# Patient Record
Sex: MALE | Race: WHITE | Employment: FULL TIME | URBAN - NONMETROPOLITAN AREA
[De-identification: names, ages, dates, MRNs, and addresses within clinical notes are randomized per-mention and may not be internally consistent; named-entity substitution may affect disease eponyms.]

---

## 2017-01-04 ENCOUNTER — OFFICE VISIT (OUTPATIENT)
Dept: PRIMARY CARE CLINIC | Facility: CLINIC | Age: 62
End: 2017-01-04

## 2017-01-04 VITALS
HEART RATE: 91 BPM | BODY MASS INDEX: 32.58 KG/M2 | RESPIRATION RATE: 14 BRPM | SYSTOLIC BLOOD PRESSURE: 143 MMHG | WEIGHT: 215 LBS | DIASTOLIC BLOOD PRESSURE: 91 MMHG | HEIGHT: 68 IN | OXYGEN SATURATION: 97 % | TEMPERATURE: 98.4 F

## 2017-01-04 DIAGNOSIS — J01.00 ACUTE NON-RECURRENT MAXILLARY SINUSITIS: Primary | ICD-10-CM

## 2017-01-04 PROCEDURE — 99213 OFFICE O/P EST LOW 20 MIN: CPT | Performed by: NURSE PRACTITIONER

## 2017-01-04 RX ORDER — AMOXICILLIN AND CLAVULANATE POTASSIUM 875; 125 MG/1; MG/1
1 TABLET, FILM COATED ORAL 2 TIMES DAILY
Qty: 14 TABLET | Refills: 0 | Status: SHIPPED | OUTPATIENT
Start: 2017-01-04 | End: 2017-01-11

## 2017-01-04 ASSESSMENT — ENCOUNTER SYMPTOMS
TROUBLE SWALLOWING: 0
COUGH: 1
SORE THROAT: 0
HEMOPTYSIS: 0
CHEST TIGHTNESS: 0
SINUS PRESSURE: 1
WHEEZING: 0
SHORTNESS OF BREATH: 0

## 2017-01-12 ENCOUNTER — OFFICE VISIT (OUTPATIENT)
Dept: UROLOGY | Facility: CLINIC | Age: 62
End: 2017-01-12

## 2017-01-12 VITALS
BODY MASS INDEX: 35.03 KG/M2 | SYSTOLIC BLOOD PRESSURE: 142 MMHG | WEIGHT: 218 LBS | DIASTOLIC BLOOD PRESSURE: 80 MMHG | HEIGHT: 66 IN

## 2017-01-12 DIAGNOSIS — N52.9 ERECTILE DYSFUNCTION, UNSPECIFIED ERECTILE DYSFUNCTION TYPE: Primary | ICD-10-CM

## 2017-01-12 PROCEDURE — 99213 OFFICE O/P EST LOW 20 MIN: CPT | Performed by: PHYSICIAN ASSISTANT

## 2017-01-25 ENCOUNTER — OFFICE VISIT (OUTPATIENT)
Dept: FAMILY MEDICINE CLINIC | Facility: CLINIC | Age: 62
End: 2017-01-25

## 2017-01-25 VITALS
WEIGHT: 222 LBS | HEIGHT: 66 IN | DIASTOLIC BLOOD PRESSURE: 86 MMHG | BODY MASS INDEX: 35.68 KG/M2 | SYSTOLIC BLOOD PRESSURE: 128 MMHG

## 2017-01-25 DIAGNOSIS — T46.4X5A ACE-INHIBITOR COUGH: ICD-10-CM

## 2017-01-25 DIAGNOSIS — F33.1 MODERATE EPISODE OF RECURRENT MAJOR DEPRESSIVE DISORDER (HCC): ICD-10-CM

## 2017-01-25 DIAGNOSIS — I10 ESSENTIAL HYPERTENSION: Primary | ICD-10-CM

## 2017-01-25 DIAGNOSIS — R05.8 ACE-INHIBITOR COUGH: ICD-10-CM

## 2017-01-25 DIAGNOSIS — E78.2 MIXED HYPERLIPIDEMIA: ICD-10-CM

## 2017-01-25 PROBLEM — F32.9 MAJOR DEPRESSION: Status: ACTIVE | Noted: 2017-01-25

## 2017-01-25 PROCEDURE — 99214 OFFICE O/P EST MOD 30 MIN: CPT | Performed by: FAMILY MEDICINE

## 2017-01-25 RX ORDER — AMLODIPINE BESYLATE 5 MG/1
5 TABLET ORAL DAILY
Qty: 30 TABLET | Refills: 5 | Status: SHIPPED | OUTPATIENT
Start: 2017-01-25 | End: 2017-08-02 | Stop reason: SDUPTHER

## 2017-01-25 RX ORDER — VENLAFAXINE HYDROCHLORIDE 150 MG/1
CAPSULE, EXTENDED RELEASE ORAL
Qty: 30 CAPSULE | Refills: 5 | Status: SHIPPED | OUTPATIENT
Start: 2017-01-25 | End: 2017-08-02 | Stop reason: SDUPTHER

## 2017-01-25 RX ORDER — LISINOPRIL 10 MG/1
10 TABLET ORAL DAILY
Qty: 30 TABLET | Refills: 5 | Status: SHIPPED | OUTPATIENT
Start: 2017-01-25 | End: 2017-01-25

## 2017-01-25 RX ORDER — LISINOPRIL 10 MG/1
TABLET ORAL
COMMUNITY
Start: 2017-01-02 | End: 2017-01-25 | Stop reason: SDUPTHER

## 2017-01-25 RX ORDER — SIMVASTATIN 20 MG
TABLET ORAL
Qty: 30 TABLET | Refills: 5 | Status: SHIPPED | OUTPATIENT
Start: 2017-01-25 | End: 2017-07-14 | Stop reason: ALTCHOICE

## 2017-01-25 ASSESSMENT — ENCOUNTER SYMPTOMS
SHORTNESS OF BREATH: 0
BLURRED VISION: 0
ORTHOPNEA: 0

## 2017-03-17 ENCOUNTER — OFFICE VISIT (OUTPATIENT)
Dept: FAMILY MEDICINE CLINIC | Age: 62
End: 2017-03-17
Payer: COMMERCIAL

## 2017-03-17 ENCOUNTER — HOSPITAL ENCOUNTER (OUTPATIENT)
Age: 62
Setting detail: SPECIMEN
Discharge: HOME | End: 2017-03-17
Payer: COMMERCIAL

## 2017-03-17 VITALS
BODY MASS INDEX: 35.36 KG/M2 | WEIGHT: 220 LBS | SYSTOLIC BLOOD PRESSURE: 136 MMHG | DIASTOLIC BLOOD PRESSURE: 82 MMHG | HEIGHT: 66 IN

## 2017-03-17 DIAGNOSIS — N41.0 ACUTE PROSTATITIS: Primary | ICD-10-CM

## 2017-03-17 DIAGNOSIS — M79.675 PAIN OF TOE OF LEFT FOOT: ICD-10-CM

## 2017-03-17 PROCEDURE — 87086 URINE CULTURE/COLONY COUNT: CPT

## 2017-03-17 PROCEDURE — 99214 OFFICE O/P EST MOD 30 MIN: CPT | Performed by: FAMILY MEDICINE

## 2017-03-17 PROCEDURE — 81002 URINALYSIS NONAUTO W/O SCOPE: CPT | Performed by: FAMILY MEDICINE

## 2017-03-17 RX ORDER — SULFAMETHOXAZOLE AND TRIMETHOPRIM 800; 160 MG/1; MG/1
1 TABLET ORAL 2 TIMES DAILY
Qty: 28 TABLET | Refills: 0 | Status: SHIPPED | OUTPATIENT
Start: 2017-03-17 | End: 2017-03-31

## 2017-03-17 ASSESSMENT — ENCOUNTER SYMPTOMS: RESPIRATORY NEGATIVE: 1

## 2017-03-18 LAB
CULTURE: NO GROWTH
CULTURE: NORMAL
Lab: NORMAL
SPECIMEN DESCRIPTION: NORMAL
SPECIMEN DESCRIPTION: NORMAL
STATUS: NORMAL

## 2017-03-20 LAB
BILIRUBIN, POC: ABNORMAL
BLOOD URINE, POC: ABNORMAL
CLARITY, POC: ABNORMAL
COLOR, POC: ABNORMAL
GLUCOSE URINE, POC: ABNORMAL
KETONES, POC: ABNORMAL
LEUKOCYTE EST, POC: ABNORMAL
NITRITE, POC: ABNORMAL
PH, POC: 5.5
PROTEIN, POC: ABNORMAL
SPECIFIC GRAVITY, POC: 1.02
UROBILINOGEN, POC: 0.2

## 2017-03-31 ENCOUNTER — OFFICE VISIT (OUTPATIENT)
Dept: FAMILY MEDICINE CLINIC | Age: 62
End: 2017-03-31
Payer: COMMERCIAL

## 2017-03-31 VITALS
BODY MASS INDEX: 35.36 KG/M2 | HEIGHT: 66 IN | TEMPERATURE: 98 F | SYSTOLIC BLOOD PRESSURE: 138 MMHG | WEIGHT: 220 LBS | OXYGEN SATURATION: 96 % | HEART RATE: 90 BPM | DIASTOLIC BLOOD PRESSURE: 82 MMHG

## 2017-03-31 DIAGNOSIS — J06.9 VIRAL URI WITH COUGH: Primary | ICD-10-CM

## 2017-03-31 PROCEDURE — 99213 OFFICE O/P EST LOW 20 MIN: CPT | Performed by: NURSE PRACTITIONER

## 2017-03-31 ASSESSMENT — ENCOUNTER SYMPTOMS
RHINORRHEA: 0
SORE THROAT: 1
DIARRHEA: 0
SINUS PAIN: 0
NAUSEA: 1
WHEEZING: 1
COUGH: 1
VOMITING: 0

## 2017-04-05 ENCOUNTER — OFFICE VISIT (OUTPATIENT)
Dept: PRIMARY CARE CLINIC | Age: 62
End: 2017-04-05
Payer: COMMERCIAL

## 2017-04-05 VITALS
TEMPERATURE: 98.1 F | DIASTOLIC BLOOD PRESSURE: 95 MMHG | WEIGHT: 219.1 LBS | OXYGEN SATURATION: 97 % | SYSTOLIC BLOOD PRESSURE: 156 MMHG | HEART RATE: 92 BPM | RESPIRATION RATE: 16 BRPM | BODY MASS INDEX: 35.36 KG/M2

## 2017-04-05 DIAGNOSIS — J01.40 ACUTE NON-RECURRENT PANSINUSITIS: Primary | ICD-10-CM

## 2017-04-05 PROCEDURE — 99213 OFFICE O/P EST LOW 20 MIN: CPT | Performed by: NURSE PRACTITIONER

## 2017-04-05 RX ORDER — AMOXICILLIN AND CLAVULANATE POTASSIUM 875; 125 MG/1; MG/1
1 TABLET, FILM COATED ORAL 2 TIMES DAILY
Qty: 20 TABLET | Refills: 0 | Status: SHIPPED | OUTPATIENT
Start: 2017-04-05 | End: 2017-04-15

## 2017-04-05 ASSESSMENT — ENCOUNTER SYMPTOMS
SORE THROAT: 1
WHEEZING: 1
NAUSEA: 1
DIARRHEA: 0
SHORTNESS OF BREATH: 0
RHINORRHEA: 1
SINUS PRESSURE: 1
COUGH: 1
VOMITING: 0
HOARSE VOICE: 0
SWOLLEN GLANDS: 0

## 2017-05-03 ENCOUNTER — OFFICE VISIT (OUTPATIENT)
Dept: FAMILY MEDICINE CLINIC | Age: 62
End: 2017-05-03
Payer: COMMERCIAL

## 2017-05-03 ENCOUNTER — HOSPITAL ENCOUNTER (OUTPATIENT)
Age: 62
Discharge: HOME | End: 2017-05-03
Payer: COMMERCIAL

## 2017-05-03 VITALS
WEIGHT: 221 LBS | SYSTOLIC BLOOD PRESSURE: 128 MMHG | BODY MASS INDEX: 35.52 KG/M2 | DIASTOLIC BLOOD PRESSURE: 86 MMHG | HEIGHT: 66 IN

## 2017-05-03 DIAGNOSIS — K62.5 RECTAL BLEEDING: ICD-10-CM

## 2017-05-03 DIAGNOSIS — R10.32 LLQ PAIN: ICD-10-CM

## 2017-05-03 DIAGNOSIS — E78.2 MIXED HYPERLIPIDEMIA: ICD-10-CM

## 2017-05-03 DIAGNOSIS — R10.32 LLQ PAIN: Primary | ICD-10-CM

## 2017-05-03 DIAGNOSIS — E78.2 MIXED HYPERLIPIDEMIA: Primary | ICD-10-CM

## 2017-05-03 LAB
ABSOLUTE EOS #: 0 K/UL (ref 0–0.4)
ABSOLUTE LYMPH #: 1 K/UL (ref 0.9–2.5)
ABSOLUTE MONO #: 0.6 K/UL (ref 0–1)
ALBUMIN SERPL-MCNC: 4.4 G/DL (ref 3.5–5.2)
ALBUMIN/GLOBULIN RATIO: ABNORMAL (ref 1–2.5)
ALP BLD-CCNC: 100 U/L (ref 40–129)
ALT SERPL-CCNC: 37 U/L (ref 5–41)
ANION GAP SERPL CALCULATED.3IONS-SCNC: 13 MMOL/L (ref 9–17)
AST SERPL-CCNC: 26 U/L
BASOPHILS # BLD: 0 %
BASOPHILS ABSOLUTE: 0 K/UL (ref 0–0.2)
BILIRUB SERPL-MCNC: 0.56 MG/DL (ref 0.3–1.2)
BUN BLDV-MCNC: 12 MG/DL (ref 8–23)
BUN/CREAT BLD: 15 (ref 9–20)
CALCIUM SERPL-MCNC: 9.2 MG/DL (ref 8.6–10.4)
CHLORIDE BLD-SCNC: 102 MMOL/L (ref 98–107)
CHOLESTEROL/HDL RATIO: 6.2
CHOLESTEROL: 218 MG/DL
CO2: 23 MMOL/L (ref 20–31)
CREAT SERPL-MCNC: 0.8 MG/DL (ref 0.7–1.2)
DIFFERENTIAL TYPE: YES
EOSINOPHILS RELATIVE PERCENT: 0 %
GFR AFRICAN AMERICAN: >60 ML/MIN
GFR NON-AFRICAN AMERICAN: >60 ML/MIN
GFR SERPL CREATININE-BSD FRML MDRD: ABNORMAL ML/MIN/{1.73_M2}
GFR SERPL CREATININE-BSD FRML MDRD: ABNORMAL ML/MIN/{1.73_M2}
GLUCOSE BLD-MCNC: 108 MG/DL (ref 70–99)
HCT VFR BLD CALC: 40.6 % (ref 41–53)
HDLC SERPL-MCNC: 35 MG/DL
HEMOGLOBIN: 13.7 G/DL (ref 13.5–17.5)
LDL CHOLESTEROL: 134 MG/DL (ref 0–130)
LYMPHOCYTES # BLD: 16 %
MCH RBC QN AUTO: 29.7 PG (ref 26–34)
MCHC RBC AUTO-ENTMCNC: 33.8 G/DL (ref 31–37)
MCV RBC AUTO: 87.9 FL (ref 80–100)
MONOCYTES # BLD: 9 %
PATIENT FASTING?: YES
PDW BLD-RTO: 14.1 % (ref 12.1–15.2)
PLATELET # BLD: 283 K/UL (ref 140–450)
PLATELET ESTIMATE: ABNORMAL
PMV BLD AUTO: ABNORMAL FL (ref 6–12)
POTASSIUM SERPL-SCNC: 4.5 MMOL/L (ref 3.7–5.3)
RBC # BLD: 4.61 M/UL (ref 4.5–5.9)
RBC # BLD: ABNORMAL 10*6/UL
SEG NEUTROPHILS: 75 %
SEGMENTED NEUTROPHILS ABSOLUTE COUNT: 4.6 K/UL (ref 2.1–6.5)
SODIUM BLD-SCNC: 138 MMOL/L (ref 135–144)
TOTAL PROTEIN: 7.1 G/DL (ref 6.4–8.3)
TRIGL SERPL-MCNC: 243 MG/DL
VLDLC SERPL CALC-MCNC: ABNORMAL MG/DL (ref 1–30)
WBC # BLD: 6.2 K/UL (ref 3.5–11)
WBC # BLD: ABNORMAL 10*3/UL

## 2017-05-03 PROCEDURE — 99214 OFFICE O/P EST MOD 30 MIN: CPT | Performed by: FAMILY MEDICINE

## 2017-05-03 PROCEDURE — 36415 COLL VENOUS BLD VENIPUNCTURE: CPT

## 2017-05-03 PROCEDURE — 85025 COMPLETE CBC W/AUTO DIFF WBC: CPT

## 2017-05-03 PROCEDURE — 80061 LIPID PANEL: CPT

## 2017-05-03 PROCEDURE — 80053 COMPREHEN METABOLIC PANEL: CPT

## 2017-05-03 RX ORDER — ATORVASTATIN CALCIUM 40 MG/1
40 TABLET, FILM COATED ORAL DAILY
Qty: 90 TABLET | Refills: 1 | Status: SHIPPED | OUTPATIENT
Start: 2017-05-03 | End: 2017-07-02 | Stop reason: ALTCHOICE

## 2017-05-03 ASSESSMENT — PATIENT HEALTH QUESTIONNAIRE - PHQ9
SUM OF ALL RESPONSES TO PHQ QUESTIONS 1-9: 0
SUM OF ALL RESPONSES TO PHQ9 QUESTIONS 1 & 2: 0
1. LITTLE INTEREST OR PLEASURE IN DOING THINGS: 0
2. FEELING DOWN, DEPRESSED OR HOPELESS: 0

## 2017-05-03 ASSESSMENT — ENCOUNTER SYMPTOMS: RESPIRATORY NEGATIVE: 1

## 2017-06-22 ENCOUNTER — OFFICE VISIT (OUTPATIENT)
Dept: FAMILY MEDICINE CLINIC | Age: 62
End: 2017-06-22
Payer: COMMERCIAL

## 2017-06-22 VITALS
DIASTOLIC BLOOD PRESSURE: 80 MMHG | SYSTOLIC BLOOD PRESSURE: 120 MMHG | HEIGHT: 66 IN | BODY MASS INDEX: 35.84 KG/M2 | WEIGHT: 223 LBS

## 2017-06-22 DIAGNOSIS — R51.9 MORNING HEADACHE: ICD-10-CM

## 2017-06-22 DIAGNOSIS — G47.30 OBSERVED SLEEP APNEA: ICD-10-CM

## 2017-06-22 DIAGNOSIS — R06.83 SNORING: Primary | ICD-10-CM

## 2017-06-22 PROCEDURE — 99213 OFFICE O/P EST LOW 20 MIN: CPT | Performed by: FAMILY MEDICINE

## 2017-06-22 ASSESSMENT — PATIENT HEALTH QUESTIONNAIRE - PHQ9
SUM OF ALL RESPONSES TO PHQ QUESTIONS 1-9: 0
1. LITTLE INTEREST OR PLEASURE IN DOING THINGS: 0
2. FEELING DOWN, DEPRESSED OR HOPELESS: 0
SUM OF ALL RESPONSES TO PHQ9 QUESTIONS 1 & 2: 0

## 2017-06-26 ASSESSMENT — ENCOUNTER SYMPTOMS: GASTROINTESTINAL NEGATIVE: 1

## 2017-07-02 ENCOUNTER — APPOINTMENT (OUTPATIENT)
Dept: GENERAL RADIOLOGY | Age: 62
End: 2017-07-02
Payer: COMMERCIAL

## 2017-07-02 ENCOUNTER — HOSPITAL ENCOUNTER (EMERGENCY)
Age: 62
Discharge: HOME | End: 2017-07-02
Attending: EMERGENCY MEDICINE
Payer: COMMERCIAL

## 2017-07-02 VITALS
DIASTOLIC BLOOD PRESSURE: 87 MMHG | HEART RATE: 87 BPM | RESPIRATION RATE: 20 BRPM | TEMPERATURE: 98.1 F | SYSTOLIC BLOOD PRESSURE: 138 MMHG | OXYGEN SATURATION: 98 %

## 2017-07-02 DIAGNOSIS — S62.639A CLOSED FRACTURE OF TUFT OF DISTAL PHALANX OF FINGER, INITIAL ENCOUNTER: Primary | ICD-10-CM

## 2017-07-02 PROCEDURE — 99283 EMERGENCY DEPT VISIT LOW MDM: CPT

## 2017-07-02 PROCEDURE — 2500000003 HC RX 250 WO HCPCS: Performed by: EMERGENCY MEDICINE

## 2017-07-02 PROCEDURE — 6370000000 HC RX 637 (ALT 250 FOR IP): Performed by: EMERGENCY MEDICINE

## 2017-07-02 PROCEDURE — 73130 X-RAY EXAM OF HAND: CPT

## 2017-07-02 PROCEDURE — 12001 RPR S/N/AX/GEN/TRNK 2.5CM/<: CPT

## 2017-07-02 RX ORDER — CEPHALEXIN 500 MG/1
500 CAPSULE ORAL 3 TIMES DAILY
Qty: 21 CAPSULE | Refills: 0 | Status: SHIPPED | OUTPATIENT
Start: 2017-07-02 | End: 2017-07-09

## 2017-07-02 RX ORDER — IBUPROFEN 200 MG
400 TABLET ORAL ONCE
Status: COMPLETED | OUTPATIENT
Start: 2017-07-02 | End: 2017-07-02

## 2017-07-02 RX ORDER — CEPHALEXIN 250 MG/1
250 CAPSULE ORAL ONCE
Status: COMPLETED | OUTPATIENT
Start: 2017-07-02 | End: 2017-07-02

## 2017-07-02 RX ORDER — DIAPER,BRIEF,INFANT-TODD,DISP
EACH MISCELLANEOUS ONCE
Status: COMPLETED | OUTPATIENT
Start: 2017-07-02 | End: 2017-07-02

## 2017-07-02 RX ORDER — LIDOCAINE HYDROCHLORIDE 10 MG/ML
5 INJECTION, SOLUTION EPIDURAL; INFILTRATION; INTRACAUDAL; PERINEURAL ONCE
Status: COMPLETED | OUTPATIENT
Start: 2017-07-02 | End: 2017-07-02

## 2017-07-02 RX ADMIN — LIDOCAINE HYDROCHLORIDE 5 ML: 10 INJECTION, SOLUTION EPIDURAL; INFILTRATION; INTRACAUDAL; PERINEURAL at 17:47

## 2017-07-02 RX ADMIN — CEPHALEXIN 250 MG: 250 CAPSULE ORAL at 17:52

## 2017-07-02 RX ADMIN — BACITRACIN ZINC 1 G: 500 OINTMENT TOPICAL at 18:24

## 2017-07-02 RX ADMIN — IBUPROFEN 400 MG: 200 TABLET, FILM COATED ORAL at 18:24

## 2017-07-02 ASSESSMENT — ENCOUNTER SYMPTOMS
RESPIRATORY NEGATIVE: 1
GASTROINTESTINAL NEGATIVE: 1
ROS SKIN COMMENTS: LEFT RING FINGER
EYES NEGATIVE: 1
ALLERGIC/IMMUNOLOGIC NEGATIVE: 1

## 2017-07-02 ASSESSMENT — PAIN DESCRIPTION - DESCRIPTORS: DESCRIPTORS: THROBBING

## 2017-07-02 ASSESSMENT — PAIN SCALES - GENERAL
PAINLEVEL_OUTOF10: 3
PAINLEVEL_OUTOF10: 3

## 2017-07-02 ASSESSMENT — PAIN DESCRIPTION - PAIN TYPE: TYPE: ACUTE PAIN

## 2017-07-02 ASSESSMENT — PAIN DESCRIPTION - ORIENTATION: ORIENTATION: LEFT

## 2017-07-02 ASSESSMENT — PAIN DESCRIPTION - LOCATION: LOCATION: FINGER (COMMENT WHICH ONE)

## 2017-07-02 ASSESSMENT — PAIN DESCRIPTION - PROGRESSION: CLINICAL_PROGRESSION: NOT CHANGED

## 2017-07-02 ASSESSMENT — PAIN DESCRIPTION - FREQUENCY: FREQUENCY: CONTINUOUS

## 2017-07-14 ENCOUNTER — OFFICE VISIT (OUTPATIENT)
Dept: FAMILY MEDICINE CLINIC | Age: 62
End: 2017-07-14
Payer: COMMERCIAL

## 2017-07-14 VITALS
WEIGHT: 226 LBS | BODY MASS INDEX: 36.32 KG/M2 | HEIGHT: 66 IN | DIASTOLIC BLOOD PRESSURE: 80 MMHG | SYSTOLIC BLOOD PRESSURE: 120 MMHG

## 2017-07-14 DIAGNOSIS — S62.639D CLOSED FRACTURE OF TUFT OF DISTAL PHALANX OF FINGER, WITH ROUTINE HEALING, SUBSEQUENT ENCOUNTER: Primary | ICD-10-CM

## 2017-07-14 DIAGNOSIS — Z48.02 VISIT FOR SUTURE REMOVAL: ICD-10-CM

## 2017-07-14 DIAGNOSIS — S61.219D FINGER LACERATION, SUBSEQUENT ENCOUNTER: ICD-10-CM

## 2017-07-14 PROCEDURE — 99213 OFFICE O/P EST LOW 20 MIN: CPT | Performed by: FAMILY MEDICINE

## 2017-07-14 RX ORDER — ATORVASTATIN CALCIUM 40 MG/1
TABLET, FILM COATED ORAL
Refills: 0 | COMMUNITY
Start: 2017-07-09 | End: 2017-08-02 | Stop reason: SDUPTHER

## 2017-07-17 ASSESSMENT — ENCOUNTER SYMPTOMS: GASTROINTESTINAL NEGATIVE: 1

## 2017-07-31 ENCOUNTER — HOSPITAL ENCOUNTER (OUTPATIENT)
Age: 62
Discharge: HOME | End: 2017-07-31
Payer: COMMERCIAL

## 2017-07-31 ENCOUNTER — HOSPITAL ENCOUNTER (OUTPATIENT)
Dept: GENERAL RADIOLOGY | Age: 62
Discharge: HOME | End: 2017-07-31
Payer: COMMERCIAL

## 2017-07-31 DIAGNOSIS — S62.639D CLOSED FRACTURE OF TUFT OF DISTAL PHALANX OF FINGER, WITH ROUTINE HEALING, SUBSEQUENT ENCOUNTER: ICD-10-CM

## 2017-07-31 PROCEDURE — 73140 X-RAY EXAM OF FINGER(S): CPT

## 2017-08-02 ENCOUNTER — OFFICE VISIT (OUTPATIENT)
Dept: FAMILY MEDICINE CLINIC | Age: 62
End: 2017-08-02
Payer: COMMERCIAL

## 2017-08-02 VITALS
DIASTOLIC BLOOD PRESSURE: 80 MMHG | WEIGHT: 227 LBS | HEIGHT: 66 IN | SYSTOLIC BLOOD PRESSURE: 130 MMHG | BODY MASS INDEX: 36.48 KG/M2

## 2017-08-02 DIAGNOSIS — B35.6 JOCK ITCH: ICD-10-CM

## 2017-08-02 DIAGNOSIS — F32.A ANXIETY AND DEPRESSION: ICD-10-CM

## 2017-08-02 DIAGNOSIS — Z12.5 PROSTATE CANCER SCREENING: ICD-10-CM

## 2017-08-02 DIAGNOSIS — E78.2 MIXED HYPERLIPIDEMIA: ICD-10-CM

## 2017-08-02 DIAGNOSIS — S62.639G: Primary | ICD-10-CM

## 2017-08-02 DIAGNOSIS — F41.9 ANXIETY AND DEPRESSION: ICD-10-CM

## 2017-08-02 DIAGNOSIS — M85.80 OSTEOPENIA DETERMINED BY X-RAY: ICD-10-CM

## 2017-08-02 DIAGNOSIS — I10 ESSENTIAL HYPERTENSION: ICD-10-CM

## 2017-08-02 PROCEDURE — 99214 OFFICE O/P EST MOD 30 MIN: CPT | Performed by: FAMILY MEDICINE

## 2017-08-02 RX ORDER — VENLAFAXINE HYDROCHLORIDE 150 MG/1
CAPSULE, EXTENDED RELEASE ORAL
Qty: 30 CAPSULE | Refills: 5 | Status: SHIPPED | OUTPATIENT
Start: 2017-08-02 | End: 2017-11-01 | Stop reason: SDUPTHER

## 2017-08-02 RX ORDER — ATORVASTATIN CALCIUM 40 MG/1
40 TABLET, FILM COATED ORAL DAILY
Qty: 30 TABLET | Refills: 5 | Status: SHIPPED | OUTPATIENT
Start: 2017-08-02 | End: 2018-03-09 | Stop reason: SDUPTHER

## 2017-08-02 RX ORDER — AMLODIPINE BESYLATE 5 MG/1
5 TABLET ORAL DAILY
Qty: 30 TABLET | Refills: 5 | Status: SHIPPED | OUTPATIENT
Start: 2017-08-02 | End: 2018-03-09 | Stop reason: SDUPTHER

## 2017-08-02 ASSESSMENT — ENCOUNTER SYMPTOMS
RESPIRATORY NEGATIVE: 1
GASTROINTESTINAL NEGATIVE: 1

## 2017-08-14 RX ORDER — AMLODIPINE BESYLATE 5 MG/1
TABLET ORAL
Qty: 30 TABLET | Refills: 5 | OUTPATIENT
Start: 2017-08-14

## 2017-08-18 ENCOUNTER — HOSPITAL ENCOUNTER (OUTPATIENT)
Dept: BONE DENSITY | Age: 62
Discharge: HOME | End: 2017-08-18
Payer: COMMERCIAL

## 2017-08-18 DIAGNOSIS — M85.80 OSTEOPENIA DETERMINED BY X-RAY: ICD-10-CM

## 2017-08-18 PROCEDURE — 77080 DXA BONE DENSITY AXIAL: CPT

## 2017-10-23 ENCOUNTER — OFFICE VISIT (OUTPATIENT)
Dept: FAMILY MEDICINE CLINIC | Age: 62
End: 2017-10-23
Payer: COMMERCIAL

## 2017-10-23 VITALS — HEIGHT: 66 IN | BODY MASS INDEX: 36 KG/M2 | WEIGHT: 224 LBS

## 2017-10-23 DIAGNOSIS — L03.311 CELLULITIS OF ABDOMINAL WALL: Primary | ICD-10-CM

## 2017-10-23 DIAGNOSIS — L98.492 ULCER OF ABDOMEN WALL WITH FAT LAYER EXPOSED (HCC): ICD-10-CM

## 2017-10-23 PROCEDURE — 10060 I&D ABSCESS SIMPLE/SINGLE: CPT | Performed by: FAMILY MEDICINE

## 2017-10-23 PROCEDURE — 99213 OFFICE O/P EST LOW 20 MIN: CPT | Performed by: FAMILY MEDICINE

## 2017-10-23 RX ORDER — SULFAMETHOXAZOLE AND TRIMETHOPRIM 800; 160 MG/1; MG/1
1 TABLET ORAL 2 TIMES DAILY
Qty: 20 TABLET | Refills: 0 | Status: SHIPPED | OUTPATIENT
Start: 2017-10-23 | End: 2017-11-02

## 2017-10-23 NOTE — PROGRESS NOTES
current medications, diet and exercise. Discussed use, benefit, and side effects of prescribed medications. Barriers to medication compliance addressed. Patient given educational materials - see patient instructions. All patient questions answered. Patient voiced understanding.          Electronically signed by Odean Schilder, DO on 10/23/2017 at 5:43 PM

## 2017-11-01 ENCOUNTER — HOSPITAL ENCOUNTER (OUTPATIENT)
Age: 62
Discharge: HOME | End: 2017-11-01
Payer: COMMERCIAL

## 2017-11-01 ENCOUNTER — HOSPITAL ENCOUNTER (OUTPATIENT)
Dept: GENERAL RADIOLOGY | Age: 62
Discharge: HOME | End: 2017-11-01
Payer: COMMERCIAL

## 2017-11-01 ENCOUNTER — OFFICE VISIT (OUTPATIENT)
Dept: FAMILY MEDICINE CLINIC | Age: 62
End: 2017-11-01
Payer: COMMERCIAL

## 2017-11-01 VITALS
DIASTOLIC BLOOD PRESSURE: 82 MMHG | SYSTOLIC BLOOD PRESSURE: 122 MMHG | HEIGHT: 66 IN | BODY MASS INDEX: 36 KG/M2 | WEIGHT: 224 LBS

## 2017-11-01 DIAGNOSIS — R51.9 OCCIPITAL HEADACHE: Primary | ICD-10-CM

## 2017-11-01 DIAGNOSIS — R51.9 OCCIPITAL HEADACHE: ICD-10-CM

## 2017-11-01 DIAGNOSIS — M54.2 CHRONIC NECK PAIN: ICD-10-CM

## 2017-11-01 DIAGNOSIS — G89.29 CHRONIC NECK PAIN: ICD-10-CM

## 2017-11-01 PROCEDURE — 99214 OFFICE O/P EST MOD 30 MIN: CPT | Performed by: FAMILY MEDICINE

## 2017-11-01 PROCEDURE — 72050 X-RAY EXAM NECK SPINE 4/5VWS: CPT

## 2017-11-01 RX ORDER — CYCLOBENZAPRINE HCL 5 MG
5-10 TABLET ORAL 3 TIMES DAILY PRN
Qty: 30 TABLET | Refills: 1 | Status: SHIPPED | OUTPATIENT
Start: 2017-11-01 | End: 2018-05-16 | Stop reason: ALTCHOICE

## 2017-11-01 RX ORDER — VENLAFAXINE HYDROCHLORIDE 150 MG/1
CAPSULE, EXTENDED RELEASE ORAL
Qty: 30 CAPSULE | Refills: 5 | Status: SHIPPED | OUTPATIENT
Start: 2017-11-01 | End: 2018-10-03 | Stop reason: SDUPTHER

## 2017-11-01 NOTE — PATIENT INSTRUCTIONS
SURVEY:    You may be receiving a survey from [email or web address] regarding your visit today. Please complete the survey to enable us to provide the highest quality of care to you and your family. If you cannot score us as very good on any question, please call the office to discuss how we could have made your experience exceptional.     Thank you.

## 2017-11-01 NOTE — PROGRESS NOTES
Name: Onel Cruz  : 1955         Chief Complaint:     Chief Complaint   Patient presents with    Headache     patient woke up with a migraine, took excedrin, mostly gone but his wife insisted that he see the doctor. History of Present Illness:      Onel Cruz is a 65 y.o.  male who presents with Headache (patient woke up with a migraine, took excedrin, mostly gone but his wife insisted that he see the doctor. )      HPI    Pt waking up with posterior headache almost every day. Not there on going to sleep. If pain very bad sometimes gets nausea but hasn't vomited. Makes it difficult to do work and other activities. Not usually pounding. Sometimes feels like pain is r/t a stiff neck. Used to have My Pillow but hasn't found it since moving and thinks sleeping on different pillow (has tried a few) may be causing symptoms. Bothered by bright light during headaches. Helped by excedrine or aspirin but not by tylenol or advil. Pt and wife have been trying to cut out caffeine. Drinks fair amt of iced tea - since weather has gotten cooler drinks 1 bottle of pure leaf iced tea, estimates 16 oz. Ran out of effexor about 2 days ago. Has run out before and didn't get headaches. Headaches are different than they had been a few mos ago - at that point had been frontal and now they're biparietal. No paresthesias in upper extremities. Pt relates very long history of headaches throughout adulthood. Missed work d/t headache today and had to go home for a couple hrs during work yesterday. Lesion on L abdomen much better, just a little pain if pushed right in the middle. Medical History:     Patient Active Problem List   Diagnosis    Major depression    Hypertension    Mixed hyperlipidemia       Medications:       Prior to Admission medications    Medication Sig Start Date End Date Taking?  Authorizing Provider   cyclobenzaprine (FLEXERIL) 5 MG tablet Take 1-2 tablets by mouth 3 times daily as needed for Muscle spasms 11/1/17  Yes Aura Chew, DO   venlafaxine (EFFEXOR XR) 150 MG extended release capsule take 1 capsule by mouth daily 11/1/17  Yes Aura Chew, DO   atorvastatin (LIPITOR) 40 MG tablet Take 1 tablet by mouth daily 8/2/17   Aura Chew, DO   amLODIPine (NORVASC) 5 MG tablet Take 1 tablet by mouth daily 8/2/17   Aura Chew, DO   sildenafil (VIAGRA) 100 MG tablet Take 1 tablet by mouth as needed for Erectile Dysfunction 8/11/16   Angelina Garcia MD   [facility] [place] Natural Products (GLUCOSAMINE CHOND COMPLEX/MSM PO) Take 2 tablets by mouth daily    Historical Provider, MD   aspirin 81 MG tablet Take 81 mg by mouth daily    Historical Provider, MD   Multiple Vitamins-Minerals (THERAPEUTIC MULTIVITAMIN-MINERALS) tablet Take 1 tablet by mouth daily    Historical Provider, MD        Allergies:       Codeine    Review of Systems:     Positive and Negative as described in HPI    Review of Systems   Constitutional: Negative. Respiratory: Negative. Gastrointestinal: Negative. Physical Exam:     Vitals:  /82   Ht 5' 6\" (1.676 m)   Wt 224 lb (101.6 kg)   BMI 36.15 kg/m²   Physical Exam   Constitutional: He is oriented to person, place, and time. He appears well-developed and well-nourished. No distress. Eyes: Conjunctivae and EOM are normal. Pupils are equal, round, and reactive to light. Neck: Neck supple. Cardiovascular: Normal rate, regular rhythm, normal heart sounds and intact distal pulses. No peripheral edema. Pulmonary/Chest: Effort normal and breath sounds normal.   Musculoskeletal: He exhibits no edema. TTP upper cervical paraspinals   Lymphadenopathy:     He has no cervical adenopathy. Neurological: He is alert and oriented to person, place, and time. Skin: Skin is warm and dry. Psychiatric: He has a normal mood and affect. Judgment normal.   Nursing note and vitals reviewed.       Data:     Lab Results   Component Value Date     05/03/2017

## 2017-11-03 ASSESSMENT — ENCOUNTER SYMPTOMS
RESPIRATORY NEGATIVE: 1
GASTROINTESTINAL NEGATIVE: 1

## 2018-03-12 RX ORDER — ATORVASTATIN CALCIUM 40 MG/1
TABLET, FILM COATED ORAL
Qty: 30 TABLET | Refills: 5 | Status: SHIPPED | OUTPATIENT
Start: 2018-03-12 | End: 2018-10-03 | Stop reason: SDUPTHER

## 2018-03-12 RX ORDER — AMLODIPINE BESYLATE 5 MG/1
TABLET ORAL
Qty: 30 TABLET | Refills: 5 | Status: SHIPPED | OUTPATIENT
Start: 2018-03-12 | End: 2018-10-03 | Stop reason: SDUPTHER

## 2018-05-16 ENCOUNTER — OFFICE VISIT (OUTPATIENT)
Dept: FAMILY MEDICINE CLINIC | Age: 63
End: 2018-05-16
Payer: COMMERCIAL

## 2018-05-16 VITALS
SYSTOLIC BLOOD PRESSURE: 118 MMHG | DIASTOLIC BLOOD PRESSURE: 78 MMHG | HEIGHT: 66 IN | WEIGHT: 223 LBS | BODY MASS INDEX: 35.84 KG/M2

## 2018-05-16 DIAGNOSIS — Z01.812 PRE-PROCEDURE LAB EXAM: ICD-10-CM

## 2018-05-16 DIAGNOSIS — G47.30 OBSERVED SLEEP APNEA: ICD-10-CM

## 2018-05-16 DIAGNOSIS — R10.32 LEFT GROIN PAIN: Primary | ICD-10-CM

## 2018-05-16 DIAGNOSIS — R51.9 MORNING HEADACHE: ICD-10-CM

## 2018-05-16 DIAGNOSIS — E29.1 HYPOGONADISM MALE: ICD-10-CM

## 2018-05-16 PROCEDURE — 99214 OFFICE O/P EST MOD 30 MIN: CPT | Performed by: FAMILY MEDICINE

## 2018-05-16 ASSESSMENT — PATIENT HEALTH QUESTIONNAIRE - PHQ9
SUM OF ALL RESPONSES TO PHQ QUESTIONS 1-9: 3
2. FEELING DOWN, DEPRESSED OR HOPELESS: 1
1. LITTLE INTEREST OR PLEASURE IN DOING THINGS: 2
SUM OF ALL RESPONSES TO PHQ9 QUESTIONS 1 & 2: 3

## 2018-05-19 ASSESSMENT — ENCOUNTER SYMPTOMS: RESPIRATORY NEGATIVE: 1

## 2018-05-23 ENCOUNTER — HOSPITAL ENCOUNTER (OUTPATIENT)
Dept: CT IMAGING | Age: 63
Discharge: HOME | End: 2018-05-25
Payer: COMMERCIAL

## 2018-05-23 ENCOUNTER — TELEPHONE (OUTPATIENT)
Dept: FAMILY MEDICINE CLINIC | Age: 63
End: 2018-05-23

## 2018-05-23 ENCOUNTER — HOSPITAL ENCOUNTER (OUTPATIENT)
Age: 63
Discharge: HOME | End: 2018-05-23
Payer: COMMERCIAL

## 2018-05-23 DIAGNOSIS — R93.5 ABNORMAL CT OF THE ABDOMEN: Primary | ICD-10-CM

## 2018-05-23 DIAGNOSIS — R10.32 LLQ PAIN: ICD-10-CM

## 2018-05-23 DIAGNOSIS — R10.32 LEFT GROIN PAIN: ICD-10-CM

## 2018-05-23 DIAGNOSIS — Z01.812 PRE-PROCEDURE LAB EXAM: ICD-10-CM

## 2018-05-23 DIAGNOSIS — E29.1 HYPOGONADISM MALE: ICD-10-CM

## 2018-05-23 DIAGNOSIS — E29.1 HYPOGONADISM IN MALE: ICD-10-CM

## 2018-05-23 LAB
ANION GAP SERPL CALCULATED.3IONS-SCNC: 11 MMOL/L (ref 9–17)
BUN BLDV-MCNC: 15 MG/DL (ref 8–23)
BUN/CREAT BLD: 19 (ref 9–20)
CALCIUM SERPL-MCNC: 8.8 MG/DL (ref 8.6–10.4)
CHLORIDE BLD-SCNC: 104 MMOL/L (ref 98–107)
CO2: 24 MMOL/L (ref 20–31)
CREAT SERPL-MCNC: 0.8 MG/DL (ref 0.7–1.2)
GFR AFRICAN AMERICAN: >60 ML/MIN
GFR NON-AFRICAN AMERICAN: >60 ML/MIN
GFR SERPL CREATININE-BSD FRML MDRD: ABNORMAL ML/MIN/{1.73_M2}
GFR SERPL CREATININE-BSD FRML MDRD: ABNORMAL ML/MIN/{1.73_M2}
GLUCOSE BLD-MCNC: 115 MG/DL (ref 70–99)
POTASSIUM SERPL-SCNC: 3.8 MMOL/L (ref 3.7–5.3)
SODIUM BLD-SCNC: 139 MMOL/L (ref 135–144)

## 2018-05-23 PROCEDURE — 6360000004 HC RX CONTRAST MEDICATION: Performed by: FAMILY MEDICINE

## 2018-05-23 PROCEDURE — 80048 BASIC METABOLIC PNL TOTAL CA: CPT

## 2018-05-23 PROCEDURE — 74177 CT ABD & PELVIS W/CONTRAST: CPT

## 2018-05-23 PROCEDURE — 84403 ASSAY OF TOTAL TESTOSTERONE: CPT

## 2018-05-23 PROCEDURE — 36415 COLL VENOUS BLD VENIPUNCTURE: CPT

## 2018-05-23 RX ADMIN — IOHEXOL 25 ML: 240 INJECTION, SOLUTION INTRATHECAL; INTRAVASCULAR; INTRAVENOUS; ORAL at 09:40

## 2018-05-23 RX ADMIN — IOPAMIDOL 75 ML: 755 INJECTION, SOLUTION INTRAVENOUS at 09:40

## 2018-05-24 LAB — TESTOSTERONE TOTAL: 79 NG/DL (ref 220–1000)

## 2018-05-31 ENCOUNTER — HOSPITAL ENCOUNTER (OUTPATIENT)
Age: 63
Setting detail: SPECIMEN
Discharge: HOME | End: 2018-05-31
Payer: COMMERCIAL

## 2018-05-31 ENCOUNTER — OFFICE VISIT (OUTPATIENT)
Dept: UROLOGY | Age: 63
End: 2018-05-31
Payer: COMMERCIAL

## 2018-05-31 VITALS
BODY MASS INDEX: 33.49 KG/M2 | WEIGHT: 221 LBS | HEIGHT: 68 IN | SYSTOLIC BLOOD PRESSURE: 140 MMHG | DIASTOLIC BLOOD PRESSURE: 90 MMHG

## 2018-05-31 DIAGNOSIS — R35.0 FREQUENCY OF URINATION: ICD-10-CM

## 2018-05-31 DIAGNOSIS — N28.89 DILATION OF RENAL PELVIS: Primary | ICD-10-CM

## 2018-05-31 DIAGNOSIS — R10.32 LEFT GROIN PAIN: ICD-10-CM

## 2018-05-31 DIAGNOSIS — Z12.5 PROSTATE CANCER SCREENING: ICD-10-CM

## 2018-05-31 LAB
-: ABNORMAL
AMORPHOUS: ABNORMAL
BACTERIA: ABNORMAL
BILIRUBIN URINE: NEGATIVE
CASTS UA: ABNORMAL /LPF
COLOR: YELLOW
COMMENT UA: ABNORMAL
CRYSTALS, UA: ABNORMAL /HPF
EPITHELIAL CELLS UA: ABNORMAL /HPF
GLUCOSE URINE: NEGATIVE
KETONES, URINE: NEGATIVE
LEUKOCYTE ESTERASE, URINE: NEGATIVE
MUCUS: ABNORMAL
NITRITE, URINE: NEGATIVE
OTHER OBSERVATIONS UA: ABNORMAL
PH UA: 6 (ref 5–8)
PROTEIN UA: NEGATIVE
RBC UA: ABNORMAL /HPF (ref 0–2)
RENAL EPITHELIAL, UA: ABNORMAL /HPF
SPECIFIC GRAVITY UA: 1.01 (ref 1–1.03)
TRICHOMONAS: ABNORMAL
TURBIDITY: CLEAR
URINE HGB: ABNORMAL
UROBILINOGEN, URINE: NORMAL
WBC UA: ABNORMAL /HPF
YEAST: ABNORMAL

## 2018-05-31 PROCEDURE — 87086 URINE CULTURE/COLONY COUNT: CPT

## 2018-05-31 PROCEDURE — 81001 URINALYSIS AUTO W/SCOPE: CPT

## 2018-05-31 PROCEDURE — 99214 OFFICE O/P EST MOD 30 MIN: CPT | Performed by: NURSE PRACTITIONER

## 2018-06-04 ASSESSMENT — ENCOUNTER SYMPTOMS
NAUSEA: 0
EYE REDNESS: 0
EYE PAIN: 0
CONSTIPATION: 0
ABDOMINAL PAIN: 0
SHORTNESS OF BREATH: 0
BACK PAIN: 0
WHEEZING: 0
COUGH: 0
COLOR CHANGE: 0
VOMITING: 0

## 2018-06-08 ENCOUNTER — HOSPITAL ENCOUNTER (OUTPATIENT)
Dept: NUCLEAR MEDICINE | Age: 63
Discharge: HOME | End: 2018-06-10

## 2018-06-08 VITALS — BODY MASS INDEX: 33.34 KG/M2 | HEIGHT: 68 IN | WEIGHT: 220 LBS

## 2018-06-08 DIAGNOSIS — N28.89 DILATION OF RENAL PELVIS: ICD-10-CM

## 2018-06-08 PROCEDURE — 3430000000 HC RX DIAGNOSTIC RADIOPHARMACEUTICAL: Performed by: NURSE PRACTITIONER

## 2018-06-08 PROCEDURE — 78708 K FLOW/FUNCT IMAGE W/DRUG: CPT

## 2018-06-08 PROCEDURE — A9562 TC99M MERTIATIDE: HCPCS | Performed by: NURSE PRACTITIONER

## 2018-06-08 PROCEDURE — 6360000002 HC RX W HCPCS: Performed by: RADIOLOGY

## 2018-06-08 RX ORDER — FUROSEMIDE 10 MG/ML
40 INJECTION INTRAMUSCULAR; INTRAVENOUS ONCE
Status: COMPLETED | OUTPATIENT
Start: 2018-06-08 | End: 2018-06-08

## 2018-06-08 RX ADMIN — FUROSEMIDE 40 MG: 10 INJECTION, SOLUTION INTRAMUSCULAR; INTRAVENOUS at 14:14

## 2018-06-08 RX ADMIN — Medication 5 MILLICURIE: at 14:05

## 2018-06-14 DIAGNOSIS — N40.0 BPH WITHOUT URINARY OBSTRUCTION: ICD-10-CM

## 2018-06-14 DIAGNOSIS — R31.29 HEMATURIA, MICROSCOPIC: ICD-10-CM

## 2018-06-14 PROBLEM — R10.30 GROIN PAIN: Status: ACTIVE | Noted: 2018-06-07

## 2018-06-14 PROBLEM — N52.9 ERECTILE DYSFUNCTION: Status: ACTIVE | Noted: 2018-06-07

## 2018-06-15 ENCOUNTER — HOSPITAL ENCOUNTER (OUTPATIENT)
Age: 63
Discharge: HOME | End: 2018-06-15

## 2018-06-15 DIAGNOSIS — Z12.5 PROSTATE CANCER SCREENING: ICD-10-CM

## 2018-06-15 LAB — PROSTATE SPECIFIC ANTIGEN: 1.31 UG/L

## 2018-06-15 PROCEDURE — G0103 PSA SCREENING: HCPCS

## 2018-06-15 PROCEDURE — 36415 COLL VENOUS BLD VENIPUNCTURE: CPT

## 2018-06-21 ENCOUNTER — OFFICE VISIT (OUTPATIENT)
Dept: UROLOGY | Age: 63
End: 2018-06-21
Payer: COMMERCIAL

## 2018-06-21 VITALS
SYSTOLIC BLOOD PRESSURE: 136 MMHG | DIASTOLIC BLOOD PRESSURE: 90 MMHG | BODY MASS INDEX: 34.25 KG/M2 | HEIGHT: 68 IN | WEIGHT: 226 LBS

## 2018-06-21 DIAGNOSIS — N40.0 BPH WITHOUT URINARY OBSTRUCTION: ICD-10-CM

## 2018-06-21 DIAGNOSIS — N28.89 DILATION OF RENAL PELVIS: Primary | ICD-10-CM

## 2018-06-21 DIAGNOSIS — Z12.5 SCREENING PSA (PROSTATE SPECIFIC ANTIGEN): ICD-10-CM

## 2018-06-21 DIAGNOSIS — R10.32 LEFT GROIN PAIN: ICD-10-CM

## 2018-06-21 PROCEDURE — 99214 OFFICE O/P EST MOD 30 MIN: CPT | Performed by: NURSE PRACTITIONER

## 2018-06-21 ASSESSMENT — ENCOUNTER SYMPTOMS
EYE PAIN: 0
NAUSEA: 0
BACK PAIN: 0
CONSTIPATION: 0
EYE REDNESS: 0
WHEEZING: 0
VOMITING: 0
ABDOMINAL PAIN: 0
COUGH: 0
COLOR CHANGE: 0
SHORTNESS OF BREATH: 0

## 2018-06-30 PROBLEM — Z12.5 PROSTATE CANCER SCREENING: Status: RESOLVED | Noted: 2018-05-31 | Resolved: 2018-06-30

## 2018-09-10 ENCOUNTER — OFFICE VISIT (OUTPATIENT)
Dept: PRIMARY CARE CLINIC | Age: 63
End: 2018-09-10
Payer: COMMERCIAL

## 2018-09-10 VITALS
OXYGEN SATURATION: 97 % | BODY MASS INDEX: 34.21 KG/M2 | WEIGHT: 225 LBS | TEMPERATURE: 97.6 F | DIASTOLIC BLOOD PRESSURE: 88 MMHG | HEART RATE: 84 BPM | SYSTOLIC BLOOD PRESSURE: 130 MMHG

## 2018-09-10 DIAGNOSIS — J06.9 VIRAL URI WITH COUGH: Primary | ICD-10-CM

## 2018-09-10 PROCEDURE — 3017F COLORECTAL CA SCREEN DOC REV: CPT | Performed by: NURSE PRACTITIONER

## 2018-09-10 PROCEDURE — 99213 OFFICE O/P EST LOW 20 MIN: CPT | Performed by: NURSE PRACTITIONER

## 2018-09-10 PROCEDURE — G8417 CALC BMI ABV UP PARAM F/U: HCPCS | Performed by: NURSE PRACTITIONER

## 2018-09-10 PROCEDURE — 1036F TOBACCO NON-USER: CPT | Performed by: NURSE PRACTITIONER

## 2018-09-10 PROCEDURE — G8427 DOCREV CUR MEDS BY ELIG CLIN: HCPCS | Performed by: NURSE PRACTITIONER

## 2018-09-10 RX ORDER — BENZONATATE 100 MG/1
100 CAPSULE ORAL 3 TIMES DAILY PRN
Qty: 14 CAPSULE | Refills: 0 | Status: SHIPPED | OUTPATIENT
Start: 2018-09-10 | End: 2018-09-17

## 2018-09-10 RX ORDER — FLUTICASONE PROPIONATE 50 MCG
1 SPRAY, SUSPENSION (ML) NASAL DAILY
Qty: 1 BOTTLE | Refills: 3 | Status: SHIPPED | OUTPATIENT
Start: 2018-09-10 | End: 2018-10-03

## 2018-09-10 ASSESSMENT — ENCOUNTER SYMPTOMS
SINUS PAIN: 1
WHEEZING: 0
SORE THROAT: 0
SINUS PRESSURE: 1
RHINORRHEA: 1
TROUBLE SWALLOWING: 0
SHORTNESS OF BREATH: 0
COUGH: 1

## 2018-09-10 NOTE — PROGRESS NOTES
[facility]  [place] [id number]  Dept: [phone]  Dept Fax: [phone]    Vaishali Severino is a 59 y.o. male who presents to the [address] [place] [address] in Care today for his medical conditions/complaints as noted below. Vaishali Severino is c/o of Cough (Patient presents today for a non productive cough since yesterday with slight nasal congestion, denies fever )      HPI:   Cough   This is a new problem. The current episode started yesterday. The problem has been unchanged. The cough is non-productive. Associated symptoms include nasal congestion, postnasal drip and rhinorrhea. Pertinent negatives include no chills, ear congestion, ear pain, fever, sore throat, shortness of breath or wheezing. Nothing aggravates the symptoms. Treatments tried: Coricidin. The treatment provided no relief. Past Medical History:   Diagnosis Date    Depression     Erectile dysfunction     History of shingles     Hypertension 1/25/2017    Mixed hyperlipidemia 1/25/2017        Current Outpatient Prescriptions   Medication Sig Dispense Refill    fluticasone (FLONASE) 50 MCG/ACT nasal spray 1 spray by Nasal route daily 1 Bottle 3    benzonatate (TESSALON PERLES) 100 MG capsule Take 1 capsule by mouth 3 times daily as needed for Cough 14 capsule 0    atorvastatin (LIPITOR) 40 MG tablet take 1 tablet by mouth once daily 30 tablet 5    amLODIPine (NORVASC) 5 MG tablet take 1 tablet by mouth once daily 30 tablet 5    venlafaxine (EFFEXOR XR) 150 MG extended release capsule take 1 capsule by mouth daily 30 capsule 5    Misc Natural Products (GLUCOSAMINE CHOND COMPLEX/MSM PO) Take 2 tablets by mouth daily      aspirin 81 MG tablet Take 81 mg by mouth daily      Multiple Vitamins-Minerals (THERAPEUTIC MULTIVITAMIN-MINERALS) tablet Take 1 tablet by mouth daily       No current facility-administered medications for this visit.       Allergies   Allergen Reactions    Codeine Nausea Only       Subjective:      Review of Systems   Constitutional: Negative for chills, fatigue and fever. HENT: Positive for congestion, postnasal drip, rhinorrhea, sinus pain and sinus pressure. Negative for ear discharge, ear pain, sore throat and trouble swallowing. Respiratory: Positive for cough. Negative for shortness of breath and wheezing. Cardiovascular: Negative. Skin: Negative. Hematological: Negative. Objective:     Physical Exam   Constitutional: He does not appear ill. Appears to be of stated age with warm, dry skin; normal coloration without rash of the exposed skin. Patient is well-appearing, well-hydrated, nontoxic, comfortable, alert and oriented, pleasant and talkative without apparent distress. They are oriented for age with age appropriate behavior. HENT:   Head: Normocephalic. Right Ear: Tympanic membrane normal.   Left Ear: Tympanic membrane normal.   Nose: Nose normal. Right sinus exhibits no maxillary sinus tenderness and no frontal sinus tenderness. Left sinus exhibits no maxillary sinus tenderness and no frontal sinus tenderness. Mouth/Throat: Uvula is midline, oropharynx is clear and moist and mucous membranes are normal.   Posterior oropharyngeal cobblestoning   Neck: Neck supple. Cardiovascular: Normal rate and regular rhythm. Pulmonary/Chest: Effort normal and breath sounds normal. He has no wheezes. He has no rhonchi. He has no rales. Lymphadenopathy:     He has no cervical adenopathy. Nursing note and vitals reviewed. /88   Pulse 84   Temp 97.6 °F (36.4 °C)   Wt 225 lb (102.1 kg)   SpO2 97%   BMI 34.21 kg/m²     Assessment:      Diagnosis Orders   1. Viral URI with cough  fluticasone (FLONASE) 50 MCG/ACT nasal spray    benzonatate (TESSALON PERLES) 100 MG capsule       Plan:   Luis Daniel Ovalle was seen today for cough.     Diagnoses and all orders for this visit:    Viral URI with cough  -     fluticasone (FLONASE) 50 MCG/ACT nasal

## 2018-09-10 NOTE — PATIENT INSTRUCTIONS
SURVEY:    You may be receiving a survey from [employer] regarding your visit today. Please complete the survey to enable us to provide the highest quality of care to you and your family. If you cannot score us as very good on any question, please call the office to discuss how we could have made your experience exceptional.     Thank you. Patient Education        Viral Respiratory Infection: Care Instructions  Your Care Instructions    Viruses are very small organisms. They grow in number after they enter your body. There are many types that cause different illnesses, such as colds and the mumps. The symptoms of a viral respiratory infection often start quickly. They include a fever, sore throat, and runny nose. You may also just not feel well. Or you may not want to eat much. Most viral respiratory infections are not serious. They usually get better with time and self-care. Antibiotics are not used to treat a viral infection. That's because antibiotics will not help cure a viral illness. In some cases, antiviral medicine can help your body fight a serious viral infection. Follow-up care is a key part of your treatment and safety. Be sure to make and go to all appointments, and call your doctor if you are having problems. It's also a good idea to know your test results and keep a list of the medicines you take. How can you care for yourself at home? · Rest as much as possible until you feel better. · Be safe with medicines. Take your medicine exactly as prescribed. Call your doctor if you think you are having a problem with your medicine. You will get more details on the specific medicine your doctor prescribes. · Take an over-the-counter pain medicine, such as acetaminophen (Tylenol), ibuprofen (Advil, Motrin), or naproxen (Aleve), as needed for pain and fever. Read and follow all instructions on the label. Do not give aspirin to anyone younger than 20.  It has been linked to Reye syndrome, a serious illness. · Drink plenty of fluids, enough so that your urine is light yellow or clear like water. Hot fluids, such as tea or soup, may help relieve congestion in your nose and throat. If you have kidney, heart, or liver disease and have to limit fluids, talk with your doctor before you increase the amount of fluids you drink. · Try to clear mucus from your lungs by breathing deeply and coughing. · Gargle with warm salt water once an hour. This can help reduce swelling and throat pain. Use 1 teaspoon of salt mixed in 1 cup of warm water. · Do not smoke or allow others to smoke around you. If you need help quitting, talk to your doctor about stop-smoking programs and medicines. These can increase your chances of quitting for good. To avoid spreading the virus  · Cough or sneeze into a tissue. Then throw the tissue away. · If you don't have a tissue, use your hand to cover your cough or sneeze. Then clean your hand. You can also cough into your sleeve. · Wash your hands often. Use soap and warm water. Wash for 15 to 20 seconds each time. · If you don't have soap and water near you, you can clean your hands with alcohol wipes or gel. When should you call for help? Call your doctor now or seek immediate medical care if:    · You have a new or higher fever.     · Your fever lasts more than 48 hours.     · You have trouble breathing.     · You have a fever with a stiff neck or a severe headache.     · You are sensitive to light.     · You feel very sleepy or confused.    Watch closely for changes in your health, and be sure to contact your doctor if:    · You do not get better as expected. Where can you learn more? Go to https://[employer]. org and sign in to your [employer] account. Enter O979 in the gripNote box to learn more about \"Viral Respiratory Infection: Care Instructions. \"     If you do not have an account, please click on the \"Sign Up Now\" link.   Current as of: pregnant while using this medication. It is not known whether benzonatate passes into breast milk or if it could harm a nursing baby. Do not use this medication without telling your doctor if you are breast-feeding a baby. Do not give this medication to a child younger than 8years old without medical advice. An overdose of benzonatate can be fatal, especially to a child. How should I take benzonatate? Take exactly as prescribed by your doctor. Do not take in larger or smaller amounts or for longer than recommended. Follow the directions on your prescription label. Always ask a doctor before giving a cough medicine to a child. Death can occur from the misuse of cough and cold medicines in very young children. Take each dose with a full glass of water. Never suck or chew on a benzonatate capsule. Swallow the pill whole. Sucking or chewing the capsule may cause your mouth and throat to feel numb or cause other serious side effects. Store at room temperature away from moisture, heat, and light. What happens if I miss a dose? Take the missed dose as soon as you remember. Skip the missed dose if it is almost time for your next scheduled dose. Do not  take extra medicine to make up the missed dose. What happens if I overdose? Seek emergency medical attention or call the Poison Help line at 1-896.454.9457. An overdose of benzonatate can be fatal, especially to a child. Accidental death has occurred in children under 3years old who took only 1 or 2 capsules. Overdose symptoms may include numbness in the mouth or throat, feeling restless or very sleepy, tremors or shaking, seizure (convulsions), slow heart rate, weak pulse, fainting, and slow breathing (breathing may stop). What should I avoid while taking benzonatate? Follow your doctor's instructions about any restrictions on food, beverages, or activity while you are using benzonatate  What are the possible side effects of benzonatate?   Get emergency medical help if you have any of these signs of an allergic reaction:  hives; difficulty breathing; swelling of your face, lips, tongue, or throat. Stop taking benzonatate and call your doctor at once if you have a serious side effect such as:  · a choking feeling;  · chest pain or numbness;  · feeling like you might pass out;  · confusion; or  · hallucinations. Some of these side effects may result from chewing or sucking on a benzonatate capsule. Less serious side effects may include:  · headache;  · dizziness;  · drowsiness;  · nausea, vomiting, constipation; or  · mild itching or skin rash. This is not a complete list of side effects and others may occur. Call your doctor for medical advice about side effects. You may report side effects to FDA at 1-615-FDA-0841. What other drugs will affect benzonatate? Before taking benzonatate, tell your doctor if you regularly use other medicines that make you sleepy (such as cold or allergy medicine, sedatives, narcotic pain medicine, sleeping pills, muscle relaxers, and medicine for seizures, depression, or anxiety). They can add to drowsiness and other side effects of benzonatate. There may be other drugs that can interact with benzonatate. Tell your doctor about all medications you use. This includes prescription, over-the-counter, vitamin, and herbal products. Do not start a new medication without telling your doctor. Where can I get more information? Your pharmacist can provide more information about benzonatate. Remember, keep this and all other medicines out of the reach of children, never share your medicines with others, and use this medication only for the indication prescribed. Every effort has been made to ensure that the information provided by [address] is accurate, up-to-date, and complete, but no guarantee is made to that effect. Drug information contained herein may be time sensitive.  [employer] information has been compiled for use by healthcare practitioners and consumers in the [place] and therefore [employer] does not warrant that uses outside of the [place] are appropriate, unless specifically indicated otherwise. [facility] drug information does not endorse drugs, diagnose patients or recommend therapy. [facility] drug information is an informational resource designed to assist licensed healthcare practitioners in caring for their patients and/or to serve consumers viewing this service as a supplement to, and not a substitute for, the expertise, skill, knowledge and judgment of healthcare practitioners. The absence of a warning for a given drug or drug combination in no way should be construed to indicate that the drug or drug combination is safe, effective or appropriate for any given patient. [facility] does not assume any responsibility for any aspect of healthcare administered with the aid of information [facility] provides. The information contained herein is not intended to cover all possible uses, directions, precautions, warnings, drug interactions, allergic reactions, or adverse effects. If you have questions about the drugs you are taking, check with your doctor, nurse or pharmacist.  Copyright 1745-7438 [address]. Version: 8.01. Revision date: 3/9/2011. Care instructions adapted under license by [facility] ([facility]). If you have questions about a medical condition or this instruction, always ask your healthcare professional. [place] [address] any warranty or liability for your use of this information. Patient Education          fluticasone nasal  Pronunciation:  jorge a thomas  Brand:  Flonase, Michelle Mehta  What is the most important information I should know about fluticasone nasal?  Follow all directions on your medicine label and package. Tell each of your healthcare providers about all your medical conditions, allergies, and all medicines you use.   What is fluticasone nasal?  Fluticasone is a steroid. It prevents the release of substances in the body that cause inflammation. Fluticasone nasal (for the nose) is used to treat nasal congestion, sneezing, runny nose, and itchy or watery eyes caused by seasonal or year-round allergies. The Janna brand of this medicine is for use only in adults. Veramyst may be used in children as young as 3years old. Flonase is for use in adults and children who are at least 3years old. Fluticasone nasal may also be used for purposes not listed in this medication guide. What should I discuss with my healthcare provider before using fluticasone nasal?  You should not use fluticasone nasal if you are allergic to it. Fluticasone can weaken your immune system,  making it easier for you to get an infection or worsening an infection you already have or recently had. Tell your doctor about any illness or infection you have had within the past several weeks. To make sure fluticasone nasal is safe for you, tell your doctor if you have ever had:  · sores or ulcers inside your nose;  · injury of or surgery on your nose;  · tuberculosis or any other infection or illness;  · glaucoma or cataracts;  · liver disease;  · osteoporosis; or  · herpes simplex virus of your eyes. If you use fluticasone nasal without a prescription and you have any medical conditions, ask a doctor or pharmacist if this medicine is safe for you. Also tell your doctor if you have diabetes. Steroid medicines may increase the glucose (sugar) levels in your blood or urine. You may also need to adjust the dose of your diabetes medications. It is not known whether fluticasone nasal will harm an unborn baby. Ask a doctor before using this medicine if you are pregnant. It is not known whether fluticasone nasal passes into breast milk or if it could affect a nursing baby. Ask a doctor before using this medicine if you are breast-feeding.   How should I use fluticasone nasal?  Use exactly as directed on the label, or as prescribed by your doctor. Do not use in larger or smaller amounts or for longer than recommended. Do not share this medicine with another person, even if they have the same symptoms you have. The usual dose of fluticasone nasal is 1 to 2 sprays into each nostril once or twice per day. Your dose will depend on the fluticasone brand or strength you use, and your dose may change once your symptoms improve. Follow all dosing instructions very carefully. Danny Horner is not approved for use by anyone younger than 25years old. Do not use Flonase in a child younger than 3years old. Do not use Veramyst in a child younger than 3years old. Any child using fluticasone nasal should be supervised by an adult while using the nasal spray. This medicine comes with patient instructions for safe and effective use, and directions for priming the nasal spray device. Follow these directions carefully. Ask your doctor or pharmacist if you have any questions. Shake the nasal spray just before each use. If you switched to fluticasone from another steroid medicine, do not stop using the other steroid suddenly or you may have unpleasant withdrawal symptoms. Talk with your doctor about tapering your steroid dose before stopping completely. To be sure fluticasone nasal is not causing harmful effects on your nose or sinuses, your doctor may need to check your progress on a regular basis. It may take up to several days before your symptoms improve. Keep using the medication as directed and tell your doctor if your symptoms do not improve after a week of treatment. Store fluticasone nasal in an upright position at room temperature, away from moisture and heat. Throw the spray bottle away after you have used 120 sprays, even if there is still medicine left in the bottle. What happens if I miss a dose? Use the missed dose as soon as you remember. Skip the missed dose if it is almost time for your next scheduled dose.  Do not

## 2018-09-17 ENCOUNTER — OFFICE VISIT (OUTPATIENT)
Dept: FAMILY MEDICINE CLINIC | Age: 63
End: 2018-09-17
Payer: COMMERCIAL

## 2018-09-17 VITALS
HEART RATE: 102 BPM | HEIGHT: 68 IN | DIASTOLIC BLOOD PRESSURE: 90 MMHG | OXYGEN SATURATION: 97 % | SYSTOLIC BLOOD PRESSURE: 130 MMHG | WEIGHT: 225.3 LBS | BODY MASS INDEX: 34.14 KG/M2 | TEMPERATURE: 97.5 F

## 2018-09-17 DIAGNOSIS — M70.22 OLECRANON BURSITIS OF LEFT ELBOW: ICD-10-CM

## 2018-09-17 DIAGNOSIS — J01.00 ACUTE NON-RECURRENT MAXILLARY SINUSITIS: Primary | ICD-10-CM

## 2018-09-17 PROCEDURE — G8417 CALC BMI ABV UP PARAM F/U: HCPCS | Performed by: NURSE PRACTITIONER

## 2018-09-17 PROCEDURE — G8427 DOCREV CUR MEDS BY ELIG CLIN: HCPCS | Performed by: NURSE PRACTITIONER

## 2018-09-17 PROCEDURE — 99213 OFFICE O/P EST LOW 20 MIN: CPT | Performed by: NURSE PRACTITIONER

## 2018-09-17 PROCEDURE — 1036F TOBACCO NON-USER: CPT | Performed by: NURSE PRACTITIONER

## 2018-09-17 PROCEDURE — 3017F COLORECTAL CA SCREEN DOC REV: CPT | Performed by: NURSE PRACTITIONER

## 2018-09-17 RX ORDER — AMOXICILLIN AND CLAVULANATE POTASSIUM 875; 125 MG/1; MG/1
1 TABLET, FILM COATED ORAL EVERY 12 HOURS
Qty: 20 TABLET | Refills: 0 | Status: SHIPPED | OUTPATIENT
Start: 2018-09-17 | End: 2018-09-27

## 2018-09-17 ASSESSMENT — ENCOUNTER SYMPTOMS
SORE THROAT: 1
SHORTNESS OF BREATH: 0
COUGH: 1
WHEEZING: 0

## 2018-09-17 NOTE — PATIENT INSTRUCTIONS
up and palm down while keeping your forearm on your thigh. 3. Repeat 8 to 12 times. Follow-up care is a key part of your treatment and safety. Be sure to make and go to all appointments, and call your doctor if you are having problems. It's also a good idea to know your test results and keep a list of the medicines you take. Where can you learn more? Go to https://IntegrienpeBenchling.OnSwipe. org and sign in to your OpenBuildings account. Enter J919 in the Trustev box to learn more about \"Elbow Bursitis: Exercises. \"     If you do not have an account, please click on the \"Sign Up Now\" link. Current as of: November 29, 2017  Content Version: 11.7  © 3370-7926 [employer], [employer]. Care instructions adapted under license by [facility] ([facility]). If you have questions about a medical condition or this instruction, always ask your healthcare professional. [place] [address] any warranty or liability for your use of this information. Patient Education        Saline Nasal Washes: Care Instructions  Your Care Instructions  Saline nasal washes help keep the nasal passages open by washing out thick or dried mucus. This simple remedy can help relieve symptoms of allergies, sinusitis, and colds. It also can make the nose feel more comfortable by keeping the mucous membranes moist. You may notice a little burning sensation in your nose the first few times you use the solution, but this usually gets better in a few days. Follow-up care is a key part of your treatment and safety. Be sure to make and go to all appointments, and call your doctor if you are having problems. It's also a good idea to know your test results and keep a list of the medicines you take. How can you care for yourself at home? · You can buy premixed saline solution in a squeeze bottle or other sinus rinse products at a drugstore. Read and follow the instructions on the label.   · You also can make your own saline solution by adding 1 teaspoon of salt and 1 teaspoon of baking soda to 2 cups of distilled water. · If you use a homemade solution, pour a small amount into a clean bowl. Using a rubber bulb syringe, squeeze the syringe and place the tip in the salt water. Pull a small amount of the salt water into the syringe by relaxing your hand. · Sit down with your head tilted slightly back. Do not lie down. Put the tip of the bulb syringe or the squeeze bottle a little way into one of your nostrils. Gently drip or squirt a few drops into the nostril. Repeat with the other nostril. Some sneezing and gagging are normal at first.  · Gently blow your nose. · Wipe the syringe or bottle tip clean after each use. · Repeat this 2 or 3 times a day. · Use nasal washes gently if you have nosebleeds often. When should you call for help? Watch closely for changes in your health, and be sure to contact your doctor if:    · You often get nosebleeds.     · You have problems doing the nasal washes. Where can you learn more? Go to https://MostLikelypepiceweb.[email or web address] Energy Corporation. org and sign in to your Applied BioCode account. Enter [phone] [id number] [phone] in the Equipois box to learn more about \"Saline Nasal Washes: Care Instructions. \"     If you do not have an account, please click on the \"Sign Up Now\" link. Current as of: May 12, 2017  Content Version: 11.7  © 0729-5133 [employer], Incorporated. Care instructions adapted under license by [facility] ([facility]). If you have questions about a medical condition or this instruction, always ask your healthcare professional. [place] [address] any warranty or liability for your use of this information.

## 2018-09-17 NOTE — PROGRESS NOTES
atraumatic. Right Ear: External ear normal.   Left Ear: External ear normal.   Mouth/Throat: Oropharynx is clear and moist. No oropharyngeal exudate. Maxillary sinus fullness, pharynx erythematous post nasal yellow thick mucous no petechiae or exudate. Eyes: Pupils are equal, round, and reactive to light. EOM are normal.   Neck: Normal range of motion. Neck supple. No thyromegaly present. Cardiovascular: Normal rate, regular rhythm, normal heart sounds and normal pulses. No murmur heard. Pulmonary/Chest: Effort normal and breath sounds normal. No respiratory distress. Abdominal: Soft. He exhibits no mass. There is no tenderness. Musculoskeletal: Normal range of motion. He exhibits edema (generalized non-pitting) and tenderness (left elbow olecranon process tender no redness. applied 4x4's pad and 3 inch ace wrap MSP intact after wrapping instructed to remove at bedtime and use next 2-3 days). Epicondylar medial and lateral without bold tenderness or irritation on exam on left. Lymphadenopathy:     He has no cervical adenopathy. Neurological: He is alert and oriented to person, place, and time. Skin: Skin is warm and dry. No rash noted. Psychiatric: He has a normal mood and affect. His behavior is normal. Judgment and thought content normal.   Nursing note and vitals reviewed.     BP (!) 130/90 (Site: Right Upper Arm, Position: Sitting, Cuff Size: Medium Adult)   Pulse 102   Temp 97.5 °F (36.4 °C) (Oral)   Ht 5' 8\" (1.727 m)   Wt 225 lb 4.8 oz (102.2 kg)   SpO2 97%   BMI 34.26 kg/m²     Data:     Lab Results   Component Value Date     05/23/2018    K 3.8 05/23/2018     05/23/2018    CO2 24 05/23/2018    BUN 15 05/23/2018    CREATININE 0.80 05/23/2018    GLUCOSE 115 05/23/2018    PROT 7.1 05/03/2017    LABALBU 4.4 05/03/2017    BILITOT 0.56 05/03/2017    ALKPHOS 100 05/03/2017    AST 26 05/03/2017    ALT 37 05/03/2017     Lab Results   Component Value Date    WBC 6.2 05/03/2017    RBC 4.61 05/03/2017    HGB 13.7 05/03/2017    HCT 40.6 05/03/2017    MCV 87.9 05/03/2017    MCH 29.7 05/03/2017    MCHC 33.8 05/03/2017    RDW 14.1 05/03/2017     05/03/2017    MPV NOT REPORTED 05/03/2017     Lab Results   Component Value Date    TSH 1.913 06/11/2012     Lab Results   Component Value Date    CHOL 218 05/03/2017    HDL 35 05/03/2017    PSA 1.31 06/15/2018          Assessment & Plan       1. Acute non-recurrent maxillary sinusitis  Good hydration  OTC nasal saline  Finish antibiotic  - amoxicillin-clavulanate (AUGMENTIN) 875-125 MG per tablet; Take 1 tablet by mouth every 12 hours for 10 days Maxillary sinusitis  Dispense: 20 tablet; Refill: 0    2. Olecranon bursitis of left elbow  Likely early with no specific injury recalled. Will offer support and should resolve. On augmentin for sinusitis. Call if worsening or effusion develops. Gentle stretching. Remove all support wraps at night. Rash to RLL not present today with encourage lotion/hydration likely stasis dermatitis. Elevate legs when able. Will think about support hose. Completed Refills   Requested Prescriptions     Signed Prescriptions Disp Refills    amoxicillin-clavulanate (AUGMENTIN) 875-125 MG per tablet 20 tablet 0     Sig: Take 1 tablet by mouth every 12 hours for 10 days Maxillary sinusitis     Return if symptoms worsen or fail to improve. Orders Placed This Encounter   Medications    amoxicillin-clavulanate (AUGMENTIN) 875-125 MG per tablet     Sig: Take 1 tablet by mouth every 12 hours for 10 days Maxillary sinusitis     Dispense:  20 tablet     Refill:  0     No orders of the defined types were placed in this encounter. Jarrett Ruff received counseling on the following healthy behaviors: nutrition, exercise and medication adherence  Reviewed prior labs and health maintenance  Continue current medications, diet and exercise.   Discussed use, benefit, and side effects of prescribed medications. Barriers to medication compliance addressed. Patient given educational materials - see patient instructions  Was a self-tracking handout given in paper form or via OSSIANIXt? No:     Requested Prescriptions     Signed Prescriptions Disp Refills    amoxicillin-clavulanate (AUGMENTIN) 875-125 MG per tablet 20 tablet 0     Sig: Take 1 tablet by mouth every 12 hours for 10 days Maxillary sinusitis       All patient questions answered. Patient voiced understanding. Quality Measures    Body mass index is 34.26 kg/m². Elevated. Weight control planned discussed Healthy diet and regular exercise. BP: (!) 130/90 Blood pressure is high. Treatment plan consists of no added salt increase exercise regimen.     Lab Results   Component Value Date    LDLCALC 136 (H) 06/11/2012    LDLCHOLESTEROL 134 (H) 05/03/2017    (goal LDL reduction with dx if diabetes is 50% LDL reduction)      PHQ Scores 5/16/2018 6/22/2017 5/3/2017 7/18/2016   PHQ2 Score 3 0 0 0   PHQ9 Score 3 0 0 0     Interpretation of Total Score Depression Severity: 1-4 = Minimal depression, 5-9 = Mild depression, 10-14 = Moderate depression, 15-19 = Moderately severe depression, 20-27 = Severe depression         Electronically signed by ALEKSANDAR Wren CNP on 9/20/2018 at 12:18 AM

## 2018-10-03 ENCOUNTER — OFFICE VISIT (OUTPATIENT)
Dept: FAMILY MEDICINE CLINIC | Age: 63
End: 2018-10-03
Payer: COMMERCIAL

## 2018-10-03 VITALS
WEIGHT: 230 LBS | BODY MASS INDEX: 34.86 KG/M2 | DIASTOLIC BLOOD PRESSURE: 84 MMHG | HEIGHT: 68 IN | SYSTOLIC BLOOD PRESSURE: 120 MMHG

## 2018-10-03 DIAGNOSIS — I10 ESSENTIAL HYPERTENSION: ICD-10-CM

## 2018-10-03 DIAGNOSIS — F41.9 ANXIETY AND DEPRESSION: ICD-10-CM

## 2018-10-03 DIAGNOSIS — R40.0 DAYTIME SLEEPINESS: ICD-10-CM

## 2018-10-03 DIAGNOSIS — G25.0 ESSENTIAL TREMOR: ICD-10-CM

## 2018-10-03 DIAGNOSIS — F32.A ANXIETY AND DEPRESSION: ICD-10-CM

## 2018-10-03 DIAGNOSIS — R51.9 MORNING HEADACHE: Primary | ICD-10-CM

## 2018-10-03 PROCEDURE — 3017F COLORECTAL CA SCREEN DOC REV: CPT | Performed by: FAMILY MEDICINE

## 2018-10-03 PROCEDURE — G8427 DOCREV CUR MEDS BY ELIG CLIN: HCPCS | Performed by: FAMILY MEDICINE

## 2018-10-03 PROCEDURE — 1036F TOBACCO NON-USER: CPT | Performed by: FAMILY MEDICINE

## 2018-10-03 PROCEDURE — 99214 OFFICE O/P EST MOD 30 MIN: CPT | Performed by: FAMILY MEDICINE

## 2018-10-03 PROCEDURE — G8484 FLU IMMUNIZE NO ADMIN: HCPCS | Performed by: FAMILY MEDICINE

## 2018-10-03 PROCEDURE — G8417 CALC BMI ABV UP PARAM F/U: HCPCS | Performed by: FAMILY MEDICINE

## 2018-10-03 RX ORDER — VENLAFAXINE HYDROCHLORIDE 150 MG/1
CAPSULE, EXTENDED RELEASE ORAL
Qty: 90 CAPSULE | Refills: 1 | Status: SHIPPED | OUTPATIENT
Start: 2018-10-03 | End: 2019-06-09 | Stop reason: SDUPTHER

## 2018-10-03 RX ORDER — ATORVASTATIN CALCIUM 40 MG/1
TABLET, FILM COATED ORAL
Qty: 90 TABLET | Refills: 1 | Status: SHIPPED | OUTPATIENT
Start: 2018-10-03 | End: 2019-05-04 | Stop reason: SDUPTHER

## 2018-10-03 RX ORDER — CEPHALEXIN 500 MG/1
CAPSULE ORAL
Refills: 0 | COMMUNITY
Start: 2018-09-27 | End: 2018-12-27 | Stop reason: ALTCHOICE

## 2018-10-03 RX ORDER — AMLODIPINE BESYLATE 5 MG/1
TABLET ORAL
Qty: 90 TABLET | Refills: 1 | Status: SHIPPED | OUTPATIENT
Start: 2018-10-03 | End: 2019-05-04 | Stop reason: SDUPTHER

## 2018-10-03 ASSESSMENT — ENCOUNTER SYMPTOMS: RESPIRATORY NEGATIVE: 1

## 2018-10-03 NOTE — PROGRESS NOTES
Prescriptions     Signed Prescriptions Disp Refills    venlafaxine (EFFEXOR XR) 150 MG extended release capsule 90 capsule 1     Sig: take 1 capsule by mouth daily    amLODIPine (NORVASC) 5 MG tablet 90 tablet 1     Sig: take 1 tablet by mouth once daily    atorvastatin (LIPITOR) 40 MG tablet 90 tablet 1     Sig: take 1 tablet by mouth once daily       There are no Patient Instructions on file for this visit. Afsaneh Devries received counseling on the following healthy behaviors: medication adherence  Reviewed prior labs and health maintenance. Continue current medications, diet and exercise. Discussed use, benefit, and side effects of prescribed medications. Barriers to medication compliance addressed. Patient given educational materials - see patient instructions. All patient questions answered. Patient voiced understanding.      Electronically signed by Nury Mendez DO on 10/3/2018 at 11:15 PM   [address] [address]  Dept: [phone]

## 2018-10-10 ENCOUNTER — OFFICE VISIT (OUTPATIENT)
Dept: FAMILY MEDICINE CLINIC | Age: 63
End: 2018-10-10
Payer: COMMERCIAL

## 2018-10-10 VITALS
HEART RATE: 80 BPM | DIASTOLIC BLOOD PRESSURE: 88 MMHG | BODY MASS INDEX: 34.71 KG/M2 | WEIGHT: 229 LBS | SYSTOLIC BLOOD PRESSURE: 132 MMHG | OXYGEN SATURATION: 95 % | HEIGHT: 68 IN

## 2018-10-10 DIAGNOSIS — R07.81 RIB PAIN ON RIGHT SIDE: Primary | ICD-10-CM

## 2018-10-10 PROCEDURE — G8417 CALC BMI ABV UP PARAM F/U: HCPCS | Performed by: FAMILY MEDICINE

## 2018-10-10 PROCEDURE — 1036F TOBACCO NON-USER: CPT | Performed by: FAMILY MEDICINE

## 2018-10-10 PROCEDURE — G8427 DOCREV CUR MEDS BY ELIG CLIN: HCPCS | Performed by: FAMILY MEDICINE

## 2018-10-10 PROCEDURE — 3017F COLORECTAL CA SCREEN DOC REV: CPT | Performed by: FAMILY MEDICINE

## 2018-10-10 PROCEDURE — G8484 FLU IMMUNIZE NO ADMIN: HCPCS | Performed by: FAMILY MEDICINE

## 2018-10-10 PROCEDURE — 99213 OFFICE O/P EST LOW 20 MIN: CPT | Performed by: FAMILY MEDICINE

## 2018-10-10 RX ORDER — NAPROXEN 500 MG/1
500 TABLET ORAL 2 TIMES DAILY WITH MEALS
Qty: 30 TABLET | Refills: 1 | Status: SHIPPED | OUTPATIENT
Start: 2018-10-10 | End: 2019-03-13 | Stop reason: ALTCHOICE

## 2018-10-12 ASSESSMENT — ENCOUNTER SYMPTOMS
CONSTIPATION: 0
SHORTNESS OF BREATH: 0
EYE DISCHARGE: 0
COUGH: 0
NAUSEA: 0
EYE REDNESS: 0
DIARRHEA: 0
VOMITING: 0
ABDOMINAL PAIN: 0
BLOOD IN STOOL: 0

## 2018-12-27 ENCOUNTER — OFFICE VISIT (OUTPATIENT)
Dept: PRIMARY CARE CLINIC | Age: 63
End: 2018-12-27
Payer: COMMERCIAL

## 2018-12-27 VITALS
HEART RATE: 91 BPM | WEIGHT: 223 LBS | DIASTOLIC BLOOD PRESSURE: 86 MMHG | SYSTOLIC BLOOD PRESSURE: 127 MMHG | BODY MASS INDEX: 33.91 KG/M2 | OXYGEN SATURATION: 98 % | TEMPERATURE: 98 F

## 2018-12-27 DIAGNOSIS — K52.9 GASTROENTERITIS, ACUTE: Primary | ICD-10-CM

## 2018-12-27 PROCEDURE — G8417 CALC BMI ABV UP PARAM F/U: HCPCS | Performed by: NURSE PRACTITIONER

## 2018-12-27 PROCEDURE — 3017F COLORECTAL CA SCREEN DOC REV: CPT | Performed by: NURSE PRACTITIONER

## 2018-12-27 PROCEDURE — G8427 DOCREV CUR MEDS BY ELIG CLIN: HCPCS | Performed by: NURSE PRACTITIONER

## 2018-12-27 PROCEDURE — G8484 FLU IMMUNIZE NO ADMIN: HCPCS | Performed by: NURSE PRACTITIONER

## 2018-12-27 PROCEDURE — 1036F TOBACCO NON-USER: CPT | Performed by: NURSE PRACTITIONER

## 2018-12-27 PROCEDURE — 99213 OFFICE O/P EST LOW 20 MIN: CPT | Performed by: NURSE PRACTITIONER

## 2018-12-27 RX ORDER — POLYETHYLENE GLYCOL 3350, SODIUM CHLORIDE, SODIUM BICARBONATE, POTASSIUM CHLORIDE 420; 11.2; 5.72; 1.48 G/4L; G/4L; G/4L; G/4L
POWDER, FOR SOLUTION ORAL
Refills: 0 | COMMUNITY
Start: 2018-10-09 | End: 2019-03-13 | Stop reason: ALTCHOICE

## 2018-12-27 ASSESSMENT — ENCOUNTER SYMPTOMS
ANAL BLEEDING: 0
VOMITING: 1
DIARRHEA: 1
NAUSEA: 1
BLOOD IN STOOL: 0
ABDOMINAL DISTENTION: 0
COUGH: 0
ABDOMINAL PAIN: 0

## 2018-12-27 NOTE — PATIENT INSTRUCTIONS
gastroenteritis  · Keep hot foods hot and cold foods cold. · Do not eat meats, dressings, salads, or other foods that have been kept at room temperature for more than 2 hours. · Use a thermometer to check your refrigerator. It should be between 34°F and 40°F.  · Defrost meats in the refrigerator or microwave, not on the kitchen counter. · Keep your hands and your kitchen clean. Wash your hands, cutting boards, and countertops with hot soapy water frequently. · Cook meat until it is well done. · Do not eat raw eggs or uncooked sauces made with raw eggs. · Do not take chances. If food looks or tastes spoiled, throw it out. When should you call for help? Call 911 anytime you think you may need emergency care. For example, call if:    · You vomit blood or what looks like coffee grounds.     · You passed out (lost consciousness).     · You pass maroon or very bloody stools.    Call your doctor now or seek immediate medical care if:    · You have severe belly pain.     · You have signs of needing more fluids. You have sunken eyes, a dry mouth, and pass only a little dark urine.     · You feel like you are going to faint.     · You have increased belly pain that does not go away in 1 to 2 days.     · You have new or increased nausea, or you are vomiting.     · You have a new or higher fever.     · Your stools are black and tarlike or have streaks of blood.    Watch closely for changes in your health, and be sure to contact your doctor if:    · You are dizzy or lightheaded.     · You urinate less than usual, or your urine is dark yellow or brown.     · You do not feel better with each day that goes by. Where can you learn more? Go to https://Teleport.Shopular. org and sign in to your C-Note account. Enter N142 in the WO Funding box to learn more about \"Gastroenteritis: Care Instructions. \"     If you do not have an account, please click on the \"Sign Up Now\" link.   Current as of: November

## 2018-12-27 NOTE — LETTER
[address]  [place] [address]  Phone: [phone]  Fax: [phone]    ALEKSANDAR Rangel CNP        December 27, 2018     Patient: Benito Tucker   YOB: 1955   Date of Visit: 12/27/2018       To Whom it May Concern:    Benito Tucker was seen in my clinic on 12/27/2018. He may return to work on 12/28/2018. If you have any questions or concerns, please don't hesitate to call.     Sincerely,           ALEKSANDAR Rangel CNP

## 2019-02-08 ENCOUNTER — OFFICE VISIT (OUTPATIENT)
Dept: PRIMARY CARE CLINIC | Age: 64
End: 2019-02-08
Payer: COMMERCIAL

## 2019-02-08 VITALS
BODY MASS INDEX: 34.36 KG/M2 | SYSTOLIC BLOOD PRESSURE: 130 MMHG | HEART RATE: 86 BPM | OXYGEN SATURATION: 97 % | WEIGHT: 226 LBS | DIASTOLIC BLOOD PRESSURE: 84 MMHG | TEMPERATURE: 97.7 F

## 2019-02-08 DIAGNOSIS — B35.6 TINEA CRURIS: Primary | ICD-10-CM

## 2019-02-08 PROCEDURE — G8427 DOCREV CUR MEDS BY ELIG CLIN: HCPCS | Performed by: NURSE PRACTITIONER

## 2019-02-08 PROCEDURE — 3017F COLORECTAL CA SCREEN DOC REV: CPT | Performed by: NURSE PRACTITIONER

## 2019-02-08 PROCEDURE — G8484 FLU IMMUNIZE NO ADMIN: HCPCS | Performed by: NURSE PRACTITIONER

## 2019-02-08 PROCEDURE — 99213 OFFICE O/P EST LOW 20 MIN: CPT | Performed by: NURSE PRACTITIONER

## 2019-02-08 PROCEDURE — G8417 CALC BMI ABV UP PARAM F/U: HCPCS | Performed by: NURSE PRACTITIONER

## 2019-02-08 PROCEDURE — 1036F TOBACCO NON-USER: CPT | Performed by: NURSE PRACTITIONER

## 2019-02-08 RX ORDER — PRENATAL VIT 91/IRON/FOLIC/DHA 28-975-200
COMBINATION PACKAGE (EA) ORAL
Qty: 24 G | Refills: 0 | Status: SHIPPED | OUTPATIENT
Start: 2019-02-08 | End: 2019-02-08 | Stop reason: ALTCHOICE

## 2019-02-08 RX ORDER — PRENATAL VIT 91/IRON/FOLIC/DHA 28-975-200
COMBINATION PACKAGE (EA) ORAL
Qty: 24 G | Refills: 0
Start: 2019-02-08 | End: 2019-03-13 | Stop reason: ALTCHOICE

## 2019-02-08 ASSESSMENT — ENCOUNTER SYMPTOMS
SHORTNESS OF BREATH: 0
COUGH: 0
SORE THROAT: 0

## 2019-03-13 ENCOUNTER — OFFICE VISIT (OUTPATIENT)
Dept: FAMILY MEDICINE CLINIC | Age: 64
End: 2019-03-13
Payer: COMMERCIAL

## 2019-03-13 VITALS
SYSTOLIC BLOOD PRESSURE: 130 MMHG | TEMPERATURE: 97.4 F | HEART RATE: 97 BPM | DIASTOLIC BLOOD PRESSURE: 90 MMHG | WEIGHT: 220 LBS | BODY MASS INDEX: 33.45 KG/M2 | OXYGEN SATURATION: 98 %

## 2019-03-13 DIAGNOSIS — R51.9 CHRONIC NONINTRACTABLE HEADACHE, UNSPECIFIED HEADACHE TYPE: Primary | ICD-10-CM

## 2019-03-13 DIAGNOSIS — G89.29 CHRONIC NONINTRACTABLE HEADACHE, UNSPECIFIED HEADACHE TYPE: Primary | ICD-10-CM

## 2019-03-13 PROCEDURE — 99213 OFFICE O/P EST LOW 20 MIN: CPT | Performed by: NURSE PRACTITIONER

## 2019-03-13 RX ORDER — TIZANIDINE 4 MG/1
4 TABLET ORAL NIGHTLY PRN
Qty: 30 TABLET | Refills: 2 | Status: SHIPPED | OUTPATIENT
Start: 2019-03-13 | End: 2019-05-16 | Stop reason: ALTCHOICE

## 2019-03-13 ASSESSMENT — ENCOUNTER SYMPTOMS
PHOTOPHOBIA: 1
COUGH: 0
VOMITING: 0
DIARRHEA: 0
BLURRED VISION: 0
SHORTNESS OF BREATH: 0
NAUSEA: 1

## 2019-05-06 RX ORDER — AMLODIPINE BESYLATE 5 MG/1
TABLET ORAL
Qty: 90 TABLET | Refills: 1 | Status: SHIPPED | OUTPATIENT
Start: 2019-05-06 | End: 2019-10-30 | Stop reason: SDUPTHER

## 2019-05-06 RX ORDER — ATORVASTATIN CALCIUM 40 MG/1
TABLET, FILM COATED ORAL
Qty: 90 TABLET | Refills: 1 | Status: SHIPPED | OUTPATIENT
Start: 2019-05-06 | End: 2019-10-30 | Stop reason: SDUPTHER

## 2019-05-06 NOTE — TELEPHONE ENCOUNTER
Last OV: 3/13/2019  ---last appointment with you was 10-10-18  Last RX: 10/3/18  Next scheduled apt: Visit date not found  Medication pending

## 2019-05-16 ENCOUNTER — OFFICE VISIT (OUTPATIENT)
Dept: FAMILY MEDICINE CLINIC | Age: 64
End: 2019-05-16
Payer: COMMERCIAL

## 2019-05-16 VITALS
BODY MASS INDEX: 33.95 KG/M2 | DIASTOLIC BLOOD PRESSURE: 80 MMHG | WEIGHT: 224 LBS | SYSTOLIC BLOOD PRESSURE: 130 MMHG | HEIGHT: 68 IN

## 2019-05-16 DIAGNOSIS — B35.6 TINEA CRURIS: ICD-10-CM

## 2019-05-16 DIAGNOSIS — G89.29 CHRONIC LEFT SHOULDER PAIN: Primary | ICD-10-CM

## 2019-05-16 DIAGNOSIS — M25.512 CHRONIC LEFT SHOULDER PAIN: Primary | ICD-10-CM

## 2019-05-16 PROBLEM — R10.30 GROIN PAIN: Status: RESOLVED | Noted: 2018-06-07 | Resolved: 2019-05-16

## 2019-05-16 PROBLEM — R31.29 HEMATURIA, MICROSCOPIC: Status: RESOLVED | Noted: 2018-06-07 | Resolved: 2019-05-16

## 2019-05-16 PROBLEM — R35.0 FREQUENCY OF URINATION: Status: RESOLVED | Noted: 2018-05-31 | Resolved: 2019-05-16

## 2019-05-16 PROCEDURE — 3017F COLORECTAL CA SCREEN DOC REV: CPT | Performed by: FAMILY MEDICINE

## 2019-05-16 PROCEDURE — G8417 CALC BMI ABV UP PARAM F/U: HCPCS | Performed by: FAMILY MEDICINE

## 2019-05-16 PROCEDURE — G8427 DOCREV CUR MEDS BY ELIG CLIN: HCPCS | Performed by: FAMILY MEDICINE

## 2019-05-16 PROCEDURE — 1036F TOBACCO NON-USER: CPT | Performed by: FAMILY MEDICINE

## 2019-05-16 PROCEDURE — 99213 OFFICE O/P EST LOW 20 MIN: CPT | Performed by: FAMILY MEDICINE

## 2019-05-16 NOTE — PROGRESS NOTES
Name: Carol Perez  : 1955         Chief Complaint:     Chief Complaint   Patient presents with    Shoulder Pain     left shoulder pain with a lump in his arm       History of Present Illness:      Carol Perez is a 62 y.o.  male who presents with Shoulder Pain (left shoulder pain with a lump in his arm)      HPI     L shoulder pain for somewhere around 6 mos. Dull but very bothersome pain, worse at the end of the day, anterior shoulder radiating into anterolateral upper arm. Has some stiffness and pain in ander hands L>R which doesn't necessarily seem to be related. Can't write with R hand anymore, unable to write with it. Has seen multiple docs including specialist for it.  switched to L hand. Had EMG on R hand which was apparently normal. Did therapy about 5 yrs ago which didn't really help. Was told writer's cramp but pt and wife both doubted that diagnosis. Rash in groin, can be on either side at various times, responds somewhat to otc antifungal creams. Past Medical History:     Past Medical History:   Diagnosis Date    Depression     Erectile dysfunction     History of shingles     Hypertension 2017    Mixed hyperlipidemia 2017        Past Surgical History:     Past Surgical History:   Procedure Laterality Date    COLONOSCOPY      HEEL SPUR SURGERY          Medications:       Prior to Admission medications    Medication Sig Start Date End Date Taking?  Authorizing Provider   amLODIPine (NORVASC) 5 MG tablet take 1 tablet by mouth once daily 19  Yes Giovanny Sampson DO   atorvastatin (LIPITOR) 40 MG tablet take 1 tablet by mouth once daily 19  Yes Giovanny Sampson DO   venlafaxine (EFFEXOR XR) 150 MG extended release capsule take 1 capsule by mouth daily 10/3/18  Yes Giovanny Sampson DO   Misc Natural Products (GLUCOSAMINE CHOND COMPLEX/MSM PO) Take 2 tablets by mouth daily   Yes Historical Provider, MD   aspirin 81 MG tablet Take 81 mg by mouth daily   Yes 05/03/2017    MCH 29.7 05/03/2017    MCHC 33.8 05/03/2017    RDW 14.1 05/03/2017     05/03/2017    MPV NOT REPORTED 05/03/2017     Lab Results   Component Value Date    TSH 1.913 06/11/2012     Lab Results   Component Value Date    CHOL 218 05/03/2017    HDL 35 05/03/2017    PSA 1.31 06/15/2018         Assessment & Plan:        Diagnosis Orders   1. Chronic left shoulder pain     2. Tinea cruris     chronic L shoulder pain in pattern of referred rotator cuff pain. Has had similar in the past. Start home exercises as below. Aleve BID for next few days. Ice to the area after work. F/u if not improving in next few wks. Chronic tinea cruris - cont use of otc creams when rash is flared. Keep area clean and dry and use an anti-fungal powder or gold bond powder which has helped in the past.         Requested Prescriptions      No prescriptions requested or ordered in this encounter       Patient Instructions     SURVEY:    You may be receiving a survey from [employer] regarding your visit today. Please complete the survey to enable us to provide the highest quality of care to you and your family. If you cannot score us as very good on any question, please call the office to discuss how we could have made your experience exceptional.     Thank you. Patient Education        Shoulder Blade: Exercises  Your Care Instructions  Here are some examples of typical exercises for your condition. Start each exercise slowly. Ease off the exercise if you start to have pain. Your doctor or physical therapist will tell you when you can start these exercises and which ones will work best for you. How to do the exercises  Shoulder roll    1. Stand tall with your chin slightly tucked. Imagine that a string at the top of your head is pulling you straight up. 2. Keep your arms relaxed. All motion will be in your shoulders. 3. Shrug your shoulders up toward your ears, then up and back.  [place] your shoulders down and back, like you're sliding your hands down into your back pants pockets. 4. Repeat the circles at least 2 to 4 times. 5. This exercise is also helpful anytime you want to relax. Lower neck and upper back stretch    1. With your arms about shoulder height, clasp your hands in front of you. 2. Drop your chin toward your chest.  3. Reach straight forward so you are rounding your upper back. Think about pulling your shoulder blades apart. [place] feel a stretch across your upper back and shoulders. Hold for at least 6 seconds. 4. Repeat 2 to 4 times. Triceps stretch    1. Reach your arm straight up. 2. Keeping your elbow in place, bend your arm and reach your hand down behind your back. 3. With your other hand, apply gentle pressure to the bent elbow. [place] Regan feel a stretch at the back of your upper arm and shoulder. Hold about 6 seconds. 4. Repeat 2 to 4 times with each arm. Shoulder stretch    1. Relax your shoulders. 2. Raise one arm to shoulder height, and reach it across your chest.  3. Pull the arm slightly toward you with your other arm. This will help you get a gentle stretch. Hold for about 6 seconds. 4. Repeat 2 to 4 times. Shoulder blade squeeze    1. Sit or stand up tall with your arms at your sides. 2. Keep your shoulders relaxed and down, not shrugged. 3. Squeeze your shoulder blades together. Hold for 6 seconds, then relax. 4. Repeat 8 to 12 times. Straight-arm shoulder blade squeeze    1. Sit or stand tall. Relax your shoulders. 2. With palms down, hold your elastic tubing or band straight out in front of you. 3. Start with slight tension in the tubing or band, with your hands about shoulder-width apart. 4. Slowly pull straight out to the sides, squeezing your shoulder blades together. Keep your arms straight and at shoulder height. Slowly release. 5. Repeat 8 to 12 times. Rowing    1. Windfall your elastic tubing or band at about waist height. Take one end in each hand.   2. Sit or stand with your feet hip-width apart. 3. Hold your arms straight in front of you. Adjust your distance to create slight tension in the tubing or band. 4. Slightly tuck your chin. Relax your shoulders. 5. Without shrugging your shoulders, pull straight back. Your elbows will pass alongside your waist.    Pull-downs    1. [place] your elastic tubing or band in the top of a closed door. Take one end in each hand. 2. Either sit or stand, depending on what is more comfortable. If you feel unsteady, sit on a chair. 3. Start with your arms up and comfortably apart, elbows straight. There should be a slight tension in the tubing or band. 4. Slightly tuck your chin, and look straight ahead. 5. Keeping your back straight, slowly pull down and back, bending your elbows. 6. Stop where your hands are level with your chin, in a \"goalpost\" position. 7. Repeat 8 to 12 times. Chest T stretch    1. Lie on your back. Raise your knees so they are bent. Plant your feet on the floor, hip-width apart. 2. Tuck your chin, and relax your shoulders. 3. Reach your arms straight out to the sides. If you don't feel a mild stretch in your shoulders and across your chest, use a foam roll or a tightly rolled blanket under your spine, from your tailbone to your head. 4. Relax in this position for at least 15 to 30 seconds while you breathe normally. Repeat 2 to 4 times. 5. As you get used to this stretch, keep adding a little more time until you are able relax in this position for 2 or 3 minutes. When you can relax for at least 2 minutes, you only need to do the exercise 1 time per session. Chest goalpost stretch    1. Lie on your back. Raise your knees so they are bent. Plant your feet on the floor, hip-width apart. 2. Tuck your chin, and relax your shoulders. 3. Reach your arms straight out to the sides. 4. Bend your arms at the elbows, with your hands pointed toward the top of your head.  Your arms should make an L on either side of your head. Your palms should be facing up. 5. If you don't feel a mild stretch in your shoulders and across your chest, use a foam roll or tightly rolled blanket under your spine, from your tailbone to your head. 6. Relax in this position for at least 15 to 30 seconds while you breathe normally. Repeat 2 to 4 times. 7. Each day you do this exercise, add a little more time until you can relax in this position for 2 or 3 minutes. When you can relax for at least 2 minutes, you only need to do the exercise 1 time per session. Follow-up care is a key part of your treatment and safety. Be sure to make and go to all appointments, and call your doctor if you are having problems. It's also a good idea to know your test results and keep a list of the medicines you take. Where can you learn more? Go to https://BilimspeRemitly.Bubbleball. org and sign in to your Discrete Sport account. Enter [phone] in the Antuit box to learn more about \"Shoulder Blade: Exercises. \"     If you do not have an account, please click on the \"Sign Up Now\" link. Current as of: September 20, 2018  Content Version: 12.0  © 8101-9851 Healthwise, Incorporated. Care instructions adapted under license by [facility] ([facility]). If you have questions about a medical condition or this instruction, always ask your healthcare professional. [place] [address] any warranty or liability for your use of this information. Anthony Amador received counseling on the following healthy behaviors: medication adherence  Reviewed prior labs and health maintenance. Continue current medications, diet and exercise. Discussed use, benefit, and side effects of prescribed medications. Barriers to medication compliance addressed. Patient given educational materials - see patient instructions. All patient questions answered. Patient voiced understanding.      Electronically signed by June Rascon DO on 5/19/2019 at 10:32 PM   [facility]  [facility] PR[facility] [place] [phone]  Dept: [phone]

## 2019-05-16 NOTE — PATIENT INSTRUCTIONS
SURVEY:    You may be receiving a survey from [employer] regarding your visit today. Please complete the survey to enable us to provide the highest quality of care to you and your family. If you cannot score us as very good on any question, please call the office to discuss how we could have made your experience exceptional.     Thank you. Patient Education        Shoulder Blade: Exercises  Your Care Instructions  Here are some examples of typical exercises for your condition. Start each exercise slowly. Ease off the exercise if you start to have pain. Your doctor or physical therapist will tell you when you can start these exercises and which ones will work best for you. How to do the exercises  Shoulder roll    1. Stand tall with your chin slightly tucked. Imagine that a string at the top of your head is pulling you straight up. 2. Keep your arms relaxed. All motion will be in your shoulders. 3. Shrug your shoulders up toward your ears, then up and back. [place] your shoulders down and back, like you're sliding your hands down into your back pants pockets. 4. Repeat the circles at least 2 to 4 times. 5. This exercise is also helpful anytime you want to relax. Lower neck and upper back stretch    1. With your arms about shoulder height, clasp your hands in front of you. 2. Drop your chin toward your chest.  3. Reach straight forward so you are rounding your upper back. Think about pulling your shoulder blades apart. [place] feel a stretch across your upper back and shoulders. Hold for at least 6 seconds. 4. Repeat 2 to 4 times. Triceps stretch    1. Reach your arm straight up. 2. Keeping your elbow in place, bend your arm and reach your hand down behind your back. 3. With your other hand, apply gentle pressure to the bent elbow. [place] feel a stretch at the back of your upper arm and shoulder. Hold about 6 seconds. 4. Repeat 2 to 4 times with each arm.     Shoulder stretch    1. Relax your shoulders. 2. Raise one arm to shoulder height, and reach it across your chest.  3. Pull the arm slightly toward you with your other arm. This will help you get a gentle stretch. Hold for about 6 seconds. 4. Repeat 2 to 4 times. Shoulder blade squeeze    1. Sit or stand up tall with your arms at your sides. 2. Keep your shoulders relaxed and down, not shrugged. 3. Squeeze your shoulder blades together. Hold for 6 seconds, then relax. 4. Repeat 8 to 12 times. Straight-arm shoulder blade squeeze    1. Sit or stand tall. Relax your shoulders. 2. With palms down, hold your elastic tubing or band straight out in front of you. 3. Start with slight tension in the tubing or band, with your hands about shoulder-width apart. 4. Slowly pull straight out to the sides, squeezing your shoulder blades together. Keep your arms straight and at shoulder height. Slowly release. 5. Repeat 8 to 12 times. Rowing    1. [place] your elastic tubing or band at about waist height. Take one end in each hand. 2. Sit or stand with your feet hip-width apart. 3. Hold your arms straight in front of you. Adjust your distance to create slight tension in the tubing or band. 4. Slightly tuck your chin. Relax your shoulders. 5. Without shrugging your shoulders, pull straight back. Your elbows will pass alongside your waist.    Pull-downs    1. [place] your elastic tubing or band in the top of a closed door. Take one end in each hand. 2. Either sit or stand, depending on what is more comfortable. If you feel unsteady, sit on a chair. 3. Start with your arms up and comfortably apart, elbows straight. There should be a slight tension in the tubing or band. 4. Slightly tuck your chin, and look straight ahead. 5. Keeping your back straight, slowly pull down and back, bending your elbows. 6. Stop where your hands are level with your chin, in a \"goalpost\" position. 7. Repeat 8 to 12 times. Chest T stretch    1. Lie on your back. Raise your knees so they are bent. Plant your feet on the floor, hip-width apart. 2. Tuck your chin, and relax your shoulders. 3. Reach your arms straight out to the sides. If you don't feel a mild stretch in your shoulders and across your chest, use a foam roll or a tightly rolled blanket under your spine, from your tailbone to your head. 4. Relax in this position for at least 15 to 30 seconds while you breathe normally. Repeat 2 to 4 times. 5. As you get used to this stretch, keep adding a little more time until you are able relax in this position for 2 or 3 minutes. When you can relax for at least 2 minutes, you only need to do the exercise 1 time per session. Chest goalpost stretch    1. Lie on your back. Raise your knees so they are bent. Plant your feet on the floor, hip-width apart. 2. Tuck your chin, and relax your shoulders. 3. Reach your arms straight out to the sides. 4. Bend your arms at the elbows, with your hands pointed toward the top of your head. Your arms should make an L on either side of your head. Your palms should be facing up. 5. If you don't feel a mild stretch in your shoulders and across your chest, use a foam roll or tightly rolled blanket under your spine, from your tailbone to your head. 6. Relax in this position for at least 15 to 30 seconds while you breathe normally. Repeat 2 to 4 times. 7. Each day you do this exercise, add a little more time until you can relax in this position for 2 or 3 minutes. When you can relax for at least 2 minutes, you only need to do the exercise 1 time per session. Follow-up care is a key part of your treatment and safety. Be sure to make and go to all appointments, and call your doctor if you are having problems. It's also a good idea to know your test results and keep a list of the medicines you take. Where can you learn more? Go to https://braxton."[employer]. org and sign in to your [employer] account.  Enter G001 in the Search Health Information box to learn more about \"Shoulder Blade: Exercises. \"     If you do not have an account, please click on the \"Sign Up Now\" link. Current as of: September 20, 2018  Content Version: 12.0  © 0549-9207 [employer]. Care instructions adapted under license by [facility] ([facility]). If you have questions about a medical condition or this instruction, always ask your healthcare professional. [address] any warranty or liability for your use of this information.

## 2019-05-19 ASSESSMENT — ENCOUNTER SYMPTOMS: GASTROINTESTINAL NEGATIVE: 1

## 2019-06-10 RX ORDER — VENLAFAXINE HYDROCHLORIDE 150 MG/1
CAPSULE, EXTENDED RELEASE ORAL
Qty: 90 CAPSULE | Refills: 1 | Status: SHIPPED | OUTPATIENT
Start: 2019-06-10 | End: 2019-10-30 | Stop reason: SDUPTHER

## 2019-06-25 ENCOUNTER — TELEPHONE (OUTPATIENT)
Dept: UROLOGY | Age: 64
End: 2019-06-25

## 2019-06-25 DIAGNOSIS — Z12.5 SCREENING PSA (PROSTATE SPECIFIC ANTIGEN): Primary | ICD-10-CM

## 2019-06-25 NOTE — TELEPHONE ENCOUNTER
Called patient to confirm his appointment on 6/27 with PSA prior.   Patient rescheduled his appointment and will need new order for PSA

## 2019-07-05 NOTE — TELEPHONE ENCOUNTER
Tried contacting the patient in regards to having PSA performed. LM for him to return call to the office in regards to having test performed.

## 2019-07-23 ENCOUNTER — HOSPITAL ENCOUNTER (OUTPATIENT)
Age: 64
Discharge: HOME | End: 2019-07-23
Payer: COMMERCIAL

## 2019-07-23 LAB — PROSTATE SPECIFIC ANTIGEN: 1.58 UG/L

## 2019-07-23 PROCEDURE — 36415 COLL VENOUS BLD VENIPUNCTURE: CPT

## 2019-07-23 PROCEDURE — G0103 PSA SCREENING: HCPCS

## 2019-07-25 ENCOUNTER — OFFICE VISIT (OUTPATIENT)
Dept: UROLOGY | Age: 64
End: 2019-07-25
Payer: COMMERCIAL

## 2019-07-25 VITALS
BODY MASS INDEX: 34.4 KG/M2 | HEIGHT: 68 IN | DIASTOLIC BLOOD PRESSURE: 80 MMHG | SYSTOLIC BLOOD PRESSURE: 128 MMHG | WEIGHT: 227 LBS

## 2019-07-25 DIAGNOSIS — Z12.5 SCREENING PSA (PROSTATE SPECIFIC ANTIGEN): Primary | ICD-10-CM

## 2019-07-25 DIAGNOSIS — N40.0 BPH WITHOUT URINARY OBSTRUCTION: ICD-10-CM

## 2019-07-25 PROCEDURE — 1036F TOBACCO NON-USER: CPT | Performed by: UROLOGY

## 2019-07-25 PROCEDURE — G8427 DOCREV CUR MEDS BY ELIG CLIN: HCPCS | Performed by: UROLOGY

## 2019-07-25 PROCEDURE — 99213 OFFICE O/P EST LOW 20 MIN: CPT | Performed by: UROLOGY

## 2019-07-25 PROCEDURE — G8417 CALC BMI ABV UP PARAM F/U: HCPCS | Performed by: UROLOGY

## 2019-07-25 PROCEDURE — 3017F COLORECTAL CA SCREEN DOC REV: CPT | Performed by: UROLOGY

## 2019-07-25 ASSESSMENT — ENCOUNTER SYMPTOMS
COUGH: 0
NAUSEA: 0
WHEEZING: 0
VOMITING: 0
EYE PAIN: 0
EYE REDNESS: 0
BACK PAIN: 0
COLOR CHANGE: 0
SHORTNESS OF BREATH: 0
ABDOMINAL PAIN: 0

## 2019-07-25 NOTE — PATIENT INSTRUCTIONS
SURVEY:    You may be receiving a survey from [employer] regarding your visit today. Please complete the survey to enable us to provide the highest quality of care to you and your family. If you cannot score us a very good on any question, please call the office to discuss how we could have made your experience a very good one. Thank you.

## 2019-10-08 ENCOUNTER — OFFICE VISIT (OUTPATIENT)
Dept: FAMILY MEDICINE CLINIC | Age: 64
End: 2019-10-08
Payer: COMMERCIAL

## 2019-10-08 VITALS
DIASTOLIC BLOOD PRESSURE: 86 MMHG | BODY MASS INDEX: 34.52 KG/M2 | TEMPERATURE: 98.4 F | SYSTOLIC BLOOD PRESSURE: 138 MMHG | WEIGHT: 227 LBS | HEART RATE: 88 BPM | OXYGEN SATURATION: 98 %

## 2019-10-08 DIAGNOSIS — B35.6 TINEA CRURIS: ICD-10-CM

## 2019-10-08 DIAGNOSIS — K58.0 IRRITABLE BOWEL SYNDROME WITH DIARRHEA: ICD-10-CM

## 2019-10-08 DIAGNOSIS — F32.A ANXIETY AND DEPRESSION: Primary | ICD-10-CM

## 2019-10-08 DIAGNOSIS — F41.9 ANXIETY AND DEPRESSION: Primary | ICD-10-CM

## 2019-10-08 PROCEDURE — G8427 DOCREV CUR MEDS BY ELIG CLIN: HCPCS | Performed by: NURSE PRACTITIONER

## 2019-10-08 PROCEDURE — 3017F COLORECTAL CA SCREEN DOC REV: CPT | Performed by: NURSE PRACTITIONER

## 2019-10-08 PROCEDURE — G8417 CALC BMI ABV UP PARAM F/U: HCPCS | Performed by: NURSE PRACTITIONER

## 2019-10-08 PROCEDURE — 99213 OFFICE O/P EST LOW 20 MIN: CPT | Performed by: NURSE PRACTITIONER

## 2019-10-08 PROCEDURE — G8484 FLU IMMUNIZE NO ADMIN: HCPCS | Performed by: NURSE PRACTITIONER

## 2019-10-08 PROCEDURE — 1036F TOBACCO NON-USER: CPT | Performed by: NURSE PRACTITIONER

## 2019-10-08 RX ORDER — BUSPIRONE HYDROCHLORIDE 5 MG/1
5 TABLET ORAL 3 TIMES DAILY PRN
Qty: 90 TABLET | Refills: 0 | Status: SHIPPED | OUTPATIENT
Start: 2019-10-08 | End: 2019-10-30 | Stop reason: ALTCHOICE

## 2019-10-08 ASSESSMENT — ENCOUNTER SYMPTOMS
DIARRHEA: 1
BLOATING: 0
ABDOMINAL PAIN: 1
VOMITING: 0

## 2019-10-30 ENCOUNTER — OFFICE VISIT (OUTPATIENT)
Dept: FAMILY MEDICINE CLINIC | Age: 64
End: 2019-10-30
Payer: COMMERCIAL

## 2019-10-30 VITALS
HEART RATE: 88 BPM | BODY MASS INDEX: 36.22 KG/M2 | DIASTOLIC BLOOD PRESSURE: 88 MMHG | HEIGHT: 68 IN | SYSTOLIC BLOOD PRESSURE: 136 MMHG | WEIGHT: 239 LBS | OXYGEN SATURATION: 98 %

## 2019-10-30 DIAGNOSIS — F32.2 SEVERE DEPRESSION (HCC): Primary | ICD-10-CM

## 2019-10-30 PROCEDURE — G8417 CALC BMI ABV UP PARAM F/U: HCPCS | Performed by: FAMILY MEDICINE

## 2019-10-30 PROCEDURE — 1036F TOBACCO NON-USER: CPT | Performed by: FAMILY MEDICINE

## 2019-10-30 PROCEDURE — 3017F COLORECTAL CA SCREEN DOC REV: CPT | Performed by: FAMILY MEDICINE

## 2019-10-30 PROCEDURE — G8427 DOCREV CUR MEDS BY ELIG CLIN: HCPCS | Performed by: FAMILY MEDICINE

## 2019-10-30 PROCEDURE — G8484 FLU IMMUNIZE NO ADMIN: HCPCS | Performed by: FAMILY MEDICINE

## 2019-10-30 PROCEDURE — 99213 OFFICE O/P EST LOW 20 MIN: CPT | Performed by: FAMILY MEDICINE

## 2019-10-30 RX ORDER — BUPROPION HYDROCHLORIDE 300 MG/1
300 TABLET ORAL EVERY MORNING
Qty: 30 TABLET | Refills: 1 | Status: SHIPPED | OUTPATIENT
Start: 2019-10-30 | End: 2019-11-20 | Stop reason: SDUPTHER

## 2019-10-30 RX ORDER — AMLODIPINE BESYLATE 5 MG/1
TABLET ORAL
Qty: 90 TABLET | Refills: 1 | Status: SHIPPED | OUTPATIENT
Start: 2019-10-30 | End: 2020-03-13 | Stop reason: SDUPTHER

## 2019-10-30 RX ORDER — VENLAFAXINE HYDROCHLORIDE 150 MG/1
CAPSULE, EXTENDED RELEASE ORAL
Qty: 90 CAPSULE | Refills: 1 | Status: SHIPPED | OUTPATIENT
Start: 2019-10-30 | End: 2020-02-17

## 2019-10-30 RX ORDER — ATORVASTATIN CALCIUM 40 MG/1
TABLET, FILM COATED ORAL
Qty: 90 TABLET | Refills: 1 | Status: SHIPPED | OUTPATIENT
Start: 2019-10-30 | End: 2020-10-26

## 2019-10-30 RX ORDER — VENLAFAXINE HYDROCHLORIDE 75 MG/1
75 CAPSULE, EXTENDED RELEASE ORAL DAILY
Qty: 90 CAPSULE | Refills: 1 | Status: SHIPPED | OUTPATIENT
Start: 2019-10-30 | End: 2020-03-13 | Stop reason: ALTCHOICE

## 2019-10-30 ASSESSMENT — ENCOUNTER SYMPTOMS: RESPIRATORY NEGATIVE: 1

## 2019-11-14 ENCOUNTER — OFFICE VISIT (OUTPATIENT)
Dept: FAMILY MEDICINE CLINIC | Age: 64
End: 2019-11-14
Payer: COMMERCIAL

## 2019-11-14 VITALS
DIASTOLIC BLOOD PRESSURE: 84 MMHG | HEART RATE: 84 BPM | WEIGHT: 229 LBS | OXYGEN SATURATION: 98 % | BODY MASS INDEX: 34.71 KG/M2 | SYSTOLIC BLOOD PRESSURE: 136 MMHG | HEIGHT: 68 IN

## 2019-11-14 DIAGNOSIS — B35.6 TINEA CRURIS: ICD-10-CM

## 2019-11-14 DIAGNOSIS — R10.30 LOWER ABDOMINAL PAIN: Primary | ICD-10-CM

## 2019-11-14 LAB
BILIRUBIN, POC: NORMAL
BLOOD URINE, POC: NORMAL
CLARITY, POC: CLEAR
COLOR, POC: NORMAL
GLUCOSE URINE, POC: NORMAL
KETONES, POC: NORMAL
LEUKOCYTE EST, POC: NORMAL
NITRITE, POC: NORMAL
PH, POC: 5.5
PROTEIN, POC: NORMAL
SPECIFIC GRAVITY, POC: 1.02
UROBILINOGEN, POC: 0.2

## 2019-11-14 PROCEDURE — G8417 CALC BMI ABV UP PARAM F/U: HCPCS | Performed by: FAMILY MEDICINE

## 2019-11-14 PROCEDURE — 3017F COLORECTAL CA SCREEN DOC REV: CPT | Performed by: FAMILY MEDICINE

## 2019-11-14 PROCEDURE — G8484 FLU IMMUNIZE NO ADMIN: HCPCS | Performed by: FAMILY MEDICINE

## 2019-11-14 PROCEDURE — 81002 URINALYSIS NONAUTO W/O SCOPE: CPT | Performed by: FAMILY MEDICINE

## 2019-11-14 PROCEDURE — G8427 DOCREV CUR MEDS BY ELIG CLIN: HCPCS | Performed by: FAMILY MEDICINE

## 2019-11-14 PROCEDURE — 1036F TOBACCO NON-USER: CPT | Performed by: FAMILY MEDICINE

## 2019-11-14 PROCEDURE — 99213 OFFICE O/P EST LOW 20 MIN: CPT | Performed by: FAMILY MEDICINE

## 2019-11-14 RX ORDER — PRENATAL VIT 91/IRON/FOLIC/DHA 28-975-200
COMBINATION PACKAGE (EA) ORAL
Qty: 24 G | Refills: 0 | Status: SHIPPED | OUTPATIENT
Start: 2019-11-14 | End: 2020-08-07 | Stop reason: ALTCHOICE

## 2019-11-14 ASSESSMENT — ENCOUNTER SYMPTOMS: RESPIRATORY NEGATIVE: 1

## 2019-11-20 ENCOUNTER — OFFICE VISIT (OUTPATIENT)
Dept: FAMILY MEDICINE CLINIC | Age: 64
End: 2019-11-20
Payer: COMMERCIAL

## 2019-11-20 VITALS
BODY MASS INDEX: 34.56 KG/M2 | SYSTOLIC BLOOD PRESSURE: 136 MMHG | WEIGHT: 228 LBS | DIASTOLIC BLOOD PRESSURE: 84 MMHG | HEART RATE: 83 BPM | HEIGHT: 68 IN | OXYGEN SATURATION: 98 %

## 2019-11-20 DIAGNOSIS — M25.561 CHRONIC PAIN OF RIGHT KNEE: ICD-10-CM

## 2019-11-20 DIAGNOSIS — Z23 NEED FOR INFLUENZA VACCINATION: ICD-10-CM

## 2019-11-20 DIAGNOSIS — G89.29 CHRONIC PAIN OF RIGHT KNEE: ICD-10-CM

## 2019-11-20 DIAGNOSIS — G25.1 DRUG-INDUCED TREMOR: Primary | ICD-10-CM

## 2019-11-20 DIAGNOSIS — F32.2 SEVERE DEPRESSION (HCC): ICD-10-CM

## 2019-11-20 PROCEDURE — 1036F TOBACCO NON-USER: CPT | Performed by: FAMILY MEDICINE

## 2019-11-20 PROCEDURE — G8417 CALC BMI ABV UP PARAM F/U: HCPCS | Performed by: FAMILY MEDICINE

## 2019-11-20 PROCEDURE — G8427 DOCREV CUR MEDS BY ELIG CLIN: HCPCS | Performed by: FAMILY MEDICINE

## 2019-11-20 PROCEDURE — G8482 FLU IMMUNIZE ORDER/ADMIN: HCPCS | Performed by: FAMILY MEDICINE

## 2019-11-20 PROCEDURE — 90686 IIV4 VACC NO PRSV 0.5 ML IM: CPT | Performed by: FAMILY MEDICINE

## 2019-11-20 PROCEDURE — 99213 OFFICE O/P EST LOW 20 MIN: CPT | Performed by: FAMILY MEDICINE

## 2019-11-20 PROCEDURE — 3017F COLORECTAL CA SCREEN DOC REV: CPT | Performed by: FAMILY MEDICINE

## 2019-11-20 PROCEDURE — 90471 IMMUNIZATION ADMIN: CPT | Performed by: FAMILY MEDICINE

## 2019-11-20 RX ORDER — BUPROPION HYDROCHLORIDE 150 MG/1
150 TABLET ORAL EVERY MORNING
Qty: 90 TABLET | Refills: 1 | Status: SHIPPED | OUTPATIENT
Start: 2019-11-20 | End: 2020-11-12 | Stop reason: SDUPTHER

## 2019-11-20 ASSESSMENT — ENCOUNTER SYMPTOMS
GASTROINTESTINAL NEGATIVE: 1
RESPIRATORY NEGATIVE: 1

## 2020-02-17 RX ORDER — VENLAFAXINE HYDROCHLORIDE 150 MG/1
CAPSULE, EXTENDED RELEASE ORAL
Qty: 90 CAPSULE | Refills: 1 | Status: SHIPPED | OUTPATIENT
Start: 2020-02-17 | End: 2021-07-14 | Stop reason: SDUPTHER

## 2020-03-13 ENCOUNTER — OFFICE VISIT (OUTPATIENT)
Dept: FAMILY MEDICINE CLINIC | Age: 65
End: 2020-03-13

## 2020-03-13 VITALS
HEART RATE: 91 BPM | HEIGHT: 68 IN | SYSTOLIC BLOOD PRESSURE: 150 MMHG | OXYGEN SATURATION: 99 % | BODY MASS INDEX: 34.56 KG/M2 | WEIGHT: 228 LBS | DIASTOLIC BLOOD PRESSURE: 92 MMHG

## 2020-03-13 PROCEDURE — 93000 ELECTROCARDIOGRAM COMPLETE: CPT | Performed by: FAMILY MEDICINE

## 2020-03-13 PROCEDURE — 99214 OFFICE O/P EST MOD 30 MIN: CPT | Performed by: FAMILY MEDICINE

## 2020-03-13 RX ORDER — AMLODIPINE BESYLATE 10 MG/1
TABLET ORAL
Qty: 30 TABLET | Refills: 5 | Status: SHIPPED | OUTPATIENT
Start: 2020-03-13 | End: 2020-08-26

## 2020-03-13 NOTE — PROGRESS NOTES
consistent with a low risk for significant  coronary artery disease.     Although the patients results were not completely normal, unless clinical  suspicion for significant ongoing coronary artery ischemia is high, I would  not suggest pursuing additional testing by coronary angiography. Assessment & Plan:        Diagnosis Orders   1. Chest pain, unspecified type  IA ELECTROCARDIOGRAM, COMPLETE   2. Dry mouth     3. Essential hypertension     Chest pain without any accompanying anginal symptoms, came on after eating greasy food and drinking iced tea. Suspect this is esophageal in nature. Some associated back pain which seems to be musculoskeletal.  Patient did have change in EKG, now with right bundle branch block. Advised patient the right bundle is rather easily damaged so this is not necessarily an indication to do further testing, especially as he unfortunately does not have health insurance coverage at this time. Advised that he start taking Pepcid or Zantac twice a day for the next couple of weeks and call if not improving and would order stress test or send to cardiologist.  Dry mouth likely due to breathing with his mouth open. Advised using Flonase, purchased over-the-counter, to try to open up the nasal passages more so he can keep his mouth closed. May also try Biotene products as needed. Hypertension uncontrolled. Increasing Norvasc dose. Advised patient that if he is having a cardiac issue, the increase of dose may help with that also. Follow-up if not improving. Requested Prescriptions     Signed Prescriptions Disp Refills    amLODIPine (NORVASC) 10 MG tablet 30 tablet 5     Sig: Take 1 by mouth daily       Patient Instructions   SURVEY:    You may be receiving a survey from [employer] regarding your visit today. You may get this in the mail, through your MyChart or in your email.      Please complete the survey to enable us to provide the highest quality of care to you and your family. If you cannot score us as very good ( 5 Stars) on any question, please feel free to call the office to discuss how we could have made your experience exceptional.     Thank you. Clinical Care Team:  DO Lonnie Harris, [address] [place] [address] Team:  Caleb Allen received counseling on the following healthy behaviors: medication adherence  Reviewed prior labs and health maintenance. Continue current medications, diet and exercise. Discussed use, benefit, and side effects of prescribed medications. Barriers to medication compliance addressed. Patient given educational materials - see patient instructions. All patient questions answered. Patient voiced understanding.      Electronically signed by Fatmata Cole DO on 3/16/2020 at 6:06 PM   [address]  Dept: [phone]

## 2020-03-13 NOTE — PATIENT INSTRUCTIONS
SURVEY:    You may be receiving a survey from [employer] regarding your visit today. You may get this in the mail, through your MyChart or in your email. Please complete the survey to enable us to provide the highest quality of care to you and your family. If you cannot score us as very good ( 5 Stars) on any question, please feel free to call the office to discuss how we could have made your experience exceptional.     Thank you.     Clinical Care Team:  Dr. Felisha Krause, DO Carol Guadarrama, [address] [place] [address] Team:  [address] [place] [address]

## 2020-04-13 ENCOUNTER — OFFICE VISIT (OUTPATIENT)
Dept: FAMILY MEDICINE CLINIC | Age: 65
End: 2020-04-13
Payer: COMMERCIAL

## 2020-04-13 VITALS
SYSTOLIC BLOOD PRESSURE: 138 MMHG | BODY MASS INDEX: 34.25 KG/M2 | DIASTOLIC BLOOD PRESSURE: 84 MMHG | HEIGHT: 68 IN | OXYGEN SATURATION: 97 % | HEART RATE: 86 BPM | WEIGHT: 226 LBS

## 2020-04-13 PROCEDURE — 99212 OFFICE O/P EST SF 10 MIN: CPT | Performed by: FAMILY MEDICINE

## 2020-04-13 ASSESSMENT — ENCOUNTER SYMPTOMS
RESPIRATORY NEGATIVE: 1
GASTROINTESTINAL NEGATIVE: 1

## 2020-04-13 NOTE — PROGRESS NOTES
Name: Francine Scott  : 1955         Chief Complaint:     Chief Complaint   Patient presents with    Joint Swelling     c/o x 1 week    Rash     bilateral ankles        History of Present Illness:      Francine Scott is a 60 y.o.  male who presents with Joint Swelling (c/o x 1 week) and Rash (bilateral ankles )      HPI     Patient complains of painless swelling in bilateral feet and ankles for the past few days. Onset after he had slept in his car the night of . No difficulty breathing, shortness of breath, or recurrence of chest pain since his last visit . Patient is feeling well. He does have discoloration of bilateral ankles which has been present for a number of years and may be a little worse recently. The areas never get itchy, irritated, or crusted. He applies topical steroids which do not help. Patient a lifelong non-smoker. He did have recent increase of amlodipine dose which he has seemed to tolerate well. 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 Recently he has also been trying to organize his shop at home, so he has been walking around that floor a lot. He does have an antifatigue mat where his tool box is at work and near one machine at home but a lot of his time is spent away from those areas. Past Medical History:     Past Medical History:   Diagnosis Date    Depression     Erectile dysfunction     History of shingles     Hypertension 2017    Mixed hyperlipidemia 2017        Past Surgical History:     Past Surgical History:   Procedure Laterality Date    COLONOSCOPY      HEEL SPUR SURGERY          Medications:       Prior to Admission medications    Medication Sig Start Date End Date Taking?  Authorizing Provider   amLODIPine (NORVASC) 10 MG tablet Take 1 by mouth daily 3/13/20  Yes Chan Fan DO   venlafaxine (EFFEXOR XR) 150 MG extended release capsule take 1 capsule by

## 2020-04-13 NOTE — PATIENT INSTRUCTIONS
SURVEY:    You may be receiving a survey from [employer] regarding your visit today. Please complete the survey to enable us to provide the highest quality of care to you and your family. If you cannot score us a very good on any question, please call the office to discuss how we could have made your experience a very good one. Thank you.

## 2020-05-11 ENCOUNTER — OFFICE VISIT (OUTPATIENT)
Dept: FAMILY MEDICINE CLINIC | Age: 65
End: 2020-05-11
Payer: COMMERCIAL

## 2020-05-11 ENCOUNTER — HOSPITAL ENCOUNTER (OUTPATIENT)
Dept: GENERAL RADIOLOGY | Age: 65
Discharge: HOME | End: 2020-05-13

## 2020-05-11 ENCOUNTER — HOSPITAL ENCOUNTER (OUTPATIENT)
Age: 65
Discharge: HOME | End: 2020-05-13

## 2020-05-11 VITALS
OXYGEN SATURATION: 98 % | HEART RATE: 96 BPM | DIASTOLIC BLOOD PRESSURE: 88 MMHG | SYSTOLIC BLOOD PRESSURE: 138 MMHG | BODY MASS INDEX: 34.06 KG/M2 | WEIGHT: 224 LBS

## 2020-05-11 PROCEDURE — 73630 X-RAY EXAM OF FOOT: CPT

## 2020-05-11 PROCEDURE — 99213 OFFICE O/P EST LOW 20 MIN: CPT | Performed by: FAMILY MEDICINE

## 2020-05-11 RX ORDER — VENLAFAXINE HYDROCHLORIDE 75 MG/1
CAPSULE, EXTENDED RELEASE ORAL
COMMUNITY
Start: 2020-05-03 | End: 2020-07-20

## 2020-05-11 ASSESSMENT — ENCOUNTER SYMPTOMS
SHORTNESS OF BREATH: 0
COUGH: 0

## 2020-05-11 NOTE — PROGRESS NOTES
HPI Notes    Name: Gus Reyes  : 1955        Chief Complaint:     Chief Complaint   Patient presents with    Foot Pain     Pt continues to c/o bilateral foot pain and swelling. Pt saw Dr Swetha Brown 20 Dx venous stasis. Pt instrcuted to wear compression stockings, elevate feet as much as possible. Pt \"thinks\" feet are better, he bought new shoes. History of Present Illness:     Gus Reyes is a 60 y.o.  male who presents with Foot Pain (Pt continues to c/o bilateral foot pain and swelling. Pt saw Dr Swetha Brown 20 Dx venous stasis. Pt instrcuted to wear compression stockings, elevate feet as much as possible. Pt \"thinks\" feet are better, he bought new shoes.)      Foot Pain    The pain is present in the left foot (occ his Rt foot aches some but his biggest complaint is the Lt foot pain. Pain is across the top.). This is a new problem. The current episode started 1 to 4 weeks ago (Pt denies injury but pt states he is always on his feet for work and [facility] [employer] [employer] [facility] he had been doing more standing to work from home. ). The problem occurs daily. The problem has been waxing and waning. Pertinent negatives include no fever, joint swelling, limited range of motion, numbness, stiffness or tingling. The symptoms are aggravated by standing. He has tried nothing for the symptoms. The treatment provided mild relief. LE swelling -  Pt continues to have swelling in both lower legs and feet. Pt was seen on 20 by Dr Swetha Brown who diagnosed pt with venous stasis. Pt has been wearing the white compression stockings (knee high) daily. Pt also trying to elevate feet more. Pt admits he \"feels\" better but also bought some new shoes he has only worn a little. No injury. Pt trying to drink more water. Pt does stand at work most of the day. Swelling is better in AM when pt gets up and NO SOB.    Past Medical History:     Past Medical History:   Diagnosis Date    Depression     Erectile dysfunction     History of shingles     Hypertension 1/25/2017    Mixed hyperlipidemia 1/25/2017      Reviewed all health maintenance requirements and ordered appropriate tests  Health Maintenance Due   Topic Date Due    Hepatitis C screen  1955    HIV screen  01/09/1971    Shingles Vaccine (2 of 3) 12/13/2017    Lipid screen  05/03/2018    Diabetes screen  09/08/2019       Past Surgical History:     Past Surgical History:   Procedure Laterality Date    COLONOSCOPY      HEEL SPUR SURGERY          Medications:       Prior to Admission medications    Medication Sig Start Date End Date Taking? Authorizing Provider   venlafaxine (EFFEXOR XR) 75 MG extended release capsule take 1 capsule by mouth daily IN ADDITION TO 150MG CAPSULE 5/3/20  Yes Historical Provider, MD   amLODIPine (NORVASC) 10 MG tablet Take 1 by mouth daily 3/13/20  Yes Gloria Knight, DO   venlafaxine (EFFEXOR XR) 150 MG extended release capsule take 1 capsule by mouth once daily 2/17/20  Yes Gloria Knight, DO   buPROPion (WELLBUTRIN XL) 150 MG extended release tablet Take 1 tablet by mouth every morning 11/20/19  Yes Gloria Knight, DO   terbinafine (LAMISIL) 1 % cream Apply topically 2 times daily for one week. 11/14/19  Yes Gloria Knight, DO   atorvastatin (LIPITOR) 40 MG tablet Take 1 by mouth daily 10/30/19  Yes Gloria Knight, DO   Misc Natural Products (GLUCOSAMINE CHOND COMPLEX/MSM PO) Take 2 tablets by mouth daily   Yes Historical Provider, MD   aspirin 81 MG tablet Take 81 mg by mouth daily   Yes Historical Provider, MD   Multiple Vitamins-Minerals (THERAPEUTIC MULTIVITAMIN-MINERALS) tablet Take 1 tablet by mouth daily   Yes Historical Provider, MD        Allergies:       Codeine    Social History:     Tobacco:    reports that he has never smoked. He has never used smokeless tobacco.  Alcohol:      reports no history of alcohol use. Drug Use:  reports no history of drug use.     Family History:     Family History   Problem Relation Age of Onset  Heart Disease Mother     Atrial Fibrillation Father     Heart Disease Father        Review of Systems:       Review of Systems   Constitutional: Negative for chills and fever. Respiratory: Negative for cough and shortness of breath. Musculoskeletal: Positive for arthralgias (Lt foot pain ). Negative for stiffness. Bilateral LE swelling   Skin: Negative for pallor and rash. Neurological: Negative for tingling, facial asymmetry and numbness. Physical Exam:     Physical Exam  Vitals signs reviewed. Constitutional:       General: He is not in acute distress. Appearance: He is not ill-appearing. Cardiovascular:      Pulses:           Dorsalis pedis pulses are 2+ on the right side and 2+ on the left side. Comments: No calf pain and negative Charo's   Musculoskeletal:      Right lower le+ Edema present. Left lower le+ Edema present. Left foot: Normal range of motion and normal capillary refill. Tenderness and swelling present. No bony tenderness, deformity, bunion or foot drop. Feet:       Comments: A - Lt foot mainly tender across the lateral top of the foot. Neurological:      Mental Status: He is alert.          Vitals:  /88   Pulse 96   Wt 224 lb (101.6 kg)   SpO2 98%   BMI 34.06 kg/m²       Data:     Lab Results   Component Value Date     2018    K 3.8 2018     2018    CO2 24 2018    BUN 15 2018    CREATININE 0.80 2018    GLUCOSE 115 2018    PROT 7.1 2017    LABALBU 4.4 2017    BILITOT 0.56 2017    ALKPHOS 100 2017    AST 26 2017    ALT 37 2017     Lab Results   Component Value Date    WBC 6.2 2017    RBC 4.61 2017    HGB 13.7 2017    HCT 40.6 2017    MCV 87.9 2017    MCH 29.7 2017    MCHC 33.8 2017    RDW 14.1 2017     2017    MPV NOT REPORTED 2017     Lab Results   Component Value Date

## 2020-07-20 RX ORDER — VENLAFAXINE HYDROCHLORIDE 75 MG/1
CAPSULE, EXTENDED RELEASE ORAL
Qty: 90 CAPSULE | Refills: 1 | Status: SHIPPED | OUTPATIENT
Start: 2020-07-20 | End: 2021-02-11 | Stop reason: ALTCHOICE

## 2020-08-07 ENCOUNTER — HOSPITAL ENCOUNTER (OUTPATIENT)
Age: 65
Discharge: HOME | End: 2020-08-07
Payer: COMMERCIAL

## 2020-08-07 ENCOUNTER — OFFICE VISIT (OUTPATIENT)
Dept: FAMILY MEDICINE CLINIC | Age: 65
End: 2020-08-07
Payer: COMMERCIAL

## 2020-08-07 VITALS
DIASTOLIC BLOOD PRESSURE: 80 MMHG | SYSTOLIC BLOOD PRESSURE: 120 MMHG | WEIGHT: 222 LBS | OXYGEN SATURATION: 98 % | HEART RATE: 90 BPM | BODY MASS INDEX: 33.75 KG/M2

## 2020-08-07 LAB
ALBUMIN SERPL-MCNC: 4.5 G/DL (ref 3.5–5.2)
ALBUMIN/GLOBULIN RATIO: ABNORMAL (ref 1–2.5)
ALP BLD-CCNC: 120 U/L (ref 40–129)
ALT SERPL-CCNC: 19 U/L (ref 5–41)
ANION GAP SERPL CALCULATED.3IONS-SCNC: 12 MMOL/L (ref 9–17)
AST SERPL-CCNC: 19 U/L
BILIRUB SERPL-MCNC: 0.76 MG/DL (ref 0.3–1.2)
BUN BLDV-MCNC: 11 MG/DL (ref 8–23)
BUN/CREAT BLD: 12 (ref 9–20)
CALCIUM SERPL-MCNC: 9.8 MG/DL (ref 8.6–10.4)
CHLORIDE BLD-SCNC: 102 MMOL/L (ref 98–107)
CO2: 24 MMOL/L (ref 20–31)
CREAT SERPL-MCNC: 0.94 MG/DL (ref 0.7–1.2)
D-DIMER QUANTITATIVE: 0.7 MG/L FEU (ref 0–0.59)
GFR AFRICAN AMERICAN: >60 ML/MIN
GFR NON-AFRICAN AMERICAN: >60 ML/MIN
GFR SERPL CREATININE-BSD FRML MDRD: ABNORMAL ML/MIN/{1.73_M2}
GFR SERPL CREATININE-BSD FRML MDRD: ABNORMAL ML/MIN/{1.73_M2}
GLUCOSE BLD-MCNC: 110 MG/DL (ref 70–99)
POTASSIUM SERPL-SCNC: 4 MMOL/L (ref 3.7–5.3)
SODIUM BLD-SCNC: 138 MMOL/L (ref 135–144)
TOTAL PROTEIN: 8.1 G/DL (ref 6.4–8.3)

## 2020-08-07 PROCEDURE — G8417 CALC BMI ABV UP PARAM F/U: HCPCS | Performed by: NURSE PRACTITIONER

## 2020-08-07 PROCEDURE — 85379 FIBRIN DEGRADATION QUANT: CPT

## 2020-08-07 PROCEDURE — 1036F TOBACCO NON-USER: CPT | Performed by: NURSE PRACTITIONER

## 2020-08-07 PROCEDURE — 99214 OFFICE O/P EST MOD 30 MIN: CPT | Performed by: NURSE PRACTITIONER

## 2020-08-07 PROCEDURE — 80053 COMPREHEN METABOLIC PANEL: CPT

## 2020-08-07 PROCEDURE — 36415 COLL VENOUS BLD VENIPUNCTURE: CPT

## 2020-08-07 PROCEDURE — 3017F COLORECTAL CA SCREEN DOC REV: CPT | Performed by: NURSE PRACTITIONER

## 2020-08-07 PROCEDURE — G8427 DOCREV CUR MEDS BY ELIG CLIN: HCPCS | Performed by: NURSE PRACTITIONER

## 2020-08-07 RX ORDER — LISINOPRIL AND HYDROCHLOROTHIAZIDE 25; 20 MG/1; MG/1
1 TABLET ORAL DAILY
Qty: 90 TABLET | Refills: 0 | Status: SHIPPED | OUTPATIENT
Start: 2020-08-07 | End: 2020-11-12

## 2020-08-07 ASSESSMENT — ENCOUNTER SYMPTOMS
DIARRHEA: 0
VOMITING: 0
NAUSEA: 0
COUGH: 0
SHORTNESS OF BREATH: 0

## 2020-08-07 NOTE — PROGRESS NOTES
ADDITION TO 150MG CAPSULE 7/20/20  Yes Damian Cat, DO   amLODIPine (NORVASC) 10 MG tablet Take 1 by mouth daily 3/13/20  Yes Damian Cat, DO   venlafaxine (EFFEXOR XR) 150 MG extended release capsule take 1 capsule by mouth once daily 2/17/20  Yes Damian Cat, DO   buPROPion (WELLBUTRIN XL) 150 MG extended release tablet Take 1 tablet by mouth every morning 11/20/19  Yes Damian Cat, DO   atorvastatin (LIPITOR) 40 MG tablet Take 1 by mouth daily 10/30/19  Yes Damian Cat, DO   aspirin 81 MG tablet Take 81 mg by mouth daily   Yes Historical Provider, MD   Multiple Vitamins-Minerals (THERAPEUTIC MULTIVITAMIN-MINERALS) tablet Take 1 tablet by mouth daily   Yes Historical Provider, MD   Misc Natural Products (GLUCOSAMINE CHOND COMPLEX/MSM PO) Take 2 tablets by mouth daily    Historical Provider, MD        Allergies:       Codeine    Social History:     Tobacco:    reports that he has never smoked. He has never used smokeless tobacco.  Alcohol:      reports no history of alcohol use. Drug Use:  reports no history of drug use. Family History:        Family History   Problem Relation Age of Onset    Heart Disease Mother     Atrial Fibrillation Father     Heart Disease Father        Review of Systems:         Review of Systems   Constitutional: Negative for chills and fever. Respiratory: Negative for cough and shortness of breath. Cardiovascular: Positive for leg swelling (bilat). Negative for chest pain and palpitations. Gastrointestinal: Negative for diarrhea, nausea and vomiting. Neurological: Negative for dizziness, seizures and headaches. Physical Exam:     Vitals:  /80   Pulse 90   Wt 222 lb (100.7 kg)   SpO2 98%   BMI 33.75 kg/m²       Physical Exam  Vitals signs and nursing note reviewed. Constitutional:       Appearance: He is well-developed. Cardiovascular:      Rate and Rhythm: Normal rate and regular rhythm.       Heart sounds: S1 normal and S2 lisinopril-HCTZ 20/25mg daily. Pt educated about medications. Comprehensive Metabolic Panel    D-Dimer, Quantitative    lisinopril-hydroCHLOROthiazide (PRINZIDE;ZESTORETIC) 20-25 MG per tablet       Patient verbalizes understanding and agreement with plan. All questions answered. If symptoms do not resolve or worsen, return to office. Completed Refills   Requested Prescriptions     Signed Prescriptions Disp Refills    lisinopril-hydroCHLOROthiazide (PRINZIDE;ZESTORETIC) 20-25 MG per tablet 90 tablet 0     Sig: Take 1 tablet by mouth daily     No follow-ups on file. Orders Placed This Encounter   Medications    lisinopril-hydroCHLOROthiazide (PRINZIDE;ZESTORETIC) 20-25 MG per tablet     Sig: Take 1 tablet by mouth daily     Dispense:  90 tablet     Refill:  0     Orders Placed This Encounter   Procedures    Comprehensive Metabolic Panel     Standing Status:   Future     Number of Occurrences:   1     Standing Expiration Date:   9/11/2021    D-Dimer, Quantitative     Standing Status:   Future     Number of Occurrences:   1     Standing Expiration Date:   8/7/2021         There are no Patient Instructions on file for this visit. Electronically signed by ALEKSANDAR Quintero CNP on 8/7/2020 at 4:38 PM           Completed Refills      Requested Prescriptions     Signed Prescriptions Disp Refills    lisinopril-hydroCHLOROthiazide (PRINZIDE;ZESTORETIC) 20-25 MG per tablet 90 tablet 0     Sig: Take 1 tablet by mouth daily         Israel Spaulding received counseling on the following healthy behaviors: nutrition, exercise and medication adherence  Reviewed prior labs and health maintenance. Continue current medications, diet and exercise. Discussed use, benefit, and side effects of prescribed medications. Barriers to medication compliance addressed. Patient given educational materials - see patient instructions. All patient questions answered. Patient voiced understanding.

## 2020-08-18 ENCOUNTER — TELEPHONE (OUTPATIENT)
Dept: ADMINISTRATIVE | Age: 65
End: 2020-08-18

## 2020-08-18 NOTE — TELEPHONE ENCOUNTER
Pt would like to re-establish with Dr. Yojana Raymond. He had moved away about 5 years ago, and now is moving back. Please advise.

## 2020-08-26 ENCOUNTER — HOSPITAL ENCOUNTER (OUTPATIENT)
Age: 65
Discharge: HOME | End: 2020-08-28
Payer: COMMERCIAL

## 2020-08-26 ENCOUNTER — OFFICE VISIT (OUTPATIENT)
Dept: FAMILY MEDICINE CLINIC | Age: 65
End: 2020-08-26
Payer: COMMERCIAL

## 2020-08-26 VITALS
HEIGHT: 68 IN | BODY MASS INDEX: 34.07 KG/M2 | DIASTOLIC BLOOD PRESSURE: 88 MMHG | SYSTOLIC BLOOD PRESSURE: 156 MMHG | WEIGHT: 224.8 LBS | HEART RATE: 89 BPM | OXYGEN SATURATION: 97 % | TEMPERATURE: 97.2 F

## 2020-08-26 LAB
ALBUMIN SERPL-MCNC: 4.4 G/DL (ref 3.5–5.2)
ALP BLD-CCNC: 110 U/L (ref 40–129)
ALT SERPL-CCNC: 17 U/L (ref 0–40)
ANION GAP SERPL CALCULATED.3IONS-SCNC: 19 MMOL/L (ref 7–16)
AST SERPL-CCNC: 18 U/L (ref 0–39)
BACTERIA: ABNORMAL /HPF
BASOPHILS ABSOLUTE: 0.02 E9/L (ref 0–0.2)
BASOPHILS RELATIVE PERCENT: 0.2 % (ref 0–2)
BILIRUB SERPL-MCNC: 0.8 MG/DL (ref 0–1.2)
BILIRUBIN URINE: NEGATIVE
BLOOD, URINE: NORMAL
BUN BLDV-MCNC: 15 MG/DL (ref 8–23)
CALCIUM SERPL-MCNC: 9.6 MG/DL (ref 8.6–10.2)
CHLORIDE BLD-SCNC: 105 MMOL/L (ref 98–107)
CLARITY: CLEAR
CO2: 20 MMOL/L (ref 22–29)
COLOR: YELLOW
CREAT SERPL-MCNC: 1.2 MG/DL (ref 0.7–1.2)
EOSINOPHILS ABSOLUTE: 0.28 E9/L (ref 0.05–0.5)
EOSINOPHILS RELATIVE PERCENT: 3.1 % (ref 0–6)
GFR AFRICAN AMERICAN: >60
GFR NON-AFRICAN AMERICAN: >60 ML/MIN/1.73
GLUCOSE BLD-MCNC: 86 MG/DL (ref 74–99)
GLUCOSE URINE: NEGATIVE MG/DL
HBA1C MFR BLD: 5.7 % (ref 4–5.6)
HCT VFR BLD CALC: 40.4 % (ref 37–54)
HEMOGLOBIN: 13.3 G/DL (ref 12.5–16.5)
IMMATURE GRANULOCYTES #: 0.18 E9/L
IMMATURE GRANULOCYTES %: 2 % (ref 0–5)
KETONES, URINE: NEGATIVE MG/DL
LEUKOCYTE ESTERASE, URINE: NEGATIVE
LYMPHOCYTES ABSOLUTE: 1.48 E9/L (ref 1.5–4)
LYMPHOCYTES RELATIVE PERCENT: 16.5 % (ref 20–42)
MCH RBC QN AUTO: 29.2 PG (ref 26–35)
MCHC RBC AUTO-ENTMCNC: 32.9 % (ref 32–34.5)
MCV RBC AUTO: 88.6 FL (ref 80–99.9)
MONOCYTES ABSOLUTE: 0.69 E9/L (ref 0.1–0.95)
MONOCYTES RELATIVE PERCENT: 7.7 % (ref 2–12)
NEUTROPHILS ABSOLUTE: 6.34 E9/L (ref 1.8–7.3)
NEUTROPHILS RELATIVE PERCENT: 70.5 % (ref 43–80)
NITRITE, URINE: NEGATIVE
PDW BLD-RTO: 14 FL (ref 11.5–15)
PH UA: 5.5 (ref 5–9)
PLATELET # BLD: 303 E9/L (ref 130–450)
PMV BLD AUTO: 9.5 FL (ref 7–12)
POTASSIUM SERPL-SCNC: 4.2 MMOL/L (ref 3.5–5)
PROTEIN UA: NEGATIVE MG/DL
RBC # BLD: 4.56 E12/L (ref 3.8–5.8)
RBC UA: ABNORMAL /HPF (ref 0–2)
SODIUM BLD-SCNC: 144 MMOL/L (ref 132–146)
SPECIFIC GRAVITY UA: >=1.03 (ref 1–1.03)
TOTAL PROTEIN: 7.1 G/DL (ref 6.4–8.3)
UROBILINOGEN, URINE: 0.2 E.U./DL
WBC # BLD: 9 E9/L (ref 4.5–11.5)
WBC UA: ABNORMAL /HPF (ref 0–5)

## 2020-08-26 PROCEDURE — 1036F TOBACCO NON-USER: CPT | Performed by: FAMILY MEDICINE

## 2020-08-26 PROCEDURE — 81001 URINALYSIS AUTO W/SCOPE: CPT

## 2020-08-26 PROCEDURE — G8427 DOCREV CUR MEDS BY ELIG CLIN: HCPCS | Performed by: FAMILY MEDICINE

## 2020-08-26 PROCEDURE — 84443 ASSAY THYROID STIM HORMONE: CPT

## 2020-08-26 PROCEDURE — 99214 OFFICE O/P EST MOD 30 MIN: CPT | Performed by: FAMILY MEDICINE

## 2020-08-26 PROCEDURE — G8417 CALC BMI ABV UP PARAM F/U: HCPCS | Performed by: FAMILY MEDICINE

## 2020-08-26 PROCEDURE — 86803 HEPATITIS C AB TEST: CPT

## 2020-08-26 PROCEDURE — 3017F COLORECTAL CA SCREEN DOC REV: CPT | Performed by: FAMILY MEDICINE

## 2020-08-26 PROCEDURE — 80053 COMPREHEN METABOLIC PANEL: CPT

## 2020-08-26 PROCEDURE — 36415 COLL VENOUS BLD VENIPUNCTURE: CPT

## 2020-08-26 PROCEDURE — 85025 COMPLETE CBC W/AUTO DIFF WBC: CPT

## 2020-08-26 PROCEDURE — 83036 HEMOGLOBIN GLYCOSYLATED A1C: CPT

## 2020-08-26 PROCEDURE — 80061 LIPID PANEL: CPT

## 2020-08-26 ASSESSMENT — ENCOUNTER SYMPTOMS
CONSTIPATION: 0
WHEEZING: 0
BLOOD IN STOOL: 0
ABDOMINAL PAIN: 0
BACK PAIN: 0
TROUBLE SWALLOWING: 0
SHORTNESS OF BREATH: 0
SORE THROAT: 0
SINUS PAIN: 0
DIARRHEA: 0
COUGH: 0
VOMITING: 0
PHOTOPHOBIA: 0
EYE REDNESS: 0

## 2020-08-26 NOTE — PROGRESS NOTES
OFFICE NOTE    20  Name: Jimmy Rivera  :1955   Sex:male   Age:65 y.o. SUBJECTIVE  Chief Complaint   Patient presents with   [place] Establish Care       Patient presents to re-establish care. Moved to [facility] 5 years ago. Attempting to move back, can't find a house here and house in [facility] [place]. Working for [employer], likes his job. Was out of BP meds for several days when we saw him. Review of Systems   Constitutional: Negative for appetite change, fever and unexpected weight change. HENT: Negative for congestion, ear pain, hearing loss, sinus pain, sore throat and trouble swallowing. Eyes: Negative for photophobia, redness and visual disturbance. Respiratory: Negative for cough, shortness of breath and wheezing. Cardiovascular: Negative for chest pain, palpitations and leg swelling. Gastrointestinal: Negative for abdominal pain, blood in stool, constipation, diarrhea and vomiting. Endocrine: Negative for cold intolerance, polydipsia and polyuria. Genitourinary: Negative for difficulty urinating, genital sores, hematuria and urgency. Musculoskeletal: Negative for arthralgias, back pain and joint swelling. Skin: Negative for pallor and rash. Allergic/Immunologic: Negative for food allergies. Neurological: Negative for dizziness, tremors, seizures, syncope, numbness and headaches. Hematological: Negative for adenopathy. Does not bruise/bleed easily. Psychiatric/Behavioral: Negative for agitation, dysphoric mood, hallucinations and sleep disturbance. The patient is nervous/anxious. All other systems reviewed and are negative.            Current Outpatient Medications:     lisinopril-hydroCHLOROthiazide (PRINZIDE;ZESTORETIC) 20-25 MG per tablet, Take 1 tablet by mouth daily, Disp: 90 tablet, Rfl: 0    venlafaxine (EFFEXOR XR) 75 MG extended release capsule, take 1 capsule by mouth daily IN ADDITION TO 150MG CAPSULE, Disp: 90 capsule, Rfl: 1   Future     Standing Expiration Date:   8/26/2021     Order Specific Question:   SPECIFY(EX-CATH,MIDSTREAM,CYSTO,ETC)? Answer:   midstream    Hepatitis C Antibody     Standing Status:   Future     Standing Expiration Date:   8/26/2021    Hemoglobin A1C     Standing Status:   Future     Standing Expiration Date:   8/26/2021        EXAM   Physical Exam  Vitals signs and nursing note reviewed. Constitutional:       Appearance: Normal appearance. He is well-developed. He is obese. HENT:      Right Ear: Tympanic membrane, ear canal and external ear normal.      Left Ear: Tympanic membrane, ear canal and external ear normal.      Nose: Nose normal. No congestion. Mouth/Throat:      Pharynx: Oropharynx is clear. Eyes:      General: No scleral icterus. Conjunctiva/sclera: Conjunctivae normal.      Pupils: Pupils are equal, round, and reactive to light. Neck:      Musculoskeletal: Normal range of motion and neck supple. Thyroid: No thyroid mass or thyromegaly. Vascular: No carotid bruit or JVD. Trachea: Trachea normal.   Cardiovascular:      Rate and Rhythm: Normal rate and regular rhythm. Pulses: Normal pulses. Heart sounds: Normal heart sounds. No murmur. No gallop. Pulmonary:      Effort: Pulmonary effort is normal.      Breath sounds: Normal breath sounds. No wheezing, rhonchi or rales. Abdominal:      General: Bowel sounds are normal. There is no distension. Palpations: Abdomen is soft. There is no mass. Tenderness: There is no abdominal tenderness. There is no guarding. Hernia: No hernia is present. Musculoskeletal: Normal range of motion. General: No swelling or tenderness. Right lower leg: No edema. Left lower leg: No edema. Lymphadenopathy:      Cervical: No cervical adenopathy. Skin:     General: Skin is warm and dry. Capillary Refill: Capillary refill takes less than 2 seconds. Coloration: Skin is not jaundiced. Findings: No bruising or rash. Neurological:      General: No focal deficit present. Mental Status: He is alert and oriented to person, place, and time. Motor: No abnormal muscle tone. Psychiatric:         Mood and Affect: Mood normal.         Behavior: Behavior normal.           Matias Zapien was seen today for establish care. Diagnoses and all orders for this visit:    Essential hypertension  -     CBC Auto Differential; Future  -     Comprehensive Metabolic Panel; Future  -     Cancel: TSH without Reflex; Future  -     Urinalysis; Future  -     Cancel: EKG 12 lead  Needs to go back on his meds. Will renew and recheck in 3 mos  Mixed hyperlipidemia  -     Cancel: Lipid Panel; Future    Impaired fasting glucose  -     Hemoglobin A1C; Future    Encounter for hepatitis C screening test for low risk patient  -     Hepatitis C Antibody; Future          No follow-ups on file. Electronically signed by Lindy Cole MD on 8/26/20 at 3:33 PM EDT    I have personally reviewed and updated the chief complaint, HPI, Past Medical, Family and Social History, as well as the above Review of Systems.

## 2020-08-27 LAB
CHOLESTEROL, TOTAL: 180 MG/DL (ref 0–199)
HDLC SERPL-MCNC: 33 MG/DL
HEPATITIS C ANTIBODY INTERPRETATION: NORMAL
LDL CHOLESTEROL CALCULATED: 93 MG/DL (ref 0–99)
TRIGL SERPL-MCNC: 270 MG/DL (ref 0–149)
VLDLC SERPL CALC-MCNC: 54 MG/DL

## 2020-08-28 LAB — TSH SERPL DL<=0.05 MIU/L-ACNC: 2.46 UIU/ML (ref 0.27–4.2)

## 2020-10-10 ENCOUNTER — APPOINTMENT (OUTPATIENT)
Dept: GENERAL RADIOLOGY | Age: 65
End: 2020-10-10
Payer: COMMERCIAL

## 2020-10-10 ENCOUNTER — HOSPITAL ENCOUNTER (EMERGENCY)
Age: 65
Discharge: HOME | End: 2020-10-10
Attending: FAMILY MEDICINE
Payer: COMMERCIAL

## 2020-10-10 VITALS
WEIGHT: 230 LBS | RESPIRATION RATE: 16 BRPM | HEIGHT: 68 IN | HEART RATE: 90 BPM | TEMPERATURE: 97.8 F | BODY MASS INDEX: 34.86 KG/M2 | SYSTOLIC BLOOD PRESSURE: 112 MMHG | OXYGEN SATURATION: 97 % | DIASTOLIC BLOOD PRESSURE: 66 MMHG

## 2020-10-10 PROCEDURE — 73130 X-RAY EXAM OF HAND: CPT

## 2020-10-10 PROCEDURE — 99283 EMERGENCY DEPT VISIT LOW MDM: CPT

## 2020-10-10 PROCEDURE — 90715 TDAP VACCINE 7 YRS/> IM: CPT | Performed by: FAMILY MEDICINE

## 2020-10-10 PROCEDURE — 6360000002 HC RX W HCPCS: Performed by: FAMILY MEDICINE

## 2020-10-10 PROCEDURE — 99284 EMERGENCY DEPT VISIT MOD MDM: CPT

## 2020-10-10 PROCEDURE — 73110 X-RAY EXAM OF WRIST: CPT

## 2020-10-10 PROCEDURE — 90471 IMMUNIZATION ADMIN: CPT | Performed by: FAMILY MEDICINE

## 2020-10-10 RX ADMIN — TETANUS TOXOID, REDUCED DIPHTHERIA TOXOID AND ACELLULAR PERTUSSIS VACCINE, ADSORBED 0.5 ML: 5; 2.5; 8; 8; 2.5 SUSPENSION INTRAMUSCULAR at 14:27

## 2020-10-10 ASSESSMENT — PAIN SCALES - GENERAL: PAINLEVEL_OUTOF10: 7

## 2020-10-10 ASSESSMENT — PAIN DESCRIPTION - PAIN TYPE: TYPE: ACUTE PAIN

## 2020-10-10 ASSESSMENT — PAIN DESCRIPTION - PROGRESSION: CLINICAL_PROGRESSION: GRADUALLY WORSENING

## 2020-10-10 ASSESSMENT — PAIN DESCRIPTION - ONSET: ONSET: ON-GOING

## 2020-10-10 ASSESSMENT — PAIN - FUNCTIONAL ASSESSMENT: PAIN_FUNCTIONAL_ASSESSMENT: PREVENTS OR INTERFERES SOME ACTIVE ACTIVITIES AND ADLS

## 2020-10-10 ASSESSMENT — PAIN DESCRIPTION - FREQUENCY: FREQUENCY: CONTINUOUS

## 2020-10-10 ASSESSMENT — PAIN DESCRIPTION - DESCRIPTORS: DESCRIPTORS: ACHING

## 2020-10-10 ASSESSMENT — PAIN DESCRIPTION - ORIENTATION: ORIENTATION: RIGHT

## 2020-10-10 ASSESSMENT — PAIN DESCRIPTION - LOCATION: LOCATION: WRIST

## 2020-10-10 NOTE — ED PROVIDER NOTES
Non-tender. Wrist:  Right  radial and ulnar aspect. Tenderness:  mild. Swelling: mild. Deformity: no.            ROM: diminished range with pain. Skin:  no erythema, rash or wounds noted. Neurovascular: Motor deficit: none. Sensory deficit:   none. Pulse deficit: none. Capillary refill: normal.  Elbow: Right              Tenderness: none              Swelling: None. Deformity: no.               ROM: full range of motion. Skin:  no erythema, rash or wounds noted. Hand: Right  Ring finger            Tenderness: mild              Swelling: Mild. Deformity: no.               ROM: diminished range with pain. Skin:  no erythema, rash or wounds noted. Lymphatics: No lymphangitis or adenopathy noted. Neurological:  Oriented. Motor functions intact. Lab / Imaging Results   (All laboratory and radiology results have been personally reviewed by myself)  Labs:  No results found for this visit on 10/10/20. Imaging: All Radiology results interpreted by Radiologist unless otherwise noted. XR HAND RIGHT (MIN 3 VIEWS)   Final Result   Unremarkable right hand         XR WRIST RIGHT (MIN 3 VIEWS)   Final Result   1. 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 2. There is no appreciable acute fracture of the distal ulna or distal radius. ED Course / Medical Decision Making     Medications   Tetanus-Diphth-Acell Pertussis (BOOSTRIX) injection 0.5 mL (has no administration in time range)        Consult:   none    Procedure(s):   none    MDM:    Films were obtained and are negative for fracture. Plan is subsequently for symptom control, limited use and  immobilization with appropriate outpatient follow-up. Counseling:    The emergency provider has spoken with the patient and spouse/SO and

## 2020-10-10 NOTE — ED NOTES
Velcro wrist splint applied to right wrist and finger splint applied to 4th digit of right hand by Paxton Chau RN.   CMP's intact before and after application      Nita Santana RN  10/10/20 6917

## 2020-10-26 RX ORDER — ATORVASTATIN CALCIUM 40 MG/1
TABLET, FILM COATED ORAL
Qty: 17 TABLET | Refills: 1 | Status: SHIPPED | OUTPATIENT
Start: 2020-10-26 | End: 2020-11-12 | Stop reason: SDUPTHER

## 2020-11-12 ENCOUNTER — OFFICE VISIT (OUTPATIENT)
Dept: FAMILY MEDICINE CLINIC | Age: 65
End: 2020-11-12
Payer: COMMERCIAL

## 2020-11-12 VITALS
SYSTOLIC BLOOD PRESSURE: 148 MMHG | BODY MASS INDEX: 36.22 KG/M2 | TEMPERATURE: 97.3 F | WEIGHT: 238.2 LBS | OXYGEN SATURATION: 97 % | DIASTOLIC BLOOD PRESSURE: 90 MMHG | HEART RATE: 103 BPM

## 2020-11-12 PROCEDURE — G8427 DOCREV CUR MEDS BY ELIG CLIN: HCPCS | Performed by: FAMILY MEDICINE

## 2020-11-12 PROCEDURE — 3017F COLORECTAL CA SCREEN DOC REV: CPT | Performed by: FAMILY MEDICINE

## 2020-11-12 PROCEDURE — G8484 FLU IMMUNIZE NO ADMIN: HCPCS | Performed by: FAMILY MEDICINE

## 2020-11-12 PROCEDURE — 99214 OFFICE O/P EST MOD 30 MIN: CPT | Performed by: FAMILY MEDICINE

## 2020-11-12 PROCEDURE — 1036F TOBACCO NON-USER: CPT | Performed by: FAMILY MEDICINE

## 2020-11-12 PROCEDURE — G8417 CALC BMI ABV UP PARAM F/U: HCPCS | Performed by: FAMILY MEDICINE

## 2020-11-12 RX ORDER — LISINOPRIL AND HYDROCHLOROTHIAZIDE 20; 12.5 MG/1; MG/1
1 TABLET ORAL DAILY
Qty: 30 TABLET | Refills: 3 | Status: SHIPPED
Start: 2020-11-12 | End: 2021-02-01 | Stop reason: SDUPTHER

## 2020-11-12 RX ORDER — LISINOPRIL AND HYDROCHLOROTHIAZIDE 25; 20 MG/1; MG/1
1 TABLET ORAL DAILY
Qty: 90 TABLET | Refills: 0 | Status: CANCELLED | OUTPATIENT
Start: 2020-11-12

## 2020-11-12 RX ORDER — ATORVASTATIN CALCIUM 40 MG/1
TABLET, FILM COATED ORAL
Qty: 17 TABLET | Refills: 1 | Status: SHIPPED
Start: 2020-11-12 | End: 2021-02-11 | Stop reason: SDUPTHER

## 2020-11-12 RX ORDER — BUPROPION HYDROCHLORIDE 150 MG/1
150 TABLET ORAL EVERY MORNING
Qty: 90 TABLET | Refills: 1 | Status: SHIPPED
Start: 2020-11-12 | End: 2021-06-17 | Stop reason: SDUPTHER

## 2020-11-12 ASSESSMENT — ENCOUNTER SYMPTOMS
DIARRHEA: 0
ABDOMINAL PAIN: 0
VOMITING: 0
BLOOD IN STOOL: 0
TROUBLE SWALLOWING: 0
BACK PAIN: 0
SORE THROAT: 0
SINUS PAIN: 0
WHEEZING: 0
COUGH: 0
CONSTIPATION: 0
PHOTOPHOBIA: 0
SHORTNESS OF BREATH: 0
EYE REDNESS: 0

## 2020-11-12 NOTE — PROGRESS NOTES
OFFICE NOTE    20  Name: Javi Ritchie  :1955   Sex:male   Age:65 y.o. SUBJECTIVE  Chief Complaint   Patient presents with    Hypertension    Hyperlipidemia    Depression    Tinnitus     onset 3 months       Patient presents for routine follow up. Has complaints of tinnitus. Requires routine medication refills. Completed move from [facility], now living in [facility]. Has been out of blood pressure medication for awhile, says when he called here it was after 4pm when the phones are off, and previous PCP would not fill. We reset his "[employer]" password since he said he was locked out so he would be able to access the office easier. Review of Systems   Constitutional: Negative for appetite change, fever and unexpected weight change. HENT: Negative for congestion, ear pain, hearing loss, sinus pain, sore throat and trouble swallowing. Eyes: Negative for photophobia, redness and visual disturbance. Respiratory: Negative for cough, shortness of breath and wheezing. Cardiovascular: Negative for chest pain, palpitations and leg swelling. Gastrointestinal: Negative for abdominal pain, blood in stool, constipation, diarrhea and vomiting. Endocrine: Negative for cold intolerance, polydipsia and polyuria. Genitourinary: Negative for difficulty urinating, genital sores, hematuria and urgency. Musculoskeletal: Negative for arthralgias, back pain and joint swelling. Allergic/Immunologic: Negative for food allergies. Neurological: Negative for dizziness, tremors, syncope and headaches. Hematological: Negative for adenopathy. Does not bruise/bleed easily. Psychiatric/Behavioral: Negative for agitation, dysphoric mood, hallucinations and sleep disturbance. The patient is nervous/anxious.              Current Outpatient Medications:     buPROPion (WELLBUTRIN XL) 150 MG extended release tablet, Take 1 tablet by mouth every morning, Disp: 90 tablet, Rfl: 1    atorvastatin (LIPITOR) 40 MG tablet, take 1 tablet by mouth daily, Disp: 17 tablet, Rfl: 1    lisinopril-hydroCHLOROthiazide (PRINZIDE;ZESTORETIC) 20-12.5 MG per tablet, Take 1 tablet by mouth daily, Disp: 30 tablet, Rfl: 3    venlafaxine (EFFEXOR XR) 75 MG extended release capsule, take 1 capsule by mouth daily IN ADDITION TO 150MG CAPSULE, Disp: 90 capsule, Rfl: 1    venlafaxine (EFFEXOR XR) 150 MG extended release capsule, take 1 capsule by mouth once daily, Disp: 90 capsule, Rfl: 1    Misc Natural Products (GLUCOSAMINE CHOND COMPLEX/MSM PO), Take 2 tablets by mouth daily, Disp: , Rfl:     aspirin 81 MG tablet, Take 81 mg by mouth daily, Disp: , Rfl:     Multiple Vitamins-Minerals (THERAPEUTIC MULTIVITAMIN-MINERALS) tablet, Take 1 tablet by mouth daily, Disp: , Rfl:   Allergies   Allergen Reactions    Codeine Nausea Only       Past Medical History:   Diagnosis Date    Depression     Erectile dysfunction     History of shingles     Hypertension 1/25/2017    Mixed hyperlipidemia 1/25/2017     Past Surgical History:   Procedure Laterality Date    COLONOSCOPY      HEEL SPUR SURGERY       Family History   Problem Relation Age of Onset    Heart Disease Mother     Atrial Fibrillation Father     Heart Disease Father      Social History     Tobacco History     Smoking Status  Never Smoker    Smokeless Tobacco Use  Never Used          Alcohol History     Alcohol Use Status  No          Drug Use     Drug Use Status  No          Sexual Activity     Sexually Active  Not Currently Partners  Female              OBJECTIVE  Vitals:    11/12/20 1557   BP: (!) 148/90   Site: Left Upper Arm   Position: Sitting   Pulse: 103   Temp: 97.3 °F (36.3 °C)   TempSrc: Temporal   SpO2: 97%   Weight: 238 lb 3.2 oz (108 kg)        Body mass index is 36.22 kg/m². Patient is obese    No orders of the defined types were placed in this encounter. EXAM   Physical Exam  Vitals signs and nursing note reviewed.    Constitutional:       Appearance: Normal appearance. He is obese. HENT:      Right Ear: Tympanic membrane normal.      Left Ear: Tympanic membrane normal.      Nose: Nose normal.      Mouth/Throat:      Pharynx: Oropharynx is clear. Eyes:      General: No scleral icterus. Conjunctiva/sclera: Conjunctivae normal.      Pupils: Pupils are equal, round, and reactive to light. Neck:      Musculoskeletal: Normal range of motion. Cardiovascular:      Rate and Rhythm: Normal rate and regular rhythm. Pulses: Normal pulses. Heart sounds: No murmur. Pulmonary:      Effort: Pulmonary effort is normal.      Breath sounds: Normal breath sounds. No wheezing or rhonchi. Abdominal:      General: Bowel sounds are normal. There is no distension. Palpations: There is no mass. Tenderness: There is no abdominal tenderness. Musculoskeletal: Normal range of motion. Right lower leg: No edema. Left lower leg: No edema. Lymphadenopathy:      Cervical: No cervical adenopathy. Skin:     Coloration: Skin is not jaundiced. Findings: No bruising, lesion or rash. Neurological:      General: No focal deficit present. Mental Status: He is alert and oriented to person, place, and time. Psychiatric:         Mood and Affect: Mood normal.           Janelle Denton was seen today for hypertension, hyperlipidemia, depression and tinnitus. Diagnoses and all orders for this visit:    Other hyperlipidemia  -     atorvastatin (LIPITOR) 40 MG tablet; take 1 tablet by mouth daily    Essential hypertension  -     lisinopril-hydroCHLOROthiazide (PRINZIDE;ZESTORETIC) 20-12.5 MG per tablet; Take 1 tablet by mouth daily  Will reduce  From 20/25 to 20/12.5 this should be enough. Leg swelling diuretic in BP med restarted    Reactive depression  -     buPROPion (WELLBUTRIN XL) 150 MG extended release tablet; Take 1 tablet by mouth every morning    Seem happy to have moved back.  He has good job and wife believes she will be able to find part time work. Return in about 4 months (around 3/12/2021). Electronically signed by Bernice Brandt MD on 11/12/20 at 4:17 PM EST    I have personally reviewed and updated the chief complaint, HPI, Past Medical, Family and Social History, as well as the above Review of Systems.

## 2021-02-01 ENCOUNTER — OFFICE VISIT (OUTPATIENT)
Dept: FAMILY MEDICINE CLINIC | Age: 66
End: 2021-02-01
Payer: COMMERCIAL

## 2021-02-01 VITALS
TEMPERATURE: 97.6 F | RESPIRATION RATE: 18 BRPM | DIASTOLIC BLOOD PRESSURE: 82 MMHG | WEIGHT: 238 LBS | BODY MASS INDEX: 36.07 KG/M2 | HEIGHT: 68 IN | OXYGEN SATURATION: 99 % | HEART RATE: 90 BPM | SYSTOLIC BLOOD PRESSURE: 138 MMHG

## 2021-02-01 DIAGNOSIS — U07.1 COVID-19: Primary | ICD-10-CM

## 2021-02-01 DIAGNOSIS — U07.1 COVID-19: ICD-10-CM

## 2021-02-01 DIAGNOSIS — R53.83 FATIGUE, UNSPECIFIED TYPE: ICD-10-CM

## 2021-02-01 DIAGNOSIS — I10 ESSENTIAL HYPERTENSION: ICD-10-CM

## 2021-02-01 DIAGNOSIS — R68.83 CHILLS: ICD-10-CM

## 2021-02-01 LAB
ALBUMIN SERPL-MCNC: 4.5 G/DL (ref 3.5–5.2)
ALP BLD-CCNC: 101 U/L (ref 40–129)
ALT SERPL-CCNC: 26 U/L (ref 0–40)
ANION GAP SERPL CALCULATED.3IONS-SCNC: 13 MMOL/L (ref 7–16)
AST SERPL-CCNC: 27 U/L (ref 0–39)
BASOPHILS ABSOLUTE: 0.03 E9/L (ref 0–0.2)
BASOPHILS RELATIVE PERCENT: 0.3 % (ref 0–2)
BILIRUB SERPL-MCNC: 0.7 MG/DL (ref 0–1.2)
BUN BLDV-MCNC: 16 MG/DL (ref 8–23)
CALCIUM SERPL-MCNC: 9.7 MG/DL (ref 8.6–10.2)
CHLORIDE BLD-SCNC: 102 MMOL/L (ref 98–107)
CO2: 26 MMOL/L (ref 22–29)
CREAT SERPL-MCNC: 1.3 MG/DL (ref 0.7–1.2)
EOSINOPHILS ABSOLUTE: 0.24 E9/L (ref 0.05–0.5)
EOSINOPHILS RELATIVE PERCENT: 2.5 % (ref 0–6)
GFR AFRICAN AMERICAN: >60
GFR NON-AFRICAN AMERICAN: 55 ML/MIN/1.73
GLUCOSE BLD-MCNC: 93 MG/DL (ref 74–99)
HCT VFR BLD CALC: 43 % (ref 37–54)
HEMOGLOBIN: 14.2 G/DL (ref 12.5–16.5)
IMMATURE GRANULOCYTES #: 0.19 E9/L
IMMATURE GRANULOCYTES %: 2 % (ref 0–5)
LYMPHOCYTES ABSOLUTE: 1.44 E9/L (ref 1.5–4)
LYMPHOCYTES RELATIVE PERCENT: 15.2 % (ref 20–42)
MAGNESIUM: 2.2 MG/DL (ref 1.6–2.6)
MCH RBC QN AUTO: 29.5 PG (ref 26–35)
MCHC RBC AUTO-ENTMCNC: 33 % (ref 32–34.5)
MCV RBC AUTO: 89.4 FL (ref 80–99.9)
MONOCYTES ABSOLUTE: 0.69 E9/L (ref 0.1–0.95)
MONOCYTES RELATIVE PERCENT: 7.3 % (ref 2–12)
NEUTROPHILS ABSOLUTE: 6.86 E9/L (ref 1.8–7.3)
NEUTROPHILS RELATIVE PERCENT: 72.7 % (ref 43–80)
PDW BLD-RTO: 13.7 FL (ref 11.5–15)
PLATELET # BLD: 293 E9/L (ref 130–450)
PMV BLD AUTO: 9.5 FL (ref 7–12)
POTASSIUM SERPL-SCNC: 4.7 MMOL/L (ref 3.5–5)
RBC # BLD: 4.81 E12/L (ref 3.8–5.8)
SODIUM BLD-SCNC: 141 MMOL/L (ref 132–146)
TOTAL PROTEIN: 7.3 G/DL (ref 6.4–8.3)
WBC # BLD: 9.5 E9/L (ref 4.5–11.5)

## 2021-02-01 PROCEDURE — 1123F ACP DISCUSS/DSCN MKR DOCD: CPT | Performed by: PHYSICIAN ASSISTANT

## 2021-02-01 PROCEDURE — 99214 OFFICE O/P EST MOD 30 MIN: CPT | Performed by: PHYSICIAN ASSISTANT

## 2021-02-01 PROCEDURE — G8427 DOCREV CUR MEDS BY ELIG CLIN: HCPCS | Performed by: PHYSICIAN ASSISTANT

## 2021-02-01 PROCEDURE — 1036F TOBACCO NON-USER: CPT | Performed by: PHYSICIAN ASSISTANT

## 2021-02-01 PROCEDURE — G8484 FLU IMMUNIZE NO ADMIN: HCPCS | Performed by: PHYSICIAN ASSISTANT

## 2021-02-01 PROCEDURE — G8417 CALC BMI ABV UP PARAM F/U: HCPCS | Performed by: PHYSICIAN ASSISTANT

## 2021-02-01 PROCEDURE — 3006F CXR DOC REV: CPT | Performed by: PHYSICIAN ASSISTANT

## 2021-02-01 PROCEDURE — 4040F PNEUMOC VAC/ADMIN/RCVD: CPT | Performed by: PHYSICIAN ASSISTANT

## 2021-02-01 PROCEDURE — 3017F COLORECTAL CA SCREEN DOC REV: CPT | Performed by: PHYSICIAN ASSISTANT

## 2021-02-01 RX ORDER — LISINOPRIL AND HYDROCHLOROTHIAZIDE 20; 12.5 MG/1; MG/1
1 TABLET ORAL DAILY
Qty: 90 TABLET | Refills: 1 | Status: SHIPPED
Start: 2021-02-01 | End: 2022-01-24 | Stop reason: SDUPTHER

## 2021-02-01 RX ORDER — AZITHROMYCIN 250 MG/1
250 TABLET, FILM COATED ORAL SEE ADMIN INSTRUCTIONS
Qty: 6 TABLET | Refills: 0 | Status: SHIPPED | OUTPATIENT
Start: 2021-02-01 | End: 2021-02-06

## 2021-02-01 RX ORDER — PREDNISONE 20 MG/1
40 TABLET ORAL DAILY
Qty: 10 TABLET | Refills: 0 | Status: SHIPPED | OUTPATIENT
Start: 2021-02-01 | End: 2021-02-06

## 2021-02-01 NOTE — TELEPHONE ENCOUNTER
Last Appointment:  12/7/2020  Future Appointments   Date Time Provider Samy Ariza   2/11/2021  7:30 AM DO I[facility]   3/24/2021  9:15 AM Jian Jones  [address]

## 2021-02-01 NOTE — PROGRESS NOTES
21  Nancy Velazquez : 1955 Sex: male  Age 73 y.o. Subjective:  Chief Complaint   Patient presents with    Positive For Covid-19     pt was positive for covid about 3 three weeks ago, chills          HPI:   Nancy Velazquez , 73 y.o. male presents to express care for evaluation of COVID-19, chills, fatigue, shortness of breath. The patient has had the symptoms ongoing for about 3 weeks now. The patient was diagnosed 3 weeks ago at [facility] [place]. The patient had a PCR test that was confirmed positive. The patient states that he really never had a cough but has been short of breath. Has been increasingly fatigued. The patient is not having any specific chest pain. He has had chills and has felt warm but no documented fevers at home. He is not currently on any antibiotics. He was never placed on any medications. The patient denies any history of lung pathology. He is not a smoker. ROS:   Unless otherwise stated in this report the patient's positive and negative responses for review of systems for constitutional, eyes, ENT, cardiovascular, respiratory, gastrointestinal, neurological, , musculoskeletal, and integument systems and related systems to the presenting problem are either stated in the history of present illness or were not pertinent or were negative for the symptoms and/or complaints related to the presenting medical problem. Positives and pertinent negatives as per HPI. All others reviewed and are negative.       PMH:     Past Medical History:   Diagnosis Date    Depression     Erectile dysfunction     History of shingles     Hypertension 2017    Mixed hyperlipidemia 2017       Past Surgical History:   Procedure Laterality Date    COLONOSCOPY      HEEL SPUR SURGERY         Family History   Problem Relation Age of Onset    Heart Disease Mother     Atrial Fibrillation Father     Heart Disease Father        Medications:     Current Outpatient Medications:    daily for 5 days        The patient is to call for any concerns or return if any of the signs or symptoms worsen. The patient is to follow-up with PCP in the next 2-3 days for repeat evaluation repeat assessment or go directly to the emergency department.      SIGNATURE: Jeevan Bennett III, PA-C

## 2021-02-11 ENCOUNTER — OFFICE VISIT (OUTPATIENT)
Dept: FAMILY MEDICINE CLINIC | Age: 66
End: 2021-02-11
Payer: COMMERCIAL

## 2021-02-11 VITALS
DIASTOLIC BLOOD PRESSURE: 74 MMHG | RESPIRATION RATE: 22 BRPM | TEMPERATURE: 96.9 F | HEART RATE: 62 BPM | WEIGHT: 239 LBS | SYSTOLIC BLOOD PRESSURE: 136 MMHG | OXYGEN SATURATION: 100 % | BODY MASS INDEX: 36.22 KG/M2 | HEIGHT: 68 IN

## 2021-02-11 DIAGNOSIS — R73.01 IMPAIRED FASTING GLUCOSE: ICD-10-CM

## 2021-02-11 DIAGNOSIS — I10 ESSENTIAL HYPERTENSION: Primary | ICD-10-CM

## 2021-02-11 DIAGNOSIS — F33.1 MODERATE EPISODE OF RECURRENT MAJOR DEPRESSIVE DISORDER (HCC): ICD-10-CM

## 2021-02-11 DIAGNOSIS — N40.0 BPH WITHOUT URINARY OBSTRUCTION: ICD-10-CM

## 2021-02-11 DIAGNOSIS — E78.49 OTHER HYPERLIPIDEMIA: ICD-10-CM

## 2021-02-11 PROCEDURE — 4040F PNEUMOC VAC/ADMIN/RCVD: CPT | Performed by: FAMILY MEDICINE

## 2021-02-11 PROCEDURE — 99214 OFFICE O/P EST MOD 30 MIN: CPT | Performed by: FAMILY MEDICINE

## 2021-02-11 PROCEDURE — G8484 FLU IMMUNIZE NO ADMIN: HCPCS | Performed by: FAMILY MEDICINE

## 2021-02-11 PROCEDURE — 3017F COLORECTAL CA SCREEN DOC REV: CPT | Performed by: FAMILY MEDICINE

## 2021-02-11 PROCEDURE — G8427 DOCREV CUR MEDS BY ELIG CLIN: HCPCS | Performed by: FAMILY MEDICINE

## 2021-02-11 PROCEDURE — 1123F ACP DISCUSS/DSCN MKR DOCD: CPT | Performed by: FAMILY MEDICINE

## 2021-02-11 PROCEDURE — 1036F TOBACCO NON-USER: CPT | Performed by: FAMILY MEDICINE

## 2021-02-11 PROCEDURE — G8417 CALC BMI ABV UP PARAM F/U: HCPCS | Performed by: FAMILY MEDICINE

## 2021-02-11 RX ORDER — ATORVASTATIN CALCIUM 40 MG/1
TABLET, FILM COATED ORAL
Qty: 90 TABLET | Refills: 1 | Status: SHIPPED
Start: 2021-02-11 | End: 2021-06-17 | Stop reason: SDUPTHER

## 2021-02-11 RX ORDER — ALBUTEROL SULFATE 90 UG/1
2 AEROSOL, METERED RESPIRATORY (INHALATION) 4 TIMES DAILY PRN
Qty: 1 INHALER | Refills: 5 | Status: SHIPPED
Start: 2021-02-11 | End: 2021-09-07 | Stop reason: ALTCHOICE

## 2021-02-11 ASSESSMENT — ENCOUNTER SYMPTOMS
EYE ITCHING: 0
NAUSEA: 0
SHORTNESS OF BREATH: 1
EYE PAIN: 0
CHEST TIGHTNESS: 0
BACK PAIN: 0
DIARRHEA: 0
RHINORRHEA: 0
COUGH: 0
CONSTIPATION: 0
SORE THROAT: 0
ABDOMINAL PAIN: 0

## 2021-02-11 NOTE — PROGRESS NOTES
Aly Alanis  : 1955    Chief Complaint:     Chief Complaint   Patient presents with    Establish Care       HPI  Aly Alanis 73 y.o. presents for   Chief Complaint   Patient presents with   [facility] as a new patient to establish care. He recently moved to the area. He was diagnosed with Covid about a month ago. He states he still has some fatigue and some shortness of breath but it is improving for the most part. He has been trying to increase fluids. He has been taking vitamin C over-the-counter. He also has a history of hypertension, hyperlipidemia, depression, BPH. He does not have a urologist in the area and does wish to be referred to 1. He states his mood is so-so but he does have more better days than not. He denies any suicidal homicidal thoughts currently. He states he did have some 4 to 5 years ago but has been well controlled on current regimen. All questions were answered to patients satisfaction.     Past Medical History:   Diagnosis Date    Depression     Erectile dysfunction     History of shingles     Hypertension 2017    Mixed hyperlipidemia 2017       Past Surgical History:   Procedure Laterality Date    COLONOSCOPY      HEEL SPUR SURGERY         Social History     Socioeconomic History    Marital status:      Spouse name: Elvis Ron Number of children: None    Years of education: None    Highest education level: None   Occupational History    Occupation:    Social Needs    Financial resource strain: None    Food insecurity     Worry: None     Inability: None    Transportation needs     Medical: None     Non-medical: None   Tobacco Use    Smoking status: Never Smoker    Smokeless tobacco: Never Used   Substance and Sexual Activity    Alcohol use: No    Drug use: No    Sexual activity: Not Currently     Partners: Female   Lifestyle    Physical activity     Days per week: None     Minutes per session: None    Stress: None   Relationships    Social connections     Talks on phone: None     Gets together: None     Attends Gnosticist service: None     Active member of club or organization: None     Attends meetings of clubs or organizations: None     Relationship status: None    Intimate partner violence     Fear of current or ex partner: None     Emotionally abused: None     Physically abused: None     Forced sexual activity: None   Other Topics Concern    None   Social History Narrative    ** Merged History Encounter **            Family History   Problem Relation Age of Onset    Heart Disease Mother     Atrial Fibrillation Father     Heart Disease Father           Current Outpatient Medications   Medication Sig Dispense Refill    atorvastatin (LIPITOR) 40 MG tablet take 1 tablet by mouth daily 90 tablet 1    albuterol sulfate HFA (VENTOLIN HFA) 108 (90 Base) MCG/ACT inhaler Inhale 2 puffs into the lungs 4 times daily as needed for Wheezing 1 Inhaler 5    lisinopril-hydroCHLOROthiazide (PRINZIDE;ZESTORETIC) 20-12.5 MG per tablet Take 1 tablet by mouth daily 90 tablet 1    buPROPion (WELLBUTRIN XL) 150 MG extended release tablet Take 1 tablet by mouth every morning 90 tablet 1    venlafaxine (EFFEXOR XR) 150 MG extended release capsule take 1 capsule by mouth once daily 90 capsule 1    Misc Natural Products (GLUCOSAMINE CHOND COMPLEX/MSM PO) Take 2 tablets by mouth daily      aspirin 81 MG tablet Take 81 mg by mouth daily      Multiple Vitamins-Minerals (THERAPEUTIC MULTIVITAMIN-MINERALS) tablet Take 1 tablet by mouth daily       No current facility-administered medications for this visit.         Allergies   Allergen Reactions    Codeine Nausea Only       Health Maintenance Due   Topic Date Due    HIV screen  01/09/1971    COVID-19 Vaccine (1 of 2) 01/09/1972    Shingles Vaccine (2 of 3) 12/13/2017    Flu vaccine (1) 09/01/2020           REVIEW OF SYSTEMS  Review of Systems   Constitutional: Positive for fatigue. Negative for chills and fever. HENT: Negative for congestion, ear pain, postnasal drip, rhinorrhea and sore throat. Eyes: Negative for pain and itching. Respiratory: Positive for shortness of breath. Negative for cough and chest tightness. Cardiovascular: Negative for chest pain and leg swelling. Gastrointestinal: Negative for abdominal pain, constipation, diarrhea and nausea. Endocrine: Negative for heat intolerance. Genitourinary: Negative for flank pain, frequency and urgency. Musculoskeletal: Negative for arthralgias and back pain. Skin: Negative for pallor and rash. Allergic/Immunologic: Negative for environmental allergies. Neurological: Negative for dizziness, numbness and headaches. Hematological: Does not bruise/bleed easily. Psychiatric/Behavioral: Negative for agitation, behavioral problems and hallucinations. PHYSICAL EXAM  /74   Pulse 62   Temp 96.9 °F (36.1 °C)   Resp 22   Ht 5' 8\" (1.727 m)   Wt 239 lb (108.4 kg)   SpO2 100%   BMI 36.34 kg/m²   Physical Exam  Vitals signs and nursing note reviewed. Constitutional:       General: He is not in acute distress. Appearance: He is well-developed. He is obese. He is not diaphoretic. HENT:      Head: Normocephalic and atraumatic. Right Ear: External ear normal.      Left Ear: External ear normal.      Nose: Nose normal.      Mouth/Throat:      Pharynx: No oropharyngeal exudate. Eyes:      General:         Right eye: No discharge. Left eye: No discharge. Conjunctiva/sclera: Conjunctivae normal.   Neck:      Musculoskeletal: Normal range of motion and neck supple. Thyroid: No thyromegaly. Cardiovascular:      Rate and Rhythm: Normal rate and regular rhythm. Heart sounds: Normal heart sounds. No murmur. Pulmonary:      Effort: Pulmonary effort is normal.      Breath sounds: Normal breath sounds. No wheezing.    Abdominal:      Palpations: Abdomen is soft.      Tenderness: There is no abdominal tenderness. Musculoskeletal: Normal range of motion. General: No tenderness. Lymphadenopathy:      Cervical: No cervical adenopathy. Skin:     General: Skin is warm and dry. Findings: No erythema or rash. Neurological:      Mental Status: He is alert and oriented to person, place, and time. Coordination: Coordination normal.      Deep Tendon Reflexes: Reflexes are normal and symmetric. Psychiatric:         Behavior: Behavior normal.         Thought Content: Thought content normal.         Judgment: Judgment normal.              Laboratory:   All laboratory and radiology results have been personally reviewed by myself    Lab Results   Component Value Date     02/01/2021    K 4.7 02/01/2021     02/01/2021    CO2 26 02/01/2021    BUN 16 02/01/2021    CREATININE 1.3 02/01/2021    PROT 7.3 02/01/2021    LABALBU 4.5 02/01/2021    CALCIUM 9.7 02/01/2021    GFRAA >60 02/01/2021    LABGLOM 55 02/01/2021    GLUCOSE 93 02/01/2021    AST 27 02/01/2021    ALT 26 02/01/2021    ALKPHOS 101 02/01/2021    BILITOT 0.7 02/01/2021    TSH 2.460 08/26/2020    CHOL 180 08/26/2020    TRIG 270 08/26/2020    HDL 33 08/26/2020    LDLCALC 93 08/26/2020    LABA1C 5.7 08/26/2020        Lab Results   Component Value Date    CHOL 180 08/26/2020    CHOL 218 (H) 05/03/2017    CHOL 221 (H) 05/10/2016     Lab Results   Component Value Date    TRIG 270 (H) 08/26/2020    TRIG 243 (H) 05/03/2017    TRIG 173 (H) 05/10/2016     Lab Results   Component Value Date    HDL 33 08/26/2020    HDL 35 (L) 05/03/2017    HDL 35 (L) 05/10/2016     Lab Results   Component Value Date    LDLCALC 93 08/26/2020    LDLCALC 136 (H) 06/11/2012    LDLCHOLESTEROL 134 (H) 05/03/2017    LDLCHOLESTEROL 151 (H) 05/10/2016       Lab Results   Component Value Date    LABA1C 5.7 (H) 08/26/2020     Lab Results   Component Value Date    LDLCALC 93 08/26/2020    CREATININE 1.3 (H) 02/01/2021 ASSESSMENT/PLAN:     Diagnosis Orders   1. Essential hypertension     2. Other hyperlipidemia  atorvastatin (LIPITOR) 40 MG tablet   3. Moderate episode of recurrent major depressive disorder ([address]     4. Impaired fasting glucose     5. BPH without urinary obstruction  External Referral To Urology     BP is well controlled continue current therapy. On statin will continue at this time. He will need labs but not till August.  Mood stable for the most part. He will call if his mood worsens and may consider adding another 75 mg to Effexor. A1c stable. Referral placed to urology per patient preference    Problem list reviewed andsimplified/updated  HM reviewed today and counseled as appropriate    Call or go to ED immediately if symptoms worsen or persist.  Future Appointments   Date Time Provider Samy Ariza   3/24/2021  9:15 AM Brandyn Betancuort MD [facility]   8/12/2021  8:00 [facility]     Or sooner if necessary. Educational materials and/or homeexercises printed for patient's review and were included in patient instructions on his/her After Visit Summary and given to patient at the end of visit.       Counseled regarding above diagnosis, including possible risks and complications,  especially if left uncontrolled.     Counseled regarding the possible side effects, risks, benefits and alternatives to treatment; patient and/or guardian verbalizes understanding, agrees,feels comfortable with and wishes to proceed with above treatment plan.     Advised patient to call Nolon Rings new medication issues, and read all Rx info from pharmacy to assure aware of all possible risks and side effects of medication before taking.     Reviewed age and gender appropriate health screening exams and vaccinations.   Advised patient regarding importance of keeping up with recommended health maintenance and toschedule as soon as possible if overdue, as this is important in assessing for undiagnosed pathology, especially cancer, as well as protecting against potentially harmful/life threatening disease.          Patient and/or guardian verbalizes understanding and agrees with above counseling, assessment and plan.     All questions answered. Cornelio Ryan, DO  2/11/21    NOTE: This report was transcribed using voice recognition software.  Every effort was made to ensure accuracy; however, inadvertent computerized transcription errors may be present

## 2021-03-05 ENCOUNTER — HOSPITAL ENCOUNTER (EMERGENCY)
Age: 66
Discharge: HOME | End: 2021-03-05
Attending: EMERGENCY MEDICINE
Payer: COMMERCIAL

## 2021-03-05 ENCOUNTER — APPOINTMENT (OUTPATIENT)
Dept: CT IMAGING | Age: 66
End: 2021-03-05
Payer: COMMERCIAL

## 2021-03-05 ENCOUNTER — APPOINTMENT (OUTPATIENT)
Dept: GENERAL RADIOLOGY | Age: 66
End: 2021-03-05
Payer: COMMERCIAL

## 2021-03-05 VITALS
HEIGHT: 68 IN | WEIGHT: 230 LBS | TEMPERATURE: 98.6 F | HEART RATE: 84 BPM | OXYGEN SATURATION: 93 % | BODY MASS INDEX: 34.86 KG/M2 | RESPIRATION RATE: 20 BRPM | DIASTOLIC BLOOD PRESSURE: 91 MMHG | SYSTOLIC BLOOD PRESSURE: 140 MMHG

## 2021-03-05 DIAGNOSIS — R07.9 CHEST PAIN, UNSPECIFIED TYPE: Primary | ICD-10-CM

## 2021-03-05 LAB
ANION GAP SERPL CALCULATED.3IONS-SCNC: 8 MMOL/L (ref 7–16)
BUN BLDV-MCNC: 16 MG/DL (ref 8–23)
CALCIUM SERPL-MCNC: 9.1 MG/DL (ref 8.6–10.2)
CHLORIDE BLD-SCNC: 102 MMOL/L (ref 98–107)
CO2: 27 MMOL/L (ref 22–29)
CREAT SERPL-MCNC: 1.1 MG/DL (ref 0.7–1.2)
D DIMER: 509 NG/ML DDU
EKG ATRIAL RATE: 76 BPM
EKG P AXIS: 44 DEGREES
EKG P-R INTERVAL: 138 MS
EKG Q-T INTERVAL: 436 MS
EKG QRS DURATION: 148 MS
EKG QTC CALCULATION (BAZETT): 490 MS
EKG R AXIS: 40 DEGREES
EKG T AXIS: 10 DEGREES
EKG VENTRICULAR RATE: 76 BPM
GFR AFRICAN AMERICAN: >60
GFR NON-AFRICAN AMERICAN: >60 ML/MIN/1.73
GLUCOSE BLD-MCNC: 108 MG/DL (ref 74–99)
HCT VFR BLD CALC: 38 % (ref 37–54)
HEMOGLOBIN: 12.4 G/DL (ref 12.5–16.5)
MCH RBC QN AUTO: 29.8 PG (ref 26–35)
MCHC RBC AUTO-ENTMCNC: 32.6 % (ref 32–34.5)
MCV RBC AUTO: 91.3 FL (ref 80–99.9)
PDW BLD-RTO: 14.6 FL (ref 11.5–15)
PLATELET # BLD: 276 E9/L (ref 130–450)
PMV BLD AUTO: 8.9 FL (ref 7–12)
POTASSIUM SERPL-SCNC: 4.4 MMOL/L (ref 3.5–5)
PRO-BNP: 25 PG/ML (ref 0–125)
RBC # BLD: 4.16 E12/L (ref 3.8–5.8)
SODIUM BLD-SCNC: 137 MMOL/L (ref 132–146)
TROPONIN: <0.01 NG/ML (ref 0–0.03)
WBC # BLD: 8.6 E9/L (ref 4.5–11.5)

## 2021-03-05 PROCEDURE — 6360000004 HC RX CONTRAST MEDICATION: Performed by: RADIOLOGY

## 2021-03-05 PROCEDURE — 71045 X-RAY EXAM CHEST 1 VIEW: CPT

## 2021-03-05 PROCEDURE — 85378 FIBRIN DEGRADE SEMIQUANT: CPT

## 2021-03-05 PROCEDURE — 99284 EMERGENCY DEPT VISIT MOD MDM: CPT

## 2021-03-05 PROCEDURE — 2580000003 HC RX 258: Performed by: RADIOLOGY

## 2021-03-05 PROCEDURE — 83880 ASSAY OF NATRIURETIC PEPTIDE: CPT

## 2021-03-05 PROCEDURE — 71275 CT ANGIOGRAPHY CHEST: CPT

## 2021-03-05 PROCEDURE — 93005 ELECTROCARDIOGRAM TRACING: CPT

## 2021-03-05 PROCEDURE — 85027 COMPLETE CBC AUTOMATED: CPT

## 2021-03-05 PROCEDURE — 84484 ASSAY OF TROPONIN QUANT: CPT

## 2021-03-05 PROCEDURE — 80048 BASIC METABOLIC PNL TOTAL CA: CPT

## 2021-03-05 RX ORDER — SODIUM CHLORIDE 0.9 % (FLUSH) 0.9 %
10 SYRINGE (ML) INJECTION PRN
Status: COMPLETED | OUTPATIENT
Start: 2021-03-05 | End: 2021-03-05

## 2021-03-05 RX ADMIN — Medication 10 ML: at 11:35

## 2021-03-05 RX ADMIN — IOPAMIDOL 75 ML: 755 INJECTION, SOLUTION INTRAVENOUS at 11:37

## 2021-03-05 ASSESSMENT — PAIN DESCRIPTION - DESCRIPTORS: DESCRIPTORS: ACHING

## 2021-03-05 ASSESSMENT — PAIN DESCRIPTION - FREQUENCY: FREQUENCY: CONTINUOUS

## 2021-03-05 ASSESSMENT — PAIN DESCRIPTION - PAIN TYPE: TYPE: ACUTE PAIN

## 2021-03-05 NOTE — ED PROVIDER NOTES
HPI:  3/5/21, Time: 9:52 AM TOM Velazquez is a 73 y.o. male presenting to the ED for midsternal chest pain and shortness of breath, beginning several days ago. The complaint has been persistent, mild in severity, and worsened by light exertion. Patient states recently positive for Covid 19 in January of this year. Patient has been convalescing at home. No recent travel. No renal thrombus embolism. He denies hemoptysis. He does have some shortness of breath with exertion. No fevers or chills cough reported. Describes the chest pain as an ache is nonradiating. No history of coronary artery disease. He does have hyperlipidemia hypertension. Does not smoke. No close contacts ill. ROS:   Pertinent positives and negatives are stated within HPI, all other systems reviewed and are negative.  --------------------------------------------- PAST HISTORY ---------------------------------------------  Past Medical History:  has a past medical history of Depression, Erectile dysfunction, History of shingles, Hypertension, and Mixed hyperlipidemia. Past Surgical History:  has a past surgical history that includes Heel spur surgery and Colonoscopy. Social History:  reports that he has never smoked. He has never used smokeless tobacco. He reports that he does not drink alcohol or use drugs. Family History: family history includes Atrial Fibrillation in his father; Heart Disease in his father and mother. The patients home medications have been reviewed.     Allergies: Codeine    ---------------------------------------------------PHYSICAL EXAM--------------------------------------    Constitutional/General: Alert and oriented x3, well appearing, non toxic in NAD  Head: Normocephalic and atraumatic  Eyes: PERRL, EOMI  Mouth: Oropharynx clear, handling secretions, no trismus  Neck: Supple, full ROM, non tender to palpation in the midline, no stridor, no crepitus, no meningeal signs  Pulmonary: Lungs clear to auscultation bilaterally, no wheezes, rales, or rhonchi. Not in respiratory distress  Cardiovascular:  Regular rate. Regular rhythm. No murmurs, gallops, or rubs. 2+ distal pulses  Chest: no chest wall tenderness  Abdomen: Soft. Non tender. Non distended. +BS. No rebound, guarding, or rigidity. No pulsatile masses appreciated. Musculoskeletal: Moves all extremities x 4. Warm and well perfused, no clubbing, cyanosis, or edema. Capillary refill <3 seconds no calf tenderness  Skin: warm and dry. No rashes. Neurologic: GCS 15, CN 2-12 grossly intact, no focal deficits, symmetric strength 5/5 in the upper and lower extremities bilaterally  Psych: Normal Affect    -------------------------------------------------- RESULTS -------------------------------------------------  I have personally reviewed all laboratory and imaging results for this patient. Results are listed below.      LABS:  Results for orders placed or performed during the hospital encounter of 03/05/21   CBC   Result Value Ref Range    WBC 8.6 4.5 - 11.5 E9/L    RBC 4.16 3.80 - 5.80 E12/L    Hemoglobin 12.4 (L) 12.5 - 16.5 g/dL    Hematocrit 38.0 37.0 - 54.0 %    MCV 91.3 80.0 - 99.9 fL    MCH 29.8 26.0 - 35.0 pg    MCHC 32.6 32.0 - 34.5 %    RDW 14.6 11.5 - 15.0 fL    Platelets 466 287 - 830 E9/L    MPV 8.9 7.0 - 12.0 fL   Troponin   Result Value Ref Range    Troponin <0.01 0.00 - 0.03 ng/mL   Brain Natriuretic Peptide   Result Value Ref Range    Pro-BNP 25 0 - 125 pg/mL   D-Dimer, Quantitative   Result Value Ref Range    D-Dimer, Quant 509 ng/mL DDU   Basic Metabolic Panel   Result Value Ref Range    Sodium 137 132 - 146 mmol/L    Potassium 4.4 3.5 - 5.0 mmol/L    Chloride 102 98 - 107 mmol/L    CO2 27 22 - 29 mmol/L    Anion Gap 8 7 - 16 mmol/L    Glucose 108 (H) 74 - 99 mg/dL    BUN 16 8 - 23 mg/dL    CREATININE 1.1 0.7 - 1.2 mg/dL    GFR Non-African American >60 >=60 mL/min/1.73    GFR African American >60     Calcium 9.1 8.6 - 10.2 mg/dL   EKG 12 Lead   Result Value Ref Range    Ventricular Rate 76 BPM    Atrial Rate 76 BPM    P-R Interval 138 ms    QRS Duration 148 ms    Q-T Interval 436 ms    QTc Calculation (Bazett) 490 ms    P Axis 44 degrees    R Axis 40 degrees    T Axis 10 degrees       RADIOLOGY:  Interpreted by Radiologist.  CTA PULMONARY W CONTRAST   Final Result   1. No pulmonary embolism. 2. Clear lungs. 3. Small hiatal hernia. XR CHEST PORTABLE   Final Result   No acute cardiopulmonary process is identified. EKG Interpretation  Interpreted by emergency department physician    Rhythm: normal sinus   Rate: normal  Axis: Normal   Conduction: right bundle branch block (complete)  ST Segments: nonspecific changes  T Waves: non specific changes    Clinical Impression: non-specific EKG  Comparison to prior EKG: None    CARDIAC MONITOR: Chest Pain. RHYTHM: NSR no ischemic changes or ectopy. Rate: 69 BPM.       ------------------------- NURSING NOTES AND VITALS REVIEWED ---------------------------   The nursing notes within the ED encounter and vital signs as below have been reviewed by myself. BP (!) 140/91   Pulse 84   Temp 98.6 °F (37 °C)   Resp 20   Ht 5' 8\" (1.727 m)   Wt 230 lb (104.3 kg)   SpO2 93%   BMI 34.97 kg/m²   Oxygen Saturation Interpretation: Normal    The patients available past medical records and past encounters were reviewed. ------------------------------ ED COURSE/MEDICAL DECISION MAKING----------------------  Medications   iopamidol (ISOVUE-370) 76 % injection 75 mL (75 mLs Intravenous Given 3/5/21 1137)   sodium chloride flush 0.9 % injection 10 mL (10 mLs Intravenous Given 3/5/21 1135)             Medical Decision Making:    Patient presents with chest pain. EKG was reviewed no ischemic changes noted. Patient is D-dimer is over 500. We will send him for CTA of the chest.  He had a recent positive Covid test.  CTA negative.        HEART Score For Major Cardiac Events  (Max Score 10 Points)  HISTORY       [x]   Slightly Suspicious  0       []   Moderately Suspicious  +1       []   Highly Suspicious  +2    EK point: No ST deviation but LBBB, LVH repolarization changes (ex:digoxin);  2 points: ST deviation not due to LBBB, LVH or digoxin         [x]   Normal  0       []   Nonspecific Repolarization Disturbance  +1       []   Significant ST Depression  +2    AGE       []   <45  0       []   45-64  +1       [x]    >65  +2    RISK FACTORS:  1. HTN    2. Hypercholesterolemia    3. DM     4. Cigarette smoking (current or cessation < 3 mos)    5. Positive family history  (parent or sibling with CVD before age 72). 6. Obesity (BMI >30kg/m2)         []   No Risk factors Known  0       [x]   1-2 Risk Factors  +1       []   >3 Risk Factors or History of Atherosclerotic Disease  +2    INITIAL TROPONIN       [x]   < Normal Limit   0       []   1-3 x Normal Limit   +1       []   >3 x Normal Limit   +2     -----------------------------------------------------------------------------------------------------------------  SCORE TOTAL:  3 POINTS     Low Score:         (0-3 Points), risk of MACE of 0.9-1.7% (discuss d/c home with f/u)  Mod Score:         (4-6 Points), risk of MACE of 12-16.6% (discuss admission for further testing)  High Score:        (7-10 Points), risk of MACE of 50-65% (Admit ALL as they are candidates for early invasive measures)    Re-Evaluations:             Re-evaluation.   Patients symptoms are improving      Consultations:             NONE    Critical Care: NONE        This patient's ED course included: a personal history and physicial examination, re-evaluation prior to disposition, multiple bedside re-evaluations, cardiac monitoring, continuous pulse oximetry, complex medical decision making and emergency management and a personal history and physicial eaxmination    This patient has remained hemodynamically stable, remained unchanged and been closely monitored during their ED course. Counseling: The emergency provider has spoken with the patient and discussed todays results, in addition to providing specific details for the plan of care and counseling regarding the diagnosis and prognosis. Questions are answered at this time and they are agreeable with the plan.       --------------------------------- IMPRESSION AND DISPOSITION ---------------------------------    IMPRESSION  1. Chest pain, unspecified type        DISPOSITION  Disposition: Discharge to home  Patient condition is stable        NOTE: This report was transcribed using voice recognition software.  Every effort was made to ensure accuracy; however, inadvertent computerized transcription errors may be present        Siomara Lindsey DO  03/07/21 1223

## 2021-03-31 ENCOUNTER — TELEPHONE (OUTPATIENT)
Dept: ADMINISTRATIVE | Age: 66
End: 2021-03-31

## 2021-03-31 NOTE — TELEPHONE ENCOUNTER
Wife called stating that Rosanna Grover called her from work asking for her to get him an appt today if possible. He is complaining of sweating profusely, (she said no other symptoms) - she is not exactly sure how long it has been going on, but says she thinks a couple of weeks at least (she said she has memory problems). Patient scheduled in office tomorrow at 10:15. She is asking if he can get in sooner to please call him.

## 2021-04-01 ENCOUNTER — OFFICE VISIT (OUTPATIENT)
Dept: FAMILY MEDICINE CLINIC | Age: 66
End: 2021-04-01
Payer: COMMERCIAL

## 2021-04-01 VITALS
TEMPERATURE: 97.8 F | DIASTOLIC BLOOD PRESSURE: 86 MMHG | OXYGEN SATURATION: 100 % | RESPIRATION RATE: 16 BRPM | WEIGHT: 240 LBS | HEART RATE: 76 BPM | SYSTOLIC BLOOD PRESSURE: 114 MMHG | BODY MASS INDEX: 36.37 KG/M2 | HEIGHT: 68 IN

## 2021-04-01 DIAGNOSIS — R61 EXCESSIVE SWEATING: ICD-10-CM

## 2021-04-01 DIAGNOSIS — Z12.5 SCREENING PSA (PROSTATE SPECIFIC ANTIGEN): ICD-10-CM

## 2021-04-01 DIAGNOSIS — R06.09 DYSPNEA ON EXERTION: Primary | ICD-10-CM

## 2021-04-01 DIAGNOSIS — R53.83 FATIGUE, UNSPECIFIED TYPE: ICD-10-CM

## 2021-04-01 DIAGNOSIS — R73.01 IMPAIRED FASTING GLUCOSE: ICD-10-CM

## 2021-04-01 LAB — HBA1C MFR BLD: 5.8 %

## 2021-04-01 PROCEDURE — 99214 OFFICE O/P EST MOD 30 MIN: CPT | Performed by: FAMILY MEDICINE

## 2021-04-01 PROCEDURE — G8427 DOCREV CUR MEDS BY ELIG CLIN: HCPCS | Performed by: FAMILY MEDICINE

## 2021-04-01 PROCEDURE — 4040F PNEUMOC VAC/ADMIN/RCVD: CPT | Performed by: FAMILY MEDICINE

## 2021-04-01 PROCEDURE — 83036 HEMOGLOBIN GLYCOSYLATED A1C: CPT | Performed by: FAMILY MEDICINE

## 2021-04-01 PROCEDURE — 3017F COLORECTAL CA SCREEN DOC REV: CPT | Performed by: FAMILY MEDICINE

## 2021-04-01 PROCEDURE — 1036F TOBACCO NON-USER: CPT | Performed by: FAMILY MEDICINE

## 2021-04-01 PROCEDURE — 1123F ACP DISCUSS/DSCN MKR DOCD: CPT | Performed by: FAMILY MEDICINE

## 2021-04-01 PROCEDURE — G8417 CALC BMI ABV UP PARAM F/U: HCPCS | Performed by: FAMILY MEDICINE

## 2021-04-01 ASSESSMENT — ENCOUNTER SYMPTOMS
RHINORRHEA: 0
ABDOMINAL PAIN: 0
DIARRHEA: 0
SHORTNESS OF BREATH: 0
SORE THROAT: 0
CHEST TIGHTNESS: 0
BACK PAIN: 0
CONSTIPATION: 0
COUGH: 0
NAUSEA: 0
EYE PAIN: 0
EYE ITCHING: 0

## 2021-04-01 NOTE — PROGRESS NOTES
Susana Apodaca  : 1955    Chief Complaint:     Chief Complaint   Patient presents with    Excessive Sweating       HPI  Susana Apodaca 73 y.o. presents for   Chief Complaint   Patient presents with    Excessive Sweating     Patient presents for follow-up today. He admits to excessive sweating especially when he is doing activities. He has some shortness of breath but not all the time. He states this has worsened since he had Covid a few months prior. He states that before Covid he was still sweating but not as much. He denies any chest pain. However he did go to the ER in March due to chest pain and shortness of breath. CTA as well as labs were within normal limits. He also has fatigue. He was admitted to the hospital many years prior and did have a stress test which was normal.    All questions were answered to patients satisfaction.     Past Medical History:   Diagnosis Date    Depression     Erectile dysfunction     History of shingles     Hypertension 2017    Mixed hyperlipidemia 2017       Past Surgical History:   Procedure Laterality Date    COLONOSCOPY      HEEL SPUR SURGERY         Social History     Socioeconomic History    Marital status:      Spouse name: Mya Gonzales Number of children: None    Years of education: None    Highest education level: None   Occupational History    Occupation:    Social Needs    Financial resource strain: None    Food insecurity     Worry: None     Inability: None    Transportation needs     Medical: None     Non-medical: None   Tobacco Use    Smoking status: Never Smoker    Smokeless tobacco: Never Used   Substance and Sexual Activity    Alcohol use: No    Drug use: No    Sexual activity: Not Currently     Partners: Female   Lifestyle    Physical activity     Days per week: None     Minutes per session: None    Stress: None   Relationships    Social connections     Talks on phone: None     Gets together: None Attends Ep[employer] service: None     Active member of club or organization: None     Attends meetings of clubs or organizations: None     Relationship status: None    Intimate partner violence     Fear of current or ex partner: None     Emotionally abused: None     Physically abused: None     Forced sexual activity: None   Other Topics Concern    None   Social History Narrative    ** Merged History Encounter **            Family History   Problem Relation Age of Onset    Heart Disease Mother     Atrial Fibrillation Father     Heart Disease Father           Current Outpatient Medications   Medication Sig Dispense Refill    atorvastatin (LIPITOR) 40 MG tablet take 1 tablet by mouth daily 90 tablet 1    albuterol sulfate HFA (VENTOLIN HFA) 108 (90 Base) MCG/ACT inhaler Inhale 2 puffs into the lungs 4 times daily as needed for Wheezing 1 Inhaler 5    lisinopril-hydroCHLOROthiazide (PRINZIDE;ZESTORETIC) 20-12.5 MG per tablet Take 1 tablet by mouth daily 90 tablet 1    buPROPion (WELLBUTRIN XL) 150 MG extended release tablet Take 1 tablet by mouth every morning 90 tablet 1    venlafaxine (EFFEXOR XR) 150 MG extended release capsule take 1 capsule by mouth once daily 90 capsule 1    Misc Natural Products (GLUCOSAMINE CHOND COMPLEX/MSM PO) Take 2 tablets by mouth daily      aspirin 81 MG tablet Take 81 mg by mouth daily      Multiple Vitamins-Minerals (THERAPEUTIC MULTIVITAMIN-MINERALS) tablet Take 1 tablet by mouth daily       No current facility-administered medications for this visit. Allergies   Allergen Reactions    Codeine Nausea Only       Health Maintenance Due   Topic Date Due    HIV screen  Never done    COVID-19 Vaccine (1) Never done    Shingles Vaccine (2 of 3) 12/13/2017           REVIEW OF SYSTEMS  Review of Systems   Constitutional: Positive for fatigue. Negative for chills and fever.         Excessive sweating   HENT: Negative for congestion, ear pain, postnasal drip, rhinorrhea and sore throat. Eyes: Negative for pain and itching. Respiratory: Negative for cough, chest tightness and shortness of breath. Cardiovascular: Negative for chest pain and leg swelling. Gastrointestinal: Negative for abdominal pain, constipation, diarrhea and nausea. Endocrine: Negative for heat intolerance. Genitourinary: Negative for flank pain, frequency and urgency. Musculoskeletal: Negative for arthralgias and back pain. Skin: Negative for pallor and rash. Allergic/Immunologic: Negative for environmental allergies. Neurological: Negative for dizziness, numbness and headaches. Hematological: Does not bruise/bleed easily. Psychiatric/Behavioral: Negative for agitation, behavioral problems and hallucinations. PHYSICAL EXAM  /86   Pulse 76   Temp 97.8 °F (36.6 °C)   Resp 16   Ht 5' 8\" (1.727 m)   Wt 240 lb (108.9 kg)   SpO2 100%   BMI 36.49 kg/m²   Physical Exam  Vitals signs and nursing note reviewed. Constitutional:       General: He is not in acute distress. Appearance: He is well-developed. He is obese. He is not diaphoretic. HENT:      Head: Normocephalic and atraumatic. Right Ear: External ear normal.      Left Ear: External ear normal.      Nose: Nose normal.      Mouth/Throat:      Pharynx: No oropharyngeal exudate. Eyes:      General:         Right eye: No discharge. Left eye: No discharge. Conjunctiva/sclera: Conjunctivae normal.   Neck:      Musculoskeletal: Normal range of motion and neck supple. Thyroid: No thyromegaly. Cardiovascular:      Rate and Rhythm: Normal rate and regular rhythm. Heart sounds: Normal heart sounds. No murmur. Pulmonary:      Effort: Pulmonary effort is normal.      Breath sounds: Normal breath sounds. No wheezing. Abdominal:      Palpations: Abdomen is soft. Tenderness: There is no abdominal tenderness. Musculoskeletal: Normal range of motion. General: No tenderness. Lymphadenopathy:      Cervical: No cervical adenopathy. Skin:     General: Skin is warm and dry. Findings: No erythema or rash. Neurological:      Mental Status: He is alert and oriented to person, place, and time. Coordination: Coordination normal.      Deep Tendon Reflexes: Reflexes are normal and symmetric. Psychiatric:         Behavior: Behavior normal.         Thought Content: Thought content normal.         Judgment: Judgment normal.              Laboratory: All laboratory and radiology results have been personally reviewed by myself    Lab Results   Component Value Date     03/05/2021    K 4.4 03/05/2021     03/05/2021    CO2 27 03/05/2021    BUN 16 03/05/2021    CREATININE 1.1 03/05/2021    PROT 7.3 02/01/2021    LABALBU 4.5 02/01/2021    CALCIUM 9.1 03/05/2021    GFRAA >60 03/05/2021    LABGLOM >60 03/05/2021    GLUCOSE 108 03/05/2021    AST 27 02/01/2021    ALT 26 02/01/2021    ALKPHOS 101 02/01/2021    BILITOT 0.7 02/01/2021    TSH 2.460 08/26/2020    CHOL 180 08/26/2020    TRIG 270 08/26/2020    HDL 33 08/26/2020    LDLCALC 93 08/26/2020    LABA1C 5.8 04/01/2021        Lab Results   Component Value Date    CHOL 180 08/26/2020    CHOL 218 (H) 05/03/2017    CHOL 221 (H) 05/10/2016     Lab Results   Component Value Date    TRIG 270 (H) 08/26/2020    TRIG 243 (H) 05/03/2017    TRIG 173 (H) 05/10/2016     Lab Results   Component Value Date    HDL 33 08/26/2020    HDL 35 (L) 05/03/2017    HDL 35 (L) 05/10/2016     Lab Results   Component Value Date    LDLCALC 93 08/26/2020    LDLCALC 136 (H) 06/11/2012    LDLCHOLESTEROL 134 (H) 05/03/2017    LDLCHOLESTEROL 151 (H) 05/10/2016       Lab Results   Component Value Date    LABA1C 5.8 04/01/2021    LABA1C 5.7 (H) 08/26/2020     Lab Results   Component Value Date    LDLCALC 93 08/26/2020    CREATININE 1.1 03/05/2021       ASSESSMENT/PLAN:     Diagnosis Orders   1.  Dyspnea on exertion  NM Cardiac Stress Test Nuclear Imaging    Cardiac health screening exams and vaccinations. Advised patient regarding importance of keeping up with recommended health maintenance and toschedule as soon as possible if overdue, as this is important in assessing for undiagnosed pathology, especially cancer, as well as protecting against potentially harmful/life threatening disease.          Patient and/or guardian verbalizes understanding and agrees with above counseling, assessment and plan.     All questions answered. Cornelio Ryan, DO  4/1/21    NOTE: This report was transcribed using voice recognition software.  Every effort was made to ensure accuracy; however, inadvertent computerized transcription errors may be present

## 2021-04-02 ENCOUNTER — HOSPITAL ENCOUNTER (OUTPATIENT)
Age: 66
Discharge: HOME | End: 2021-04-02
Payer: COMMERCIAL

## 2021-04-02 DIAGNOSIS — Z12.5 SCREENING PSA (PROSTATE SPECIFIC ANTIGEN): ICD-10-CM

## 2021-04-02 DIAGNOSIS — R53.83 FATIGUE, UNSPECIFIED TYPE: ICD-10-CM

## 2021-04-02 DIAGNOSIS — R61 EXCESSIVE SWEATING: ICD-10-CM

## 2021-04-02 LAB
BASOPHILS ABSOLUTE: 0.02 E9/L (ref 0–0.2)
BASOPHILS RELATIVE PERCENT: 0.2 % (ref 0–2)
CORTISOL TOTAL: 14.18 MCG/DL (ref 2.68–18.4)
EOSINOPHILS ABSOLUTE: 0.2 E9/L (ref 0.05–0.5)
EOSINOPHILS RELATIVE PERCENT: 2.4 % (ref 0–6)
FOLATE: 9.3 NG/ML (ref 4.8–24.2)
HCT VFR BLD CALC: 41.6 % (ref 37–54)
HEMOGLOBIN: 14.1 G/DL (ref 12.5–16.5)
IMMATURE GRANULOCYTES #: 0.2 E9/L
IMMATURE GRANULOCYTES %: 2.4 % (ref 0–5)
IRON SATURATION: 21 % (ref 20–55)
IRON: 67 MCG/DL (ref 59–158)
LYMPHOCYTES ABSOLUTE: 1.25 E9/L (ref 1.5–4)
LYMPHOCYTES RELATIVE PERCENT: 14.9 % (ref 20–42)
MCH RBC QN AUTO: 30.4 PG (ref 26–35)
MCHC RBC AUTO-ENTMCNC: 33.9 % (ref 32–34.5)
MCV RBC AUTO: 89.7 FL (ref 80–99.9)
MONOCYTES ABSOLUTE: 0.64 E9/L (ref 0.1–0.95)
MONOCYTES RELATIVE PERCENT: 7.6 % (ref 2–12)
NEUTROPHILS ABSOLUTE: 6.09 E9/L (ref 1.8–7.3)
NEUTROPHILS RELATIVE PERCENT: 72.5 % (ref 43–80)
PDW BLD-RTO: 14.5 FL (ref 11.5–15)
PLATELET # BLD: 249 E9/L (ref 130–450)
PMV BLD AUTO: 8 FL (ref 7–12)
PROSTATE SPECIFIC ANTIGEN: 1.85 NG/ML (ref 0–4)
RBC # BLD: 4.64 E12/L (ref 3.8–5.8)
TOTAL IRON BINDING CAPACITY: 324 MCG/DL (ref 250–450)
TSH SERPL DL<=0.05 MIU/L-ACNC: 2 UIU/ML (ref 0.27–4.2)
VITAMIN B-12: 633 PG/ML (ref 211–946)
WBC # BLD: 8.4 E9/L (ref 4.5–11.5)

## 2021-04-02 PROCEDURE — 82533 TOTAL CORTISOL: CPT

## 2021-04-02 PROCEDURE — 84443 ASSAY THYROID STIM HORMONE: CPT

## 2021-04-02 PROCEDURE — 83550 IRON BINDING TEST: CPT

## 2021-04-02 PROCEDURE — 82607 VITAMIN B-12: CPT

## 2021-04-02 PROCEDURE — 83540 ASSAY OF IRON: CPT

## 2021-04-02 PROCEDURE — 84403 ASSAY OF TOTAL TESTOSTERONE: CPT

## 2021-04-02 PROCEDURE — 82746 ASSAY OF FOLIC ACID SERUM: CPT

## 2021-04-02 PROCEDURE — G0103 PSA SCREENING: HCPCS

## 2021-04-02 PROCEDURE — 85025 COMPLETE CBC W/AUTO DIFF WBC: CPT

## 2021-04-02 PROCEDURE — 36415 COLL VENOUS BLD VENIPUNCTURE: CPT

## 2021-04-02 PROCEDURE — 84270 ASSAY OF SEX HORMONE GLOBUL: CPT

## 2021-04-06 DIAGNOSIS — R79.89 LOW TESTOSTERONE IN MALE: Primary | ICD-10-CM

## 2021-04-06 LAB
SEX HORMONE BINDING GLOBULIN: 24 NMOL/L (ref 11–80)
TESTOSTERONE FREE-NONMALE: 12.9 PG/ML (ref 47–244)
TESTOSTERONE TOTAL: 60 NG/DL (ref 220–1000)

## 2021-04-08 DIAGNOSIS — R79.89 LOW TESTOSTERONE IN MALE: ICD-10-CM

## 2021-04-10 LAB
SEX HORMONE BINDING GLOBULIN: 24 NMOL/L (ref 11–80)
TESTOSTERONE FREE-NONMALE: 13.8 PG/ML (ref 47–244)
TESTOSTERONE TOTAL: 64 NG/DL (ref 220–1000)

## 2021-04-13 DIAGNOSIS — R79.89 LOW TESTOSTERONE IN MALE: Primary | ICD-10-CM

## 2021-04-21 ENCOUNTER — OFFICE VISIT (OUTPATIENT)
Dept: FAMILY MEDICINE CLINIC | Age: 66
End: 2021-04-21
Payer: COMMERCIAL

## 2021-04-21 VITALS
HEART RATE: 86 BPM | WEIGHT: 238 LBS | BODY MASS INDEX: 36.07 KG/M2 | TEMPERATURE: 97.3 F | SYSTOLIC BLOOD PRESSURE: 126 MMHG | DIASTOLIC BLOOD PRESSURE: 80 MMHG | HEIGHT: 68 IN | RESPIRATION RATE: 20 BRPM

## 2021-04-21 DIAGNOSIS — R61 EXCESSIVE SWEATING: ICD-10-CM

## 2021-04-21 DIAGNOSIS — R79.89 LOW TESTOSTERONE IN MALE: Primary | ICD-10-CM

## 2021-04-21 DIAGNOSIS — R53.83 FATIGUE, UNSPECIFIED TYPE: ICD-10-CM

## 2021-04-21 PROCEDURE — 99213 OFFICE O/P EST LOW 20 MIN: CPT | Performed by: FAMILY MEDICINE

## 2021-04-21 PROCEDURE — 1036F TOBACCO NON-USER: CPT | Performed by: FAMILY MEDICINE

## 2021-04-21 PROCEDURE — G8427 DOCREV CUR MEDS BY ELIG CLIN: HCPCS | Performed by: FAMILY MEDICINE

## 2021-04-21 PROCEDURE — 4040F PNEUMOC VAC/ADMIN/RCVD: CPT | Performed by: FAMILY MEDICINE

## 2021-04-21 PROCEDURE — 3017F COLORECTAL CA SCREEN DOC REV: CPT | Performed by: FAMILY MEDICINE

## 2021-04-21 PROCEDURE — 1123F ACP DISCUSS/DSCN MKR DOCD: CPT | Performed by: FAMILY MEDICINE

## 2021-04-21 PROCEDURE — G8417 CALC BMI ABV UP PARAM F/U: HCPCS | Performed by: FAMILY MEDICINE

## 2021-04-21 ASSESSMENT — ENCOUNTER SYMPTOMS
EYE ITCHING: 0
DIARRHEA: 0
BACK PAIN: 0
ABDOMINAL PAIN: 0
CONSTIPATION: 0
NAUSEA: 0
COUGH: 0
SHORTNESS OF BREATH: 0
RHINORRHEA: 0
EYE PAIN: 0
CHEST TIGHTNESS: 0
SORE THROAT: 0

## 2021-04-21 NOTE — PROGRESS NOTES
Piper Jang  : 1955    Chief Complaint:     Chief Complaint   Patient presents with    Excessive Sweating     going on for months    Fatigue       HPI  Piper Jang 73 y.o. presents for   Chief Complaint   Patient presents with    Excessive Sweating     going on for months    Fatigue     Patient is continued to have issues with fatigue, excessive sweating. He does have an appointment with urology for low testosterone May 4. He states he did take a leave of absence from work as he just does not feel right. He did not get a phone call about the stress test.  He states that he just is very stressed out. He also has noted some tremors. We did review all of his labs today and all labs were normal except for testosterone. All questions were answered to patients satisfaction.     Past Medical History:   Diagnosis Date    Depression     Erectile dysfunction     History of shingles     Hypertension 2017    Mixed hyperlipidemia 2017       Past Surgical History:   Procedure Laterality Date    COLONOSCOPY      HEEL SPUR SURGERY         Social History     Socioeconomic History    Marital status:      Spouse name: Renuka Castillo Number of children: None    Years of education: None    Highest education level: None   Occupational History    Occupation:    Social Needs    Financial resource strain: None    Food insecurity     Worry: None     Inability: None    Transportation needs     Medical: None     Non-medical: None   Tobacco Use    Smoking status: Never Smoker    Smokeless tobacco: Never Used   Substance and Sexual Activity    Alcohol use: No    Drug use: No    Sexual activity: Not Currently     Partners: Female   Lifestyle    Physical activity     Days per week: None     Minutes per session: None    Stress: None   Relationships    Social connections     Talks on phone: None     Gets together: None     Attends Jewish service: None     Active member of club or organization: None     Attends meetings of clubs or organizations: None     Relationship status: None    Intimate partner violence     Fear of current or ex partner: None     Emotionally abused: None     Physically abused: None     Forced sexual activity: None   Other Topics Concern    None   Social History Narrative    ** Merged History Encounter **            Family History   Problem Relation Age of Onset    Heart Disease Mother     Atrial Fibrillation Father     Heart Disease Father           Current Outpatient Medications   Medication Sig Dispense Refill    atorvastatin (LIPITOR) 40 MG tablet take 1 tablet by mouth daily 90 tablet 1    albuterol sulfate HFA (VENTOLIN HFA) 108 (90 Base) MCG/ACT inhaler Inhale 2 puffs into the lungs 4 times daily as needed for Wheezing 1 Inhaler 5    lisinopril-hydroCHLOROthiazide (PRINZIDE;ZESTORETIC) 20-12.5 MG per tablet Take 1 tablet by mouth daily 90 tablet 1    buPROPion (WELLBUTRIN XL) 150 MG extended release tablet Take 1 tablet by mouth every morning 90 tablet 1    venlafaxine (EFFEXOR XR) 150 MG extended release capsule take 1 capsule by mouth once daily 90 capsule 1    Misc Natural Products (GLUCOSAMINE CHOND COMPLEX/MSM PO) Take 2 tablets by mouth daily      aspirin 81 MG tablet Take 81 mg by mouth daily      Multiple Vitamins-Minerals (THERAPEUTIC MULTIVITAMIN-MINERALS) tablet Take 1 tablet by mouth daily       No current facility-administered medications for this visit. Allergies   Allergen Reactions    Codeine Nausea Only       Health Maintenance Due   Topic Date Due    HIV screen  Never done    Shingles Vaccine (2 of 3) 12/13/2017           REVIEW OF SYSTEMS  Review of Systems   Constitutional: Positive for fatigue. Negative for chills and fever. Excessive sweating   HENT: Negative for congestion, ear pain, postnasal drip, rhinorrhea and sore throat. Eyes: Negative for pain and itching.    Respiratory: Negative for cough, 03/05/2021    GLUCOSE 108 03/05/2021    AST 27 02/01/2021    ALT 26 02/01/2021    ALKPHOS 101 02/01/2021    BILITOT 0.7 02/01/2021    TSH 2.000 04/02/2021    CHOL 180 08/26/2020    TRIG 270 08/26/2020    HDL 33 08/26/2020    LDLCALC 93 08/26/2020    LABA1C 5.8 04/01/2021        Lab Results   Component Value Date    CHOL 180 08/26/2020    CHOL 218 (H) 05/03/2017    CHOL 221 (H) 05/10/2016     Lab Results   Component Value Date    TRIG 270 (H) 08/26/2020    TRIG 243 (H) 05/03/2017    TRIG 173 (H) 05/10/2016     Lab Results   Component Value Date    HDL 33 08/26/2020    HDL 35 (L) 05/03/2017    HDL 35 (L) 05/10/2016     Lab Results   Component Value Date    LDLCALC 93 08/26/2020    LDLCALC 136 (H) 06/11/2012    LDLCHOLESTEROL 134 (H) 05/03/2017    LDLCHOLESTEROL 151 (H) 05/10/2016       Lab Results   Component Value Date    LABA1C 5.8 04/01/2021    LABA1C 5.7 (H) 08/26/2020     Lab Results   Component Value Date    LDLCALC 93 08/26/2020    CREATININE 1.1 03/05/2021       ASSESSMENT/PLAN:     Diagnosis Orders   1. Low testosterone in male     2. Excessive sweating     3. Fatigue, unspecified type       Did try to reassure patient today. I did recommend for him to call about scheduling his stress test so we can at least rule out a cardiac issue. Patient is agreeable. If his symptoms worsen do not improve he will call or proceed to the ED for reevaluation. Did recommend for him to keep his May 4 appointment with urology for testosterone replacement. Patient is agreeable.     More than 20 minutes was spent with the patient during the encounter, during which more than half the time was spent counseling on the above concerns    Problem list reviewed andsimplified/updated  HM reviewed today and counseled as appropriate    Call or go to ED immediately if symptoms worsen or persist.  Future Appointments   Date Time Provider Samy Ariza   5/4/2021  9:45 AM DO Irasema Lopez [facility]   8/12/2021  8:00 AM Darien Mora DO [facility]     Or sooner if necessary. Educational materials and/or homeexercises printed for patient's review and were included in patient instructions on his/her After Visit Summary and given to patient at the end of visit.       Counseled regarding above diagnosis, including possible risks and complications,  especially if left uncontrolled.     Counseled regarding the possible side effects, risks, benefits and alternatives to treatment; patient and/or guardian verbalizes understanding, agrees,feels comfortable with and wishes to proceed with above treatment plan.     Advised patient to call [facility] new medication issues, and read all Rx info from pharmacy to assure aware of all possible risks and side effects of medication before taking.     Reviewed age and gender appropriate health screening exams and vaccinations. Advised patient regarding importance of keeping up with recommended health maintenance and toschedule as soon as possible if overdue, as this is important in assessing for undiagnosed pathology, especially cancer, as well as protecting against potentially harmful/life threatening disease.          Patient and/or guardian verbalizes understanding and agrees with above counseling, assessment and plan.     All questions answered. Darien Mora DO  4/21/21    NOTE: This report was transcribed using voice recognition software.  Every effort was made to ensure accuracy; however, inadvertent computerized transcription errors may be present

## 2021-04-22 ENCOUNTER — TELEPHONE (OUTPATIENT)
Dept: CARDIOLOGY | Age: 66
End: 2021-04-22

## 2021-04-22 NOTE — TELEPHONE ENCOUNTER
SCHEDULED STRESS TEST  FOR 05-03-21. REVIEWED COVID CHECKLIST WITH PATIENT.     Electronically signed by Karolyn Rivera on 4/22/2021 at 10:15 AM

## 2021-04-28 ENCOUNTER — TELEPHONE (OUTPATIENT)
Dept: CARDIOLOGY | Age: 66
End: 2021-04-28

## 2021-04-28 NOTE — TELEPHONE ENCOUNTER
Spoke with patient and confirmed nuclear stress test appointment on May 3, 2021 at 0730. Instructions for test and COVID-19 preprocedure checklist reviewed with patient.

## 2021-05-03 ENCOUNTER — HOSPITAL ENCOUNTER (OUTPATIENT)
Dept: CARDIOLOGY | Age: 66
Discharge: HOME | End: 2021-05-03
Payer: COMMERCIAL

## 2021-05-03 VITALS
WEIGHT: 238 LBS | DIASTOLIC BLOOD PRESSURE: 80 MMHG | HEIGHT: 68 IN | HEART RATE: 90 BPM | SYSTOLIC BLOOD PRESSURE: 122 MMHG | BODY MASS INDEX: 36.07 KG/M2 | RESPIRATION RATE: 18 BRPM

## 2021-05-03 DIAGNOSIS — R06.09 DYSPNEA ON EXERTION: ICD-10-CM

## 2021-05-03 LAB
LV EF: 69 %
LVEF MODALITY: NORMAL

## 2021-05-03 PROCEDURE — 93017 CV STRESS TEST TRACING ONLY: CPT

## 2021-05-03 PROCEDURE — A9500 TC99M SESTAMIBI: HCPCS | Performed by: INTERNAL MEDICINE

## 2021-05-03 PROCEDURE — 3430000000 HC RX DIAGNOSTIC RADIOPHARMACEUTICAL: Performed by: INTERNAL MEDICINE

## 2021-05-03 PROCEDURE — 2580000003 HC RX 258: Performed by: INTERNAL MEDICINE

## 2021-05-03 PROCEDURE — 78452 HT MUSCLE IMAGE SPECT MULT: CPT

## 2021-05-03 RX ORDER — SODIUM CHLORIDE 0.9 % (FLUSH) 0.9 %
10 SYRINGE (ML) INJECTION PRN
Status: DISCONTINUED | OUTPATIENT
Start: 2021-05-03 | End: 2021-05-04 | Stop reason: HOSPADM

## 2021-05-03 RX ADMIN — Medication 29.7 MILLICURIE: at 09:54

## 2021-05-03 RX ADMIN — SODIUM CHLORIDE, PRESERVATIVE FREE 10 ML: 5 INJECTION INTRAVENOUS at 07:49

## 2021-05-03 RX ADMIN — Medication 9.8 MILLICURIE: at 07:48

## 2021-05-03 RX ADMIN — SODIUM CHLORIDE, PRESERVATIVE FREE 10 ML: 5 INJECTION INTRAVENOUS at 09:54

## 2021-05-03 NOTE — PROCEDURES
of (Tc-Sestamibi) followed by 10 ml of Normal Saline. At peak exercise, the patient was injected intravenously with 29.7 mCi of (Tc-Sestamibi) followed by 10 ml of Normal Saline. Gated post-stress tomographic imaging was performed 20-25 minutes after stress. FINDINGS: The overall quality of the study was good. Left ventricular cavity size was noted to be normal.    Rotational analog analysis demonstrated no patient motion or abnormal extracardiac radioactivity. The gated SPECT stress imaging in the short, vertical long, and horizontal long axis demonstrated normal homogeneous tracer distribution throughout the myocardium. Gated SPECT left ventricular ejection fraction was calculated to be 69%, with abnormal septal motion consistent with intraventricular conduction delay. End diastolic volume 98 ml. TID ratio 0.95. Impression:    1. Exercise EKG was negative for ischemia, with presence of baseline RBBB. 2. The patient experienced no chest pain with exercise. 3. The myocardial perfusion imaging was normal.    4. Overall left ventricular systolic function was normal with abnormal septal motion consistent with RBBB. 5. Exercise capacity was below average. 6. Low risk general exercise treadmill test.    Thank you for sending your patient to this [facility].      Electronically signed by Tasha Naranjo MD on 5/3/21 at 3:46 PM EDT

## 2021-05-04 ENCOUNTER — OFFICE VISIT (OUTPATIENT)
Dept: FAMILY MEDICINE CLINIC | Age: 66
End: 2021-05-04
Payer: COMMERCIAL

## 2021-05-04 VITALS
RESPIRATION RATE: 18 BRPM | OXYGEN SATURATION: 99 % | HEART RATE: 88 BPM | DIASTOLIC BLOOD PRESSURE: 84 MMHG | WEIGHT: 243 LBS | SYSTOLIC BLOOD PRESSURE: 134 MMHG | HEIGHT: 68 IN | BODY MASS INDEX: 36.83 KG/M2

## 2021-05-04 DIAGNOSIS — R53.81 PHYSICAL DECONDITIONING: ICD-10-CM

## 2021-05-04 DIAGNOSIS — R61 EXCESSIVE SWEATING: ICD-10-CM

## 2021-05-04 DIAGNOSIS — R79.89 LOW TESTOSTERONE IN MALE: Primary | ICD-10-CM

## 2021-05-04 PROCEDURE — G8427 DOCREV CUR MEDS BY ELIG CLIN: HCPCS | Performed by: FAMILY MEDICINE

## 2021-05-04 PROCEDURE — 4040F PNEUMOC VAC/ADMIN/RCVD: CPT | Performed by: FAMILY MEDICINE

## 2021-05-04 PROCEDURE — G8417 CALC BMI ABV UP PARAM F/U: HCPCS | Performed by: FAMILY MEDICINE

## 2021-05-04 PROCEDURE — 1036F TOBACCO NON-USER: CPT | Performed by: FAMILY MEDICINE

## 2021-05-04 PROCEDURE — 99212 OFFICE O/P EST SF 10 MIN: CPT | Performed by: FAMILY MEDICINE

## 2021-05-04 PROCEDURE — 3017F COLORECTAL CA SCREEN DOC REV: CPT | Performed by: FAMILY MEDICINE

## 2021-05-04 PROCEDURE — 1123F ACP DISCUSS/DSCN MKR DOCD: CPT | Performed by: FAMILY MEDICINE

## 2021-05-04 ASSESSMENT — ENCOUNTER SYMPTOMS
COUGH: 0
NAUSEA: 0
CONSTIPATION: 0
SHORTNESS OF BREATH: 0
DIARRHEA: 0
SORE THROAT: 0
BACK PAIN: 0
RHINORRHEA: 0
ABDOMINAL PAIN: 0
EYE ITCHING: 0
CHEST TIGHTNESS: 0
EYE PAIN: 0

## 2021-05-04 NOTE — PROGRESS NOTES
Charis Hernández  : 1955    Chief Complaint:     Chief Complaint   Patient presents with    Shortness of Breath       HPI  Charis Hernández 73 y.o. presents for   Chief Complaint   Patient presents with    Shortness of Breath     Patient presents for follow-up today. He states he did have a stress test yesterday which was within normal limits. He still continues to have some shortness of breath. His exercise capacity was low. He does have an appointment with a urologist next week. He was supposed to have it today but his sister-in-law passed recently. He also continues to have sweating issues. All questions were answered to patients satisfaction.     Past Medical History:   Diagnosis Date    Depression     Erectile dysfunction     History of shingles     Hypertension 2017    Mixed hyperlipidemia 2017       Past Surgical History:   Procedure Laterality Date    COLONOSCOPY      HEEL SPUR SURGERY         Social History     Socioeconomic History    Marital status:      Spouse name: Esequiel Oliveira Number of children: None    Years of education: None    Highest education level: None   Occupational History    Occupation:    Social Needs    Financial resource strain: None    Food insecurity     Worry: None     Inability: None    Transportation needs     Medical: None     Non-medical: None   Tobacco Use    Smoking status: Never Smoker    Smokeless tobacco: Never Used   Substance and Sexual Activity    Alcohol use: No    Drug use: No    Sexual activity: Not Currently     Partners: Female   Lifestyle    Physical activity     Days per week: None     Minutes per session: None    Stress: None   Relationships    Social connections     Talks on phone: None     Gets together: None     Attends Orthodox service: None     Active member of club or organization: None     Attends meetings of clubs or organizations: None     Relationship status: None    Intimate partner violence     Fear of current or ex partner: None     Emotionally abused: None     Physically abused: None     Forced sexual activity: None   Other Topics Concern    None   Social History Narrative    ** Merged History Encounter **            Family History   Problem Relation Age of Onset    Heart Disease Mother         Valves    Atrial Fibrillation Father     Heart Disease Father         Valve    Heart Attack Father 59          Current Outpatient Medications   Medication Sig Dispense Refill    atorvastatin (LIPITOR) 40 MG tablet take 1 tablet by mouth daily 90 tablet 1    lisinopril-hydroCHLOROthiazide (PRINZIDE;ZESTORETIC) 20-12.5 MG per tablet Take 1 tablet by mouth daily 90 tablet 1    buPROPion (WELLBUTRIN XL) 150 MG extended release tablet Take 1 tablet by mouth every morning 90 tablet 1    venlafaxine (EFFEXOR XR) 150 MG extended release capsule take 1 capsule by mouth once daily 90 capsule 1    Misc Natural Products (GLUCOSAMINE CHOND COMPLEX/MSM PO) Take 2 tablets by mouth daily      aspirin 81 MG tablet Take 81 mg by mouth daily      Multiple Vitamins-Minerals (THERAPEUTIC MULTIVITAMIN-MINERALS) tablet Take 1 tablet by mouth daily      albuterol sulfate HFA (VENTOLIN HFA) 108 (90 Base) MCG/ACT inhaler Inhale 2 puffs into the lungs 4 times daily as needed for Wheezing (Patient not taking: Reported on 5/4/2021) 1 Inhaler 5     No current facility-administered medications for this visit. Allergies   Allergen Reactions    Codeine Nausea Only       Health Maintenance Due   Topic Date Due    HIV screen  Never done    Shingles Vaccine (2 of 3) 12/13/2017    COVID-19 Vaccine (2 - Moderna 2-dose series) 05/07/2021           REVIEW OF SYSTEMS  Review of Systems   Constitutional: Positive for fatigue. Negative for chills and fever. Excessive sweating   HENT: Negative for congestion, ear pain, postnasal drip, rhinorrhea and sore throat. Eyes: Negative for pain and itching. Respiratory: Negative for cough, chest tightness and shortness of breath. Cardiovascular: Negative for chest pain and leg swelling. Gastrointestinal: Negative for abdominal pain, constipation, diarrhea and nausea. Endocrine: Negative for heat intolerance. Genitourinary: Negative for flank pain, frequency and urgency. Musculoskeletal: Negative for arthralgias and back pain. Skin: Negative for pallor and rash. Allergic/Immunologic: Negative for environmental allergies. Neurological: Negative for dizziness, numbness and headaches. Hematological: Does not bruise/bleed easily. Psychiatric/Behavioral: Negative for agitation, behavioral problems and hallucinations. PHYSICAL EXAM  /84   Pulse 88   Resp 18   Ht 5' 8\" (1.727 m)   Wt 243 lb (110.2 kg)   SpO2 99%   BMI 36.95 kg/m²   Physical Exam  Vitals signs and nursing note reviewed. Constitutional:       Appearance: Normal appearance. He is obese. Eyes:      Extraocular Movements: Extraocular movements intact. Conjunctiva/sclera: Conjunctivae normal.   Cardiovascular:      Rate and Rhythm: Normal rate and regular rhythm. Pulmonary:      Effort: Pulmonary effort is normal.   Musculoskeletal: Normal range of motion. Skin:     General: Skin is warm and dry. Neurological:      General: No focal deficit present. Mental Status: He is alert and oriented to person, place, and time. Psychiatric:         Mood and Affect: Mood normal.         Behavior: Behavior normal.         Thought Content: Thought content normal.         Judgment: Judgment normal.              Laboratory:   All laboratory and radiology results have been personally reviewed by myself    Lab Results   Component Value Date     03/05/2021    K 4.4 03/05/2021     03/05/2021    CO2 27 03/05/2021    BUN 16 03/05/2021    CREATININE 1.1 03/05/2021    PROT 7.3 02/01/2021    LABALBU 4.5 02/01/2021    CALCIUM 9.1 03/05/2021    GFRAA >60 03/05/2021 LABGLOM >60 03/05/2021    GLUCOSE 108 03/05/2021    AST 27 02/01/2021    ALT 26 02/01/2021    ALKPHOS 101 02/01/2021    BILITOT 0.7 02/01/2021    TSH 2.000 04/02/2021    CHOL 180 08/26/2020    TRIG 270 08/26/2020    HDL 33 08/26/2020    LDLCALC 93 08/26/2020    LABA1C 5.8 04/01/2021        Lab Results   Component Value Date    CHOL 180 08/26/2020    CHOL 218 (H) 05/03/2017    CHOL 221 (H) 05/10/2016     Lab Results   Component Value Date    TRIG 270 (H) 08/26/2020    TRIG 243 (H) 05/03/2017    TRIG 173 (H) 05/10/2016     Lab Results   Component Value Date    HDL 33 08/26/2020    HDL 35 (L) 05/03/2017    HDL 35 (L) 05/10/2016     Lab Results   Component Value Date    LDLCALC 93 08/26/2020    LDLCALC 136 (H) 06/11/2012    LDLCHOLESTEROL 134 (H) 05/03/2017    LDLCHOLESTEROL 151 (H) 05/10/2016       Lab Results   Component Value Date    LABA1C 5.8 04/01/2021    LABA1C 5.7 (H) 08/26/2020     Lab Results   Component Value Date    LDLCALC 93 08/26/2020    CREATININE 1.1 03/05/2021       ASSESSMENT/PLAN:     Diagnosis Orders   1. Low testosterone in male     2. Excessive sweating     3. Physical deconditioning       Had a stress test is normal.  He will follow up with urology next week. He does not wish to have any more testing for his shortness of breath at this time. He is slightly deconditioned and has have a low exercise capacity. He states he will try to start exercising more often. Reviewed stress test as above. Problem list reviewed andsimplified/updated  HM reviewed today and counseled as appropriate    Call or go to ED immediately if symptoms worsen or persist.  Future Appointments   Date Time Provider Samy Ariza   8/12/2021  8:00 AM Cornelio 5, DO [facility]     Or sooner if necessary.       Educational materials and/or homeexercises printed for patient's review and were included in patient instructions on his/her After Visit Summary and given to patient at the end of visit.

## 2021-05-09 ENCOUNTER — APPOINTMENT (OUTPATIENT)
Dept: CT IMAGING | Age: 66
End: 2021-05-09
Payer: COMMERCIAL

## 2021-05-09 ENCOUNTER — HOSPITAL ENCOUNTER (EMERGENCY)
Age: 66
Discharge: HOME | End: 2021-05-09
Payer: COMMERCIAL

## 2021-05-09 VITALS
WEIGHT: 239 LBS | BODY MASS INDEX: 36.22 KG/M2 | HEART RATE: 92 BPM | RESPIRATION RATE: 12 BRPM | HEIGHT: 68 IN | OXYGEN SATURATION: 97 % | SYSTOLIC BLOOD PRESSURE: 136 MMHG | DIASTOLIC BLOOD PRESSURE: 87 MMHG | TEMPERATURE: 97.8 F

## 2021-05-09 DIAGNOSIS — R42 VERTIGO: Primary | ICD-10-CM

## 2021-05-09 LAB
ANION GAP SERPL CALCULATED.3IONS-SCNC: 12 MMOL/L (ref 7–16)
BUN BLDV-MCNC: 18 MG/DL (ref 6–23)
CALCIUM SERPL-MCNC: 9.5 MG/DL (ref 8.6–10.2)
CHLORIDE BLD-SCNC: 100 MMOL/L (ref 98–107)
CO2: 26 MMOL/L (ref 22–29)
CREAT SERPL-MCNC: 1.3 MG/DL (ref 0.7–1.2)
GFR AFRICAN AMERICAN: >60
GFR NON-AFRICAN AMERICAN: 55 ML/MIN/1.73
GLUCOSE BLD-MCNC: 129 MG/DL (ref 74–99)
HCT VFR BLD CALC: 42.1 % (ref 37–54)
HEMOGLOBIN: 14.1 G/DL (ref 12.5–16.5)
MCH RBC QN AUTO: 30.3 PG (ref 26–35)
MCHC RBC AUTO-ENTMCNC: 33.5 % (ref 32–34.5)
MCV RBC AUTO: 90.3 FL (ref 80–99.9)
PDW BLD-RTO: 14 FL (ref 11.5–15)
PLATELET # BLD: 250 E9/L (ref 130–450)
PMV BLD AUTO: 8.8 FL (ref 7–12)
POTASSIUM SERPL-SCNC: 4 MMOL/L (ref 3.5–5)
RBC # BLD: 4.66 E12/L (ref 3.8–5.8)
SODIUM BLD-SCNC: 138 MMOL/L (ref 132–146)
TROPONIN: <0.01 NG/ML (ref 0–0.03)
WBC # BLD: 6.1 E9/L (ref 4.5–11.5)

## 2021-05-09 PROCEDURE — 6370000000 HC RX 637 (ALT 250 FOR IP): Performed by: PHYSICIAN ASSISTANT

## 2021-05-09 PROCEDURE — 93005 ELECTROCARDIOGRAM TRACING: CPT | Performed by: PHYSICIAN ASSISTANT

## 2021-05-09 PROCEDURE — 80048 BASIC METABOLIC PNL TOTAL CA: CPT

## 2021-05-09 PROCEDURE — 36415 COLL VENOUS BLD VENIPUNCTURE: CPT

## 2021-05-09 PROCEDURE — 84484 ASSAY OF TROPONIN QUANT: CPT

## 2021-05-09 PROCEDURE — 99284 EMERGENCY DEPT VISIT MOD MDM: CPT

## 2021-05-09 PROCEDURE — 70450 CT HEAD/BRAIN W/O DYE: CPT

## 2021-05-09 PROCEDURE — 85027 COMPLETE CBC AUTOMATED: CPT

## 2021-05-09 RX ORDER — MECLIZINE HCL 12.5 MG/1
25 TABLET ORAL ONCE
Status: COMPLETED | OUTPATIENT
Start: 2021-05-09 | End: 2021-05-09

## 2021-05-09 RX ORDER — MECLIZINE HCL 12.5 MG/1
TABLET ORAL
Status: DISCONTINUED
Start: 2021-05-09 | End: 2021-05-09 | Stop reason: HOSPADM

## 2021-05-09 RX ORDER — MECLIZINE HYDROCHLORIDE 25 MG/1
25 TABLET ORAL 3 TIMES DAILY PRN
Qty: 21 TABLET | Refills: 0 | Status: SHIPPED
Start: 2021-05-09 | End: 2021-06-17

## 2021-05-09 RX ORDER — SODIUM CHLORIDE 0.9 % (FLUSH) 0.9 %
10 SYRINGE (ML) INJECTION PRN
Status: DISCONTINUED | OUTPATIENT
Start: 2021-05-09 | End: 2021-05-09 | Stop reason: HOSPADM

## 2021-05-09 RX ADMIN — MECLIZINE 25 MG: 12.5 TABLET ORAL at 17:39

## 2021-05-09 RX ADMIN — MECLIZINE 25 MG: 12.5 TABLET ORAL at 19:06

## 2021-05-09 NOTE — ED PROVIDER NOTES
[address] [facility]  Department of Emergency Medicine   ED  Encounter Note  Admit Date/RoomTime: 2021  5:15 PM  ED Room:     NAME: Dale Loera  : 1955  MRN: [id number]     Chief Complaint:  Dizziness (got second covid shot Friday and hasnt felt good since. Dizziness started today. +nausea. )    History of Present Illness        Dale Loera is a 73 y.o. old male who presents to the emergency department by private vehicle, for intermittent episodes of sudden onset vertigo which began several hour(s) prior to arrival.  Since recognized his symptoms have been intermittent and waxing and waning. He has associated fall associated with dizziness while getting out of the car to go to a restaurant when the dizziness returned he did not get injured. And denies any confusion, headache, chest pain, blurred vision, loss of vision, slurred speech, focal weakness, focal sensory loss, clumsiness, vomiting, neck pain or incontinence. The symptoms are aggravated by sitting up, sudden movements or turning of his head and are relieved generally with rest or lying still. .  The patient has a past history of: Recent cardiac stress test which is unremarkable. .   There has been no history of recent trauma. He takes no blood thinning agents. Vertebrobasilar CVA Symptoms:    Vertigo: yes (1)   Diplopia: no (0)   Decreased Vision: no (0)   Oscillopsia: no (0)   Ataxia: no (0)   Precipitated by moving one upper extremity with  Decreased BP (subclavian steal syndrome):       no (0)   Total:    1       ROS   Pertinent positives and negatives are stated within HPI, all other systems reviewed and are negative. Past Medical History:  has a past medical history of Depression, Erectile dysfunction, History of shingles, Hypertension, and Mixed hyperlipidemia. Surgical History:  has a past surgical history that includes Heel spur surgery and Colonoscopy.     Social History:  reports that he has never smoked. He has never used smokeless tobacco. He reports that he does not drink alcohol or use drugs. Family History: family history includes Atrial Fibrillation in his father; Heart Attack (age of onset: 59) in his father; Heart Disease in his father and mother. Allergies: Codeine    Physical Exam   Oxygen Saturation Interpretation: Normal.        ED Triage Vitals   BP Temp Temp src Pulse Resp SpO2 Height Weight   05/09/21 1714 05/09/21 1714 -- 05/09/21 1714 05/09/21 1714 05/09/21 1714 05/09/21 1718 05/09/21 1718   123/81 97.8 °F (36.6 °C)  94 18 99 % 5' 8\" (1.727 m) 239 lb (108.4 kg)         Constitutional:  Level of Consciousness: Awake and alert. ETOH: No.         Distress: none,  cooperative. Eyes:  PERRL, EOMI, no discharge or conjunctival injection. Ears:  External ears without lesions. Throat:  Pharynx without injection, exudate, or tonsillar hypertrophy. Airway patient. Neck:  Normal ROM. Supple. Respiratory:  Clear to auscultation and breath sounds equal.  CV:  Regular rate and rhythm, normal heart sounds, without pathological murmurs, ectopy, gallops, or rubs. GI:  Abdomen Soft, nontender, good bowel sounds. No firm or pulsatile mass. Back:  No costovertebral tenderness. Integument:  Normal turgor. Warm, dry, without visible rash, unless noted elsewhere. Lymphatic: no lymphadenopathy noted  Neurological:  Oriented. Motor functions intact.     Lab / Imaging Results   (All laboratory and radiology results have been personally reviewed by myself)  Labs:  Results for orders placed or performed during the hospital encounter of 72/42/87   Basic metabolic panel   Result Value Ref Range    Sodium 138 132 - 146 mmol/L    Potassium 4.0 3.5 - 5.0 mmol/L    Chloride 100 98 - 107 mmol/L    CO2 26 22 - 29 mmol/L    Anion Gap 12 7 - 16 mmol/L    Glucose 129 (H) 74 - 99 mg/dL    BUN 18 6 - 23 mg/dL    CREATININE 1.3 (H) 0.7 - 1.2 mg/dL    GFR Non-African American 55 >=60 mL/min/1.73    GFR African American >60     Calcium 9.5 8.6 - 10.2 mg/dL   CBC   Result Value Ref Range    WBC 6.1 4.5 - 11.5 E9/L    RBC 4.66 3.80 - 5.80 E12/L    Hemoglobin 14.1 12.5 - 16.5 g/dL    Hematocrit 42.1 37.0 - 54.0 %    MCV 90.3 80.0 - 99.9 fL    MCH 30.3 26.0 - 35.0 pg    MCHC 33.5 32.0 - 34.5 %    RDW 14.0 11.5 - 15.0 fL    Platelets 765 363 - 347 E9/L    MPV 8.8 7.0 - 12.0 fL   Troponin I   Result Value Ref Range    Troponin <0.01 0.00 - 0.03 ng/mL     Imaging: All Radiology results interpreted by Radiologist unless otherwise noted. CT HEAD WO CONTRAST   Final Result   No acute intracranial abnormality. EKG #1:  Interpreted by emergency department physician unless otherwise noted. Time:  1718    Rate: 89  Rhythm: Sinus rhythm, with Right Bundle Branch Block. Interpretation: Sinus rhythm  and with Right Bundle Branch Block, unchanged from most recent study    ED Course / Medical Decision Making     Medications   sodium chloride flush 0.9 % injection 10 mL (has no administration in time range)   meclizine (ANTIVERT) tablet 25 mg (25 mg Oral Given 5/9/21 1739)        Re-Evaluations:  5/9/21      Time: 1855    Patients condition has improved and is currently asymptomatic. Consultations:             None    Procedures:   none    MDM: She presented with sudden onset vertigo in the absence of any injury although he did fall as a result of being dizzy getting out of the car. CT scan of the brain unremarkable. EKG unchanged from prior. Lab work normal.  He did get moderate to near complete relief after oral Antivert. He will be sent home with additional medications to continue taking for his vertigo. Plan of Care/Counseling:  I reviewed today's visit with the patient in addition to providing specific details for the plan of care and counseling regarding the diagnosis and prognosis. Questions are answered at this time and are agreeable with the plan. Assessment      1.  Vertigo      This patient's ED

## 2021-05-10 LAB
EKG ATRIAL RATE: 89 BPM
EKG P AXIS: 38 DEGREES
EKG P-R INTERVAL: 144 MS
EKG Q-T INTERVAL: 392 MS
EKG QRS DURATION: 150 MS
EKG QTC CALCULATION (BAZETT): 476 MS
EKG R AXIS: 50 DEGREES
EKG T AXIS: 5 DEGREES
EKG VENTRICULAR RATE: 89 BPM

## 2021-05-10 PROCEDURE — 93010 ELECTROCARDIOGRAM REPORT: CPT | Performed by: INTERNAL MEDICINE

## 2021-05-11 ENCOUNTER — HOSPITAL ENCOUNTER (OUTPATIENT)
Age: 66
Discharge: HOME | End: 2021-05-11
Payer: COMMERCIAL

## 2021-05-11 LAB
LUTEINIZING HORMONE: 2.9 MIU/ML
PROLACTIN: 3.1 NG/ML

## 2021-05-11 PROCEDURE — 83002 ASSAY OF GONADOTROPIN (LH): CPT

## 2021-05-11 PROCEDURE — 36415 COLL VENOUS BLD VENIPUNCTURE: CPT

## 2021-05-11 PROCEDURE — 84146 ASSAY OF PROLACTIN: CPT

## 2021-05-13 ENCOUNTER — OFFICE VISIT (OUTPATIENT)
Dept: FAMILY MEDICINE CLINIC | Age: 66
End: 2021-05-13
Payer: COMMERCIAL

## 2021-05-13 VITALS
RESPIRATION RATE: 22 BRPM | WEIGHT: 237 LBS | HEIGHT: 68 IN | BODY MASS INDEX: 35.92 KG/M2 | HEART RATE: 93 BPM | DIASTOLIC BLOOD PRESSURE: 88 MMHG | SYSTOLIC BLOOD PRESSURE: 122 MMHG | OXYGEN SATURATION: 99 %

## 2021-05-13 DIAGNOSIS — R79.89 LOW TESTOSTERONE IN MALE: ICD-10-CM

## 2021-05-13 DIAGNOSIS — H81.13 BPPV (BENIGN PAROXYSMAL POSITIONAL VERTIGO), BILATERAL: Primary | ICD-10-CM

## 2021-05-13 DIAGNOSIS — Z91.09 ENVIRONMENTAL ALLERGIES: ICD-10-CM

## 2021-05-13 PROCEDURE — G8427 DOCREV CUR MEDS BY ELIG CLIN: HCPCS | Performed by: FAMILY MEDICINE

## 2021-05-13 PROCEDURE — 1036F TOBACCO NON-USER: CPT | Performed by: FAMILY MEDICINE

## 2021-05-13 PROCEDURE — 4040F PNEUMOC VAC/ADMIN/RCVD: CPT | Performed by: FAMILY MEDICINE

## 2021-05-13 PROCEDURE — 99214 OFFICE O/P EST MOD 30 MIN: CPT | Performed by: FAMILY MEDICINE

## 2021-05-13 PROCEDURE — 1123F ACP DISCUSS/DSCN MKR DOCD: CPT | Performed by: FAMILY MEDICINE

## 2021-05-13 PROCEDURE — 3017F COLORECTAL CA SCREEN DOC REV: CPT | Performed by: FAMILY MEDICINE

## 2021-05-13 PROCEDURE — G8417 CALC BMI ABV UP PARAM F/U: HCPCS | Performed by: FAMILY MEDICINE

## 2021-05-13 RX ORDER — METHYLPREDNISOLONE 4 MG/1
TABLET ORAL
Qty: 1 KIT | Refills: 0 | Status: SHIPPED | OUTPATIENT
Start: 2021-05-13 | End: 2021-05-19

## 2021-05-13 ASSESSMENT — ENCOUNTER SYMPTOMS
RHINORRHEA: 0
SORE THROAT: 0
COUGH: 0
DIARRHEA: 0
CHEST TIGHTNESS: 0
EYE PAIN: 0
SHORTNESS OF BREATH: 0
EYE ITCHING: 0
CONSTIPATION: 0
NAUSEA: 0
ABDOMINAL PAIN: 0
BACK PAIN: 0

## 2021-05-13 NOTE — PROGRESS NOTES
Relationships    Social connections     Talks on phone: None     Gets together: None     Attends Oriental orthodox service: None     Active member of club or organization: None     Attends meetings of clubs or organizations: None     Relationship status: None    Intimate partner violence     Fear of current or ex partner: None     Emotionally abused: None     Physically abused: None     Forced sexual activity: None   Other Topics Concern    None   Social History Narrative    ** Merged History Encounter **            Family History   Problem Relation Age of Onset    Heart Disease Mother         Valves    Atrial Fibrillation Father     Heart Disease Father         Valve    Heart Attack Father 59          Current Outpatient Medications   Medication Sig Dispense Refill    methylPREDNISolone (MEDROL DOSEPACK) 4 MG tablet Take by mouth. 1 kit 0    meclizine (ANTIVERT) 25 MG tablet Take 1 tablet by mouth 3 times daily as needed for Dizziness 21 tablet 0    atorvastatin (LIPITOR) 40 MG tablet take 1 tablet by mouth daily 90 tablet 1    albuterol sulfate HFA (VENTOLIN HFA) 108 (90 Base) MCG/ACT inhaler Inhale 2 puffs into the lungs 4 times daily as needed for Wheezing 1 Inhaler 5    lisinopril-hydroCHLOROthiazide (PRINZIDE;ZESTORETIC) 20-12.5 MG per tablet Take 1 tablet by mouth daily 90 tablet 1    buPROPion (WELLBUTRIN XL) 150 MG extended release tablet Take 1 tablet by mouth every morning 90 tablet 1    venlafaxine (EFFEXOR XR) 150 MG extended release capsule take 1 capsule by mouth once daily 90 capsule 1    Misc Natural Products (GLUCOSAMINE CHOND COMPLEX/MSM PO) Take 2 tablets by mouth daily      aspirin 81 MG tablet Take 81 mg by mouth daily      Multiple Vitamins-Minerals (THERAPEUTIC MULTIVITAMIN-MINERALS) tablet Take 1 tablet by mouth daily       No current facility-administered medications for this visit.         Allergies   Allergen Reactions    Codeine Nausea Only       Health Maintenance Due   Topic He is alert and oriented to person, place, and time. Psychiatric:         Mood and Affect: Mood normal.         Behavior: Behavior normal.         Thought Content: Thought content normal.         Judgment: Judgment normal.              Laboratory: All laboratory and radiology results have been personally reviewed by myself    Lab Results   Component Value Date     05/09/2021    K 4.0 05/09/2021     05/09/2021    CO2 26 05/09/2021    BUN 18 05/09/2021    CREATININE 1.3 05/09/2021    PROT 7.3 02/01/2021    LABALBU 4.5 02/01/2021    CALCIUM 9.5 05/09/2021    GFRAA >60 05/09/2021    LABGLOM 55 05/09/2021    GLUCOSE 129 05/09/2021    AST 27 02/01/2021    ALT 26 02/01/2021    ALKPHOS 101 02/01/2021    BILITOT 0.7 02/01/2021    TSH 2.000 04/02/2021    CHOL 180 08/26/2020    TRIG 270 08/26/2020    HDL 33 08/26/2020    LDLCALC 93 08/26/2020    LABA1C 5.8 04/01/2021        Lab Results   Component Value Date    CHOL 180 08/26/2020    CHOL 218 (H) 05/03/2017    CHOL 221 (H) 05/10/2016     Lab Results   Component Value Date    TRIG 270 (H) 08/26/2020    TRIG 243 (H) 05/03/2017    TRIG 173 (H) 05/10/2016     Lab Results   Component Value Date    HDL 33 08/26/2020    HDL 35 (L) 05/03/2017    HDL 35 (L) 05/10/2016     Lab Results   Component Value Date    LDLCALC 93 08/26/2020    LDLCALC 136 (H) 06/11/2012    LDLCHOLESTEROL 134 (H) 05/03/2017    LDLCHOLESTEROL 151 (H) 05/10/2016       Lab Results   Component Value Date    LABA1C 5.8 04/01/2021    LABA1C 5.7 (H) 08/26/2020     Lab Results   Component Value Date    LDLCALC 93 08/26/2020    CREATININE 1.3 (H) 05/09/2021       ASSESSMENT/PLAN:     Diagnosis Orders   1. BPPV (benign paroxysmal positional vertigo), bilateral  External Referral To Physical Therapy   2. Environmental allergies     3. Low testosterone in male       orthostatics were negative today. I did review all lab results, CT and notes from ED visit on May 9.   I will refer patient to physical therapy for vertigo as patient does not want to do exercises at home. We will also prescribe Medrol and Zyrtec samples given today. Likely this is multifactorial with BPPV and possible allergies. Side effects reviewed patient. He will call on Monday if not improved. Continue to follow with urology    Problem list reviewed andsimplified/updated  HM reviewed today and counseled as appropriate    Call or go to ED immediately if symptoms worsen or persist.  Future Appointments   Date Time Provider Samy Ariza   8/12/2021  8:00 AM DO [facility]     Or sooner if necessary. Educational materials and/or homeexercises printed for patient's review and were included in patient instructions on his/her After Visit Summary and given to patient at the end of visit.       Counseled regarding above diagnosis, including possible risks and complications,  especially if left uncontrolled.     Counseled regarding the possible side effects, risks, benefits and alternatives to treatment; patient and/or guardian verbalizes understanding, agrees,feels comfortable with and wishes to proceed with above treatment plan.     Advised patient to call [facility] new medication issues, and read all Rx info from pharmacy to assure aware of all possible risks and side effects of medication before taking.     Reviewed age and gender appropriate health screening exams and vaccinations. Advised patient regarding importance of keeping up with recommended health maintenance and toschedule as soon as possible if overdue, as this is important in assessing for undiagnosed pathology, especially cancer, as well as protecting against potentially harmful/life threatening disease.          Patient and/or guardian verbalizes understanding and agrees with above counseling, assessment and plan.     All questions answered. Cornelio Ryan DO  5/13/21    NOTE: This report was transcribed using voice recognition software.  Every effort was made to ensure accuracy; however, inadvertent computerized transcription errors may be present

## 2021-05-27 ENCOUNTER — APPOINTMENT (OUTPATIENT)
Dept: GENERAL RADIOLOGY | Age: 66
End: 2021-05-27
Payer: COMMERCIAL

## 2021-05-27 ENCOUNTER — APPOINTMENT (OUTPATIENT)
Dept: CT IMAGING | Age: 66
End: 2021-05-27
Payer: COMMERCIAL

## 2021-05-27 ENCOUNTER — HOSPITAL ENCOUNTER (INPATIENT)
Age: 66
LOS: 7 days | Discharge: HOME HEALTH | DRG: 084 | End: 2021-06-04
Attending: EMERGENCY MEDICINE | Admitting: SURGERY
Payer: COMMERCIAL

## 2021-05-27 ENCOUNTER — HOSPITAL ENCOUNTER (EMERGENCY)
Age: 66
Discharge: ANOTHER HOSPITAL | End: 2021-05-27
Attending: EMERGENCY MEDICINE
Payer: COMMERCIAL

## 2021-05-27 ENCOUNTER — APPOINTMENT (OUTPATIENT)
Dept: CT IMAGING | Age: 66
DRG: 084 | End: 2021-05-27
Payer: COMMERCIAL

## 2021-05-27 VITALS
HEIGHT: 68 IN | DIASTOLIC BLOOD PRESSURE: 76 MMHG | RESPIRATION RATE: 18 BRPM | TEMPERATURE: 98.9 F | SYSTOLIC BLOOD PRESSURE: 140 MMHG | HEART RATE: 94 BPM | OXYGEN SATURATION: 98 % | WEIGHT: 237 LBS | BODY MASS INDEX: 35.92 KG/M2

## 2021-05-27 DIAGNOSIS — S06.9X9A FRACTURE OF OCCIPITAL BONE OF SKULL WITH LOSS OF CONSCIOUSNESS (HCC): ICD-10-CM

## 2021-05-27 DIAGNOSIS — S09.90XA CLOSED HEAD INJURY, INITIAL ENCOUNTER: ICD-10-CM

## 2021-05-27 DIAGNOSIS — I60.9 SAH (SUBARACHNOID HEMORRHAGE) (HCC): Primary | ICD-10-CM

## 2021-05-27 DIAGNOSIS — I60.9 SUBARACHNOID HEMORRHAGE (HCC): Primary | ICD-10-CM

## 2021-05-27 DIAGNOSIS — S02.119A FRACTURE OF OCCIPITAL BONE OF SKULL WITH LOSS OF CONSCIOUSNESS (HCC): ICD-10-CM

## 2021-05-27 LAB
ALBUMIN SERPL-MCNC: 3.7 G/DL (ref 3.5–5.2)
ALP BLD-CCNC: 103 U/L (ref 40–129)
ALT SERPL-CCNC: 27 U/L (ref 0–40)
ANION GAP SERPL CALCULATED.3IONS-SCNC: 11 MMOL/L (ref 7–16)
ANION GAP SERPL CALCULATED.3IONS-SCNC: 12 MMOL/L (ref 7–16)
APTT: <20 SEC (ref 24.5–35.1)
AST SERPL-CCNC: 39 U/L (ref 0–39)
BASOPHILS ABSOLUTE: 0.01 E9/L (ref 0–0.2)
BASOPHILS ABSOLUTE: 0.02 E9/L (ref 0–0.2)
BASOPHILS RELATIVE PERCENT: 0.1 % (ref 0–2)
BASOPHILS RELATIVE PERCENT: 0.2 % (ref 0–2)
BILIRUB SERPL-MCNC: 0.8 MG/DL (ref 0–1.2)
BUN BLDV-MCNC: 14 MG/DL (ref 6–23)
BUN BLDV-MCNC: 14 MG/DL (ref 6–23)
CALCIUM SERPL-MCNC: 8.7 MG/DL (ref 8.6–10.2)
CALCIUM SERPL-MCNC: 8.8 MG/DL (ref 8.6–10.2)
CHLORIDE BLD-SCNC: 100 MMOL/L (ref 98–107)
CHLORIDE BLD-SCNC: 101 MMOL/L (ref 98–107)
CO2: 22 MMOL/L (ref 22–29)
CO2: 24 MMOL/L (ref 22–29)
CREAT SERPL-MCNC: 1 MG/DL (ref 0.7–1.2)
CREAT SERPL-MCNC: 1.1 MG/DL (ref 0.7–1.2)
EKG ATRIAL RATE: 81 BPM
EKG P AXIS: 20 DEGREES
EKG P-R INTERVAL: 118 MS
EKG Q-T INTERVAL: 416 MS
EKG QRS DURATION: 142 MS
EKG QTC CALCULATION (BAZETT): 483 MS
EKG R AXIS: 25 DEGREES
EKG T AXIS: 4 DEGREES
EKG VENTRICULAR RATE: 81 BPM
EOSINOPHILS ABSOLUTE: 0.09 E9/L (ref 0.05–0.5)
EOSINOPHILS ABSOLUTE: 0.31 E9/L (ref 0.05–0.5)
EOSINOPHILS RELATIVE PERCENT: 0.7 % (ref 0–6)
EOSINOPHILS RELATIVE PERCENT: 4.1 % (ref 0–6)
GFR AFRICAN AMERICAN: >60
GFR AFRICAN AMERICAN: >60
GFR NON-AFRICAN AMERICAN: >60 ML/MIN/1.73
GFR NON-AFRICAN AMERICAN: >60 ML/MIN/1.73
GLUCOSE BLD-MCNC: 119 MG/DL (ref 74–99)
GLUCOSE BLD-MCNC: 120 MG/DL (ref 74–99)
HCT VFR BLD CALC: 37.5 % (ref 37–54)
HCT VFR BLD CALC: 38.3 % (ref 37–54)
HEMOGLOBIN: 12.4 G/DL (ref 12.5–16.5)
HEMOGLOBIN: 12.7 G/DL (ref 12.5–16.5)
IMMATURE GRANULOCYTES #: 0.15 E9/L
IMMATURE GRANULOCYTES #: 0.18 E9/L
IMMATURE GRANULOCYTES %: 1.4 % (ref 0–5)
IMMATURE GRANULOCYTES %: 2 % (ref 0–5)
INR BLD: 1
LYMPHOCYTES ABSOLUTE: 1.05 E9/L (ref 1.5–4)
LYMPHOCYTES ABSOLUTE: 1.49 E9/L (ref 1.5–4)
LYMPHOCYTES RELATIVE PERCENT: 19.6 % (ref 20–42)
LYMPHOCYTES RELATIVE PERCENT: 8 % (ref 20–42)
MCH RBC QN AUTO: 29.7 PG (ref 26–35)
MCH RBC QN AUTO: 30 PG (ref 26–35)
MCHC RBC AUTO-ENTMCNC: 33.1 % (ref 32–34.5)
MCHC RBC AUTO-ENTMCNC: 33.2 % (ref 32–34.5)
MCV RBC AUTO: 89.9 FL (ref 80–99.9)
MCV RBC AUTO: 90.5 FL (ref 80–99.9)
MONOCYTES ABSOLUTE: 0.67 E9/L (ref 0.1–0.95)
MONOCYTES ABSOLUTE: 0.68 E9/L (ref 0.1–0.95)
MONOCYTES RELATIVE PERCENT: 5.2 % (ref 2–12)
MONOCYTES RELATIVE PERCENT: 8.8 % (ref 2–12)
NEUTROPHILS ABSOLUTE: 11.06 E9/L (ref 1.8–7.3)
NEUTROPHILS ABSOLUTE: 4.97 E9/L (ref 1.8–7.3)
NEUTROPHILS RELATIVE PERCENT: 65.4 % (ref 43–80)
NEUTROPHILS RELATIVE PERCENT: 84.5 % (ref 43–80)
PDW BLD-RTO: 13.2 FL (ref 11.5–15)
PDW BLD-RTO: 13.4 FL (ref 11.5–15)
PLATELET # BLD: 242 E9/L (ref 130–450)
PLATELET # BLD: 249 E9/L (ref 130–450)
PMV BLD AUTO: 9.1 FL (ref 7–12)
PMV BLD AUTO: 9.1 FL (ref 7–12)
POTASSIUM REFLEX MAGNESIUM: 4.4 MMOL/L (ref 3.5–5)
POTASSIUM REFLEX MAGNESIUM: 4.5 MMOL/L (ref 3.5–5)
PROTHROMBIN TIME: 10.5 SEC (ref 9.3–12.4)
RBC # BLD: 4.17 E12/L (ref 3.8–5.8)
RBC # BLD: 4.23 E12/L (ref 3.8–5.8)
SODIUM BLD-SCNC: 135 MMOL/L (ref 132–146)
SODIUM BLD-SCNC: 135 MMOL/L (ref 132–146)
TOTAL PROTEIN: 6.3 G/DL (ref 6.4–8.3)
TROPONIN, HIGH SENSITIVITY: 8 NG/L (ref 0–11)
WBC # BLD: 13.1 E9/L (ref 4.5–11.5)
WBC # BLD: 7.6 E9/L (ref 4.5–11.5)

## 2021-05-27 PROCEDURE — 96365 THER/PROPH/DIAG IV INF INIT: CPT

## 2021-05-27 PROCEDURE — 93005 ELECTROCARDIOGRAM TRACING: CPT | Performed by: EMERGENCY MEDICINE

## 2021-05-27 PROCEDURE — 99283 EMERGENCY DEPT VISIT LOW MDM: CPT

## 2021-05-27 PROCEDURE — 2580000003 HC RX 258: Performed by: RADIOLOGY

## 2021-05-27 PROCEDURE — 71045 X-RAY EXAM CHEST 1 VIEW: CPT

## 2021-05-27 PROCEDURE — 80048 BASIC METABOLIC PNL TOTAL CA: CPT

## 2021-05-27 PROCEDURE — 70498 CT ANGIOGRAPHY NECK: CPT

## 2021-05-27 PROCEDURE — 70496 CT ANGIOGRAPHY HEAD: CPT

## 2021-05-27 PROCEDURE — 96375 TX/PRO/DX INJ NEW DRUG ADDON: CPT

## 2021-05-27 PROCEDURE — 80053 COMPREHEN METABOLIC PANEL: CPT

## 2021-05-27 PROCEDURE — 2580000003 HC RX 258: Performed by: EMERGENCY MEDICINE

## 2021-05-27 PROCEDURE — 85025 COMPLETE CBC W/AUTO DIFF WBC: CPT

## 2021-05-27 PROCEDURE — 6360000002 HC RX W HCPCS: Performed by: EMERGENCY MEDICINE

## 2021-05-27 PROCEDURE — 85730 THROMBOPLASTIN TIME PARTIAL: CPT

## 2021-05-27 PROCEDURE — 6360000002 HC RX W HCPCS

## 2021-05-27 PROCEDURE — 84484 ASSAY OF TROPONIN QUANT: CPT

## 2021-05-27 PROCEDURE — 99285 EMERGENCY DEPT VISIT HI MDM: CPT

## 2021-05-27 PROCEDURE — 6370000000 HC RX 637 (ALT 250 FOR IP): Performed by: EMERGENCY MEDICINE

## 2021-05-27 PROCEDURE — 70450 CT HEAD/BRAIN W/O DYE: CPT

## 2021-05-27 PROCEDURE — 6360000004 HC RX CONTRAST MEDICATION: Performed by: RADIOLOGY

## 2021-05-27 PROCEDURE — 72125 CT NECK SPINE W/O DYE: CPT

## 2021-05-27 PROCEDURE — 85610 PROTHROMBIN TIME: CPT

## 2021-05-27 RX ORDER — ONDANSETRON 2 MG/ML
INJECTION INTRAMUSCULAR; INTRAVENOUS
Status: COMPLETED
Start: 2021-05-27 | End: 2021-05-27

## 2021-05-27 RX ORDER — ONDANSETRON 2 MG/ML
4 INJECTION INTRAMUSCULAR; INTRAVENOUS ONCE
Status: COMPLETED | OUTPATIENT
Start: 2021-05-27 | End: 2021-05-27

## 2021-05-27 RX ORDER — LEVETIRACETAM 5 MG/ML
500 INJECTION INTRAVASCULAR ONCE
Status: COMPLETED | OUTPATIENT
Start: 2021-05-27 | End: 2021-05-27

## 2021-05-27 RX ORDER — 0.9 % SODIUM CHLORIDE 0.9 %
1000 INTRAVENOUS SOLUTION INTRAVENOUS ONCE
Status: COMPLETED | OUTPATIENT
Start: 2021-05-27 | End: 2021-05-27

## 2021-05-27 RX ORDER — SODIUM CHLORIDE 0.9 % (FLUSH) 0.9 %
10 SYRINGE (ML) INJECTION PRN
Status: DISCONTINUED | OUTPATIENT
Start: 2021-05-27 | End: 2021-06-04 | Stop reason: HOSPADM

## 2021-05-27 RX ORDER — SODIUM CHLORIDE 9 MG/ML
25 INJECTION, SOLUTION INTRAVENOUS PRN
Status: DISCONTINUED | OUTPATIENT
Start: 2021-05-27 | End: 2021-06-04 | Stop reason: HOSPADM

## 2021-05-27 RX ORDER — MECLIZINE HCL 12.5 MG/1
25 TABLET ORAL ONCE
Status: COMPLETED | OUTPATIENT
Start: 2021-05-27 | End: 2021-05-27

## 2021-05-27 RX ADMIN — IOPAMIDOL 60 ML: 755 INJECTION, SOLUTION INTRAVENOUS at 18:29

## 2021-05-27 RX ADMIN — ONDANSETRON 4 MG: 2 INJECTION INTRAMUSCULAR; INTRAVENOUS at 15:05

## 2021-05-27 RX ADMIN — Medication 10 ML: at 18:29

## 2021-05-27 RX ADMIN — MECLIZINE 25 MG: 12.5 TABLET ORAL at 15:23

## 2021-05-27 RX ADMIN — SODIUM CHLORIDE 1000 ML: 9 INJECTION, SOLUTION INTRAVENOUS at 15:24

## 2021-05-27 RX ADMIN — LEVETIRACETAM 500 MG: 5 INJECTION INTRAVENOUS at 16:35

## 2021-05-27 ASSESSMENT — PAIN DESCRIPTION - DESCRIPTORS: DESCRIPTORS: ACHING

## 2021-05-27 ASSESSMENT — PAIN DESCRIPTION - PAIN TYPE: TYPE: ACUTE PAIN

## 2021-05-27 ASSESSMENT — PAIN SCALES - GENERAL: PAINLEVEL_OUTOF10: 4

## 2021-05-27 ASSESSMENT — PAIN DESCRIPTION - FREQUENCY: FREQUENCY: INTERMITTENT

## 2021-05-27 ASSESSMENT — PAIN DESCRIPTION - LOCATION
LOCATION: HEAD;JAW
LOCATION: HEAD

## 2021-05-27 NOTE — ED NOTES
Bed: 22  Expected date:   Expected time:   Means of arrival:   Comments:  EMS     Charles Hernandez RN  05/27/21 [id number]

## 2021-05-27 NOTE — ED NOTES
Attempted to call report to the main ED ila allen, [phone], RN busy with several trauma, will call me back for report.      Kel Lowery RN  05/27/21 [id number]

## 2021-05-27 NOTE — ED PROVIDER NOTES
Department of Emergency Medicine   ED  Provider Note  Admit Date/RoomTime: 5/27/2021  2:50 PM  ED Room: 22/22          History of Present Illness:  5/27/21, Time: 3:13 PM EDT  Chief Complaint   Patient presents with    Loss of Consciousness     patient had syncope eousidem hitting his head against the cement                Star Evaristo is a 73 y.o. male presenting to the ED for vertigo and fall, beginning prior to arrival.  The complaint has been intermittent, severe in severity, and worsened by nothing. Patient reports that he was at work and had vertigo, he reports it caused him to fall down. He hit his head. There was no loss of consciousness. He says he remembers the event. He said that he has been suffering with vertigo for almost a month. He reports turning his head from side to side makes him sick. No vertigo at rest.  No fevers or chills. No chest pain or shortness of breath. No abdominal pain. No back pain. He said he did not have a syncopal episode. Review of Systems:   A complete review of systems was performed and pertinent positives and negatives are stated within HPI, all other systems reviewed and are negative.        --------------------------------------------- PAST HISTORY ---------------------------------------------  Past Medical History:  has a past medical history of Depression, Erectile dysfunction, History of shingles, Hypertension, and Mixed hyperlipidemia. Past Surgical History:  has a past surgical history that includes Heel spur surgery and Colonoscopy. Social History:  reports that he has never smoked. He has never used smokeless tobacco. He reports that he does not drink alcohol and does not use drugs. Family History: family history includes Atrial Fibrillation in his father; Heart Attack (age of onset: 59) in his father; Heart Disease in his father and mother. . Unless otherwise noted, family history is non contributory    The patients home medications have been reviewed. Allergies: Codeine    I have reviewed the past medical history, past surgical history, social history, and family history    ---------------------------------------------------PHYSICAL EXAM--------------------------------------    Constitutional/General: Alert and oriented x3  Head: Normocephalic and atraumatic  Eyes: PERRL, EOMI, sclera non icteric  ENT: Oropharynx clear, handling secretions, no trismus, no asymmetry of the posterior oropharynx or uvular edema  Neck: Supple, full ROM, no stridor, no meningeal signs  Respiratory: Lungs clear to auscultation bilaterally, no wheezes, rales, or rhonchi. Not in respiratory distress  Cardiovascular:  Regular rate. Regular rhythm. No murmurs, no gallops, no rubs. 2+ distal pulses. Equal extremity pulses. Chest: No chest wall tenderness  Gastrointestinal:  Abdomen Soft, Non tender, Non distended. No rebound, guarding, or rigidity. No pulsatile masses. Musculoskeletal: Moves all extremities x 4. Warm and well perfused, no clubbing, no cyanosis, no edema. Capillary refill <3 seconds  Skin: skin warm and dry. No rashes. Neurologic: GCS 15, no focal deficits, symmetric strength 5/5 in the upper and lower extremities bilaterally  Psychiatric: Normal Affect          -------------------------------------------------- RESULTS -------------------------------------------------  I have personally reviewed all laboratory and imaging results for this patient. Results are listed below.      LABS: (Lab results interpreted by me)  Results for orders placed or performed during the hospital encounter of 05/27/21   CBC Auto Differential   Result Value Ref Range    WBC 7.6 4.5 - 11.5 E9/L    RBC 4.23 3.80 - 5.80 E12/L    Hemoglobin 12.7 12.5 - 16.5 g/dL    Hematocrit 38.3 37.0 - 54.0 %    MCV 90.5 80.0 - 99.9 fL    MCH 30.0 26.0 - 35.0 pg    MCHC 33.2 32.0 - 34.5 %    RDW 13.4 11.5 - 15.0 fL    Platelets 072 933 - 386 E9/L    MPV 9.1 7.0 - 12.0 fL    Neutrophils % 65.4 fracture or joint dislocation is seen. 2. Degenerative changes, as described. CT CERVICAL SPINE WO CONTRAST   Final Result   CT head without contrast:      1. Small volume subarachnoid blood, most notably in the suprasellar cistern. The bleed may be posttraumatic or aneurysmal.  Further evaluation with CTA of   the brain is recommended. 2. No edema, mass effect or midline shift. CT cervical spine without contrast:      1. No fracture or joint dislocation is seen. 2. Degenerative changes, as described. EKG Interpretation  Interpreted by emergency department physician, Dr. Romelia Smith     Date of EK21  Time:     Rhythm: normal sinus   Rate: normal  Axis: normal  Conduction: right bundle branch block (complete)  ST Segments: no acute change  T Waves: no acute change    Clinical Impression: Sinus rhythm, right bundle branch block, no acute ischemic changes  Comparison to prior EKG: stable as compared to patient's most recent EKG      ------------------------- NURSING NOTES AND VITALS REVIEWED ---------------------------   The nursing notes within the ED encounter and vital signs as below have been reviewed by myself  BP (!) 140/76   Pulse 94   Temp 98.9 °F (37.2 °C) (Oral)   Resp 18   Ht 5' 8\" (1.727 m)   Wt 237 lb (107.5 kg)   SpO2 98%   BMI 36.04 kg/m²     Oxygen Saturation Interpretation: Normal    The patients available past medical records and past encounters were reviewed.         ------------------------------ ED COURSE/MEDICAL DECISION MAKING----------------------  Medications   0.9 % sodium chloride bolus (0 mLs Intravenous Stopped 21 1634)   meclizine (ANTIVERT) tablet 25 mg (25 mg Oral Given 21 1523)   levETIRAcetam (KEPPRA) 500 mg/100 mL IVPB (0 mg Intravenous Stopped 21 1723)   ondansetron (ZOFRAN) injection 4 mg (4 mg Intravenous Given 21 1505)           The cardiac monitor revealed sinus rhythm with a heart rate in the 90 s as interpreted by me. The cardiac monitor was ordered secondary to the patient's fall and to monitor the patient for dysrhythmia. CPT [id number]       I, Dr. Elvie Buerger, am the primary provider of record    Medical Decision Making:   Fall, [facility] on CT. Not on thinners. GCS 15. Awake and talking. Keppra given. Trauma consult, transfer downtown for higher level or care (neurosurgery and trauma surgery). Stable for transfer    Oxygen Saturation Interpretation: 98 % on RA. Normal    Re-Evaluations:       No change      This patient's ED course included: a personal history and physicial examination, re-evaluation prior to disposition, IV medications, cardiac monitoring, continuous pulse oximetry and complex medical decision making and emergency management    This patient has remained hemodynamically stable during their ED course. Consultations:  Trauma surgery consulted  Dr. Simons Credit accepts for transfer downtown    CRITICAL CARE:  Please note that the withdrawal or failure to initiate urgent interventions for this patient would likely result in a life threatening deterioration or permanent disability. Accordingly this patient received 30 minutes of critical care time, including coordination of care, and direct bedside care and excluding separately billable procedures. Counseling: The emergency provider has spoken with the patient and discussed todays results, in addition to providing specific details for the plan of care and counseling regarding the diagnosis and prognosis. Questions are answered at this time and they are agreeable with the plan.       --------------------------------- IMPRESSION AND DISPOSITION ---------------------------------    IMPRESSION  1. Subarachnoid hemorrhage (HCC)    2. Closed head injury, initial encounter        DISPOSITION  Disposition: Transfer to [facility] ED  Patient condition is serious but stable        NOTE: This report was transcribed using voice recognition software.  Every effort was made to ensure accuracy; however, inadvertent computerized transcription errors may be present     Crissy Darden MD  05/27/21 2144

## 2021-05-28 ENCOUNTER — APPOINTMENT (OUTPATIENT)
Dept: CT IMAGING | Age: 66
DRG: 084 | End: 2021-05-28
Payer: COMMERCIAL

## 2021-05-28 PROBLEM — I60.9 SUBARACHNOID HEMORRHAGE (HCC): Status: ACTIVE | Noted: 2021-05-28

## 2021-05-28 PROBLEM — I62.9 INTRACRANIAL HEMORRHAGE (HCC): Status: ACTIVE | Noted: 2021-05-28

## 2021-05-28 PROBLEM — S06.5XAA SUBDURAL HEMATOMA (HCC): Status: ACTIVE | Noted: 2021-05-28

## 2021-05-28 LAB
ANION GAP SERPL CALCULATED.3IONS-SCNC: 11 MMOL/L (ref 7–16)
BASOPHILS ABSOLUTE: 0.02 E9/L (ref 0–0.2)
BASOPHILS RELATIVE PERCENT: 0.2 % (ref 0–2)
BUN BLDV-MCNC: 14 MG/DL (ref 6–23)
CALCIUM SERPL-MCNC: 8.9 MG/DL (ref 8.6–10.2)
CHLORIDE BLD-SCNC: 103 MMOL/L (ref 98–107)
CO2: 23 MMOL/L (ref 22–29)
CREAT SERPL-MCNC: 1 MG/DL (ref 0.7–1.2)
EKG ATRIAL RATE: 81 BPM
EKG P AXIS: 20 DEGREES
EKG P-R INTERVAL: 118 MS
EKG Q-T INTERVAL: 416 MS
EKG QRS DURATION: 142 MS
EKG QTC CALCULATION (BAZETT): 483 MS
EKG R AXIS: 25 DEGREES
EKG T AXIS: 4 DEGREES
EKG VENTRICULAR RATE: 81 BPM
EOSINOPHILS ABSOLUTE: 0.08 E9/L (ref 0.05–0.5)
EOSINOPHILS RELATIVE PERCENT: 0.8 % (ref 0–6)
GFR AFRICAN AMERICAN: >60
GFR NON-AFRICAN AMERICAN: >60 ML/MIN/1.73
GLUCOSE BLD-MCNC: 105 MG/DL (ref 74–99)
HCT VFR BLD CALC: 35.6 % (ref 37–54)
HEMOGLOBIN: 11.7 G/DL (ref 12.5–16.5)
IMMATURE GRANULOCYTES #: 0.09 E9/L
IMMATURE GRANULOCYTES %: 0.8 % (ref 0–5)
LYMPHOCYTES ABSOLUTE: 1.11 E9/L (ref 1.5–4)
LYMPHOCYTES RELATIVE PERCENT: 10.4 % (ref 20–42)
MCH RBC QN AUTO: 29.7 PG (ref 26–35)
MCHC RBC AUTO-ENTMCNC: 32.9 % (ref 32–34.5)
MCV RBC AUTO: 90.4 FL (ref 80–99.9)
MONOCYTES ABSOLUTE: 0.83 E9/L (ref 0.1–0.95)
MONOCYTES RELATIVE PERCENT: 7.8 % (ref 2–12)
NEUTROPHILS ABSOLUTE: 8.51 E9/L (ref 1.8–7.3)
NEUTROPHILS RELATIVE PERCENT: 80 % (ref 43–80)
PDW BLD-RTO: 13.5 FL (ref 11.5–15)
PLATELET # BLD: 230 E9/L (ref 130–450)
PMV BLD AUTO: 8.9 FL (ref 7–12)
POTASSIUM REFLEX MAGNESIUM: 4 MMOL/L (ref 3.5–5)
RBC # BLD: 3.94 E12/L (ref 3.8–5.8)
SODIUM BLD-SCNC: 137 MMOL/L (ref 132–146)
WBC # BLD: 10.6 E9/L (ref 4.5–11.5)

## 2021-05-28 PROCEDURE — 85025 COMPLETE CBC W/AUTO DIFF WBC: CPT

## 2021-05-28 PROCEDURE — 6370000000 HC RX 637 (ALT 250 FOR IP): Performed by: STUDENT IN AN ORGANIZED HEALTH CARE EDUCATION/TRAINING PROGRAM

## 2021-05-28 PROCEDURE — 80048 BASIC METABOLIC PNL TOTAL CA: CPT

## 2021-05-28 PROCEDURE — 2060000000 HC ICU INTERMEDIATE R&B

## 2021-05-28 PROCEDURE — 97161 PT EVAL LOW COMPLEX 20 MIN: CPT

## 2021-05-28 PROCEDURE — 70450 CT HEAD/BRAIN W/O DYE: CPT

## 2021-05-28 PROCEDURE — 2580000003 HC RX 258: Performed by: STUDENT IN AN ORGANIZED HEALTH CARE EDUCATION/TRAINING PROGRAM

## 2021-05-28 PROCEDURE — 6360000002 HC RX W HCPCS: Performed by: STUDENT IN AN ORGANIZED HEALTH CARE EDUCATION/TRAINING PROGRAM

## 2021-05-28 PROCEDURE — 99232 SBSQ HOSP IP/OBS MODERATE 35: CPT | Performed by: SURGERY

## 2021-05-28 RX ORDER — LISINOPRIL AND HYDROCHLOROTHIAZIDE 20; 12.5 MG/1; MG/1
1 TABLET ORAL DAILY
Status: DISCONTINUED | OUTPATIENT
Start: 2021-05-28 | End: 2021-05-28 | Stop reason: ALTCHOICE

## 2021-05-28 RX ORDER — SODIUM CHLORIDE 0.9 % (FLUSH) 0.9 %
10 SYRINGE (ML) INJECTION PRN
Status: DISCONTINUED | OUTPATIENT
Start: 2021-05-28 | End: 2021-06-04 | Stop reason: HOSPADM

## 2021-05-28 RX ORDER — ATORVASTATIN CALCIUM 40 MG/1
40 TABLET, FILM COATED ORAL DAILY
Status: DISCONTINUED | OUTPATIENT
Start: 2021-05-28 | End: 2021-06-04 | Stop reason: HOSPADM

## 2021-05-28 RX ORDER — SODIUM CHLORIDE 9 MG/ML
25 INJECTION, SOLUTION INTRAVENOUS PRN
Status: DISCONTINUED | OUTPATIENT
Start: 2021-05-28 | End: 2021-06-04 | Stop reason: HOSPADM

## 2021-05-28 RX ORDER — BUPROPION HYDROCHLORIDE 150 MG/1
150 TABLET ORAL EVERY MORNING
Status: DISCONTINUED | OUTPATIENT
Start: 2021-05-28 | End: 2021-06-04 | Stop reason: HOSPADM

## 2021-05-28 RX ORDER — LEVETIRACETAM 500 MG/1
500 TABLET ORAL 2 TIMES DAILY
Status: COMPLETED | OUTPATIENT
Start: 2021-05-28 | End: 2021-06-03

## 2021-05-28 RX ORDER — POLYETHYLENE GLYCOL 3350 17 G/17G
17 POWDER, FOR SOLUTION ORAL DAILY PRN
Status: DISCONTINUED | OUTPATIENT
Start: 2021-05-28 | End: 2021-06-04 | Stop reason: HOSPADM

## 2021-05-28 RX ORDER — ONDANSETRON 2 MG/ML
4 INJECTION INTRAMUSCULAR; INTRAVENOUS EVERY 6 HOURS PRN
Status: DISCONTINUED | OUTPATIENT
Start: 2021-05-28 | End: 2021-06-04 | Stop reason: HOSPADM

## 2021-05-28 RX ORDER — ACETAMINOPHEN 325 MG/1
650 TABLET ORAL EVERY 8 HOURS SCHEDULED
Status: DISCONTINUED | OUTPATIENT
Start: 2021-05-28 | End: 2021-06-04 | Stop reason: HOSPADM

## 2021-05-28 RX ORDER — SODIUM CHLORIDE 0.9 % (FLUSH) 0.9 %
10 SYRINGE (ML) INJECTION EVERY 12 HOURS SCHEDULED
Status: DISCONTINUED | OUTPATIENT
Start: 2021-05-28 | End: 2021-06-04 | Stop reason: HOSPADM

## 2021-05-28 RX ORDER — LISINOPRIL 20 MG/1
20 TABLET ORAL DAILY
Status: DISCONTINUED | OUTPATIENT
Start: 2021-05-28 | End: 2021-06-04 | Stop reason: HOSPADM

## 2021-05-28 RX ORDER — VENLAFAXINE HYDROCHLORIDE 150 MG/1
150 CAPSULE, EXTENDED RELEASE ORAL
Status: DISCONTINUED | OUTPATIENT
Start: 2021-05-28 | End: 2021-06-04 | Stop reason: HOSPADM

## 2021-05-28 RX ORDER — LEVETIRACETAM 500 MG/1
500 TABLET ORAL 2 TIMES DAILY
Qty: 12 TABLET | Refills: 0 | Status: SHIPPED | OUTPATIENT
Start: 2021-05-28 | End: 2021-07-14 | Stop reason: ALTCHOICE

## 2021-05-28 RX ORDER — HYDROCHLOROTHIAZIDE 12.5 MG/1
12.5 TABLET ORAL DAILY
Status: DISCONTINUED | OUTPATIENT
Start: 2021-05-28 | End: 2021-06-04 | Stop reason: HOSPADM

## 2021-05-28 RX ADMIN — Medication 10 ML: at 10:27

## 2021-05-28 RX ADMIN — SODIUM CHLORIDE, PRESERVATIVE FREE 10 ML: 5 INJECTION INTRAVENOUS at 12:38

## 2021-05-28 RX ADMIN — LISINOPRIL 20 MG: 20 TABLET ORAL at 10:42

## 2021-05-28 RX ADMIN — HYDROCHLOROTHIAZIDE 12.5 MG: 12.5 TABLET ORAL at 12:37

## 2021-05-28 RX ADMIN — LEVETIRACETAM 500 MG: 500 TABLET ORAL at 10:26

## 2021-05-28 RX ADMIN — ATORVASTATIN CALCIUM 40 MG: 40 TABLET, FILM COATED ORAL at 10:43

## 2021-05-28 RX ADMIN — BUPROPION HYDROCHLORIDE 150 MG: 150 TABLET, EXTENDED RELEASE ORAL at 10:42

## 2021-05-28 RX ADMIN — VENLAFAXINE HYDROCHLORIDE 150 MG: 150 CAPSULE, EXTENDED RELEASE ORAL at 12:37

## 2021-05-28 RX ADMIN — ACETAMINOPHEN 650 MG: 325 TABLET ORAL at 14:59

## 2021-05-28 RX ADMIN — Medication 10 ML: at 20:10

## 2021-05-28 RX ADMIN — ACETAMINOPHEN 650 MG: 325 TABLET ORAL at 04:41

## 2021-05-28 RX ADMIN — LEVETIRACETAM 500 MG: 500 TABLET ORAL at 20:10

## 2021-05-28 RX ADMIN — ONDANSETRON 4 MG: 2 INJECTION INTRAMUSCULAR; INTRAVENOUS at 12:38

## 2021-05-28 RX ADMIN — ACETAMINOPHEN 650 MG: 325 TABLET ORAL at 20:10

## 2021-05-28 ASSESSMENT — PAIN SCALES - GENERAL
PAINLEVEL_OUTOF10: 8
PAINLEVEL_OUTOF10: 4
PAINLEVEL_OUTOF10: 0
PAINLEVEL_OUTOF10: 5

## 2021-05-28 NOTE — PROGRESS NOTES
IMPRESSION:  1. Subarachnoid hemorrhage, most probably traumatic, rule out aneurysm. 2.  Vertigo, etiology to be determined. 3.  Multiple medical comorbidities. Doing well. No deficit.   C/O headache

## 2021-05-28 NOTE — PROGRESS NOTES
Physical Therapy    Physical Therapy Initial Assessment     Name: Tom Yusuf  : 1955  MRN: [id number]      Date of Service: 2021    Evaluating PT:  Nabil Bennett PT, DPT  [id number]     Room #:  8131/5576-A  Diagnosis:  Intracranial hemorrhage ([facility] [I62.9]  PMHx/PSHx:  Depression, shingles, HTN, HLD, COVID-19  Precautions:  Falls, Vertigo  Equipment Needs:  TBD    SUBJECTIVE:    Pt lives with his wife in a 1 story home with 2 stairs to enter and 0 rail. Bedroom and bathroom are on the 1st level. Full flight to basement with rail. Pt ambulated with no AD PTA. OBJECTIVE:   Initial Evaluation  Date:  Treatment Short Term/ Long Term   Goals   AM-PAC 6 Clicks      Was pt agreeable to Eval/treatment? Yes with encouragement      Does pt have pain?  4/10     Bed Mobility  Rolling: SBA  Supine to sit: Min A  Sit to supine: SBA  Scooting: SBA  Modified Independent     Transfers Sit to stand: Min A  Stand to sit: Min A  Stand pivot: CGA with [employer]   Modified Independent     Ambulation    10 feet with no AD Min A;  30 feet with [employer] CGA  >150 feet with AAD if needed Modified Independent     Stair negotiation: ascended and descended  NT  >4 steps with 1 rail Modified Independent     ROM BUE: WFL  BLE:  WFL     Strength BUE:  5/5  BLE:  5/5     Balance Sitting EOB:  SBA  Dynamic Standing:  Min A  Sitting EOB:  Independent   Dynamic Standing:  Modified Independent       Pt is A & O x 4  Sensation:  Pt denies numbness and tingling to extremities  Edema:  Unremarkable    Therapeutic Exercises:    BLE ROM    Patient education  Pt educated on PT role, safety during functional mobility    Patient response to education:   Pt verbalized understanding Pt demonstrated skill Pt requires further education in this area   Yes  Yes  Yes      ASSESSMENT:    Conditions Requiring Skilled Therapeutic Intervention:    []Decreased strength     []Decreased ROM  [x]Decreased functional mobility  [x]Decreased balance   [x]Decreased endurance   []Decreased posture  []Decreased sensation  [x]Decreased coordination   []Decreased vision  []Decreased safety awareness   []Increased pain       Comments:  Pt received supine and agreeable to PT evaluation. Pt cleared for participation by RN prior to session. Vitals monitored during session. Pt limited by dizziness and nausea. Improved balance using WW to ambulate to/from bathroom. Pt demonstrates good UE and LE strength but unsteadiness during gait. Pt c/o dizziness during gait and feeling like he was going to throw up. Returned to bed. Pt left with call button in reach, lines attached, and needs met. Treatment:  Patient practiced and was instructed in the following treatment:     eval only     Pt's/ family goals   1. Home     Prognosis is fair for reaching above PT goals. Patient and or family understand(s) diagnosis, prognosis, and plan of care.   Yes     PHYSICAL THERAPY PLAN OF CARE:    PT POC is established based on physician order and patient diagnosis     Referring provider/PT Order:    05/28/21 [id number]  PT evaluation and treat Start: 05/28/21 0445, End: 05/28/21 0445, ONE TIME, Standing Count: 1 Occurrences, R      Martha Mccollum MD       Diagnosis:  Intracranial hemorrhage ([facility] [I62.9]  Specific instructions for next treatment:  Improve gait tolerance, use assistive device [facility]) if needed, trial ambulation with no AD if less dizzy     Current Treatment Recommendations:     [] Strengthening to improve independence with functional mobility   [] ROM to improve independence with functional mobility   [x] Balance Training to improve static/dynamic balance and to reduce fall risk  [x] Endurance Training to improve activity tolerance during functional mobility   [x] Transfer Training to improve safety and independence with all functional transfers   [x] Gait Training to improve gait mechanics, endurance and asses need for appropriate assistive device  [x] Stair Training in preparation for safe discharge home and/or into the community   [] Positioning to prevent skin breakdown and contractures  [x] Safety and Education Training   [x] Patient/Caregiver Education   [] HEP  [] Other     PT long term treatment goals are located in above grid    Frequency of treatments: 2-5x/week x 1-2 weeks. Time in  1455  Time out  1310    Total Treatment Time  0 minutes     Evaluation Time includes thorough review of current medical information, gathering information on past medical history/social history and prior level of function, completion of standardized testing/informal observation of tasks, assessment of data and education on plan of care and goals.     CPT codes:  [x] Low Complexity PT evaluation 98508  [] Moderate Complexity PT evaluation 40738  [] High Complexity PT evaluation 35402  [] PT Re-evaluation 51061  [] Gait training 60282 -- minutes  [] Manual therapy [id number] -- minutes  [] Therapeutic activities 88332 - minutes  [] Therapeutic exercises 40790 --- minutes  [] Neuromuscular reeducation 07559 -- minutes     Valencia Tijerina, PT, DPT  [id number]

## 2021-05-28 NOTE — ED NOTES
Pt alert oriented skin warm dry resp  Easy states pain better     Carly Harvey, DEDRA  05/28/21 7380

## 2021-05-28 NOTE — ED PROVIDER NOTES
HPI:  5/27/21,   Time: 11:34 PM EDT       Whitney Rodriguez is a 73 y.o. male presenting to the ED for edie transfer sah, beginning 4 hrs ago. The complaint has been persistent, severe in severity, and worsened by nothing. Pt hx intermittent dizziness past few weeks, episode today causd to fall, hit head on concrete, no loc, but dazed. Seen at [address] [place] [address], found to have sah, sent here for further care    Review of Systems:   Pertinent positives and negatives are stated within HPI, all other systems reviewed and are negative.          --------------------------------------------- PAST HISTORY ---------------------------------------------  Past Medical History:  has a past medical history of Depression, Erectile dysfunction, History of shingles, Hypertension, and Mixed hyperlipidemia. Past Surgical History:  has a past surgical history that includes Heel spur surgery and Colonoscopy. Social History:  reports that he has never smoked. He has never used smokeless tobacco. He reports that he does not drink alcohol and does not use drugs. Family History: family history includes Atrial Fibrillation in his father; Heart Attack (age of onset: 59) in his father; Heart Disease in his father and mother. The patients home medications have been reviewed. Allergies: Codeine        ---------------------------------------------------PHYSICAL EXAM--------------------------------------    Constitutional/General: Alert and oriented x3, well appearing, non toxic in NAD  Head: Normocephalic and atraumatic  Eyes: PERRL, EOMI, conjunctive normal, sclera non icteric  Mouth: Oropharynx clear, handling secretions,   Neck: Supple, full ROM,  Respiratory: Lungs clear to auscultation bilaterally, no wheezes, rales, or rhonchi. Not in respiratory distress  Cardiovascular:  Regular rate. Regular rhythm. No murmurs, gallops, or rubs. 2+ distal pulses  Chest: No chest wall tenderness  GI:  Abdomen Soft, Non tender, Non distended. Musculoskeletal: Moves all extremities x 4. Warm and well perfused, no clubbing, cyanosis, or edema. Capillary refill <3 seconds  Integument: skin warm and dry. No rashes. Lymphatic: no lymphadenopathy noted  Neurologic: GCS 15, no focal deficits, symmetric strength 5/5 in the upper and lower extremities bilaterally, nml senosry exam  Psychiatric: Normal Affect    -------------------------------------------------- RESULTS -------------------------------------------------  I have personally reviewed all laboratory and imaging results for this patient. Results are listed below.      LABS:  Results for orders placed or performed during the hospital encounter of 05/27/21   CBC Auto Differential   Result Value Ref Range    WBC 13.1 (H) 4.5 - 11.5 E9/L    RBC 4.17 3.80 - 5.80 E12/L    Hemoglobin 12.4 (L) 12.5 - 16.5 g/dL    Hematocrit 37.5 37.0 - 54.0 %    MCV 89.9 80.0 - 99.9 fL    MCH 29.7 26.0 - 35.0 pg    MCHC 33.1 32.0 - 34.5 %    RDW 13.2 11.5 - 15.0 fL    Platelets 102 227 - 414 E9/L    MPV 9.1 7.0 - 12.0 fL    Neutrophils % 84.5 (H) 43.0 - 80.0 %    Immature Granulocytes % 1.4 0.0 - 5.0 %    Lymphocytes % 8.0 (L) 20.0 - 42.0 %    Monocytes % 5.2 2.0 - 12.0 %    Eosinophils % 0.7 0.0 - 6.0 %    Basophils % 0.2 0.0 - 2.0 %    Neutrophils Absolute 11.06 (H) 1.80 - 7.30 E9/L    Immature Granulocytes # 0.18 E9/L    Lymphocytes Absolute 1.05 (L) 1.50 - 4.00 E9/L    Monocytes Absolute 0.68 0.10 - 0.95 E9/L    Eosinophils Absolute 0.09 0.05 - 0.50 E9/L    Basophils Absolute 0.02 0.00 - 0.20 K3/T   Basic Metabolic Panel w/ Reflex to MG   Result Value Ref Range    Sodium 135 132 - 146 mmol/L    Potassium reflex Magnesium 4.5 3.5 - 5.0 mmol/L    Chloride 100 98 - 107 mmol/L    CO2 24 22 - 29 mmol/L    Anion Gap 11 7 - 16 mmol/L    Glucose 120 (H) 74 - 99 mg/dL    BUN 14 6 - 23 mg/dL    CREATININE 1.0 0.7 - 1.2 mg/dL    GFR Non-African American >60 >=60 mL/min/1.73    GFR African American >60     Calcium 8.8 8.6 - 10.2 mg/dL Protime-INR   Result Value Ref Range    Protime 10.5 9.3 - 12.4 sec    INR 1.0    APTT   Result Value Ref Range    aPTT <20.0 (L) 24.5 - 35.1 sec       RADIOLOGY:  Interpreted by Radiologist.  CTA HEAD W CONTRAST   Final Result   1. Multicompartment acute intracranial hemorrhage is similar compared to   noncontrast CT head done earlier same day with redemonstration of small right   subdural hematoma, right greater than left subarachnoid hemorrhage and   orbital frontal hemorrhagic contusions. Mild right left midline shift   measures 4-5 mm at the level of the lateral ventricles. 2. Unremarkable CTA of the head and neck. No hemodynamically significant   stenosis, occlusion or aneurysm in the large arteries of the head and neck. 3. Nondisplaced fracture of the right suboccipital skull extends superiorly   into the right occipital skull. Small amount of fluid in the right mastoid   air cells may relate to fracture extension into the inferior mastoid air   cells. 4. No dural venous sinus thrombosis. CTA NECK W CONTRAST   Final Result   1. Multicompartment acute intracranial hemorrhage is similar compared to   noncontrast CT head done earlier same day with redemonstration of small right   subdural hematoma, right greater than left subarachnoid hemorrhage and   orbital frontal hemorrhagic contusions. Mild right left midline shift   measures 4-5 mm at the level of the lateral ventricles. 2. Unremarkable CTA of the head and neck. No hemodynamically significant   stenosis, occlusion or aneurysm in the large arteries of the head and neck. 3. Nondisplaced fracture of the right suboccipital skull extends superiorly   into the right occipital skull. Small amount of fluid in the right mastoid   air cells may relate to fracture extension into the inferior mastoid air   cells. 4. No dural venous sinus thrombosis. EKG:  This EKG is signed and interpreted by the EP.     Time:   Rate:   Rhythm: Interpretation:   Comparison:       ------------------------- NURSING NOTES AND VITALS REVIEWED ---------------------------   The nursing notes within the ED encounter and vital signs as below have been reviewed by myself. BP (!) 151/87   Pulse 86   Temp 97.6 °F (36.4 °C)   Resp 18   Ht 5' 8\" (1.727 m)   Wt 238 lb (108 kg)   SpO2 96%   BMI 36.19 kg/m²   Oxygen Saturation Interpretation: Normal    The patients available past medical records and past encounters were reviewed. ------------------------------ ED COURSE/MEDICAL DECISION MAKING----------------------  Medications   sodium chloride flush 0.9 % injection 10 mL (10 mLs Intravenous Given 5/27/21 1829)   0.9 % sodium chloride infusion (has no administration in time range)   iopamidol (ISOVUE-370) 76 % injection 60 mL (60 mLs Intravenous Given 5/27/21 1829)         ED COURSE:       Medical Decision Making:    sah noted, rad concern for aneurysm, ns consulted, agreed with cta, cta no aneurysm, but also shows sdh/fx. Trauma consulted for admit      This patient's ED course included: a personal history and physicial examination    This patient has remained hemodynamically stable during their ED course. Re-Evaluations:             Re-evaluation. Patients symptoms show no change    Re-examination  5/27/21   11:34 PM EDT          Vital Signs:   Vitals:    05/27/21 1919 05/27/21 2044 05/27/21 2132 05/27/21 2202   BP: (!) 155/89 (!) 144/94 (!) 145/86 (!) 151/87   Pulse: 85 87 88 86   Resp: 18 19 19 18   Temp:       SpO2: 97%  97% 96%   Weight:       Height:         Card/Pulm:  Rhythm: normal rate. Heart Sounds: no murmurs, gallops, or rubs. clear to auscultation, no wheezes or rales and unlabored breathing. Capillary Refill: normal.  Radial Pulse:  equal.  Skin:  Warm. Consultations:             stephan  trauma    Critical Care:         Counseling:    The emergency provider has spoken with the patient and discussed todays results, in addition to providing specific details for the plan of care and counseling regarding the diagnosis and prognosis. Questions are answered at this time and they are agreeable with the plan.       --------------------------------- IMPRESSION AND DISPOSITION ---------------------------------    IMPRESSION  1. SAH ([facility]ach[facility] hemorrhage) ([facility]    2. Fracture of occipital bone of skull with loss of consciousness ([facility]        DISPOSITION  Disposition: Admit to telemetry  Patient condition is stable    NOTE: This report was transcribed using voice recognition software.  Every effort was made to ensure accuracy; however, inadvertent computerized transcription errors may be present        Mani Pinedo MD  05/27/21 5813

## 2021-05-28 NOTE — DISCHARGE SUMMARY
Physician Discharge Summary     Patient ID:  Piper Jang  [id number]  35 y.o.  1955    Admit date: 5/27/2021    Discharge date and time: 6/4/2021  4:45 PM     Admitting Physician: Khari Higgins MD     Admission Diagnoses: Intracranial hemorrhage [facility]) [I62.9]    Discharge Diagnoses: Active Problems:    Subdural hematoma (HCC)  Resolved Problems:    * No resolved hospital problems. *      Admission Condition: fair    Discharged Condition: stable    Indication for Admission: SDH, [address] [facility] Course/Procedures/Operation/treatments:   5/27: patient arrived as a transfer from [place] [id number] after having a bout of vertigo and falling from standing height. He denies LOC. GCS 15. founf to have skull fracture with SAH and SDH  5/28: patient doing well, pain is controlled. For repeat CT head this morning. Awaiting final neurosurgery recommendations. 5/29: With severe headache and dizziness, started on Scopolamine patch. Tolerated diet. 16/24 [facility] yesterday. 5/30: Headache improved, still with vertigo. No nausea/vomiting, tolerating diet. 5/31: Continues to have HA and severe vertigo, orthostatics pending. Will trial a 1x dose of PO benzodiazapine. 6/1: Patient still endorsing severe dizziness and nausea that is constant. Failed treatment with scopolamine, meclizine and even Ativan. Consulting ENT for recommendations and likely discharging later today  6/2: ENT stating vertigo and nausea most likely from the central nervous system. Consulted neurology. Neurology trialing Thorazine. Neurosurgery trying Isi cocktail  6/3: Patient states that overall his dizziness and nausea have improved but he still has some slight dizziness when he got up to use the restroom. Plan is for him to go home to his wife. PT/OT and possible discharge later today  6/4: states that the dizziness is still the same but was up ambulating to the bathroom this morning. Plan is now for possible ELLA at discharge.           Consults:   GIOVANI CONSULT TO NEUROSURGERY  IP CONSULT TO TRAUMA SURGERY  IP CONSULT TO OTOLARYNGOLOGY  IP CONSULT TO NEUROLOGY  IP CONSULT TO HOME CARE NEEDS    Significant Diagnostic Studies:   CT HEAD WO CONTRAST    Result Date: 5/27/2021  EXAMINATION: CT OF THE HEAD WITHOUT CONTRAST; CT OF THE CERVICAL SPINE WITHOUT CONTRAST 5/27/2021 3:26 pm TECHNIQUE: CT of the head was performed without the administration of intravenous contrast. Dose modulation, iterative reconstruction, and/or weight based adjustment of the mA/kV was utilized to reduce the radiation dose to as low as reasonably achievable.; CT of the cervical spine was performed without the administration of intravenous contrast. Multiplanar reformatted images are provided for review. Dose modulation, iterative reconstruction, and/or weight based adjustment of the mA/kV was utilized to reduce the radiation dose to as low as reasonably achievable. COMPARISON: None. HISTORY: ORDERING SYSTEM PROVIDED HISTORY: vertigo, fall, r/o ICH TECHNOLOGIST PROVIDED HISTORY: Has a \"code stroke\" or \"stroke alert\" been called? ->No Reason for exam:->vertigo, fall, r/o ICH Decision Support Exception - unselect if not a suspected or confirmed emergency medical condition->Emergency Medical Condition (MA) FINDINGS: CT OF THE BRAIN: BRAIN/VENTRICLES: Subarachnoid blood is seen in in the basilar cisterns, most prominently in the suprasellar cistern. Scattered areas of subarachnoid blood is also seen in the bilateral frontal (especially anteroinferiorly) and right temporal regions and the right the sylvian fissure. No mass effect, edema or midline shift is seen. No significant volume loss is seen in the brain. Chronic microvascular ischemic changes are noted, most prominently in the bilateral external capsules. No current evidence of intraventricular blood or hydrocephalus. ORBITS: The visualized portion of the orbits demonstrate no acute abnormality.  SINUSES: Complete opacification of the left suspected or confirmed emergency medical condition->Emergency Medical Condition (MA) FINDINGS: CT OF THE BRAIN: BRAIN/VENTRICLES: Subarachnoid blood is seen in in the basilar cisterns, most prominently in the suprasellar cistern. Scattered areas of subarachnoid blood is also seen in the bilateral frontal (especially anteroinferiorly) and right temporal regions and the right the sylvian fissure. No mass effect, edema or midline shift is seen. No significant volume loss is seen in the brain. Chronic microvascular ischemic changes are noted, most prominently in the bilateral external capsules. No current evidence of intraventricular blood or hydrocephalus. ORBITS: The visualized portion of the orbits demonstrate no acute abnormality. SINUSES: Complete opacification of the left frontal and anterior ethmoid sinuses. The remainder of the visualized paranasal sinuses and the mastoids are grossly clear. SOFT TISSUES/SKULL: No acute abnormality of the visualized skull or soft tissues. CT CERVICAL SPINE: BONES/ALIGNMENT: There is no acute fracture or traumatic malalignment. DEGENERATIVE CHANGES: Mild facet hypertrophic changes are seen at the left C3-4 level. No significant1 central canal stenosis. Multilevel neural foraminal stenoses, worst (moderate to severe) at the left C3-4 level. SOFT TISSUES: There is no prevertebral soft tissue swelling. CT head without contrast: 1. Small volume subarachnoid blood, most notably in the suprasellar cistern. The bleed may be posttraumatic or aneurysmal.  Further evaluation with CTA of the brain is recommended. 2. No edema, mass effect or midline shift. CT cervical spine without contrast: 1. No fracture or joint dislocation is seen. 2. Degenerative changes, as described.      XR CHEST PORTABLE    Result Date: 5/27/2021  EXAMINATION: ONE XRAY VIEW OF THE CHEST 5/27/2021 3:54 pm COMPARISON: March 5, 2021 HISTORY: ORDERING SYSTEM PROVIDED HISTORY: fall, near syncope TECHNOLOGIST PROVIDED HISTORY: Reason for exam:->fall, near syncope FINDINGS: Stable tortuosity of the thoracic aorta. No airspace opacity or pleural effusion. The heart is normal size. No pneumothorax. No evidence of pneumonia or pleural effusion. CTA NECK W CONTRAST    Result Date: 5/27/2021  EXAMINATION: CTA OF THE NECK; CTA OF THE HEAD WITH CONTRAST 5/27/2021 6:26 pm: TECHNIQUE: CTA of the neck was performed with the administration of intravenous contrast. Multiplanar reformatted images are provided for review. MIP images are provided for review. Stenosis of the internal carotid arteries measured using NASCET criteria. Dose modulation, iterative reconstruction, and/or weight based adjustment of the mA/kV was utilized to reduce the radiation dose to as low as reasonably achievable.; CTA of the head/brain was performed with the administration of intravenous contrast. Multiplanar reformatted images are provided for review. MIP images are provided for review. Dose modulation, iterative reconstruction, and/or weight based adjustment of the mA/kV was utilized to reduce the radiation dose to as low as reasonably achievable. Noncontrast CT of the head with reconstructed 2-D images are also provided for review. COMPARISON: NONCONTRAST CT HEAD DONE EARLIER SAME DAY. HISTORY: ORDERING SYSTEM PROVIDED HISTORY: eval for aneurysm/sah TECHNOLOGIST PROVIDED HISTORY: Reason for exam:->eval for aneurysm/sah Has a \"code stroke\" or \"stroke alert\" been called? ->No Decision Support Exception - unselect if not a suspected or confirmed emergency medical condition->Emergency Medical Condition (MA) What reading provider will be dictating this exam?->CRC; ORDERING SYSTEM PROVIDED HISTORY: sah/eval for aneurysm TECHNOLOGIST PROVIDED HISTORY: Has a \"code stroke\" or \"stroke alert\" been called? ->No Reason for exam:->sah/eval for aneurysm Decision Support Exception - unselect if not a suspected or confirmed emergency medical mediastinal lymphadenopathy. The larynx and pharynx are unremarkable. No acute abnormality of the salivary glands. Heterogeneously hypodense 1.0 cm nodule in the right thyroid lobe, no imaging follow-up recommended. BONES: No acute osseous abnormality. CTA HEAD: ANTERIOR CIRCULATION: No significant stenosis of the intracranial internal carotid, anterior cerebral, or middle cerebral arteries. No aneurysm. POSTERIOR CIRCULATION: No significant stenosis of the vertebral, basilar, or posterior cerebral arteries. No aneurysm. OTHER: No dural venous sinus thrombosis on this non-dedicated study     1. Multicompartment acute intracranial hemorrhage is similar compared to noncontrast CT head done earlier same day with redemonstration of small right subdural hematoma, right greater than left subarachnoid hemorrhage and orbital frontal hemorrhagic contusions. Mild right left midline shift measures 4-5 mm at the level of the lateral ventricles. 2. Unremarkable CTA of the head and neck. No hemodynamically significant stenosis, occlusion or aneurysm in the large arteries of the head and neck. 3. Nondisplaced fracture of the right suboccipital skull extends superiorly into the right occipital skull. Small amount of fluid in the right mastoid air cells may relate to fracture extension into the inferior mastoid air cells. 4. No dural venous sinus thrombosis. CTA HEAD W CONTRAST    Result Date: 5/27/2021  EXAMINATION: CTA OF THE NECK; CTA OF THE HEAD WITH CONTRAST 5/27/2021 6:26 pm: TECHNIQUE: CTA of the neck was performed with the administration of intravenous contrast. Multiplanar reformatted images are provided for review. MIP images are provided for review. Stenosis of the internal carotid arteries measured using NASCET criteria.  Dose modulation, iterative reconstruction, and/or weight based adjustment of the mA/kV was utilized to reduce the radiation dose to as low as reasonably achievable.; CTA of the head/brain was performed with the administration of intravenous contrast. Multiplanar reformatted images are provided for review. MIP images are provided for review. Dose modulation, iterative reconstruction, and/or weight based adjustment of the mA/kV was utilized to reduce the radiation dose to as low as reasonably achievable. Noncontrast CT of the head with reconstructed 2-D images are also provided for review. COMPARISON: NONCONTRAST CT HEAD DONE EARLIER SAME DAY. HISTORY: ORDERING SYSTEM PROVIDED HISTORY: eval for aneurysm/sah TECHNOLOGIST PROVIDED HISTORY: Reason for exam:->eval for aneurysm/sah Has a \"code stroke\" or \"stroke alert\" been called? ->No Decision Support Exception - unselect if not a suspected or confirmed emergency medical condition->Emergency Medical Condition (MA) What reading provider will be dictating this exam?->CRC; ORDERING SYSTEM PROVIDED HISTORY: sah/eval for aneurysm TECHNOLOGIST PROVIDED HISTORY: Has a \"code stroke\" or \"stroke alert\" been called? ->No Reason for exam:->sah/eval for aneurysm Decision Support Exception - unselect if not a suspected or confirmed emergency medical condition->Emergency Medical Condition (MA) What reading provider will be dictating this exam?->CRC FINDINGS: CT HEAD: BRAIN/VENTRICLES:  Multi compartment acute intracranial hemorrhage is similar to the prior study with redemonstration of small right subdural hematoma, right greater than left acute subarachnoid hemorrhage and small orbital frontal hemorrhagic contusions. Small right subdural hematoma measures up to 4 mm in the right frontal convexity. The orbital frontal hemorrhagic contusions demonstrate expected mild surrounding vasogenic edema. Small amount of blood layering in the occipital horns of the lateral ventricles. Otherwise, no new areas of acute intracranial hemorrhage. Mild right left midline shift measures 4-5 mm at the level of the lateral ventricles. No large acute territorial infarct. No hydrocephalus. The basal cisterns are patent. Generalized cerebral and cerebellar volume loss. Mild ill-defined periventricular white matter hypoattenuation, commonly seen in the setting of chronic small vessel ischemic disease. Hypoattenuation in the bilateral anterior temporal lobes and subinsular regions is similar to the remote study from September 16, 2015. ORBITS: The visualized portion of the orbits demonstrate no acute abnormality. SINUSES:  Mild scattered paranasal sinus mucosal thickening. Small amount of fluid in the right inferior mastoid air cells. SOFT TISSUES/SKULL: Nondisplaced right suboccipital skull fracture extends superiorly into the occipital skull. CTA NECK: AORTIC ARCH/ARCH VESSELS: No dissection or arterial injury. No significant stenosis of the brachiocephalic or subclavian arteries. CAROTID ARTERIES: No dissection, arterial injury, or hemodynamically significant stenosis by NASCET criteria. Right carotid bulb atherosclerotic plaque. VERTEBRAL ARTERIES: No dissection, arterial injury, or significant stenosis. SOFT TISSUES: The lung apices are clear. No cervical or superior mediastinal lymphadenopathy. The larynx and pharynx are unremarkable. No acute abnormality of the salivary glands. Heterogeneously hypodense 1.0 cm nodule in the right thyroid lobe, no imaging follow-up recommended. BONES: No acute osseous abnormality. CTA HEAD: ANTERIOR CIRCULATION: No significant stenosis of the intracranial internal carotid, anterior cerebral, or middle cerebral arteries. No aneurysm. POSTERIOR CIRCULATION: No significant stenosis of the vertebral, basilar, or posterior cerebral arteries. No aneurysm. OTHER: No dural venous sinus thrombosis on this non-dedicated study     1.  Multicompartment acute intracranial hemorrhage is similar compared to noncontrast CT head done earlier same day with redemonstration of small right subdural hematoma, right greater than left subarachnoid hemorrhage and orbital frontal hemorrhagic contusions. Mild right left midline shift measures 4-5 mm at the level of the lateral ventricles. 2. Unremarkable CTA of the head and neck. No hemodynamically significant stenosis, occlusion or aneurysm in the large arteries of the head and neck. 3. Nondisplaced fracture of the right suboccipital skull extends superiorly into the right occipital skull. Small amount of fluid in the right mastoid air cells may relate to fracture extension into the inferior mastoid air cells. 4. No dural venous sinus thrombosis. Discharge Exam:  Gen - no apparent distress   Neuro - Awake, alert, attentive    HEENT - PERRL 4 mm   Lungs - non labored, BS clear b/l   Heart - RR no extra heart sounds    Abdomen - soft non tender, no obvious lesions,   Ext- ROM wnl all ext NVI,     Disposition: home    In process/preliminary results:  Outstanding Order Results     Date and Time Order Name Status Description    5/27/2021  3:14 PM EKG 12 Lead Preliminary           Patient Instructions:   Discharge Medication List as of 6/4/2021 11:00 AM           Details   oxyCODONE (ROXICODONE) 5 MG immediate release tablet Take 1 tablet by mouth every 6 hours as needed for Pain for up to 3 days. , Disp-12 tablet, R-0Normal      levETIRAcetam (KEPPRA) 500 MG tablet Take 1 tablet by mouth 2 times daily for 6 days, Disp-12 tablet, R-0Normal              Details   atorvastatin (LIPITOR) 40 MG tablet take 1 tablet by mouth daily, Disp-90 tablet, R-1Normal      lisinopril-hydroCHLOROthiazide (PRINZIDE;ZESTORETIC) 20-12.5 MG per tablet Take 1 tablet by mouth daily, Disp-90 tablet, R-1Normal      buPROPion (WELLBUTRIN XL) 150 MG extended release tablet Take 1 tablet by mouth every morning, Disp-90 tablet,R-1Normal      venlafaxine (EFFEXOR XR) 150 MG extended release capsule take 1 capsule by mouth once daily, Disp-90 capsule, R-1Normal      Multiple Vitamins-Minerals (THERAPEUTIC MULTIVITAMIN-MINERALS) tablet Take 1 tablet by mouth daily albuterol sulfate HFA (VENTOLIN HFA) 108 (90 Base) MCG/ACT inhaler Inhale 2 puffs into the lungs 4 times daily as needed for Wheezing, Disp-1 Inhaler, R-5Normal             TRAUMA SERVICES DISCHARGE INSTRUCTIONS    Call [phone], option 2, for any questions/concerns and for follow-up appointment as needed. Please follow the instructions checked below:    Please follow-up with your primary care provider. ACTIVITY INSTRUCTIONS  Increase activity as tolerated  No heavy lifting or strenuous activity  Take your incentive spirometer home and use 4-6 times/day    WOUND/DRESSING INSTRUCTIONS:  You may shower. No sitting in bath tub, hot tub or swimming until cleared by physician. Ice to areas of pain for first 24 hours. Heat to areas of pain after that. Wash areas of lacerations/abrasions with soap & water. Rinse well. Pat dry with clean towel. Apply thin layer of Bacitracin, Neosporin, or triple antibiotic cream to affected area 2-3 times per day. Keep wounds clean and dry. MEDICATION INSTRUCTIONS  Take medication as prescribed. When taking pain medications, you may experience dizziness or drowsiness. Do not drink alcohol or drive when taking these medications. You may experience constipation while taking pain medication. You may take over the counter stool softeners such as docusate (Colace), sennosides S (Senokot-S), or Miralax. [x]  You may take Ibuprofen (over the counter) as directed for mild pain. --You may take up to 800mg every 8 hours for pain, please take with food or milk. [x]  You may take acetaminophen (Tylenol) products. Do NOT take more than 4000mg of Tylenol in 24h. [x]  Do not take any other acetaminophen (Tylenol) products if you are taking Percocet or Norco, as these contain Tylenol. --Do NOT take more than 4000mg of Tylenol in 24h. OPIOID MEDICATION INSTRUCTIONS  Read the medication guide that is included with your prescription.   Take your medication exactly as of the eye) are unequal in size, and this is new.  Seizure (convulsion).  Not responding to others/won't wake up or very hard to wake up   Faints (passes out)   Vomiting more than 3 times    Notify the TRAUMA CLINIC if any of the following symptoms occur:   Severe headache -- Mild headache may last for days. Report worsening pain or uncontrolled pain with prescribed medicine.  Numbness, tingling or weakness -- Present in arms or legs; unsteady walking.  Eye Changes/light sensation -- Vision problems; blurred or double-vision; unequal sized pupils.  Nausea/Vomiting -- Episodes of vomiting may occur initially after a head injury. Persistent vomiting or difficulty taking medication by mouth.  Increased Sleepiness -- Difficulty waking from sleep with increased confusion.  Dizziness -- Does not go away or occurs repeatedly. Vomiting may accompany dizziness.  Drainage -- Clear fluid or blood from the nose and ears.  Fever -- Temperature over 101 degrees.  Neck Pain. The First Four Weeks  The symptoms below are common after a mild brain injury. They usually get better on their own within a few weeks:    feeling tired or ?low   problems falling or staying asleep   feeling confused, poor concentration, or slow to answer questions   feeling dizzy, poor balance, or poor coordination   being sensitive to light   being sensitive to sounds   ringing in the ears   a mild headache, sometimes with nausea and/or vomiting   being irritable, having mood swings, or feeling somewhat sad or down  Contact the TRAUMA CLINIC if your symptoms are affecting your everyday activities. Remember that letting yourself get too tired can make your symptoms worse. Listen to your body: if doing a certain activity increases your symptoms, take a break from that activity. Build up the amount of time you do an activity and stay under the threshold of symptoms.      Long term Effects (Post-Concussive Syndrome) don't have to be woken up every few hours. If someone does wake you up, you should awaken easily.  Do some light physical activity (housework) or light exercise (walking, stationary bike) as soon as you can tolerate the movement. Strenuous exercise (such as jogging or weight lifting) can make your concussion symptoms worse or last longer. Diet   Return to a normal diet as tolerated, you may want to start with liquids first   Eat healthy meals (including breakfast) and snacks throughout the day as your brain heals. Managing Pain   Tylenol or Ibuprofen are the best meds to take for headaches.  Your doctor may have prescribed you medications if your headaches were severe or you have other injuries. Please take as directed. Driving   Your ability to concentrate and react quickly might be affected by the concussion. Please contact trauma services for advice on when to resume driving.  Do not drive if you're concerned about vision problems, slowed thinking, slowed reaction time, reduced attention or poor judgment.  Wear sunglasses even during winter if tenisha while driving. The bright light may induce a headache. Alcohol use/Drug use   Don't drink alcohol or use recreational drugs as they may make you feel worse and/or hide the warning signs. Follow-up:  Trauma Clinic: [phone] -- press option 2   [id number] [address] CONSIDERATIONS FOR OUR PATIENTS OVER THE AGE OF 65Y    Getting around your home safely can be a challenge if you have injuries or health problems that make it easy for you to fall. Loose rugs and furniture in walkways are among the dangers for many older people who have problems walking or who have poor eyesight. People who have conditions such as arthritis, osteoporosis, or dementia also must be careful not to fall. You can make your home safer with a few simple measures.     Follow-up care regularly with an abrasive pad, such as steel wool.  Keep your house well lit, especially [place], and outside walkways. Use night-lights in areas such as hallways and washrooms. Add extra light switches or use remote switches (such as switches that go on or off when you clap your hands) to make it easier to turn lights on if you have to get up during the night.  Install sturdy handrails on stairways.  Move items in your cabinets so that the things you use a lot are on the lower shelves (about waist level).  Keep a cordless phone and a flashlight with new batteries by your bed. If possible, put a phone in each of the main rooms of your house, or carry a cell phone in case you fall and cannot reach a phone. Or, you can wear a device around your neck or wrist. You push a button that sends a signal for help.  Wear low-heeled shoes that fit well and give your feet good support. Use footwear with non-skid soles. Check the heels and soles of your shoes for wear. Repair or replace worn heels or soles.  Do not wear socks without shoes on wood floors.  Walk on the grass when the sidewalks are slippery. If you live in an area that gets snow and ice in the winter, sprinkle salt on slippery steps and sidewalks. Preventing falls in the bath   Install grab bars and non-skid mats inside and outside your shower or tub and near the toilet and sinks.  Use shower chairs and bath benches.  Use a hand-held shower head that will allow you to sit while showering.  Get into a tub or shower by putting the weaker leg in first. Get out of a tub or shower with your strong side first.   Repair loose toilet seats and consider installing a raised toilet seat to make getting on and off the toilet easier.  Keep your washroom door unlocked while you are in the shower.     Follow-up:  Trauma Clinic: [phone] option [address]  [place], [phone] Damian Melendez Subdural Hematoma: Care Instructions  Your Care Instructions  A subdural hematoma is a buildup of blood between the layers of tissue that cover the brain. The blood collects under the layer closest to the skull. (This layer is called the dura.) The bleeding is most often caused by a head injury, but there can be other causes. In an older adult, even a minor injury can lead to a subdural hematoma. The buildup of blood inside the skull can put pressure on the brain. This may cause symptoms, such as a severe headache, confusion, or seizures. There are two kinds of hematomas: acute and chronic. · With an acute hematoma, symptoms start soon after the injury. · With a chronic hematoma, it may be days or weeks before symptoms appear. Doctors use imaging tests to find the buildup of blood. You may have a test such as a CT scan or MRI. The doctor may also do a test to check the pressure inside your skull. Bleeding inside the skull may get worse over time. So it is very important to pay attention to your symptoms. And be sure to see your doctor for follow-up testing. In some cases, treatment is needed to remove the blood. This helps relieve the pressure on the brain. Your doctor may make one or two small holes in your skull. For a large hematoma, the doctor may need to remove a piece of the skull. You may not need treatment if you have a small hematoma that is not causing symptoms. If you take aspirin or some other blood thinner, you may need to stop taking it. The doctor may give you treatment to undo the effects of the blood thinner. This can help prevent more bleeding in the skull. The doctor has checked you carefully, but problems can develop later. If you notice any problems or new symptoms, get medical treatment right away. Follow-up care is a key part of your treatment and safety. Be sure to make and go to all appointments, and call your doctor if you are having problems.  It's also a good idea to know your test results and keep a list of the medicines you take. How can you care for yourself at home? For an acute hematoma  · Follow your doctor's instructions. He or she will tell you if you need someone to watch you closely for the next 24 hours or longer. For a chronic hematoma  · Get plenty of sleep at night, and take it easy during the day. Rest is the best way to recover. · Avoid activities that are physically or mentally demanding. These include housework, exercise, paperwork, video games, text messaging, and using the computer. You may need to change your work or school schedule for a while. · Return to your normal activities slowly. Do not try to do too much at once. For either type of hematoma  · Do not drink alcohol until your doctor says it is okay. · Don't drive a car, ride a bike, or operate machinery until your doctor says it's okay. · If you take aspirin or some other blood thinner, be sure to talk to your doctor. He or she will tell you if and when to start taking this medicine again. Make sure that you understand exactly what your doctor wants you to do. · If you normally take medicine, your doctor will tell you if and when you can restart it. He or she will also give you instructions about taking any new medicines. When should you call for help? Call 911 anytime you think you may need emergency care. For example, call if:    · You have a seizure.     · You passed out (lost consciousness).     · You are confused or can't stay awake. Call your doctor now or seek immediate medical care if:    · You have new or worse vomiting.     · You feel less alert.     · You have new weakness or numbness in any part of your body.     · You have a headache that is getting worse. Watch closely for changes in your health, and be sure to contact your doctor if:    · You do not get better as expected.     · You have new symptoms, such as headaches, trouble concentrating, or changes in mood.    Where can you learn more? Go to [email or web address] and sign in to your [employer] account. Enter B940 in the [facility] box to learn more about \"Subdural Hematoma: Care Instructions. \"     If you do not have an account, please click on the \"Sign Up Now\" link. Current as of: August 4, 2020               Content Version: 12.8  © 2006-2021 Healthwise, [employer]. Care instructions adapted under license by [facility] ([facility]). If you have questions about a medical condition or this instruction, always ask your healthcare professional. [place] [address] any warranty or liability for your use of this information.           Follow up:   [address]  [address] [place] [address]  [phone]  Schedule an appointment as soon as possible for a visit  As needed    [address] MD  1100 [facility], [address] [place] [address] [phone]    Schedule an appointment as soon as possible for a visit in 3 weeks  follow up brain bleed    [facility], DO  [address] [place] [place] [id number]    Schedule an appointment as soon as possible for a visit in 1 week  Call to schedule a hospital follow-up     ALEKSANDAR Comer - NP  [facility] 94  [facility] [phone]      As needed    [address] [phone]           Signed:  Silas Griffin MD  6/16/2021  1:15 AM

## 2021-05-28 NOTE — PROGRESS NOTES
Trauma Tertiary Survey    Admit Date: 5/27/2021  Hospital day 1    CC:  Fall, SAH/SDH       Past Medical History:   Diagnosis Date    Depression     Erectile dysfunction     History of shingles     Hypertension 1/25/2017    Mixed hyperlipidemia 1/25/2017       Alcohol pre-screening:  Men: How many times in the past year have you had 5 or more drinks in a day?  none    How much do you drink on a daily basis? none    Scheduled Meds:   atorvastatin  40 mg Oral Daily    buPROPion  150 mg Oral QAM    venlafaxine  150 mg Oral Daily with breakfast    sodium chloride flush  10 mL Intravenous 2 times per day    acetaminophen  650 mg Oral 3 times per day    levETIRAcetam  500 mg Oral BID    lisinopril  20 mg Oral Daily    And    hydroCHLOROthiazide  12.5 mg Oral Daily     Continuous Infusions:   sodium chloride      sodium chloride       PRN Meds:sodium chloride flush, sodium chloride, polyethylene glycol, ondansetron, sodium chloride flush, sodium chloride    Subjective:     Patient feeling fine this morning, still with a headache. No current dizziness. No new complaints on tertiary. Objective:     Patient Vitals for the past 8 hrs:   BP Pulse Resp SpO2   05/28/21 0447 (!) 142/89 89 20 96 %   05/28/21 0302 139/83 88 20 95 %   05/28/21 0231 (!) 142/81 86 21 95 %   05/28/21 0201 (!) 141/92 89 17 96 %   05/28/21 0132 130/80 91 20 95 %   05/28/21 0102 134/87 88 20 97 %   05/28/21 0031 131/71 86 20 94 %   05/28/21 0002 (!) 143/77 88 22 95 %   05/27/21 2332 (!) 142/91 91 23 96 %   05/27/21 2302 (!) 140/87 89 23 96 %   05/27/21 2231 (!) 145/90 90 23 95 %       No intake/output data recorded. No intake/output data recorded. Past Medical History:   Diagnosis Date    Depression     Erectile dysfunction     History of shingles     Hypertension 1/25/2017    Mixed hyperlipidemia 1/25/2017       Radiology:  CTA HEAD W CONTRAST   Final Result   1.  Multicompartment acute intracranial hemorrhage is similar compared signed by Mita Isabel MD on 5/28/21 at 6:07 AM EDT

## 2021-05-28 NOTE — PROGRESS NOTES
PCP is Dr. Coffman [facility]  PCP notified of admission    Tl Richardson, APRN - NP

## 2021-05-28 NOTE — PROGRESS NOTES
[facility]   ATTENDING PHYSICIAN PROGRESS NOTE     I have examined the patient, reviewed the record, and discussed the case with the APN/ Resident. I have reviewed all relevant labs and imaging data. The following summarizes my clinical findings and independent assessment. CC: SDH/SAH    Pt reports he has headache. Reports he is dizzy--states he has been dizzy for about 2 weeks--was told he had vertigo.     Asleep but arousable  Follows commands  Hrt:  Regular  Lungs:  Fairly clear bilaterally  Abd:  Soft; BS active; NT/ND  Skin:  Warm/dry  Ext:  Strength 5/5 bilateral upper/lower ext    Patient Active Problem List    Diagnosis Date Noted    Subdural hematoma ([address] 05/28/2021    SAH (subarachnoid hemorrhage) (HCC)     Fracture of occipital bone of skull with loss of consciousness ([address]     Syncope and collapse     Erectile dysfunction 06/07/2018    BPH without urinary obstruction 06/07/2018    Major depression 01/25/2017    Hypertension 01/25/2017    Mixed hyperlipidemia 01/25/2017       S/p fall  SDH/SAH--monitor neuro exam  Skull fx--pain control  Diet as tolerated  PT/OT evals  Jarret Paulson MD, FACS  5/28/2021  6:05 PM

## 2021-05-28 NOTE — H&P
TRAUMA HISTORY & PHYSICAL  Surgical Resident/Advance Practice Nurse  5/27/2021  11:47 PM    PRIMARY SURVEY    CHIEF COMPLAINT:  Trauma consult. Injury occurred just prior to arrival.  Patient states he was walking and felt a bout of vertigo, proceeded to fall and hit his head on the ground. He states he has had vertigo before and has fallen a couple weeks ago but he did not hit his head at that time. He denies LOC, states he remembers the whole thing. He originally presented to [facility] where they began a stroke work-up and sent him here for further evaluation by neurosurgery. He underwent a CT of the head as well as CTA of the neck and brain. He is found to have a subarachnoid hemorrhage as well as a subdural component. Further work-up was negative for CVA or aneurysm. He has GCS of 15. No blood thinners.     AIRWAY:   Airway Normal  EMS ETT Absent  Noisy respirations Absent  Retractions: Absent  Vomiting/bleeding: Absent      BREATHING:    Midaxillary breath sound left:  Normal  Midaxillary breath sound right:  Normal    Cough sound intensity:  good   FiO2: room air       CIRCULATION:   Femoral pulse intensity: Strong  Palpebral conjunctiva: Pink       Vitals:    05/27/21 2332   BP: (!) 142/91   Pulse: 91   Resp: 23   Temp:    SpO2: 96%       Vitals:    05/27/21 2202 05/27/21 2231 05/27/21 2302 05/27/21 2332   BP: (!) 151/87 (!) 145/90 (!) 140/87 (!) 142/91   Pulse: 86 90 89 91   Resp: 18 23 23 23   Temp:       SpO2: 96% 95% 96% 96%   Weight:       Height:            FAST EXAM: N/A    Central Nervous System    GCS Initial 15 minutes   Eye  Motor  Verbal 4 - Opens eyes on own  6 - Follows simple motor commands  5 - Alert and oriented 4 - Opens eyes on own  6 - Follows simple motor commands  5 - Alert and oriented     Neuromuscular blockade: No  Pupil size:  Left 2 mm    Right 2 mm  Pupil reaction: Yes    Wiggles fingers: Left Yes Right Yes  Wiggles toes: Left Yes   Right Yes    Hand grasp:   Left Present      Right  Present  Plantar flexion: Left  Present      Right   Present    Loss of consciousness:  No  History Obtained From:  Patient & EMS  Private Medical Doctor: Mary Jo Hoover DO      Pre-exisiting Medical History:  yes    Conditions: depression, HTN    Medications: wellbutrin, lexapro    Allergies: codeine     Social History:   Tobacco use:  none  Alcohol use:  none  Illicit drug use:  no history of illicit drug use    Past Surgical History:  heel surgery    Anticoagulant use:  No   Antiplatelet use:    No     NSAID use in last 72 hours: no  Taken PCN in past:  yes  Last food/drink: unknown  Last tetanus: unknown    Family History:   Not pertinent to presenting problem. Complaints:   Head:  Mild  Neck:   None  Chest:   None  Back:   None  Abdomen:   None  Extremities:   None  Comments:     Review of systems:  All negative unless otherwise noted. SECONDARY SURVEY  Head/scalp: Atraumatic    Face: Atraumatic    Eyes/ears/nose: Atraumatic    Pharynx/mouth: Atraumatic    Neck: Atraumatic     Cervical spine tenderness:   Cervical collar not indicated  Pain:  none  ROM:  None    Chest wall:  Atraumatic    Heart:  Regular rate & rhythm    Abdomen: Atraumatic. Soft ND  Tenderness:  none    Pelvis: Atraumatic  Tenderness: none    Thoracolumbar spine: Atraumatic  Tenderness:  none    Genitourinary:  Atraumatic. No blood or urine noted    Rectum: Atraumatic. No blood noted. Perineum: Atraumatic. No blood or urine noted. Extremities:   Sensory normal  Motor normal    Distal Pulses  Left arm normal  Right arm normal  Left leg normal  Right leg normal    Capillary refill  Left arm normal  Right arm normal  Left leg normal  Right leg normal    Procedures in ED:  none    In the event of Emergency Blood Transfusion:  Due to the critical condition of this patient, I request the immediate release of blood products for emergency transfusion secondary to shock.  I understand the increased risks

## 2021-05-28 NOTE — CONSULTS
[address]                  [address] [place],  [facility]                                  CONSULTATION    PATIENT NAME: Kristi Middleton                       :        1955  MED REC NO:   [id number]                            ROOM:       23  ACCOUNT NO:   [de-identified]                           ADMIT DATE: 2021  PROVIDER:     El Middleton MD    CONSULT DATE:  2021    CHIEF COMPLAINT:  History of fall. HISTORY OF PRESENT ILLNESS:  This 81-year-old male who was brought to  the emergency room when he fell, then he had vertiginous episode. Alyssia Maid He  denies loss of consciousness per se. He has been having vertiginous  feeling for almost a month, particularly when he turns his head from  side to side. He had nausea but no vomiting. He denies any weakness or  numbness of the extremities, speech disturbances, visual difficulty etc.  He underwent a CT scan of the brain on admission and it was reported to  show small volume of subarachnoid hemorrhage notably in the suprasellar  cistern. He also has hemorrhage in the left frontal and the right  frontotemporal lobe. There is no mass effect and there is no midline  shift. CT scan of the cervical spine, which has also been performed  reported to show no fractures or dislocation of the cervical spine. Degenerative changes were noted. PAST MEDICAL HISTORY:  Past history of depression; erectile dysfunction;  history of shingles; hypertension, mixed; hyperlipidemia. PAST SURGICAL HISTORY:  Colonoscopy and heel spur surgery. PERSONAL HISTORY:  Allergy to CODEINE. SOCIAL HISTORY:  Not a smoker. Does not use alcohol or street drugs. PHYSICAL EXAMINATION:  GENERAL:  He is a well-developed, well-nourished male, in no acute  distress. He is alert, awake and oriented to time, place and person. NEUROLOGIC:  Speech is good. Memory is good. Cranial nerves II through  XII are grossly intact. Pupils are equal and reactive. Extraocular  movements are full. Vision is equal to confrontation. No nystagmus  noted. Finger-to-nose performed without difficulty. Hand  is equal  bilaterally. He can lift his legs off the bed equally well. No focal  deficit noted. Slight tenderness of the cervical spine muscles. Rest  of the neurologic examination is unremarkable. IMPRESSION:  1. Subarachnoid hemorrhage, most probably traumatic, rule out aneurysm. 2.  Vertigo, etiology to be determined. 3.  Multiple medical comorbidities. 4.  Right Occipital Bone Fracture   RECOMMENDATIONS:  1. The patient to be placed on Keppra. 2.  A CTA of the head to rule out aneurysm. We will follow along.         Nayely Lawler MD    D: 05/27/2021 18:39:53       T: 05/27/2021 18:48:08     [email or web address]  Job#: [id number]     Doc#: [id number]    CC:

## 2021-05-29 PROCEDURE — 6360000002 HC RX W HCPCS: Performed by: STUDENT IN AN ORGANIZED HEALTH CARE EDUCATION/TRAINING PROGRAM

## 2021-05-29 PROCEDURE — 99232 SBSQ HOSP IP/OBS MODERATE 35: CPT | Performed by: SURGERY

## 2021-05-29 PROCEDURE — 2580000003 HC RX 258: Performed by: STUDENT IN AN ORGANIZED HEALTH CARE EDUCATION/TRAINING PROGRAM

## 2021-05-29 PROCEDURE — 6370000000 HC RX 637 (ALT 250 FOR IP): Performed by: STUDENT IN AN ORGANIZED HEALTH CARE EDUCATION/TRAINING PROGRAM

## 2021-05-29 PROCEDURE — 6370000000 HC RX 637 (ALT 250 FOR IP): Performed by: NEUROLOGICAL SURGERY

## 2021-05-29 PROCEDURE — 97165 OT EVAL LOW COMPLEX 30 MIN: CPT

## 2021-05-29 PROCEDURE — 2060000000 HC ICU INTERMEDIATE R&B

## 2021-05-29 RX ORDER — BUTALBITAL, ACETAMINOPHEN AND CAFFEINE 50; 325; 40 MG/1; MG/1; MG/1
1 TABLET ORAL EVERY 4 HOURS PRN
Status: DISCONTINUED | OUTPATIENT
Start: 2021-05-29 | End: 2021-06-04 | Stop reason: HOSPADM

## 2021-05-29 RX ORDER — SCOLOPAMINE TRANSDERMAL SYSTEM 1 MG/1
1 PATCH, EXTENDED RELEASE TRANSDERMAL
Status: DISCONTINUED | OUTPATIENT
Start: 2021-05-29 | End: 2021-06-04 | Stop reason: HOSPADM

## 2021-05-29 RX ORDER — OXYCODONE HYDROCHLORIDE 5 MG/1
5 TABLET ORAL EVERY 6 HOURS PRN
Qty: 12 TABLET | Refills: 0 | Status: SHIPPED | OUTPATIENT
Start: 2021-05-29 | End: 2021-06-01

## 2021-05-29 RX ORDER — MECLIZINE HCL 12.5 MG/1
12.5 TABLET ORAL 3 TIMES DAILY PRN
Status: DISCONTINUED | OUTPATIENT
Start: 2021-05-29 | End: 2021-06-04 | Stop reason: HOSPADM

## 2021-05-29 RX ORDER — OXYCODONE HYDROCHLORIDE 5 MG/1
5 TABLET ORAL EVERY 6 HOURS PRN
Status: DISCONTINUED | OUTPATIENT
Start: 2021-05-29 | End: 2021-06-04 | Stop reason: HOSPADM

## 2021-05-29 RX ADMIN — BUPROPION HYDROCHLORIDE 150 MG: 150 TABLET, EXTENDED RELEASE ORAL at 09:53

## 2021-05-29 RX ADMIN — Medication 10 ML: at 09:52

## 2021-05-29 RX ADMIN — VENLAFAXINE HYDROCHLORIDE 150 MG: 150 CAPSULE, EXTENDED RELEASE ORAL at 09:53

## 2021-05-29 RX ADMIN — OXYCODONE 5 MG: 5 TABLET ORAL at 04:25

## 2021-05-29 RX ADMIN — MECLIZINE 12.5 MG: 12.5 TABLET ORAL at 23:01

## 2021-05-29 RX ADMIN — POLYETHYLENE GLYCOL 3350 17 G: 17 POWDER, FOR SOLUTION ORAL at 18:17

## 2021-05-29 RX ADMIN — MECLIZINE 12.5 MG: 12.5 TABLET ORAL at 09:53

## 2021-05-29 RX ADMIN — ACETAMINOPHEN 650 MG: 325 TABLET ORAL at 04:11

## 2021-05-29 RX ADMIN — HYDROCHLOROTHIAZIDE 12.5 MG: 12.5 TABLET ORAL at 09:53

## 2021-05-29 RX ADMIN — BUTALBITAL, ACETAMINOPHEN AND CAFFEINE 1 TABLET: 50; 325; 40 TABLET ORAL at 16:42

## 2021-05-29 RX ADMIN — LISINOPRIL 20 MG: 20 TABLET ORAL at 09:53

## 2021-05-29 RX ADMIN — MECLIZINE 12.5 MG: 12.5 TABLET ORAL at 16:42

## 2021-05-29 RX ADMIN — ATORVASTATIN CALCIUM 40 MG: 40 TABLET, FILM COATED ORAL at 09:53

## 2021-05-29 RX ADMIN — LEVETIRACETAM 500 MG: 500 TABLET ORAL at 20:54

## 2021-05-29 RX ADMIN — LEVETIRACETAM 500 MG: 500 TABLET ORAL at 09:52

## 2021-05-29 RX ADMIN — BUTALBITAL, ACETAMINOPHEN AND CAFFEINE 1 TABLET: 50; 325; 40 TABLET ORAL at 09:52

## 2021-05-29 RX ADMIN — Medication 10 ML: at 20:55

## 2021-05-29 RX ADMIN — SODIUM CHLORIDE, PRESERVATIVE FREE 10 ML: 5 INJECTION INTRAVENOUS at 05:33

## 2021-05-29 RX ADMIN — ACETAMINOPHEN 650 MG: 325 TABLET ORAL at 14:08

## 2021-05-29 RX ADMIN — ONDANSETRON 4 MG: 2 INJECTION INTRAMUSCULAR; INTRAVENOUS at 05:32

## 2021-05-29 RX ADMIN — ACETAMINOPHEN 650 MG: 325 TABLET ORAL at 20:54

## 2021-05-29 ASSESSMENT — PAIN SCALES - GENERAL
PAINLEVEL_OUTOF10: 0
PAINLEVEL_OUTOF10: 3
PAINLEVEL_OUTOF10: 3
PAINLEVEL_OUTOF10: 10
PAINLEVEL_OUTOF10: 5
PAINLEVEL_OUTOF10: 3
PAINLEVEL_OUTOF10: 10

## 2021-05-29 ASSESSMENT — PAIN DESCRIPTION - DESCRIPTORS: DESCRIPTORS: HEADACHE

## 2021-05-29 ASSESSMENT — PAIN DESCRIPTION - LOCATION
LOCATION: HEAD

## 2021-05-29 ASSESSMENT — PAIN DESCRIPTION - FREQUENCY: FREQUENCY: INTERMITTENT

## 2021-05-29 NOTE — PLAN OF CARE
Problem: Falls - Risk of:  Goal: Will remain free from falls  Description: Will remain free from falls  5/29/2021 1613 by Reshma Dietz RN  Outcome: Met This Shift  5/29/2021 1217 by Reshma Dietz RN  Outcome: Met This Shift

## 2021-05-29 NOTE — PROGRESS NOTES
[facility]   ATTENDING PHYSICIAN PROGRESS NOTE     I have examined the patient, reviewed the record, and discussed the case with the APN/ Resident. I have reviewed all relevant labs and imaging data. The following summarizes my clinical findings and independent assessment. CC: SDH/SAH    Pt states he had headache throughout the night, but improved now. States dizziness persists.     Asleep but arousable  Follows commands  Hrt:  Regular  Lungs:  Fairly clear bilaterally  Abd:  Soft; BS active; NT/ND  Skin:  Warm/dry  Ext:  Strength 5/5 bilateral upper/lower ext    Patient Active Problem List    Diagnosis Date Noted    Subd[facility] [address] ([facility] 05/28/2021    SAH (subarachnoid hemorrhage) (HCC)     Fracture of occipital bone of skull with loss of consciousness ([address]     Syncope and collapse     Erectile dysfunction 06/07/2018    BPH without urinary obstruction 06/07/2018    Major depression 01/25/2017    Hypertension 01/25/2017    Mixed hyperlipidemia 01/25/2017       S/p fall  SDH/SAH--monitor neuro exam  Skull fx--pain control  Dizziness--meclizine  Diet as tolerated  PT/OT evals  PCDs    Ashley Reyes MD, FACS  5/29/2021  9:49 AM spouse

## 2021-05-29 NOTE — PROGRESS NOTES
[address] [address]                                                  Patient Name: Ramos Cabral  MRN: [id number]  : 1955  Room: [address] [place] [address]    Evaluating OT: Sebas Mathew, [address]    Referring Provider[de-identified] Polina Villeda MD     Specific Provider Orders/Date: OT evaluation and treatment 21    Diagnosis: intracranial hemorrage      Pertinent Medical History: Depression, shingles, HTN, HLD, COVID-19       Precautions:  Fall Risk, vertigo    Assessment of current deficits   [x] Functional mobility   [x]ADLs  [x] Strength               []Cognition   [x] Functional transfers   [] IADLs         [x] Safety Awareness   [x]Endurance   [] Fine Coordination              [x] Balance      [] Vision/perception   []Sensation    []Gross Motor Coordination  [] ROM  [] Delirium                   [] Motor Control     OT PLAN OF CARE   OT POC based on physician orders, patient diagnosis and results of clinical assessment    Frequency/Duration 1-4 days/wk for 2 weeks PRN   Specific OT Treatment to include:    Instruction/training on adapted ADL techniques and AE recommendations to increase functional independence within precautions  Training on energy conservation strategies, correct breathing pattern and techniques to improve independence/tolerance for self-care routine  Functional transfer/mobility training/DME recommendations for increased independence, safety, and fall prevention  Patient/Family education to increase follow through with safety techniques and functional independence  Therapeutic exercise to improve motor endurance, ROM, and functional strength for ADLs/functional transfers  Therapeutic activities to facilitate/challenge dynamic balance, stand tolerance for increased safety and independence with ADLs    Recommended Adaptive Equipment: ww, shower chair, long handled sponge- TBD     Home Living: Pt lives with wife in a 1 story with 2 [address] and no rail  Bathroom setup: walk in shower   Equipment owned: none, his wife has a hurry cane    Prior Level of Function: indep with ADLs , indep with IADLs; ambulated with no AD  Driving: yes  Occupation:      Pain Level: 2-3/10 in head   Cognition: A&O: 4/4;   Follows 2 step directions: good    Memory:  good    Sequencing:  good    Problem solving:  good    Judgement/safety:  fair     Functional Assessment:   AM-PAC Daily Activity Raw Score: 19/24     Initial Eval Status  Date: 5/29/21 Treatment Status  Date: STG=LTG  Time frame: 10-14 days   Feeding Independent   Independent    Grooming Supervision   Independent    UB Dressing Stand by Assist   Independent    LB Dressing Stand by Assist   Modified [place]    Bathing Stand by Assist  Modified [place]    Toileting Stand by Assist   Modified [place]    Bed Mobility  Supine to sit: NT   Sit to supine: Stand by Assist   Supine to sit: Modified [place]   Sit to supine: Modified [place]    Functional Transfers Sit to stand: Contact Guard Assist   Stand to sit: Contact Guard Assist   Stand pivot: Contact Guard Assist   Modified [place]    Functional Mobility CGA with ww  Steady with no c/o dizziness  Mod indep with AAD if needed   Balance Sitting:     Static:  indep    Dynamic:supervision  Standing: CGA  Sitting:     Static:  indep    Dynamic:indep  Standing: indep   Activity Tolerance fair  good   Visual/  Perceptual Glasses: yes          BUE  ROM/Strength/  Fine motor Coordination Hand dominance: R but has carpal tunnel and uses L a lot    RUE: ROM WFL     Strength: grossly 4/5      Strength:  WFL     Coordination:  Good-    LUE: ROM  WFL     Strength: grossly 4/5      Strength:  WFL     Coordination: WFL       Hearing: WFL   Sensation:  No c/o numbness or tingling   Tone:  Some tremors noted B hands   Edema:  None noted    Comments:   RN cleared patient for OT. Upon arrival patient up in chair with nurse present in room. Pt was educated through out treatment regarding proper technique & safety with ADL,  functional transfers & mobility . Adapted  techniques & compensatory strategies to ease tasks to improve safety and independence with ADLs,  prevent falls and allow pt maximize level of independence and safely . Pt demonstrated good understanding of education & follow through of safety with ww. At end of session, patient in bed with call light and phone within reach, all lines and tubes intact. Overall patient demonstrated slight decreased independence and safety during completion of ADL/functional transfer/mobility tasks. Pt would benefit from continued skilled OT to increase safety and independence with completion of ADL tasks and functional mobility for improved quality of life. Rehab Potential: Good  for established goals     Patient / Family Goal:  Not stated    Patient and/or family were instructed on functional diagnosis, prognosis/goals and OT plan of care. Demonstrated good understanding. Eval Complexity: Low    Time In: 2:19  Time Out: 2:40  Total Treatment Time: 0 minutes    Min Units   OT Eval Low 38241  x     OT Eval Medium 38986      OT Eval High 10519       OT Re-Eval [id number]       Therapeutic Ex 87568       Therapeutic Activities 62026      ADL/Self Care 45120      Orthotic Management 54301       Neuro Re-Ed 41508       Non-Billable Time          Evaluation Time includes thorough review of current medical information, gathering information on past medical history/social history and prior level of function, completion of standardized testing/informal observation of tasks, assessment of data and education on plan of care and goals.             [place], [place] [address]

## 2021-05-29 NOTE — PROGRESS NOTES
Department of Neurosurgery  Attending Progress Note    CHIEF COMPLAINT: dizziness    SUBJECTIVE:  C/o headache this am    ROS:    OBJECTIVE  Physical  VITALS:  BP (!) 136/97   Pulse 70   Temp 97.7 °F (36.5 °C) (Oral)   Resp 20   Ht 5' 8\" (1.727 m)   Wt 238 lb (108 kg)   SpO2 98%   BMI 36.19 kg/m²   NEUROLOGIC:  Mental Status Exam:  Level of Alertness:   awake  Orientation:   person, place, time  Motor Exam:  Motor exam is symmetrical 5 out of 5 all extremities bilaterally  Sensory:  Sensory intact    Data  CBC:   Lab Results   Component Value Date    WBC 10.6 05/28/2021    RBC 3.94 05/28/2021    HGB 11.7 05/28/2021    HCT 35.6 05/28/2021    MCV 90.4 05/28/2021    MCH 29.7 05/28/2021    MCHC 32.9 05/28/2021    RDW 13.5 05/28/2021     05/28/2021    MPV 8.9 05/28/2021     BMP:    Lab Results   Component Value Date     05/28/2021    K 4.0 05/28/2021     05/28/2021    CO2 23 05/28/2021    BUN 14 05/28/2021    LABALBU 3.7 05/27/2021    CREATININE 1.0 05/28/2021    CALCIUM 8.9 05/28/2021    GFRAA >60 05/28/2021    LABGLOM >60 05/28/2021    GLUCOSE 105 05/28/2021     Current Inpatient Medications  Current Facility-Administered Medications: oxyCODONE (ROXICODONE) immediate release tablet 5 mg, 5 mg, Oral, Q6H PRN  scopolamine (TRANSDERM-SCOP) transdermal patch 1 patch, 1 patch, Transdermal, Q72H  butalbital-acetaminophen-caffeine (FIORICET, ESGIC) per tablet 1 tablet, 1 tablet, Oral, Q4H PRN  meclizine (ANTIVERT) tablet 12.5 mg, 12.5 mg, Oral, TID PRN  atorvastatin (LIPITOR) tablet 40 mg, 40 mg, Oral, Daily  buPROPion (WELLBUTRIN XL) extended release tablet 150 mg, 150 mg, Oral, QAM  venlafaxine (EFFEXOR XR) extended release capsule 150 mg, 150 mg, Oral, Daily with breakfast  sodium chloride flush 0.9 % injection 10 mL, 10 mL, Intravenous, 2 times per day  sodium chloride flush 0.9 % injection 10 mL, 10 mL, Intravenous, PRN  0.9 % sodium chloride infusion, 25 mL, Intravenous, PRN  acetaminophen (TYLENOL) tablet 650 mg, 650 mg, Oral, 3 times per day  polyethylene glycol (GLYCOLAX) packet 17 g, 17 g, Oral, Daily PRN  ondansetron (ZOFRAN) injection 4 mg, 4 mg, Intravenous, Q6H PRN  levETIRAcetam (KEPPRA) tablet 500 mg, 500 mg, Oral, BID  lisinopril (PRINIVIL;ZESTRIL) tablet 20 mg, 20 mg, Oral, Daily **AND** hydroCHLOROthiazide (HYDRODIURIL) tablet 12.5 mg, 12.5 mg, Oral, Daily  sodium chloride flush 0.9 % injection 10 mL, 10 mL, Intravenous, PRN  0.9 % sodium chloride infusion, 25 mL, Intravenous, PRN    ASSESSMENT AND PLAN:    Patient with SDH. Stable. No intervention.   Will follow    Louis Tapia MD

## 2021-05-30 PROCEDURE — 6370000000 HC RX 637 (ALT 250 FOR IP): Performed by: STUDENT IN AN ORGANIZED HEALTH CARE EDUCATION/TRAINING PROGRAM

## 2021-05-30 PROCEDURE — 6370000000 HC RX 637 (ALT 250 FOR IP): Performed by: NEUROLOGICAL SURGERY

## 2021-05-30 PROCEDURE — 2580000003 HC RX 258: Performed by: STUDENT IN AN ORGANIZED HEALTH CARE EDUCATION/TRAINING PROGRAM

## 2021-05-30 PROCEDURE — 99232 SBSQ HOSP IP/OBS MODERATE 35: CPT | Performed by: SURGERY

## 2021-05-30 PROCEDURE — 2060000000 HC ICU INTERMEDIATE R&B

## 2021-05-30 RX ADMIN — Medication 10 ML: at 08:35

## 2021-05-30 RX ADMIN — MECLIZINE 12.5 MG: 12.5 TABLET ORAL at 08:38

## 2021-05-30 RX ADMIN — VENLAFAXINE HYDROCHLORIDE 150 MG: 150 CAPSULE, EXTENDED RELEASE ORAL at 08:37

## 2021-05-30 RX ADMIN — MECLIZINE 12.5 MG: 12.5 TABLET ORAL at 22:09

## 2021-05-30 RX ADMIN — ACETAMINOPHEN 650 MG: 325 TABLET ORAL at 06:17

## 2021-05-30 RX ADMIN — ACETAMINOPHEN 650 MG: 325 TABLET ORAL at 13:51

## 2021-05-30 RX ADMIN — MECLIZINE 12.5 MG: 12.5 TABLET ORAL at 16:22

## 2021-05-30 RX ADMIN — Medication 10 ML: at 20:32

## 2021-05-30 RX ADMIN — ACETAMINOPHEN 650 MG: 325 TABLET ORAL at 22:09

## 2021-05-30 RX ADMIN — LEVETIRACETAM 500 MG: 500 TABLET ORAL at 20:32

## 2021-05-30 RX ADMIN — BUTALBITAL, ACETAMINOPHEN AND CAFFEINE 1 TABLET: 50; 325; 40 TABLET ORAL at 16:23

## 2021-05-30 RX ADMIN — POLYETHYLENE GLYCOL 3350 17 G: 17 POWDER, FOR SOLUTION ORAL at 16:23

## 2021-05-30 RX ADMIN — ATORVASTATIN CALCIUM 40 MG: 40 TABLET, FILM COATED ORAL at 08:37

## 2021-05-30 RX ADMIN — HYDROCHLOROTHIAZIDE 12.5 MG: 12.5 TABLET ORAL at 08:37

## 2021-05-30 RX ADMIN — LEVETIRACETAM 500 MG: 500 TABLET ORAL at 08:37

## 2021-05-30 RX ADMIN — BUPROPION HYDROCHLORIDE 150 MG: 150 TABLET, EXTENDED RELEASE ORAL at 08:37

## 2021-05-30 RX ADMIN — LISINOPRIL 20 MG: 20 TABLET ORAL at 08:37

## 2021-05-30 ASSESSMENT — PAIN DESCRIPTION - FREQUENCY: FREQUENCY: INTERMITTENT

## 2021-05-30 ASSESSMENT — PAIN DESCRIPTION - PAIN TYPE: TYPE: CHRONIC PAIN;ACUTE PAIN

## 2021-05-30 ASSESSMENT — PAIN DESCRIPTION - LOCATION
LOCATION: HEAD

## 2021-05-30 ASSESSMENT — PAIN SCALES - GENERAL
PAINLEVEL_OUTOF10: 0
PAINLEVEL_OUTOF10: 6
PAINLEVEL_OUTOF10: 4
PAINLEVEL_OUTOF10: 5
PAINLEVEL_OUTOF10: 5
PAINLEVEL_OUTOF10: 4
PAINLEVEL_OUTOF10: 5
PAINLEVEL_OUTOF10: 5
PAINLEVEL_OUTOF10: 3
PAINLEVEL_OUTOF10: 0

## 2021-05-30 ASSESSMENT — PAIN DESCRIPTION - DESCRIPTORS: DESCRIPTORS: HEADACHE

## 2021-05-30 NOTE — PLAN OF CARE
Problem: Falls - Risk of:  Goal: Will remain free from falls  Description: Will remain free from falls  5/30/2021 0628 by Danyel Torres RN  Outcome: Met This Shift  5/29/2021 2214 by Danyel Torres RN  Outcome: Met This Shift  Goal: Absence of physical injury  Description: Absence of physical injury  5/30/2021 5982 by Danyel Torres RN  Outcome: Met This Shift  5/29/2021 2214 by Danyel Torres RN  Outcome: Met This Shift

## 2021-05-30 NOTE — PLAN OF CARE
Problem: Falls - Risk of:  Goal: Will remain free from falls  Description: Will remain free from falls  5/29/2021 2214 by Anna Marie Dillon RN  Outcome: Met This Shift  5/29/2021 1613 by Tiny Del Cid RN  Outcome: Met This Shift  5/29/2021 1217 by Tiny Del Cid RN  Outcome: Met This Shift  Goal: Absence of physical injury  Description: Absence of physical injury  Outcome: Met This Shift

## 2021-05-30 NOTE — PLAN OF CARE
Problem: Falls - Risk of:  Goal: Will remain free from falls  Description: Will remain free from falls  5/30/2021 0628 by Danyel Torres RN  Outcome: Met This Shift  5/29/2021 2214 by Danyel Torres RN  Outcome: Met This Shift     Problem: Falls - Risk of:  Goal: Absence of physical injury  Description: Absence of physical injury  5/30/2021 1684 by Danyel Torres RN  Outcome: Met This Shift  5/29/2021 2214 by Danyel Torres RN  Outcome: Met This Shift     Problem: Sensory:  Goal: General experience of comfort will improve  Description: General experience of comfort will improve  Outcome: Met This Shift

## 2021-05-31 PROCEDURE — 99232 SBSQ HOSP IP/OBS MODERATE 35: CPT | Performed by: SURGERY

## 2021-05-31 PROCEDURE — 6360000002 HC RX W HCPCS: Performed by: STUDENT IN AN ORGANIZED HEALTH CARE EDUCATION/TRAINING PROGRAM

## 2021-05-31 PROCEDURE — 6370000000 HC RX 637 (ALT 250 FOR IP): Performed by: STUDENT IN AN ORGANIZED HEALTH CARE EDUCATION/TRAINING PROGRAM

## 2021-05-31 PROCEDURE — 2580000003 HC RX 258: Performed by: STUDENT IN AN ORGANIZED HEALTH CARE EDUCATION/TRAINING PROGRAM

## 2021-05-31 PROCEDURE — 6370000000 HC RX 637 (ALT 250 FOR IP): Performed by: NEUROLOGICAL SURGERY

## 2021-05-31 PROCEDURE — 2060000000 HC ICU INTERMEDIATE R&B

## 2021-05-31 RX ORDER — LORAZEPAM 0.5 MG/1
0.5 TABLET ORAL ONCE
Status: COMPLETED | OUTPATIENT
Start: 2021-05-31 | End: 2021-05-31

## 2021-05-31 RX ORDER — HEPARIN SODIUM 10000 [USP'U]/ML
5000 INJECTION, SOLUTION INTRAVENOUS; SUBCUTANEOUS EVERY 8 HOURS SCHEDULED
Status: DISCONTINUED | OUTPATIENT
Start: 2021-05-31 | End: 2021-06-04 | Stop reason: HOSPADM

## 2021-05-31 RX ADMIN — BUPROPION HYDROCHLORIDE 150 MG: 150 TABLET, EXTENDED RELEASE ORAL at 08:31

## 2021-05-31 RX ADMIN — ACETAMINOPHEN 650 MG: 325 TABLET ORAL at 06:42

## 2021-05-31 RX ADMIN — ACETAMINOPHEN 650 MG: 325 TABLET ORAL at 14:18

## 2021-05-31 RX ADMIN — BUTALBITAL, ACETAMINOPHEN AND CAFFEINE 1 TABLET: 50; 325; 40 TABLET ORAL at 16:50

## 2021-05-31 RX ADMIN — BUTALBITAL, ACETAMINOPHEN AND CAFFEINE 1 TABLET: 50; 325; 40 TABLET ORAL at 03:15

## 2021-05-31 RX ADMIN — ATORVASTATIN CALCIUM 40 MG: 40 TABLET, FILM COATED ORAL at 08:31

## 2021-05-31 RX ADMIN — ONDANSETRON 4 MG: 2 INJECTION INTRAMUSCULAR; INTRAVENOUS at 03:14

## 2021-05-31 RX ADMIN — MECLIZINE 12.5 MG: 12.5 TABLET ORAL at 16:50

## 2021-05-31 RX ADMIN — MECLIZINE 12.5 MG: 12.5 TABLET ORAL at 06:42

## 2021-05-31 RX ADMIN — HYDROCHLOROTHIAZIDE 12.5 MG: 12.5 TABLET ORAL at 08:31

## 2021-05-31 RX ADMIN — LISINOPRIL 20 MG: 20 TABLET ORAL at 08:31

## 2021-05-31 RX ADMIN — VENLAFAXINE HYDROCHLORIDE 150 MG: 150 CAPSULE, EXTENDED RELEASE ORAL at 08:31

## 2021-05-31 RX ADMIN — OXYCODONE 5 MG: 5 TABLET ORAL at 06:42

## 2021-05-31 RX ADMIN — LEVETIRACETAM 500 MG: 500 TABLET ORAL at 21:08

## 2021-05-31 RX ADMIN — LORAZEPAM 0.5 MG: 0.5 TABLET ORAL at 08:30

## 2021-05-31 RX ADMIN — Medication 10 ML: at 08:31

## 2021-05-31 RX ADMIN — HEPARIN SODIUM 5000 UNITS: 10000 INJECTION INTRAVENOUS; SUBCUTANEOUS at 21:12

## 2021-05-31 RX ADMIN — Medication 10 ML: at 21:08

## 2021-05-31 RX ADMIN — SODIUM CHLORIDE, PRESERVATIVE FREE 10 ML: 5 INJECTION INTRAVENOUS at 03:15

## 2021-05-31 RX ADMIN — LEVETIRACETAM 500 MG: 500 TABLET ORAL at 08:32

## 2021-05-31 RX ADMIN — BUTALBITAL, ACETAMINOPHEN AND CAFFEINE 1 TABLET: 50; 325; 40 TABLET ORAL at 21:08

## 2021-05-31 ASSESSMENT — PAIN SCALES - GENERAL
PAINLEVEL_OUTOF10: 4
PAINLEVEL_OUTOF10: 4
PAINLEVEL_OUTOF10: 10
PAINLEVEL_OUTOF10: 9
PAINLEVEL_OUTOF10: 6

## 2021-05-31 NOTE — PLAN OF CARE
Problem: Pain:  Description: Pain management should include both nonpharmacologic and pharmacologic interventions.   Goal: Pain level will decrease  Description: Pain level will decrease  Outcome: Ongoing     Problem: Sensory:  Goal: General experience of comfort will improve  Description: General experience of comfort will improve  Outcome: Ongoing     Problem: Falls - Risk of:  Goal: Will remain free from falls  Description: Will remain free from falls  Outcome: Met This Shift

## 2021-05-31 NOTE — PROGRESS NOTES
IMPRESSION:  1. Subarachnoid hemorrhage, most probably traumatic, rule out aneurysm. 2.  Vertigo, etiology to be determined. 3.  Multiple medical comorbidities. 4. Right occipital bone Fracture. Stable neurosurgically. C/O headache. Reviewed CTA of brain  1. Multicompartment acute intracranial hemorrhage is similar compared to   noncontrast CT head done earlier same day with redemonstration of small right   subdural hematoma, right greater than left subarachnoid hemorrhage and   orbital frontal hemorrhagic contusions.  Mild right left midline shift   measures 4-5 mm at the level of the lateral ventricles. 2. Unremarkable CTA of the head and neck.  No hemodynamically significant   stenosis, occlusion or aneurysm in the large arteries of the head and neck. 3. Nondisplaced fracture of the right suboccipital skull extends superiorly   into the right occipital skull.  Small amount of fluid in the right mastoid   air cells may relate to fracture extension into the inferior mastoid air   cells. No indication for neurosurgical intervention at this time.   Spoke with family

## 2021-05-31 NOTE — PLAN OF CARE
Problem: Sensory:  Goal: General experience of comfort will improve  Description: General experience of comfort will improve  Outcome: Ongoing

## 2021-05-31 NOTE — PROGRESS NOTES
The patient continues to complain of dizziness, even when he is lying still in bed. He states the dizziness is more severe than the headache.

## 2021-05-31 NOTE — PROGRESS NOTES
[facility]   ATTENDING PHYSICIAN PROGRESS NOTE     I have examined the patient, reviewed the record, and discussed the case with the APN/ Resident. I have reviewed all relevant labs and imaging data. The following summarizes my clinical findings and independent assessment. CC: SDH/SAH    Pt states he has ongoing headache and dizziness persists.     Asleep but arousable  Follows commands  Hrt:  Regular  Lungs:  Fairly clear bilaterally  Abd:  Soft; BS active; NT/ND  Skin:  Warm/dry  Ext:  Strength 5/5 bilateral upper/lower ext    Patient Active Problem List    Diagnosis Date Noted    Subdural hematoma ([facility] [address] 05/28/2021    SAH (subarachnoid hemorrhage) (HCC)     Fracture of occipital bone of skull with loss of consciousness ([facility] [address]     Syncope and collapse     Erectile dysfunction 06/07/2018    BPH without urinary obstruction 06/07/2018    Major depression 01/25/2017    Hypertension 01/25/2017    Mixed hyperlipidemia 01/25/2017       S/p fall  SDH/SAH--monitor neuro exam  Skull fx--pain control  Dizziness--meclizine; consider ENT consult  Diet as tolerated  PT/OT evals  PCDs  Discharge planning    Stephenie Roblero MD, FACS  5/31/2021  10:34 AM

## 2021-06-01 ENCOUNTER — APPOINTMENT (OUTPATIENT)
Dept: CT IMAGING | Age: 66
DRG: 084 | End: 2021-06-01
Payer: COMMERCIAL

## 2021-06-01 PROCEDURE — 6360000002 HC RX W HCPCS: Performed by: STUDENT IN AN ORGANIZED HEALTH CARE EDUCATION/TRAINING PROGRAM

## 2021-06-01 PROCEDURE — 6370000000 HC RX 637 (ALT 250 FOR IP): Performed by: STUDENT IN AN ORGANIZED HEALTH CARE EDUCATION/TRAINING PROGRAM

## 2021-06-01 PROCEDURE — 6360000002 HC RX W HCPCS: Performed by: PSYCHIATRY & NEUROLOGY

## 2021-06-01 PROCEDURE — 2580000003 HC RX 258: Performed by: PSYCHIATRY & NEUROLOGY

## 2021-06-01 PROCEDURE — 2500000003 HC RX 250 WO HCPCS: Performed by: PSYCHIATRY & NEUROLOGY

## 2021-06-01 PROCEDURE — 99222 1ST HOSP IP/OBS MODERATE 55: CPT | Performed by: OTOLARYNGOLOGY

## 2021-06-01 PROCEDURE — 2580000003 HC RX 258: Performed by: STUDENT IN AN ORGANIZED HEALTH CARE EDUCATION/TRAINING PROGRAM

## 2021-06-01 PROCEDURE — 2060000000 HC ICU INTERMEDIATE R&B

## 2021-06-01 PROCEDURE — 70450 CT HEAD/BRAIN W/O DYE: CPT

## 2021-06-01 PROCEDURE — 6370000000 HC RX 637 (ALT 250 FOR IP): Performed by: NEUROLOGICAL SURGERY

## 2021-06-01 PROCEDURE — 99222 1ST HOSP IP/OBS MODERATE 55: CPT | Performed by: PSYCHIATRY & NEUROLOGY

## 2021-06-01 PROCEDURE — 99232 SBSQ HOSP IP/OBS MODERATE 35: CPT | Performed by: SURGERY

## 2021-06-01 RX ORDER — PROCHLORPERAZINE EDISYLATE 5 MG/ML
10 INJECTION INTRAMUSCULAR; INTRAVENOUS EVERY 8 HOURS
Status: COMPLETED | OUTPATIENT
Start: 2021-06-01 | End: 2021-06-02

## 2021-06-01 RX ADMIN — BUPROPION HYDROCHLORIDE 150 MG: 150 TABLET, EXTENDED RELEASE ORAL at 08:06

## 2021-06-01 RX ADMIN — MECLIZINE 12.5 MG: 12.5 TABLET ORAL at 05:44

## 2021-06-01 RX ADMIN — ACETAMINOPHEN 650 MG: 325 TABLET ORAL at 05:44

## 2021-06-01 RX ADMIN — HYDROCHLOROTHIAZIDE 12.5 MG: 12.5 TABLET ORAL at 08:06

## 2021-06-01 RX ADMIN — ATORVASTATIN CALCIUM 40 MG: 40 TABLET, FILM COATED ORAL at 08:06

## 2021-06-01 RX ADMIN — VENLAFAXINE HYDROCHLORIDE 150 MG: 150 CAPSULE, EXTENDED RELEASE ORAL at 08:06

## 2021-06-01 RX ADMIN — OXYCODONE 5 MG: 5 TABLET ORAL at 05:44

## 2021-06-01 RX ADMIN — ACETAMINOPHEN 650 MG: 325 TABLET ORAL at 21:44

## 2021-06-01 RX ADMIN — LEVETIRACETAM 500 MG: 500 TABLET ORAL at 21:44

## 2021-06-01 RX ADMIN — LEVETIRACETAM 500 MG: 500 TABLET ORAL at 08:06

## 2021-06-01 RX ADMIN — DEXTROSE MONOHYDRATE 500 MG: 50 INJECTION, SOLUTION INTRAVENOUS at 19:45

## 2021-06-01 RX ADMIN — BUTALBITAL, ACETAMINOPHEN AND CAFFEINE 1 TABLET: 50; 325; 40 TABLET ORAL at 03:39

## 2021-06-01 RX ADMIN — HEPARIN SODIUM 5000 UNITS: 10000 INJECTION INTRAVENOUS; SUBCUTANEOUS at 05:46

## 2021-06-01 RX ADMIN — PROCHLORPERAZINE EDISYLATE 10 MG: 5 INJECTION INTRAMUSCULAR; INTRAVENOUS at 18:19

## 2021-06-01 RX ADMIN — Medication 10 ML: at 08:07

## 2021-06-01 RX ADMIN — Medication 10 ML: at 21:43

## 2021-06-01 RX ADMIN — LISINOPRIL 20 MG: 20 TABLET ORAL at 08:06

## 2021-06-01 RX ADMIN — HEPARIN SODIUM 5000 UNITS: 10000 INJECTION INTRAVENOUS; SUBCUTANEOUS at 21:45

## 2021-06-01 RX ADMIN — ACETAMINOPHEN 650 MG: 325 TABLET ORAL at 14:14

## 2021-06-01 ASSESSMENT — PAIN SCALES - GENERAL
PAINLEVEL_OUTOF10: 9
PAINLEVEL_OUTOF10: 3
PAINLEVEL_OUTOF10: 9
PAINLEVEL_OUTOF10: 8

## 2021-06-01 ASSESSMENT — PAIN DESCRIPTION - LOCATION: LOCATION: HEAD

## 2021-06-01 ASSESSMENT — PAIN DESCRIPTION - PAIN TYPE: TYPE: ACUTE PAIN

## 2021-06-01 NOTE — PROGRESS NOTES
Occupational Therapy  OT SESSION ATTEMPT     Date:2021  Patient Name: Piper Jang  MRN: [id number]  : 1955  Room: [facility]     Attempted OT session this date:    [] unavailable due to other medical staff currently with pt   [] on hold per nursing staff   [] on hold per nursing staff secondary to lab / radiology results    [x] politely declined treatment this date due to not feeling well and just vomiting due to having a headache from light being on.    [] off unit    [] Other:     Will reattempt OT session at a later time.       Ramírez Carlson [address], [address]

## 2021-06-01 NOTE — CARE COORDINATION
Transition of Care-Discharge plan remains home with wife. NSG ordered repeat CT scan of brain without contrast today, possible discharge this afternoon. Patient does not anticipate any needs at discharge. Family to transport home.     Marii KUHNN, RN  [facility]   [phone]

## 2021-06-01 NOTE — CONSULTS
DEPARTMENT OF OTOLARYNGOLOGY   CONSULT NOTE    PATIENT: Freddy Rg : 1955 (73 y.o.)   DATE: 2021  ROOM/BED: [address] [place] [address]      REQUESTING PHYSICIAN: Anmol Dominguez MD    REASON FOR CONSULT: vertigo     HISTORY OBTAINED FROM:  patient    HISTORY OF PRESENT ILLNESS:                                                                                        HPI  73 y.o. male presents as a consult for persistent vertigo for the past several weeks after a fall from standing height. He went to the [facility] ED and was discharged. He again fell last Friday and hit his head. Denies LOC. Describe sensation as room spinning. Vertigo has been persistent for several weeks with associated nausea and vomiting. Laying supine improves symptoms. Denies acute vision or hearing changes. CT head showed small subarachnoid hematoma at the suprasellar cistern also has hemorrhage in L frontal and R frontotemporal lobe. Past Medical History:   Diagnosis Date    Depression     Erectile dysfunction     History of shingles     Hypertension 2017    Mixed hyperlipidemia 2017     Past Surgical History:   Procedure Laterality Date    COLONOSCOPY      HEEL SPUR SURGERY       Prior to Admission medications    Medication Sig Start Date End Date Taking? Authorizing Provider   oxyCODONE (ROXICODONE) 5 MG immediate release tablet Take 1 tablet by mouth every 6 hours as needed for Pain for up to 3 days.  21 Yes Ramiro Garcia MD   levETIRAcetam (KEPPRA) 500 MG tablet Take 1 tablet by mouth 2 times daily for 6 days 5/28/21 6/3/21 Yes Jeet Rueda MD   atorvastatin (LIPITOR) 40 MG tablet take 1 tablet by mouth daily 21  Yes Renuka Gomez DO   lisinopril-hydroCHLOROthiazide (PRINZIDE;ZESTORETIC) 20-12.5 MG per tablet Take 1 tablet by mouth daily 21  Yes Gail Malcolm MD   buPROPion (WELLBUTRIN XL) 150 MG extended release tablet Take 1 tablet by mouth every morning 20  Yes Ludy Rg Andrés Kramer MD   venlafaxine (EFFEXOR XR) 150 MG extended release capsule take 1 capsule by mouth once daily 2/17/20  Yes Elisha Omalley, DO   Multiple Vitamins-Minerals (THERAPEUTIC MULTIVITAMIN-MINERALS) tablet Take 1 tablet by mouth daily   Yes Historical Provider, MD   albuterol sulfate HFA (VENTOLIN HFA) 108 (90 Base) MCG/ACT inhaler Inhale 2 puffs into the lungs 4 times daily as needed for Wheezing 2/11/21   Marli Hernandez,      Scheduled Medications:   heparin (porcine)  5,000 Units Subcutaneous 3 times per day    scopolamine  1 patch Transdermal Q72H    atorvastatin  40 mg Oral Daily    buPROPion  150 mg Oral QAM    venlafaxine  150 mg Oral Daily with breakfast    sodium chloride flush  10 mL Intravenous 2 times per day    acetaminophen  650 mg Oral 3 times per day    levETIRAcetam  500 mg Oral BID    lisinopril  20 mg Oral Daily    And    hydroCHLOROthiazide  12.5 mg Oral Daily     Continuous Infusion Medications:   sodium chloride      sodium chloride       AsNeeded Medications:  oxyCODONE, butalbital-acetaminophen-caffeine, meclizine, sodium chloride flush, sodium chloride, polyethylene glycol, ondansetron, sodium chloride flush, sodium chloride  Allergies   Allergen Reactions    Codeine Nausea Only     Social History     Socioeconomic History    Marital status:      Spouse name: Flo King Number of children: Not on file    Years of education: Not on file    Highest education level: Not on file   Occupational History    Occupation:    Tobacco Use    Smoking status: Never Smoker    Smokeless tobacco: Never Used   Vaping Use    Vaping Use: Never used   Substance and Sexual Activity    Alcohol use: No    Drug use: No    Sexual activity: Not Currently     Partners: Female   Other Topics Concern    Not on file   Social History Narrative    ** Merged History Encounter **          Social Determinants of Health     Financial Resource Strain:     Difficulty of Paying Living Expenses:    Food Insecurity:     Worried About [address] [facility] in the Last Year:     [address] in the Last Year:    Transportation Needs:     Lack of Transportation (Medical):  Lack of Transportation (Non-Medical):    Physical Activity:     Days of Exercise per Week:     Minutes of Exercise per Session:    Stress:     Feeling of Stress :    Social Connections:     Frequency of Communication with Friends and Family:     Frequency of Social Gatherings with Friends and Family:     Attends Buddhist Services:     Active Member of Clubs or Organizations:     Attends Club or Organization Meetings:     Marital Status:    Intimate Partner Violence:     Fear of Current or Ex-Partner:     Emotionally Abused:     Physically Abused:     Sexually Abused:      Family History   Problem Relation Age of Onset    Heart Disease Mother         Valves    Atrial Fibrillation Father     Heart Disease Father         Valve    Heart Attack Father 59       REVIEW OF SYSTEMS:  Review of Systems  Constitutional: Negative for chills and fever. Eyes: Negative for discharge and itching. ENT: Negative for congestion, ear discharge, ear pain, hearing loss and sore throat. Respiratory: Negative for chest tightness and shortness of breath. Cardiovascular: Negative for chest pain and palpitations. Gastrointestinal:  Negative for abdominal distention and abdominal pain. PHYSICAL EXAM:                                                                                                                Vitals:    05/31/21 1545 05/31/21 2000 06/01/21 0542 06/01/21 0740   BP: 127/87 (!) 127/90 136/81 (!) 155/94   Pulse: 71 70 65 62   Resp: 18 16 19 16   Temp: 97.8 °F (36.6 °C)  97.9 °F (36.6 °C) 97.8 °F (36.6 °C)   TempSrc: Oral  Oral Oral   SpO2: 98% 96% 93%    Weight:       Height:         Physical Exam  Constitutional: Appears well-developed and well-nourished. Appears asstated age.    Eyes: Lids and lashes normal, pupils equal, round and reactive to light, extra ocular muscles intact, sclera clear, conjunctiva normal, no nystagmus noted   ENT: Normocephalic, without obvious abnormality, atraumatic, sinuses nontender on palpation, external ears without lesions, oral pharynx with moist mucus membranes. Unable to tolerate physical and Garrel Buckle exam   Neck:  Supple, symmetrical, trachea midline, no adenopathy   Respiratory: No increased work of breathing. No stridor or wheezes   Cardiovascular: Regular rate   Skin: Warm and dry   Neurologic:  Awake and alert     DATA:                                                                                                                                      CT HEAD WO CONTRAST   Final Result   1. Minimal acute blood products are now present dependently within both   occipital horns. 2.  Somewhat decreased acute subarachnoid hemorrhage, as described. 3.  Unchanged minimal acute right cerebral hemispheric subdural hematoma, and   related subtle mass effect/leftward midline shift, as described. 4.  Mild/moderate right fronto-temporal, and minimal low left frontal,   probable hemorrhagic contusions appear grossly unchanged. 5.  Right occipital/suboccipital calvarial fracture/sutural diastasis,   extending through the right mastoid air cell complex, appear not   significantly changed. 6.  Paranasal sinus and right mastoid air cell complex disease, as described. Please see above comments. .      RECOMMENDATIONS:   1. Recommend short-term surveillance noncontrast head CT, as directed   clinically. .         CTA HEAD W CONTRAST   Final Result   1. Multicompartment acute intracranial hemorrhage is similar compared to   noncontrast CT head done earlier same day with redemonstration of small right   subdural hematoma, right greater than left subarachnoid hemorrhage and   orbital frontal hemorrhagic contusions.   Mild right left midline shift measures 4-5 mm at the level of the lateral ventricles. 2. Unremarkable CTA of the head and neck. No hemodynamically significant   stenosis, occlusion or aneurysm in the large arteries of the head and neck. 3. Nondisplaced fracture of the right suboccipital skull extends superiorly   into the right occipital skull. Small amount of fluid in the right mastoid   air cells may relate to fracture extension into the inferior mastoid air   cells. 4. No dural venous sinus thrombosis. CTA NECK W CONTRAST   Final Result   1. Multicompartment acute intracranial hemorrhage is similar compared to   noncontrast CT head done earlier same day with redemonstration of small right   subdural hematoma, right greater than left subarachnoid hemorrhage and   orbital frontal hemorrhagic contusions. Mild right left midline shift   measures 4-5 mm at the level of the lateral ventricles. 2. Unremarkable CTA of the head and neck. No hemodynamically significant   stenosis, occlusion or aneurysm in the large arteries of the head and neck. 3. Nondisplaced fracture of the right suboccipital skull extends superiorly   into the right occipital skull. Small amount of fluid in the right mastoid   air cells may relate to fracture extension into the inferior mastoid air   cells. 4. No dural venous sinus thrombosis. CT HEAD WO CONTRAST    (Results Pending)       ASSESSMENTAND PLAN:                                                                                                   Patient Active Problem List   Diagnosis    Major depression    Hypertension    Mixed hyperlipidemia    Erectile dysfunction    BPH without urinary obstruction    SAH (subarachnoid hemorrhage) (HCC)    Fracture of occipital bone of skull with loss of consciousness (HCC)    Syncope and collapse    Subdural hematoma ([address]     73 y.o. male presents with persistent vertigo with probable central etigology.  Unable to tolerate physical exam    · Recommend neurology input  · VNG when able. Patient will need to be off of vestibular suppressants for 3 days prior to exam  · Vestibular therapy when able  · Patient to follow up with Dr. Clive Akbar as an outpatient    Patient wasseen, examined and discussed with attending physician. Virginia Razo DO  Resident Physician  [facility]  Otolaryngology Residency  6/1/2021  11:18 AM            Angelique Chavez  1955      I have discussed the case, including pertinent history and exam findings with the resident. I have seen and examined the patient and the key elements of the encounter have been performed by me. I agree with the assessment, plan and orders as documented by the resident. Patient here for follow up of medical problems. Patient to floor at time of current examination, however case CAT scans and results reviewed with the resident, at this point time no significant ENT evaluation is required as the patient has significant intracranial bleeds, he may undergo a further vestibular work-up at possible patient has had significant subarachnoid subdural head bleed is ongoing now for some time patient can follow-up with ENT as an outpatient for possible vestibular work-up if his persistent symptoms after resolution of current intracranial neurological concerns. Remainder of medical problems as per resident note.       [address] [facility], DO  6/1/21

## 2021-06-01 NOTE — PROGRESS NOTES
IMPRESSION:  1.  Subarachnoid hemorrhage, most probably traumatic, rule out aneurysm. 2.  Vertigo, etiology to be determined. 3.  Multiple medical comorbidities. 4. Right occipital bone Fracture. Stable neurosurgically. C/O headache worse than yesterday. No nausea/vomnoting.   Will repeat CT scan of brain without contrast today

## 2021-06-01 NOTE — PROGRESS NOTES
Dr Fritz Flores notified of admission    Electronically signed by ALEKSANDAR Wilkinson CNP on 6/1/2021 at 11:26 AM

## 2021-06-01 NOTE — CONSULTS
[facility] [address]  Neurology Consult    Date:  6/1/2021  Patient Name:  Piper Jang  YOB: 1955  MRN: [id number]     PCP:  Cornelio Ryan DO   Referring:  No ref. provider found      Chief Complaint: headache    History obtained from: patient, chart    Assessment  Piper Jang is a 73 y.o. male admitted following a fall secondary to vertigo resulting in skull fracture, subarachnoid hemorrhage with ventricular extension, and subdural hemorrhage. The vertigo preceded his head injury, and by history appears more likely central in etiology. [place]-Hallpike was not done today due to the patient being in significant discomfort from his headache. His headache is likely multifactorial due to his recent head injury as well as history of migraines. He does have migrainous features to his current headaches and will treat for these symptoms. This may also help with vertiginous symptoms. Plan  · Compazine and valproate Q8H x 3 doses  · If not successful would start gradually increasing doses of thorazine  · Obtain EKG  · Will follow        History of Present Illness:  Piper Jang is a 73 y.o. right handed male presenting for evaluation of dizziness and headaches. Beginning approximately two weeks ago had a bout of vertigo. Went to the ED and had gotten meclizine which didn't work. He could be walking and all of a sudden he would find himself having a spinning feeling. This was not clearly provoked by movement. No trouble swallowing or double vision. No loss of hearing or change in baseline tinnitus prior to fall. The feeling of dizziness lasts 30-40 seconds.      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 Edward himself spinning around and then fell and hit his head on the concrete.      The back of his head hursts where he struck his head. He does have light/noise sensitivity.  He has a history of migraines in the past. He used to take Excedrin migraine.      History of tinnitus bilaterally last 4 years. Works as a .       Review of Systems:  As above  Medical History:   Past Medical History:   Diagnosis Date    Depression     Erectile dysfunction     History of shingles     Hypertension 1/25/2017    Mixed hyperlipidemia 1/25/2017        Surgical History:   Past Surgical History:   Procedure Laterality Date    COLONOSCOPY      HEEL SPUR SURGERY          Family History:   Family History   Problem Relation Age of Onset    Heart Disease Mother         Valves    Atrial Fibrillation Father     Heart Disease Father         Valve    Heart Attack Father 59       Social History:  Social History     Tobacco Use    Smoking status: Never Smoker    Smokeless tobacco: Never Used   Vaping Use    Vaping Use: Never used   Substance Use Topics    Alcohol use: No    Drug use: No        Current Medications:      Current Facility-Administered Medications   Medication Dose Route Frequency Provider Last Rate Last Admin    valproate (DEPACON) 500 mg in dextrose 5 % 100 mL IVPB  500 mg Intravenous Q8H Yoni Sanchez DO        prochlorperazine (COMPAZINE) injection 10 mg  10 mg Intravenous Q8H Domingo Davey,         heparin (porcine) injection 5,000 Units  5,000 Units Subcutaneous 3 times per day Terrea Habermann, MD   5,000 Units at 06/01/21 0546    oxyCODONE (ROXICODONE) immediate release tablet 5 mg  5 mg Oral Q6H PRN Zoie Fung MD   5 mg at 06/01/21 0544    scopolamine (TRANSDERM-SCOP) transdermal patch 1 patch  1 patch Transdermal Q72H Zoie Fung MD   1 patch at 06/01/21 0807    butalbital-acetaminophen-caffeine (FIORICET, ESGIC) per tablet 1 tablet  1 tablet Oral Q4H PRN Gerard Dakin, MD   1 tablet at 06/01/21 0339    meclizine (ANTIVERT) tablet 12.5 mg  12.5 mg Oral TID PRN Eligio Valdovinos MD   12.5 mg at 06/01/21 0544    atorvastatin (LIPITOR) tablet 40 mg  40 mg Oral Daily Zoie Fung MD   40 mg at 06/01/21 0806    buPROPion (WELLBUTRIN XL) extended release tablet 150 mg  150 mg Oral QAM Brian Hyde MD   150 mg at 06/01/21 0806    venlafaxine (EFFEXOR XR) extended release capsule 150 mg  150 mg Oral Daily with breakfast Brian Hyde MD   150 mg at 06/01/21 0806    sodium chloride flush 0.9 % injection 10 mL  10 mL Intravenous 2 times per day Brian Hyde MD   10 mL at 06/01/21 0807    sodium chloride flush 0.9 % injection 10 mL  10 mL Intravenous PRN Brian Hyde MD   10 mL at 05/31/21 0315    0.9 % sodium chloride infusion  25 mL Intravenous PRN Brian Hyde MD        acetaminophen (TYLENOL) tablet 650 mg  650 mg Oral 3 times per day Brian Hyde MD   650 mg at 06/01/21 1414    polyethylene glycol (GLYCOLAX) packet 17 g  17 g Oral Daily PRN Brian Hyde MD   17 g at 05/30/21 1623    ondansetron (ZOFRAN) injection 4 mg  4 mg Intravenous Q6H PRN Brian Hyde MD   4 mg at 05/31/21 0314    levETIRAcetam (KEPPRA) tablet 500 mg  500 mg Oral BID Brian Hyde MD   500 mg at 06/01/21 0806    lisinopril (PRINIVIL;ZESTRIL) tablet 20 mg  20 mg Oral Daily Brian Hyde MD   20 mg at 06/01/21 9457    And    hydroCHLOROthiazide (HYDRODIURIL) tablet 12.5 mg  12.5 mg Oral Daily Brian Hyde MD   12.5 mg at 06/01/21 0806    sodium chloride flush 0.9 % injection 10 mL  10 mL Intravenous PRN Akash Lora DO   10 mL at 87/20/76 1829    0.9 % sodium chloride infusion  25 mL Intravenous PRN Akash Lora, DO            Allergies:       Allergies   Allergen Reactions    Codeine Nausea Only        Physical Examination  Vitals   Vitals:    05/31/21 2000 06/01/21 0542 06/01/21 0740 06/01/21 1413   BP: (!) 127/90 136/81 (!) 155/94 (!) 153/86   Pulse: 70 65 62 64   Resp: 16 19 16 18   Temp:  97.9 °F (36.6 °C) 97.8 °F (36.6 °C) 97.6 °F (36.4 °C)   TempSrc:  Oral Oral Oral   SpO2: 96% 93%     Weight:       Height:            General: Patient appears in no acute distress with an overweight body habitus  HEENT: Normocephalic  Chest: no respiratory distress noted  Extremities: No edema or cyanosis noted    Neurologic Examination    Mental Status  Alert, and oriented to person, place and time with normal speech and language. No evidence of aphasia during conversation. No evidence of memory impairment. Attention and concentration appeared normal.     Cranial Nerves  II. Visual fields full to confrontation bilaterally. III, IV, VI: Pupils equally round and reactive to light, 4 to 3 mm bilaterally. EOMs: full, no nystagmus. V. Facial sensation intact to light touch bilaterally  VII: Facial movements symmetric and strong  VIII: Hearing intact to voice  IX,X: Palate elevates symmetrically. No dysarthria  XI: Sternocleidomastoid and trapezius 5/5 bilaterally   XII: Tongue is midline    Motor     Right Left   Right Left   Deltoid 5 5  Hip Flexion 5 5   Biceps      5  5  Knee Extension 5 5   Triceps 5 5  Knee Flexion 5 5   Handgrip 5 5  Ankle Dorsiflexion 5 5       Ankle Plantarflexion 5 5     Tone: Normal in all four limbs    Bulk: Normal in all four limbs with no evidence of atrophy    Sensation  · Light Touch: Intact distally in all four limbs    Coordination  Rapid alternating movements normal in bilateral upper extremities  Finger to nose testing normal bilaterally    Gait  Deferred at present    Labs  Results for Gayla James (MRN [id number]) as of 6/1/2021 16:49   Ref.  Range 5/28/2021 07:16   Sodium Latest Ref Range: 132 - 146 mmol/L 137   Potassium Latest Ref Range: 3.5 - 5.0 mmol/L 4.0   Chloride Latest Ref Range: 98 - 107 mmol/L 103   CO2 Latest Ref Range: 22 - 29 mmol/L 23   BUN Latest Ref Range: 6 - 23 mg/dL 14   Creatinine Latest Ref Range: 0.7 - 1.2 mg/dL 1.0   Anion Gap Latest Ref Range: 7 - 16 mmol/L 11   GFR Non- Latest Ref Range: >=60 mL/min/1.73 >60   GFR African American Unknown >60   Glucose Latest Ref Range: 74 - 99 mg/dL 105 (H)   Calcium Latest Ref Range: 8.6 - 10.2 mg/dL 8.9   WBC Latest Ref Range: 4.5 - 11.5 E9/L 10.6   RBC Latest Ref Range: 3.80 - 5.80 E12/L 3.94   Hemoglobin Quant Latest Ref Range: 12.5 - 16.5 g/dL 11.7 (L)   Hematocrit Latest Ref Range: 37.0 - 54.0 % 35.6 (L)   MCV Latest Ref Range: 80.0 - 99.9 fL 90.4   MCH Latest Ref Range: 26.0 - 35.0 pg 29.7   MCHC Latest Ref Range: 32.0 - 34.5 % 32.9   MPV Latest Ref Range: 7.0 - 12.0 fL 8.9   RDW Latest Ref Range: 11.5 - 15.0 fL 13.5   Platelet Count Latest Ref Range: 130 - 450 E9/L 230   Neutrophils % Latest Ref Range: 43.0 - 80.0 % 80. 0   Immature Granulocytes % Latest Ref Range: 0.0 - 5.0 % 0.8   Lymphocyte % Latest Ref Range: 20.0 - 42.0 % 10.4 (L)   Monocytes % Latest Ref Range: 2.0 - 12.0 % 7.8   Eosinophils % Latest Ref Range: 0.0 - 6.0 % 0.8   Basophils % Latest Ref Range: 0.0 - 2.0 % 0.2   Neutrophils Absolute Latest Ref Range: 1.80 - 7.30 E9/L 8.51 (H)   Immature Granulocytes # Latest Units: E9/L 0.09   Lymphocytes Absolute Latest Ref Range: 1.50 - 4.00 E9/L 1.11 (L)   Monocytes Absolute Latest Ref Range: 0.10 - 0.95 E9/L 0.83   Eosinophils Absolute Latest Ref Range: 0.05 - 0.50 E9/L 0.08   Basophils Absolute Latest Ref Range: 0.00 - 0.20 E9/L 0.02       Imaging  CTA head w/contrast  Impression   1. Multicompartment acute intracranial hemorrhage is similar compared to   noncontrast CT head done earlier same day with redemonstration of small right   subdural hematoma, right greater than left subarachnoid hemorrhage and   orbital frontal hemorrhagic contusions.  Mild right left midline shift   measures 4-5 mm at the level of the lateral ventricles. 2. Unremarkable CTA of the head and neck.  No hemodynamically significant   stenosis, occlusion or aneurysm in the large arteries of the head and neck.    3. Nondisplaced fracture of the right suboccipital skull extends superiorly   into the right occipital skull.  Small amount of fluid in the right mastoid   air cells may relate to fracture extension into the inferior mastoid air cells.   4. No dural venous sinus thrombosis. CT head without contrast:       1. Small volume subarachnoid blood, most notably in the suprasellar cistern. The bleed may be posttraumatic or aneurysmal.  Further evaluation with CTA of   the brain is recommended. 2. No edema, mass effect or midline shift. CT cervical spine without contrast:       1.  No fracture or joint dislocation is seen. 2. Degenerative changes, as described.            Electronically signed by: Baljinder Duque DO, 6/1/2021 4:56 PM

## 2021-06-01 NOTE — PROGRESS NOTES
TRAUMA SURGERY  ATTENDING PROGRESS NOTE      CC: fall     S:   c/o headache worsened by light and dizziness which was apparently a pre existing issue,     O:   @BP (!) 155/94   Pulse 62   Temp 97.8 °F (36.6 °C) (Oral)   Resp 16   Ht 5' 8\" (1.727 m)   Wt 238 lb (108 kg)   SpO2 93%   BMI 36.19 kg/m² @    Gen - no apparent distress   Neuro - Awake, alert, attentive    HEENT - PERRL 3mm   Lungs - non labored, BS clear b/l   Heart - RR no extra heart sounds    Abdomen - soft non tender, no obvious lesions,   Ext- ROM wnl all ext NVI,     A/P: Fall with ICH and skull fx,       Active Problems:    Subdural hematoma (HCC)  Resolved Problems:    * No resolved hospital problems.  *    SDH/SAH--monitor neuro exam, keppra,   Skull fx--pain control  Dizziness--meclizine; ENT consult   Diet as tolerated  PT/OT evals  SCDs, heparin TID    Discharge planning      Kaylene Gilliland MD FACS

## 2021-06-02 PROCEDURE — 2580000003 HC RX 258: Performed by: PSYCHIATRY & NEUROLOGY

## 2021-06-02 PROCEDURE — 6370000000 HC RX 637 (ALT 250 FOR IP): Performed by: NEUROLOGICAL SURGERY

## 2021-06-02 PROCEDURE — 6360000002 HC RX W HCPCS: Performed by: NURSE PRACTITIONER

## 2021-06-02 PROCEDURE — 2580000003 HC RX 258: Performed by: NEUROLOGICAL SURGERY

## 2021-06-02 PROCEDURE — 99232 SBSQ HOSP IP/OBS MODERATE 35: CPT | Performed by: SURGERY

## 2021-06-02 PROCEDURE — 2500000003 HC RX 250 WO HCPCS: Performed by: NURSE PRACTITIONER

## 2021-06-02 PROCEDURE — 6370000000 HC RX 637 (ALT 250 FOR IP): Performed by: STUDENT IN AN ORGANIZED HEALTH CARE EDUCATION/TRAINING PROGRAM

## 2021-06-02 PROCEDURE — 1200000000 HC SEMI PRIVATE

## 2021-06-02 PROCEDURE — 99232 SBSQ HOSP IP/OBS MODERATE 35: CPT | Performed by: NURSE PRACTITIONER

## 2021-06-02 PROCEDURE — 2580000003 HC RX 258: Performed by: STUDENT IN AN ORGANIZED HEALTH CARE EDUCATION/TRAINING PROGRAM

## 2021-06-02 PROCEDURE — 6370000000 HC RX 637 (ALT 250 FOR IP): Performed by: NURSE PRACTITIONER

## 2021-06-02 PROCEDURE — 6360000002 HC RX W HCPCS: Performed by: STUDENT IN AN ORGANIZED HEALTH CARE EDUCATION/TRAINING PROGRAM

## 2021-06-02 PROCEDURE — 6360000002 HC RX W HCPCS: Performed by: NEUROLOGICAL SURGERY

## 2021-06-02 PROCEDURE — 93005 ELECTROCARDIOGRAM TRACING: CPT | Performed by: NURSE PRACTITIONER

## 2021-06-02 PROCEDURE — 2580000003 HC RX 258: Performed by: NURSE PRACTITIONER

## 2021-06-02 PROCEDURE — 2500000003 HC RX 250 WO HCPCS: Performed by: PSYCHIATRY & NEUROLOGY

## 2021-06-02 PROCEDURE — 6360000002 HC RX W HCPCS: Performed by: PSYCHIATRY & NEUROLOGY

## 2021-06-02 RX ORDER — PROCHLORPERAZINE EDISYLATE 5 MG/ML
10 INJECTION INTRAMUSCULAR; INTRAVENOUS EVERY 8 HOURS
Status: COMPLETED | OUTPATIENT
Start: 2021-06-02 | End: 2021-06-03

## 2021-06-02 RX ORDER — CHLORPROMAZINE HYDROCHLORIDE 25 MG/1
25 TABLET, FILM COATED ORAL 3 TIMES DAILY
Status: DISCONTINUED | OUTPATIENT
Start: 2021-06-02 | End: 2021-06-02

## 2021-06-02 RX ORDER — CHLORPROMAZINE HYDROCHLORIDE 25 MG/1
50 TABLET, FILM COATED ORAL 3 TIMES DAILY
Status: DISCONTINUED | OUTPATIENT
Start: 2021-06-02 | End: 2021-06-03

## 2021-06-02 RX ADMIN — LEVETIRACETAM 500 MG: 500 TABLET ORAL at 07:58

## 2021-06-02 RX ADMIN — ACETAMINOPHEN 650 MG: 325 TABLET ORAL at 22:34

## 2021-06-02 RX ADMIN — PROCHLORPERAZINE EDISYLATE 10 MG: 5 INJECTION INTRAMUSCULAR; INTRAVENOUS at 07:58

## 2021-06-02 RX ADMIN — ATORVASTATIN CALCIUM 40 MG: 40 TABLET, FILM COATED ORAL at 07:57

## 2021-06-02 RX ADMIN — MECLIZINE 12.5 MG: 12.5 TABLET ORAL at 00:49

## 2021-06-02 RX ADMIN — ACETAMINOPHEN 650 MG: 325 TABLET ORAL at 14:03

## 2021-06-02 RX ADMIN — DEXTROSE MONOHYDRATE 500 MG: 50 INJECTION, SOLUTION INTRAVENOUS at 12:43

## 2021-06-02 RX ADMIN — PROCHLORPERAZINE EDISYLATE 10 MG: 5 INJECTION INTRAMUSCULAR; INTRAVENOUS at 00:44

## 2021-06-02 RX ADMIN — LEVETIRACETAM 500 MG: 500 TABLET ORAL at 20:30

## 2021-06-02 RX ADMIN — PROCHLORPERAZINE EDISYLATE 10 MG: 5 INJECTION INTRAMUSCULAR; INTRAVENOUS at 14:03

## 2021-06-02 RX ADMIN — CHLORPROMAZINE HYDROCHLORIDE 50 MG: 25 TABLET, SUGAR COATED ORAL at 14:03

## 2021-06-02 RX ADMIN — HEPARIN SODIUM 5000 UNITS: 10000 INJECTION INTRAVENOUS; SUBCUTANEOUS at 22:34

## 2021-06-02 RX ADMIN — PROCHLORPERAZINE EDISYLATE 10 MG: 5 INJECTION INTRAMUSCULAR; INTRAVENOUS at 20:31

## 2021-06-02 RX ADMIN — ACETAMINOPHEN 650 MG: 325 TABLET ORAL at 05:01

## 2021-06-02 RX ADMIN — VENLAFAXINE HYDROCHLORIDE 150 MG: 150 CAPSULE, EXTENDED RELEASE ORAL at 07:59

## 2021-06-02 RX ADMIN — BUPROPION HYDROCHLORIDE 150 MG: 150 TABLET, EXTENDED RELEASE ORAL at 07:58

## 2021-06-02 RX ADMIN — CHLORPROMAZINE HYDROCHLORIDE 50 MG: 25 TABLET, SUGAR COATED ORAL at 20:30

## 2021-06-02 RX ADMIN — DEXTROSE MONOHYDRATE 500 MG: 50 INJECTION, SOLUTION INTRAVENOUS at 03:59

## 2021-06-02 RX ADMIN — HYDROCHLOROTHIAZIDE 12.5 MG: 12.5 TABLET ORAL at 07:58

## 2021-06-02 RX ADMIN — CHLORPROMAZINE HYDROCHLORIDE 25 MG: 25 TABLET, SUGAR COATED ORAL at 08:03

## 2021-06-02 RX ADMIN — HEPARIN SODIUM 5000 UNITS: 10000 INJECTION INTRAVENOUS; SUBCUTANEOUS at 05:01

## 2021-06-02 RX ADMIN — Medication 10 ML: at 07:58

## 2021-06-02 RX ADMIN — DEXTROSE MONOHYDRATE 500 MG: 50 INJECTION, SOLUTION INTRAVENOUS at 19:36

## 2021-06-02 RX ADMIN — DIPHENHYDRAMINE HYDROCHLORIDE: 50 INJECTION INTRAMUSCULAR; INTRAVENOUS at 15:52

## 2021-06-02 RX ADMIN — LISINOPRIL 20 MG: 20 TABLET ORAL at 07:58

## 2021-06-02 RX ADMIN — Medication 10 ML: at 20:31

## 2021-06-02 ASSESSMENT — PAIN SCALES - GENERAL
PAINLEVEL_OUTOF10: 8
PAINLEVEL_OUTOF10: 4
PAINLEVEL_OUTOF10: 4

## 2021-06-02 NOTE — FLOWSHEET NOTE
Pt became very lethargic while receiving Kholi cocktail. [facility]  Awaiting return phone call

## 2021-06-02 NOTE — PROGRESS NOTES
Angelique Chavez is a 73 y.o. right handed male     Neurology is following for headache    PMH: Hx shingles, HTN, mixed HDL, migraine, tinnitus, depression     Patient admitted after falling secondary to vertigo resulting in skull fracture, SAH with ventricular extension and SDH. Vertigo preceded his head injury and by history appears more likely central in etiology. Vertigo began about 2 weeks ago unrelieved by meclizine. Patient was found himself walking and all of a sudden have spinning sensation. This was not provoked by movement. The feeling of vertigo lasted 30 to 40 seconds. 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 The back of his head struck during the fall. Patient has light and sound sensitivity. Patient does have a history of migraines in the past and use Excedrin Migraine. Patient has a history of tinnitus bilaterally for the last 4 years. [place]-Hallpike could not be done due to patient discomfort. Occasionally hypertensive otherwise vital stable at present time on room air. ROS is limited due to discomfort    No family present at bedside. Objective:     BP (!) 135/93   Pulse 68   Temp 98.2 °F (36.8 °C) (Temporal)   Resp 16   Ht 5' 8\" (1.727 m)   Wt 238 lb (108 kg)   SpO2 98%   BMI 36.19 kg/m²     General appearance: alert, appears stated age, cooperative and no distress  Head: normocephalic, without obvious abnormality, atraumatic  Eyes: conjunctivae/corneas clear.  .  Neck: supple, symmetrical, trachea midline and thyroid not enlarged  Lungs: Unlabored breaths on room air  Heart: NSR on telemetry  Extremities: normal, atraumatic, no cyanosis or edema  Pulses: 2+ and symmetric  Skin: color, texture, turgor normal---no rashes or lesions      Mental Status: Alert and oriented x4, limited exam due to discomfort    Decreased attention/concentration  Intact fundus of knowledge  Intact memories    Speech: no dysarthria  Language: no aphasias    Cranial Nerves:  I: smell NA   II: visual acuity  NA   II: visual fields Full to confrontation   II: pupils JOY   III,VII: ptosis None   III,IV,VI: extraocular muscles  Full ROM   V: mastication Normal   V: facial light touch sensation  Normal   V,VII: corneal reflex  Present   VII: facial muscle function - upper  Normal   VII: facial muscle function - lower Normal   VIII: hearing Normal   IX: soft palate elevation  Normal   IX,X: gag reflex Present   XI: trapezius strength  5/5   XI: sternocleidomastoid strength 5/5   XI: neck extension strength  5/5   XII: tongue strength  Normal     Motor:  Grossly 5/5 throughout  Normal bulk and tone   No abnormal movements    Sensory:  LT intact throughout    Coordination:   Not tested today due to discomfort    Gait:  Not tested today due to discomfort    DTR:   Deferred due to discomfort  Right Brachioradialis reflex NOT DONE  Left Brachioradialis reflex NOT DONE  Right Biceps reflex NOT DONE  Left Biceps reflex NOT DONE  Right Triceps reflex NOT DONE  Left Triceps reflex NOT DONE  Right Quadriceps reflex NOT DONE  Left Quadriceps reflex NOT DONE  Right Achilles reflex NOT DONE  Left Achilles reflex NOT DONE    No Babinskis  No Gan's    No pathological reflexes    Laboratory/Radiology:     CBC:   Lab Results   Component Value Date    WBC 10.6 05/28/2021    RBC 3.94 05/28/2021    HGB 11.7 05/28/2021    HCT 35.6 05/28/2021    MCV 90.4 05/28/2021    MCH 29.7 05/28/2021    MCHC 32.9 05/28/2021    RDW 13.5 05/28/2021     05/28/2021    MPV 8.9 05/28/2021     CMP:    Lab Results   Component Value Date     05/28/2021    K 4.0 05/28/2021     05/28/2021    CO2 23 05/28/2021    BUN 14 05/28/2021    CREATININE 1.0 05/28/2021    GFRAA >60 05/28/2021    LABGLOM >60 05/28/2021    GLUCOSE 105 05/28/2021    PROT 6.3 05/27/2021    LABALBU 3.7 05/27/2021    CALCIUM 8.9 05/28/2021    BILITOT 0.8 05/27/2021    ALKPHOS 103 05/27/2021 AST 39 05/27/2021    ALT 27 05/27/2021     Lab Results   Component Value Date    LABA1C 5.8 04/01/2021     FLP:    Lab Results   Component Value Date    TRIG 270 08/26/2020    HDL 33 08/26/2020    LDLCALC 93 08/26/2020    LABVLDL 54 08/26/2020     TSH:    Lab Results   Component Value Date    TSH 2.000 04/02/2021   FOLATE:    Lab Results   Component Value Date    FOLATE 9.3 04/02/2021     IRON:    Lab Results   Component Value Date    IRON 67 04/02/2021     CT HEAD WO CONTRAST   Final Result   1. Significant although partial regression of multiple size of subarachnoid   hemorrhage the present bilaterally in the supratentorial regions. 2.  There is discrete the blood/CSF fluid level in the dependent portion of   both lateral ventricles. Which indicates intraventricular extension of   previous hemorrhage. 3.  Persistent area of subarachnoid hemorrhage along the right lateral   cerebral convexity. 4.  Persistent area of focal subarachnoid hemorrhage in the inferior aspect   of the left frontal lobe. 5.  Development of a bilateral hypodensity areas on the right more likely a   partial involution of a previous subdural hematoma. The on the left or in   both size development of hygroma like. 6.  Minimal midline shift to the left of about 2 mm. 7. Continued follow-up study recommended. CT HEAD WO CONTRAST   Final Result   1. Minimal acute blood products are now present dependently within both   occipital horns. 2.  Somewhat decreased acute subarachnoid hemorrhage, as described. 3.  Unchanged minimal acute right cerebral hemispheric subdural hematoma, and   related subtle mass effect/leftward midline shift, as described. 4.  Mild/moderate right fronto-temporal, and minimal low left frontal,   probable hemorrhagic contusions appear grossly unchanged.       5.  Right occipital/suboccipital calvarial fracture/sutural diastasis,   extending through the right mastoid air thrombosis.            All labs and images personally reviewed today    Assessment:     Multifactorial headache due to recent head injury as well as history of migraines   Patient has migrainous features on exam   Today headache 5/10 with light and sound sensitivity   Orthostatics negative x1   Medications adjusted as below       Plan:     Continue supportive care  EKG ordered  Continue Compazine and Depakote every 8 hours x 3 more doses  Will increase low-dose Thorazine to 50mg TID  Neurochecks every 4 hours    Will reevaluate tomorrow    Raul 207, ALEKSANDAR - NP-C   10:34 AM  6/2/2021

## 2021-06-02 NOTE — CARE COORDINATION
Transition of Care-Discharge pending response to medication adjustments by Neurology, will reevaluate tomorrow for readiness to discharge. Patient will return home with his wife, alise Becker, does not anticipate any needs.      Robert CAMARILLO, RN  PETTY   [phone]

## 2021-06-02 NOTE — PROGRESS NOTES
Still C/O headache. CT scan from yesterday reviewed. .  Significant although partial regression of multiple size of subarachnoid   hemorrhage the present bilaterally in the supratentorial regions.       2.  There is discrete the blood/CSF fluid level in the dependent portion of   both lateral ventricles.  Which indicates intraventricular extension of   previous hemorrhage.       3.  Persistent area of subarachnoid hemorrhage along the right lateral   cerebral convexity.       4.  Persistent area of focal subarachnoid hemorrhage in the inferior aspect   of the left frontal lobe.       5.  Development of a bilateral hypodensity areas on the right more likely a   partial involution of a previous subdural hematoma.  The on the left or in   both size development of hygroma like.       6.  Minimal midline shift to the left of about 2 mm. Will consider Isi mata  Nothing new to add from neurosurgical stand point at this time.   Will follow along

## 2021-06-02 NOTE — PROGRESS NOTES
Spoke with Trauma and Neurology-ok to transfer to Med-Surg-patient does not need to remain on telemetry.     Electronically signed by Diana Morris RN on 6/2/2021 at 12:04 PM

## 2021-06-03 LAB
EKG ATRIAL RATE: 67 BPM
EKG ATRIAL RATE: 78 BPM
EKG P AXIS: 47 DEGREES
EKG P AXIS: 53 DEGREES
EKG P-R INTERVAL: 134 MS
EKG P-R INTERVAL: 146 MS
EKG Q-T INTERVAL: 418 MS
EKG Q-T INTERVAL: 456 MS
EKG QRS DURATION: 146 MS
EKG QRS DURATION: 154 MS
EKG QTC CALCULATION (BAZETT): 476 MS
EKG QTC CALCULATION (BAZETT): 481 MS
EKG R AXIS: 31 DEGREES
EKG R AXIS: 40 DEGREES
EKG T AXIS: 11 DEGREES
EKG T AXIS: 16 DEGREES
EKG VENTRICULAR RATE: 67 BPM
EKG VENTRICULAR RATE: 78 BPM

## 2021-06-03 PROCEDURE — 97535 SELF CARE MNGMENT TRAINING: CPT

## 2021-06-03 PROCEDURE — 6360000002 HC RX W HCPCS: Performed by: STUDENT IN AN ORGANIZED HEALTH CARE EDUCATION/TRAINING PROGRAM

## 2021-06-03 PROCEDURE — 6360000002 HC RX W HCPCS: Performed by: NURSE PRACTITIONER

## 2021-06-03 PROCEDURE — 1200000000 HC SEMI PRIVATE

## 2021-06-03 PROCEDURE — 2500000003 HC RX 250 WO HCPCS: Performed by: NURSE PRACTITIONER

## 2021-06-03 PROCEDURE — 2580000003 HC RX 258: Performed by: STUDENT IN AN ORGANIZED HEALTH CARE EDUCATION/TRAINING PROGRAM

## 2021-06-03 PROCEDURE — 99232 SBSQ HOSP IP/OBS MODERATE 35: CPT | Performed by: NURSE PRACTITIONER

## 2021-06-03 PROCEDURE — 6370000000 HC RX 637 (ALT 250 FOR IP): Performed by: STUDENT IN AN ORGANIZED HEALTH CARE EDUCATION/TRAINING PROGRAM

## 2021-06-03 PROCEDURE — 2580000003 HC RX 258: Performed by: NURSE PRACTITIONER

## 2021-06-03 PROCEDURE — 6370000000 HC RX 637 (ALT 250 FOR IP): Performed by: NURSE PRACTITIONER

## 2021-06-03 PROCEDURE — 93005 ELECTROCARDIOGRAM TRACING: CPT | Performed by: NURSE PRACTITIONER

## 2021-06-03 PROCEDURE — 99232 SBSQ HOSP IP/OBS MODERATE 35: CPT | Performed by: SURGERY

## 2021-06-03 RX ADMIN — DEXTROSE MONOHYDRATE 500 MG: 50 INJECTION, SOLUTION INTRAVENOUS at 13:05

## 2021-06-03 RX ADMIN — CHLORPROMAZINE HYDROCHLORIDE 50 MG: 25 TABLET, SUGAR COATED ORAL at 09:06

## 2021-06-03 RX ADMIN — ACETAMINOPHEN 650 MG: 325 TABLET ORAL at 14:26

## 2021-06-03 RX ADMIN — SODIUM CHLORIDE, PRESERVATIVE FREE 10 ML: 5 INJECTION INTRAVENOUS at 04:30

## 2021-06-03 RX ADMIN — BUPROPION HYDROCHLORIDE 150 MG: 150 TABLET, EXTENDED RELEASE ORAL at 09:06

## 2021-06-03 RX ADMIN — Medication 10 ML: at 21:27

## 2021-06-03 RX ADMIN — ACETAMINOPHEN 650 MG: 325 TABLET ORAL at 05:44

## 2021-06-03 RX ADMIN — LEVETIRACETAM 500 MG: 500 TABLET ORAL at 09:06

## 2021-06-03 RX ADMIN — ATORVASTATIN CALCIUM 40 MG: 40 TABLET, FILM COATED ORAL at 09:05

## 2021-06-03 RX ADMIN — LEVETIRACETAM 500 MG: 500 TABLET ORAL at 21:26

## 2021-06-03 RX ADMIN — HEPARIN SODIUM 5000 UNITS: 10000 INJECTION INTRAVENOUS; SUBCUTANEOUS at 05:46

## 2021-06-03 RX ADMIN — PROCHLORPERAZINE EDISYLATE 10 MG: 5 INJECTION INTRAMUSCULAR; INTRAVENOUS at 04:30

## 2021-06-03 RX ADMIN — HEPARIN SODIUM 5000 UNITS: 10000 INJECTION INTRAVENOUS; SUBCUTANEOUS at 14:26

## 2021-06-03 RX ADMIN — HEPARIN SODIUM 5000 UNITS: 10000 INJECTION INTRAVENOUS; SUBCUTANEOUS at 21:27

## 2021-06-03 RX ADMIN — DEXTROSE MONOHYDRATE 500 MG: 50 INJECTION, SOLUTION INTRAVENOUS at 04:30

## 2021-06-03 RX ADMIN — VENLAFAXINE HYDROCHLORIDE 150 MG: 150 CAPSULE, EXTENDED RELEASE ORAL at 09:05

## 2021-06-03 RX ADMIN — Medication 10 ML: at 09:05

## 2021-06-03 RX ADMIN — ACETAMINOPHEN 650 MG: 325 TABLET ORAL at 21:26

## 2021-06-03 ASSESSMENT — PAIN SCALES - GENERAL
PAINLEVEL_OUTOF10: 2
PAINLEVEL_OUTOF10: 3
PAINLEVEL_OUTOF10: 3

## 2021-06-03 NOTE — PROGRESS NOTES
Darrick Schultz is a 73 y.o. right handed male     Neurology is following for headache    PMH: Hx shingles, HTN, mixed HDL, migraine, tinnitus, depression     Patient admitted after falling secondary to vertigo resulting in skull fracture, SAH with ventricular extension and SDH. Vertigo preceded his head injury and by history appears more likely central in etiology. Vertigo began about 2 weeks ago unrelieved by meclizine. Patient was found himself walking and all of a sudden have spinning sensation. This was not provoked by movement. The feeling of vertigo lasted 30 to 40 seconds. 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 The back of his head struck during the fall. Patient has light and sound sensitivity. Patient does have a history of migraines in the past and use Excedrin Migraine. Patient has a history of tinnitus bilaterally for the last 4 years. Randell-Hallpike could not be done due to patient discomfort. Occasionally hypotensive today otherwise vital stable at present time on room air. ROS is limited due to discomfort--- patient reports headache is significantly better today. Patient did receive Isi cocktail yesterday and a repeat cocktail is planned for later today. At present time patient reports a headache of 2-3/10. No family present at bedside. Objective:     BP (!) 94/55   Pulse 63   Temp 98.1 °F (36.7 °C) (Temporal)   Resp 18   Ht 5' 8\" (1.727 m)   Wt 238 lb (108 kg)   SpO2 96%   BMI 36.19 kg/m²     General appearance: alert, appears stated age, cooperative and no distress  Head: normocephalic, without obvious abnormality, atraumatic  Eyes: conjunctivae/corneas clear.  .  Neck: supple, symmetrical, trachea midline and thyroid not enlarged  Lungs: Unlabored breaths on room air  Heart: NSR on telemetry  Extremities: normal, atraumatic, no cyanosis or edema  Pulses: 2+ and symmetric  Skin: color, texture, turgor normal---no rashes or lesions      Mental Status: Alert and oriented x4, follows simple commands today and a little more interactive than prior exam    Decreased attention/concentration  Intact fundus of knowledge  Intact memories    Speech: no dysarthria  Language: no aphasias    Cranial Nerves:  I: smell NA   II: visual acuity  NA   II: visual fields Full to confrontation   II: pupils JOY   III,VII: ptosis None   III,IV,VI: extraocular muscles   EOMI without nystagmus   V: mastication Normal   V: facial light touch sensation  Normal   V,VII: corneal reflex     VII: facial muscle function - upper  Normal   VII: facial muscle function - lower Normal   VIII: hearing Normal   IX: soft palate elevation  Normal   IX,X: gag reflex    XI: trapezius strength  5/5   XI: sternocleidomastoid strength 5/5   XI: neck extension strength  5/5   XII: tongue strength  Normal     Motor:  Grossly 5/5 throughout  Normal bulk and tone   No abnormal movements    Sensory:  LT intact throughout    Coordination:   Not tested today due to discomfort    Gait:  Not tested today due to discomfort    No Babinskis  No Gan's    No pathological reflexes    Laboratory/Radiology:     CBC:   Lab Results   Component Value Date    WBC 10.6 05/28/2021    RBC 3.94 05/28/2021    HGB 11.7 05/28/2021    HCT 35.6 05/28/2021    MCV 90.4 05/28/2021    MCH 29.7 05/28/2021    MCHC 32.9 05/28/2021    RDW 13.5 05/28/2021     05/28/2021    MPV 8.9 05/28/2021     CMP:    Lab Results   Component Value Date     05/28/2021    K 4.0 05/28/2021     05/28/2021    CO2 23 05/28/2021    BUN 14 05/28/2021    CREATININE 1.0 05/28/2021    GFRAA >60 05/28/2021    LABGLOM >60 05/28/2021    GLUCOSE 105 05/28/2021    PROT 6.3 05/27/2021    LABALBU 3.7 05/27/2021    CALCIUM 8.9 05/28/2021    BILITOT 0.8 05/27/2021    ALKPHOS 103 05/27/2021    AST 39 05/27/2021    ALT 27 05/27/2021     Lab Results   Component Value Date    LABA1C 5.8 04/01/2021 FLP:    Lab Results   Component Value Date    TRIG 270 08/26/2020    HDL 33 08/26/2020    LDLCALC 93 08/26/2020    LABVLDL 54 08/26/2020     TSH:    Lab Results   Component Value Date    TSH 2.000 04/02/2021   FOLATE:    Lab Results   Component Value Date    FOLATE 9.3 04/02/2021     IRON:    Lab Results   Component Value Date    IRON 67 04/02/2021     CT HEAD WO CONTRAST   Final Result   1. Significant although partial regression of multiple size of subarachnoid   hemorrhage the present bilaterally in the supratentorial regions. 2.  There is discrete the blood/CSF fluid level in the dependent portion of   both lateral ventricles. Which indicates intraventricular extension of   previous hemorrhage. 3.  Persistent area of subarachnoid hemorrhage along the right lateral   cerebral convexity. 4.  Persistent area of focal subarachnoid hemorrhage in the inferior aspect   of the left frontal lobe. 5.  Development of a bilateral hypodensity areas on the right more likely a   partial involution of a previous subdural hematoma. The on the left or in   both size development of hygroma like. 6.  Minimal midline shift to the left of about 2 mm. 7. Continued follow-up study recommended. CT HEAD WO CONTRAST   Final Result   1. Minimal acute blood products are now present dependently within both   occipital horns. 2.  Somewhat decreased acute subarachnoid hemorrhage, as described. 3.  Unchanged minimal acute right cerebral hemispheric subdural hematoma, and   related subtle mass effect/leftward midline shift, as described. 4.  Mild/moderate right fronto-temporal, and minimal low left frontal,   probable hemorrhagic contusions appear grossly unchanged. 5.  Right occipital/suboccipital calvarial fracture/sutural diastasis,   extending through the right mastoid air cell complex, appear not   significantly changed.       6.  Paranasal sinus and right mastoid air cell complex disease, as described. Please see above comments. .      RECOMMENDATIONS:   1. Recommend short-term surveillance noncontrast head CT, as directed   clinically. .         CTA HEAD W CONTRAST   Final Result   1. Multicompartment acute intracranial hemorrhage is similar compared to   noncontrast CT head done earlier same day with redemonstration of small right   subdural hematoma, right greater than left subarachnoid hemorrhage and   orbital frontal hemorrhagic contusions. Mild right left midline shift   measures 4-5 mm at the level of the lateral ventricles. 2. Unremarkable CTA of the head and neck. No hemodynamically significant   stenosis, occlusion or aneurysm in the large arteries of the head and neck. 3. Nondisplaced fracture of the right suboccipital skull extends superiorly   into the right occipital skull. Small amount of fluid in the right mastoid   air cells may relate to fracture extension into the inferior mastoid air   cells. 4. No dural venous sinus thrombosis. CTA NECK W CONTRAST   Final Result   1. Multicompartment acute intracranial hemorrhage is similar compared to   noncontrast CT head done earlier same day with redemonstration of small right   subdural hematoma, right greater than left subarachnoid hemorrhage and   orbital frontal hemorrhagic contusions. Mild right left midline shift   measures 4-5 mm at the level of the lateral ventricles. 2. Unremarkable CTA of the head and neck. No hemodynamically significant   stenosis, occlusion or aneurysm in the large arteries of the head and neck. 3. Nondisplaced fracture of the right suboccipital skull extends superiorly   into the right occipital skull. Small amount of fluid in the right mastoid   air cells may relate to fracture extension into the inferior mastoid air   cells. 4. No dural venous sinus thrombosis.            All labs and images personally reviewed today    Assessment:     Multifactorial headache due to recent head injury as well as history of migraines   Patient has migrainous features on exam   Wednesday headache 5/10 with light and sound sensitivity--- today 2-3/10   Orthostatics negative x1   Repeat EKG stable   Today patient's headache is much improved and patient is more interactive. Plan:     Continue supportive care  Isi cocktail planned for later today   Continue Compazine and Depakote every 8 hours until completed  Discontinued Thorazine to 50mg TID  Neurochecks every 4 hours    Okay to discharge from neuro standpoint once medically cleared and okay with others. Neuro will sign off. Patient should follow-up with neurosurgery per their recommendations at discharge.     Juanita Callahan, APRN - NP-C   11:15 AM  6/3/2021

## 2021-06-03 NOTE — PROGRESS NOTES
IMPRESSION:  1.  Subarachnoid hemorrhage, most probably traumatic, rule out aneurysm. 2.  Vertigo, etiology to be determined. 3.  Multiple medical comorbidities. 4. Right occipital bone Fracture. Stable neurosurgically. C/O headache better than yesterday. No nausea/vomnoting. Had partial infusion of Isi Cocktail with improvement of headache yesterday. Apparently was obtunded somewhat & Cocktail was stopped. Neurology consult noted,. Will resume rest of Isi Cocktail. Nothing new to add from neurosurgical stand point at this time.

## 2021-06-03 NOTE — PROGRESS NOTES
Comprehensive Nutrition Assessment    Type and Reason for Visit:  Initial, RD Nutrition Re-Screen/LOS    Nutrition Recommendations/Plan: Start Ensure BID    Nutrition Assessment:  Pt adm s/p fall resulting in SAH/SDH and R occipital fracture. Diet General. PO varied, documentation limited. Wt seems relatively stable however current wt method unknown. Pt with c/o headache which may be affecting po. Will start ONS to promote adequate nutrient intake. Nutrition Related Findings:  Abd soft, NT. +BS. Current Nutrition Therapies:    DIET GENERAL;     Anthropometric Measures:  · Height: 5' 8\" (172.7 cm)  · Current Body Weight:     · Admission Body Weight:    238 lbs   · Ideal Body Weight: 154 lbs; % Ideal Body Weight   155%  · BMI:  36.19    Nutrition Diagnosis:   No nutrition diagnosis at this time     Nutrition Interventions:   Food and/or Nutrient Delivery:  Continue Current Diet, Start Oral Nutrition Supplement  Nutrition Education/Counseling:  No recommendation at this time   Coordination of Nutrition Care:  Continue to monitor while inpatient    Goals:  PO improve to % of meals and ONS       Nutrition Monitoring and Evaluation:   Behavioral-Environmental Outcomes:  None Identified   Food/Nutrient Intake Outcomes:  Food and Nutrient Intake, Supplement Intake  Physical Signs/Symptoms Outcomes:  Nutrition Focused Physical Findings     Discharge Planning:    Continue Oral Nutrition Supplement (Rec continue ONS until PO improved)     Electronically signed by Ronel Hyde RD, LD on 6/3/21 at 7:46 PM EDT    Contact: [phone]

## 2021-06-03 NOTE — PROGRESS NOTES
TRAUMA SURGERY  ATTENDING PROGRESS NOTE      CC: fall     S:  Somnolent, but arousable for me, states headache and is improved. . but is very sleepy. O:   @BP (!) 94/55   Pulse 63   Temp 98.1 °F (36.7 °C) (Temporal)   Resp 18   Ht 5' 8\" (1.727 m)   Wt 238 lb (108 kg)   SpO2 96%   BMI 36.19 kg/m² @    Gen - no apparent distress   Neuro - Awake, alert, attentive    HEENT - PERRL 4 mm   Lungs - non labored, BS clear b/l   Heart - RR no extra heart sounds    Abdomen - soft non tender, no obvious lesions,   Ext- ROM wnl all ext NVI,     A/P: Fall with ICH and skull fx,       Active Problems:    Subdural hematoma (HCC)  Resolved Problems:    * No resolved hospital problems.  *    SDH/SAH--monitor neuro exam, keppra,   Skull fx--pain control  Dizziness--meclizine; thorazine,  neuro consulted, appreciate input    Diet as tolerated  PT/OT   SCDs, heparin TID    Discharge planning      Marcus Canales MD FACS

## 2021-06-03 NOTE — PROGRESS NOTES
[address]         Date:2021                                                  Patient Name: Kirsten Loco  MRN: [id number]  : 1955  Room: [address] [place] [address]A    Per OT Eval:    Evaluating OT: Caleb Chauhan OTR/L [id number]     Referring Provider[de-identified] Luis Pineda MD                                      Specific Provider Orders/Date: OT evaluation and treatment 21     Diagnosis: intracranial hemorrage        Pertinent Medical History: Depression, shingles, HTN, HLD, COVID-19        Precautions:  Fall Risk, vertigo     Assessment of current deficits   [x]? Functional mobility             [x]?ADLs           [x]? Strength                  []?Cognition   [x]? Functional transfers           []? IADLs         [x]? Safety Awareness   [x]? Endurance   []? Fine Coordination              [x]? Balance      []? Vision/perception   []? Sensation     []? Gross Motor Coordination  []? ROM           []?  Delirium                   []? Motor Control      OT PLAN OF CARE   OT POC based on physician orders, patient diagnosis and results of clinical assessment     Frequency/Duration 1-4 days/wk for 2 weeks PRN   Specific OT Treatment to include:   Instruction/training on adapted ADL techniques and AE recommendations to increase functional independence within precautions  Training on energy conservation strategies, correct breathing pattern and techniques to improve independence/tolerance for self-care routine  Functional transfer/mobility training/DME recommendations for increased independence, safety, and fall prevention  Patient/Family education to increase follow through with safety techniques and functional independence  Therapeutic exercise to improve motor endurance, ROM, and functional strength for ADLs/functional transfers  Therapeutic activities to facilitate/challenge dynamic balance, stand tolerance for increased safety and independence with ADLs     Recommended Adaptive Equipment: ww, shower chair, long handled sponge- TBD      Home Living: Pt lives with wife in a 1 story with 2 EMMANUEL and no rail  Bathroom setup: walk in shower   Equipment owned: none, his wife has a hurry cane     Prior Level of Function: indep with ADLs , indep with IADLs; ambulated with no AD  Driving: yes  Occupation:       Pain Level: Pt did not complain of pain this session, stated of feeling dizzy  Cognition: A&O: 4/4;   Follows 2 step directions: good               Memory:  good               Sequencing:  good               Problem solving:  good               Judgement/safety:  fair      Functional Assessment:   AM-PAC Daily Activity Raw Score: 16/24       Initial Eval Status  Date: 5/29/21 Treatment Status  Date: 6/3/21 STG=LTG  Time frame: 10-14 days   Feeding Independent   Independent  Pt able to grasp cup, bring to mouth and drink from straw Independent    Grooming Supervision  DNT  Supervision per previous level  Independent    UB Dressing Stand by Assist   [facility] gown seated upright in chair Independent    LB Dressing Stand by Assist  Dependent  [facility] socks seated upright in chair  Modified [place]    Bathing Stand by Assist DNT  SBA per previous level  Modified [place]    Toileting Stand by Assist   Mary/CGA  Pt attempted to urinate standing at commode with CGA, pt unable to urinate, sitting on commode with assistance for transfer with use of grab bar Modified [place]    Bed Mobility  Supine to sit: NT   Sit to supine: Stand by Assist   modA  Supine<>EOB    Mary  EOB<>Supine Supine to sit: Modified [place]   Sit to supine: Modified [place]    Functional Transfers Sit to stand: Contact Guard Assist   Stand to sit: Contact Guard Assist   Stand pivot: Contact Guard Assist  min/modA   Sit to Stand   Stand to Sit    Cueing for hand placement  Modified [place]    Functional Mobility CGA with ww  Steady with no c/o dizziness  min/modA  Pt ambulated short household distance in room EOB<>Bathroom with w.w, poor safety/gait, assistance with walker management, assistance to manage of IV poll  Mod indep with AAD if needed   Balance Sitting:     Static:  indep    Dynamic:supervision  Standing: CGA  Sitting Supported:  SBA    Standing:  min/modA Sitting:     Static:  indep    Dynamic:indep  Standing: indep   Activity Tolerance fair Fair- good   Visual/  Perceptual Glasses: yes             BUE  ROM/Strength/  Fine motor Coordination Hand dominance: R but has carpal tunnel and uses L a lot     RUE: ROM WFL     Strength: grossly 4/5      Strength:  WFL     Coordination:  Good-     LUE: ROM  WFL     Strength: grossly 4/5      Strength:  WFL     Coordination: WFL          Hearing: WFL   Sensation:  No c/o numbness or tingling   Tone:  Some tremors noted B hands   Edema:  None noted        Education:  Pt was educated through out treatment regarding proper technique & safety with bed mobility, functional transfers & mobility, ADL compensatory strategies to ease tasks to improve safety & prevent falls and allow pt to return home safely. Comments: Upon arrival pt supine in bed, agreeable to therapy, speaking with nursing okaying pt to be seen this session. Pt completed of bed mobility, functional mobility, transfers and ADL tasks this session. Pt complaining of feeling dizzy throughout session, requesting to return to bed and not complete of ADL bathing task. Pt having of poor safety awareness, requiring of max cueing with all tasks. At end of session, pt supine in bed, all lines and tubes intact, call light within reach. · Pt has made limited progress towards set goals.    · Continue with current plan of care focusing on increasing of independency with transfers and ADL tasks      Treatment Time In: 2:45pm            Treatment Time Out: 3:00pm Treatment Charges: Mins Units   Ther Ex  66814     Manual Therapy 11504     Thera Activities 17595     ADL/Home Mgt 03325 15 1   Neuro Re-ed 29503     Group Therapy      Orthotic manage/training  22415     Non-Billable Time     Total Timed Treatment 15 1        Caro MANDUJANO/LOAN [id number]

## 2021-06-04 VITALS
BODY MASS INDEX: 36.07 KG/M2 | TEMPERATURE: 97.9 F | RESPIRATION RATE: 16 BRPM | WEIGHT: 238 LBS | OXYGEN SATURATION: 96 % | HEART RATE: 83 BPM | SYSTOLIC BLOOD PRESSURE: 115 MMHG | DIASTOLIC BLOOD PRESSURE: 71 MMHG | HEIGHT: 68 IN

## 2021-06-04 PROCEDURE — 95992 CANALITH REPOSITIONING PROC: CPT

## 2021-06-04 PROCEDURE — 6370000000 HC RX 637 (ALT 250 FOR IP): Performed by: STUDENT IN AN ORGANIZED HEALTH CARE EDUCATION/TRAINING PROGRAM

## 2021-06-04 PROCEDURE — 6360000002 HC RX W HCPCS: Performed by: STUDENT IN AN ORGANIZED HEALTH CARE EDUCATION/TRAINING PROGRAM

## 2021-06-04 PROCEDURE — 97530 THERAPEUTIC ACTIVITIES: CPT

## 2021-06-04 PROCEDURE — 99239 HOSP IP/OBS DSCHRG MGMT >30: CPT | Performed by: SURGERY

## 2021-06-04 PROCEDURE — 97535 SELF CARE MNGMENT TRAINING: CPT

## 2021-06-04 RX ADMIN — ATORVASTATIN CALCIUM 40 MG: 40 TABLET, FILM COATED ORAL at 08:24

## 2021-06-04 RX ADMIN — BUPROPION HYDROCHLORIDE 150 MG: 150 TABLET, EXTENDED RELEASE ORAL at 08:24

## 2021-06-04 RX ADMIN — VENLAFAXINE HYDROCHLORIDE 150 MG: 150 CAPSULE, EXTENDED RELEASE ORAL at 08:25

## 2021-06-04 RX ADMIN — LISINOPRIL 20 MG: 20 TABLET ORAL at 08:24

## 2021-06-04 RX ADMIN — HEPARIN SODIUM 5000 UNITS: 10000 INJECTION INTRAVENOUS; SUBCUTANEOUS at 13:54

## 2021-06-04 RX ADMIN — BUTALBITAL, ACETAMINOPHEN AND CAFFEINE 1 TABLET: 50; 325; 40 TABLET ORAL at 13:49

## 2021-06-04 RX ADMIN — HYDROCHLOROTHIAZIDE 12.5 MG: 12.5 TABLET ORAL at 08:24

## 2021-06-04 RX ADMIN — ACETAMINOPHEN 650 MG: 325 TABLET ORAL at 05:54

## 2021-06-04 RX ADMIN — HEPARIN SODIUM 5000 UNITS: 10000 INJECTION INTRAVENOUS; SUBCUTANEOUS at 05:55

## 2021-06-04 ASSESSMENT — PAIN SCALES - GENERAL
PAINLEVEL_OUTOF10: 3

## 2021-06-04 NOTE — CARE COORDINATION
6/4/2021 - SAH, right occipital bone fracture. PT 18/24 today. Therapy reports pt did much better today with therapy. Spoke with patient regarding [facility] vs [facility]. He states he is feeling much better and would prefer to go home. Called pt wife Donald Ernandez and pt and his wife agreed that pt will come home with [place] [address] for PT/OT. They had no preference for [address] - called referral to [facility] and they can accept. DME order for fww. [id number] [facility] will deliver fww to room. Plan is for pt to discharge home with [facility]. Pt wife will provide transportation home. SW/CM will follow. The Plan for Transition of Care is related to the following treatment goals: discharge planning    The Patient and/or patient representative Daniela Lopez was provided with a choice of provider and agrees   with the discharge plan. [x] Yes [] No    Freedom of choice list was provided with basic dialogue that supports the patient's individualized plan of care/goals, treatment preferences and shares the quality data associated with the providers.  [x] Yes [] No

## 2021-06-04 NOTE — PROGRESS NOTES
Physical Therapy  Treatment Note    Name: Piper Jang  : 1955  MRN: [id number]    Date of Service: 2021    Evaluating PT:  Talib Torrez, PT, DPT  [id number]     Room #:  0083/0467-J  Diagnosis:  Intracranial hemorrhage ([facility] [address] [I62.9]  PMHx/PSHx:  Depression, shingles, HTN, HLD, COVID-19  Precautions:  Falls, Vertigo  Equipment Needs:  TBD    SUBJECTIVE:    Pt lives with his wife in a 1 story home with 2 stairs to enter and 0 rail. Bedroom and bathroom are on the 1st level. Full flight to basement with rail. Pt ambulated with no AD PTA. OBJECTIVE:   Initial Evaluation  Date:  Treatment  Date: 2021 Short Term/ Long Term   Goals   AM-PAC 6 Clicks 58/41 77/52    Was pt agreeable to Eval/treatment? Yes with encouragement  yes    Does pt have pain? 4/10 No c/o pain    Bed Mobility  Rolling: SBA  Supine to sit: Min A  Sit to supine: SBA  Scooting: SBA Rolling: SBA  Supine<>sit: Mary  Scooting: SBA Modified Independent     Transfers Sit to stand: Min A  Stand to sit: Min A  Stand pivot: CGA with [employer]  Sit<>stand: SBA  Stand pivot: SBA front [employer] Modified Independent     Ambulation    10 feet with no AD Min A;  30 feet with [employer]  feet front [employer] SBA >150 feet with AAD if needed Modified Independent     Stair negotiation: ascended and descended  NT NT d/t fatigue >4 steps with 1 rail Modified Independent     ROM BUE: WFL  BLE:  WFL     Strength BUE:  5/5  BLE:  5/5     Balance Sitting EOB:  SBA  Dynamic Standing:  Min A Sitting EOB: SBA  Dynamic standing: SBA front [employer] Sitting EOB:  Independent   Dynamic Standing:  Modified Independent       Pt is A & O x 4. Delayed at times. Concussion. Sensation:  Pt denies numbness and tingling to extremities  Edema:  unremarkable    Vestibular Assessment:  Randell-Hallpike:    - L ear negative  - R ear positive with clockwise torsional nystagmus  Completed Epley maneuver for R posterior canal BPPV    Patient education  Pt educated on role of PT intervention.   Pt educated on safety in room with utilization of call light for assistance with mobility. Pt educated on importance of maximizing OOB time by transferring to bedside chair for meals and ambulating to bathroom/transferring to bedside commode with assistance from nursing and therapy staff to increase functional activity tolerance and overall functional independence. Educated pt on possible diagnosis and treatment options for BPPV. Educated pt on concussion symptoms/precautions as well as benefits of follow-up OP PT services. Patient response to education:   Pt verbalized understanding Pt demonstrated skill Pt requires further education in this area   yes yes yes     ASSESSMENT:    Comments:  RN cleared pt for activity prior to session. Pt received supine in bed and agreeable to PT intervention at this time. Pt performed all functional mobility as noted above. Pt reports headache and nausea have improved but he still feels fatigued and \"not right\". Assessed pt for BPPV with positive R eran-hallpike and subsequent epley maneuver with good response. Pt tolerated ambulation well with any vertigo. Will re-assess later today. Pt returned to seated in chair at end of session and left with all needs met and call light in reach. Pt requires continued skilled PT intervention for the purposes of maximizing functional mobility and independence by addressing deficits described above. Treatment:  Patient practiced and was instructed in the following treatment:     Canalith repositioning   Therapeutic Activities Completed:  o Functional mobility as noted above:   - Bed mobility: as noted above. Min VC for efficient sequencing to promote more independent completion of task. - Transfer training: sit<>stand: SBA from EOB, chair, and commode. Min VC for proper hand placement and sequencing for safety. Stand pivot with front [employer] bed>toilet>chair front [employer] SBA.   Cues for safe sequencing with front [employer].    - Ambulation: 150 feet front [employer] SBA. Slow steady gait. Occasional cues for safe utilization of front [employer] especially when turning.   o Skilled repositioning in seated position for comfort.  o Pt education as noted above. PLAN:    Patient is making good progress towards established goals. Will continue with current POC.       Time in  0835  Time out  0915    Total Treatment Time  40 minutes     CPT codes:  [] Gait training 56327 0 minutes  [] Manual therapy 13750 0 minutes  [x] Therapeutic activities 55869 30 minutes  [] Therapeutic exercises 46045 0 minutes  [x] Canalith Repositioning [id number]    Martha Zuniga, PT, DPT  [id number]

## 2021-06-04 NOTE — PROGRESS NOTES
[address]         Date:2021                                                  Patient Name: Tony Benavidez  MRN: [id number]  : 1955  Room: [address] [place] [address]A    Per OT Eval:    Evaluating OT: Jamie Welsh OTR/L [id number]     Referring Provider[de-identified] Anatoliy Pina MD                                      Specific Provider Orders/Date: OT evaluation and treatment 21     Diagnosis: intracranial hemorrage        Pertinent Medical History: Depression, shingles, HTN, HLD, COVID-19        Precautions:  Fall Risk, vertigo     Assessment of current deficits   [x]? Functional mobility             [x]?ADLs           [x]? Strength                  []?Cognition   [x]? Functional transfers           []? IADLs         [x]? Safety Awareness   [x]? Endurance   []? Fine Coordination              [x]? Balance      []? Vision/perception   []? Sensation     []? Gross Motor Coordination  []? ROM           []?  Delirium                   []? Motor Control      OT PLAN OF CARE   OT POC based on physician orders, patient diagnosis and results of clinical assessment     Frequency/Duration 1-4 days/wk for 2 weeks PRN   Specific OT Treatment to include:   Instruction/training on adapted ADL techniques and AE recommendations to increase functional independence within precautions  Training on energy conservation strategies, correct breathing pattern and techniques to improve independence/tolerance for self-care routine  Functional transfer/mobility training/DME recommendations for increased independence, safety, and fall prevention  Patient/Family education to increase follow through with safety techniques and functional independence  Therapeutic exercise to improve motor endurance, ROM, and functional strength for ADLs/functional transfers  Therapeutic activities to facilitate/challenge dynamic balance, stand tolerance for increased safety and independence with ADLs     Recommended Adaptive Equipment: ww, shower chair, long handled sponge- TBD      Home Living: Pt lives with wife in a 1 story with 2 EMMANUEL and no rail  Bathroom setup: walk in shower   Equipment owned: none, his wife has a hurry cane     Prior Level of Function: indep with ADLs , indep with IADLs; ambulated with no AD  Driving: yes  Occupation:       Pain Level: Pt did not complain of pain this session, stated of feeling dizzy  Cognition: A&O: 4/4;   Follows 2 step directions: good               Memory:  good               Sequencing:  good               Problem solving:  good               Judgement/safety:  fair      Functional Assessment:   AM-PAC Daily Activity Raw Score: 16/24       Initial Eval Status  Date: 5/29/21 Treatment Status  Date: 6/4/21 STG=LTG  Time frame: 10-14 days   Feeding Independent   Independent  Pt able to grasp cup, bring to mouth and drink from straw Independent    Grooming Supervision  Supervision  Pt washed face, donned deodorant seated EOB  Independent    UB Dressing Stand by Assist  DNT  modA per previous level Independent    LB Dressing Stand by Assist  Dependent  Carlos[facility] socks seated EOB  Modified [place]    Bathing Stand by Assist modA  Simulated Task    Pt able to wash of UB and luis area seated, with assistance to wash of LE's and stand to wash of buttocks Modified [place]    Toileting Stand by Assist   Mary/CGA  Pt urinated seated on standard commode, CGA for transfer with use of grab bar Modified [place]    Bed Mobility  Supine to sit: NT   Sit to supine: Stand by Assist   modA  Supine<>EOB    Mary  EOB<>Supine Supine to sit: Modified [place]   Sit to supine: Modified [place]    Functional Transfers Sit to stand: Contact Guard Assist   Stand to sit: Contact Guard Assist   Stand pivot: Contact Guard Assist  Mary/CGA  Sit to Stand   Stand to Sit    Cueing for hand placement  Modified [place]    Functional Mobility CGA with ww  Steady with no c/o dizziness Mary  Pt ambulated short household distance in room EOB<>Bathroom with w.w, poor safety/gait, light LOB x1, assistance with walker management Mod indep with AAD if needed   Balance Sitting:     Static:  indep    Dynamic:supervision  Standing: CGA  Sitting Supported:  SBA    Standing:  Mary Sitting:     Static:  indep    Dynamic:indep  Standing: indep   Activity Tolerance fair Fair- good   Visual/  Perceptual Glasses: yes             BUE  ROM/Strength/  Fine motor Coordination Hand dominance: R but has carpal tunnel and uses L a lot     RUE: ROM WFL     Strength: grossly 4/5      Strength:  WFL     Coordination:  Good-     LUE: ROM  WFL     Strength: grossly 4/5      Strength:  WFL     Coordination: WFL          Hearing: WFL   Sensation:  No c/o numbness or tingling   Tone:  Some tremors noted B hands   Edema:  None noted        Education:  Pt was educated through out treatment regarding proper technique & safety with bed mobility, functional transfers & mobility, ADL compensatory strategies to ease tasks to improve safety & prevent falls and allow pt to return home safely. Comments: Upon arrival pt supine in bed, agreeable to therapy, speaking with nursing okaying pt to be seen this session. Pt completed of bed mobility, functional mobility, transfers and light ADL tasks this session. Pt stating of being very tired this session, and slightly dizzy throughout session, requesting to return to bed to lay down. At end of session, pt supine in bed, all lines and tubes intact, call light within reach. · Pt has made limited progress towards set goals.    · Continue with current plan of care focusing on increasing of independency with transfers and ADL tasks      Treatment Time In: 2:53pm        Treatment Time Out: 3:03pm              Treatment Charges: Mins Units   Ther Ex  92934     Manual Therapy 46612     Thera Activities 93633     ADL/Home Mgt 29091 10 1   Neuro Re-ed [id number]     Group Therapy      Orthotic manage/training  14425     Non-Billable Time     Total Timed Treatment 10 1        Caro MANDUJANO/LOAN [id number]

## 2021-06-04 NOTE — PROGRESS NOTES
TRAUMA SURGERY  ATTENDING PROGRESS NOTE      CC: fall     S:  Awake, alert feels better over all,     O:   @/71   Pulse 83   Temp 97.9 °F (36.6 °C) (Temporal)   Resp 16   Ht 5' 8\" (1.727 m)   Wt 238 lb (108 kg)   SpO2 96%   BMI 36.19 kg/m² @    Gen - no apparent distress   Neuro - Awake, alert, attentive    HEENT - PERRL 4 mm   Lungs - non labored, BS clear b/l   Heart - RR no extra heart sounds    Abdomen - soft non tender, no obvious lesions,   Ext- ROM wnl all ext NVI,     A/P: Fall with ICH and skull fx,       Active Problems:    Subdural hematoma (HCC)  Resolved Problems:    * No resolved hospital problems.  *    SDH/SAH--monitor neuro exam, keppra,   Skull fx--pain control  Dizziness--meclizine; thorazine,  neuro consulted, appreciate input    Diet as tolerated  PT/OT   SCDs, heparin TID    Discharge       Nicky Lund MD FACS

## 2021-06-04 NOTE — PLAN OF CARE
Problem: Falls - Risk of:  Goal: Will remain free from falls  Description: Will remain free from falls  6/4/2021 0233 by Payton Aviles RN  Outcome: Met This Shift  6/3/2021 1713 by Josr Funk RN  Outcome: Met This Shift  Goal: Absence of physical injury  Description: Absence of physical injury  6/4/2021 0233 by Payton Aviles RN  Outcome: Met This Shift  6/3/2021 1713 by Josr Funk RN  Outcome: Met This Shift     Problem: Pain:  Goal: Pain level will decrease  Description: Pain level will decrease  6/4/2021 0233 by Payton Aviles RN  Outcome: Met This Shift  6/3/2021 1713 by Josr Funk RN  Outcome: Met This Shift  Goal: Control of acute pain  Description: Control of acute pain  6/4/2021 0233 by Payton Aviles RN  Outcome: Met This Shift  6/3/2021 1713 by Jsor Funk RN  Outcome: Met This Shift  Goal: Control of chronic pain  Description: Control of chronic pain  Outcome: Met This Shift     Problem: Skin Integrity:  Goal: Will show no infection signs and symptoms  Description: Will show no infection signs and symptoms  6/4/2021 0233 by Payton Aviles RN  Outcome: Met This Shift  6/3/2021 1713 by Josr Funk RN  Outcome: Met This Shift  Goal: Absence of new skin breakdown  Description: Absence of new skin breakdown  6/4/2021 0233 by Payton Aviles RN  Outcome: Met This Shift  6/3/2021 1713 by Josr Funk RN  Outcome: Met This Shift

## 2021-06-07 ENCOUNTER — TELEPHONE (OUTPATIENT)
Dept: FAMILY MEDICINE CLINIC | Age: 66
End: 2021-06-07

## 2021-06-07 NOTE — TELEPHONE ENCOUNTER
[facility] Transitions Initial Follow Up Call    Outreach made within 2 business days of discharge: Yes    Patient: Kirstie Rice Patient : 1955   MRN: [id number]  Reason for Admission:subdural hematoma   Discharge Date: 21       Spoke with: Nikhil Moss    Discharge department/facility: home     TCM Interactive Patient Contact:  Was patient able to fill all prescriptions: Yes  Was patient instructed to bring all medications to the follow-up visit: Yes  Is patient taking all medications as directed in the discharge summary?  Yes  Does patient understand their discharge instructions: Yes  Does patient have questions or concerns that need addressed prior to 7-14 day follow up office visit: no    Scheduled appointment with PCP within 7-14 days    Follow Up  Future Appointments   Date Time Provider Samy Ariza   2021  8:00 AM DO Irasema Lopez [facility]       Safia Cain

## 2021-06-17 ENCOUNTER — OFFICE VISIT (OUTPATIENT)
Dept: FAMILY MEDICINE CLINIC | Age: 66
End: 2021-06-17
Payer: COMMERCIAL

## 2021-06-17 VITALS
RESPIRATION RATE: 22 BRPM | HEART RATE: 94 BPM | SYSTOLIC BLOOD PRESSURE: 138 MMHG | OXYGEN SATURATION: 98 % | WEIGHT: 237 LBS | DIASTOLIC BLOOD PRESSURE: 86 MMHG | BODY MASS INDEX: 37.2 KG/M2 | HEIGHT: 67 IN

## 2021-06-17 DIAGNOSIS — S06.9X9A FRACTURE OF OCCIPITAL BONE OF SKULL WITH LOSS OF CONSCIOUSNESS (HCC): ICD-10-CM

## 2021-06-17 DIAGNOSIS — S06.5XAA SUBDURAL HEMATOMA: ICD-10-CM

## 2021-06-17 DIAGNOSIS — F32.9 REACTIVE DEPRESSION: ICD-10-CM

## 2021-06-17 DIAGNOSIS — I60.9 SAH (SUBARACHNOID HEMORRHAGE) (HCC): ICD-10-CM

## 2021-06-17 DIAGNOSIS — E78.49 OTHER HYPERLIPIDEMIA: ICD-10-CM

## 2021-06-17 DIAGNOSIS — S06.5XAA SUBDURAL HEMATOMA (HCC): ICD-10-CM

## 2021-06-17 DIAGNOSIS — S02.119A FRACTURE OF OCCIPITAL BONE OF SKULL WITH LOSS OF CONSCIOUSNESS (HCC): ICD-10-CM

## 2021-06-17 DIAGNOSIS — H81.13 BENIGN PAROXYSMAL POSITIONAL VERTIGO DUE TO BILATERAL VESTIBULAR DISORDER: Primary | ICD-10-CM

## 2021-06-17 LAB
ANION GAP SERPL CALCULATED.3IONS-SCNC: 14 MMOL/L (ref 7–16)
BASOPHILS ABSOLUTE: 0.04 E9/L (ref 0–0.2)
BASOPHILS RELATIVE PERCENT: 0.5 % (ref 0–2)
BUN BLDV-MCNC: 19 MG/DL (ref 6–23)
CALCIUM SERPL-MCNC: 9.4 MG/DL (ref 8.6–10.2)
CHLORIDE BLD-SCNC: 99 MMOL/L (ref 98–107)
CO2: 23 MMOL/L (ref 22–29)
CREAT SERPL-MCNC: 1.2 MG/DL (ref 0.7–1.2)
EOSINOPHILS ABSOLUTE: 0.23 E9/L (ref 0.05–0.5)
EOSINOPHILS RELATIVE PERCENT: 3.1 % (ref 0–6)
GFR AFRICAN AMERICAN: >60
GFR NON-AFRICAN AMERICAN: >60 ML/MIN/1.73
GLUCOSE BLD-MCNC: 101 MG/DL (ref 74–99)
HCT VFR BLD CALC: 41.1 % (ref 37–54)
HEMOGLOBIN: 13.3 G/DL (ref 12.5–16.5)
IMMATURE GRANULOCYTES #: 0.22 E9/L
IMMATURE GRANULOCYTES %: 3 % (ref 0–5)
LYMPHOCYTES ABSOLUTE: 1.75 E9/L (ref 1.5–4)
LYMPHOCYTES RELATIVE PERCENT: 23.8 % (ref 20–42)
MCH RBC QN AUTO: 30.2 PG (ref 26–35)
MCHC RBC AUTO-ENTMCNC: 32.4 % (ref 32–34.5)
MCV RBC AUTO: 93.2 FL (ref 80–99.9)
MONOCYTES ABSOLUTE: 0.65 E9/L (ref 0.1–0.95)
MONOCYTES RELATIVE PERCENT: 8.8 % (ref 2–12)
NEUTROPHILS ABSOLUTE: 4.47 E9/L (ref 1.8–7.3)
NEUTROPHILS RELATIVE PERCENT: 60.8 % (ref 43–80)
PDW BLD-RTO: 13.9 FL (ref 11.5–15)
PLATELET # BLD: 323 E9/L (ref 130–450)
PMV BLD AUTO: 8.8 FL (ref 7–12)
POTASSIUM SERPL-SCNC: 4.3 MMOL/L (ref 3.5–5)
RBC # BLD: 4.41 E12/L (ref 3.8–5.8)
SODIUM BLD-SCNC: 136 MMOL/L (ref 132–146)
TSH SERPL DL<=0.05 MIU/L-ACNC: 2.96 UIU/ML (ref 0.27–4.2)
WBC # BLD: 7.4 E9/L (ref 4.5–11.5)

## 2021-06-17 PROCEDURE — G8427 DOCREV CUR MEDS BY ELIG CLIN: HCPCS | Performed by: FAMILY MEDICINE

## 2021-06-17 PROCEDURE — 1111F DSCHRG MED/CURRENT MED MERGE: CPT | Performed by: FAMILY MEDICINE

## 2021-06-17 PROCEDURE — 1123F ACP DISCUSS/DSCN MKR DOCD: CPT | Performed by: FAMILY MEDICINE

## 2021-06-17 PROCEDURE — 99214 OFFICE O/P EST MOD 30 MIN: CPT | Performed by: FAMILY MEDICINE

## 2021-06-17 PROCEDURE — 1036F TOBACCO NON-USER: CPT | Performed by: FAMILY MEDICINE

## 2021-06-17 PROCEDURE — 4040F PNEUMOC VAC/ADMIN/RCVD: CPT | Performed by: FAMILY MEDICINE

## 2021-06-17 PROCEDURE — 3017F COLORECTAL CA SCREEN DOC REV: CPT | Performed by: FAMILY MEDICINE

## 2021-06-17 PROCEDURE — G8417 CALC BMI ABV UP PARAM F/U: HCPCS | Performed by: FAMILY MEDICINE

## 2021-06-17 RX ORDER — TESTOSTERONE CYPIONATE 100 MG/ML
INJECTION, SOLUTION INTRAMUSCULAR
COMMUNITY
Start: 2021-05-25 | End: 2021-12-30

## 2021-06-17 RX ORDER — MECLIZINE HCL 12.5 MG/1
12.5 TABLET ORAL 3 TIMES DAILY PRN
Qty: 21 TABLET | Refills: 0 | Status: SHIPPED | OUTPATIENT
Start: 2021-06-17 | End: 2021-06-24

## 2021-06-17 RX ORDER — ATORVASTATIN CALCIUM 40 MG/1
TABLET, FILM COATED ORAL
Qty: 90 TABLET | Refills: 1 | Status: SHIPPED
Start: 2021-06-17 | End: 2021-12-17

## 2021-06-17 RX ORDER — BUPROPION HYDROCHLORIDE 150 MG/1
150 TABLET ORAL EVERY MORNING
Qty: 90 TABLET | Refills: 1 | Status: SHIPPED
Start: 2021-06-17 | End: 2021-12-17

## 2021-06-17 SDOH — ECONOMIC STABILITY: FOOD INSECURITY: WITHIN THE PAST 12 MONTHS, THE FOOD YOU BOUGHT JUST DIDN'T LAST AND YOU DIDN'T HAVE MONEY TO GET MORE.: NEVER TRUE

## 2021-06-17 SDOH — ECONOMIC STABILITY: FOOD INSECURITY: WITHIN THE PAST 12 MONTHS, YOU WORRIED THAT YOUR FOOD WOULD RUN OUT BEFORE YOU GOT MONEY TO BUY MORE.: NEVER TRUE

## 2021-06-17 ASSESSMENT — ENCOUNTER SYMPTOMS
CONSTIPATION: 0
DIARRHEA: 0
BACK PAIN: 0
COUGH: 0
EYE ITCHING: 0
SHORTNESS OF BREATH: 0
SORE THROAT: 0
RHINORRHEA: 0
EYE PAIN: 0
CHEST TIGHTNESS: 0
ABDOMINAL PAIN: 0
NAUSEA: 0

## 2021-06-17 ASSESSMENT — SOCIAL DETERMINANTS OF HEALTH (SDOH): HOW HARD IS IT FOR YOU TO PAY FOR THE VERY BASICS LIKE FOOD, HOUSING, MEDICAL CARE, AND HEATING?: NOT HARD AT ALL

## 2021-06-17 NOTE — PROGRESS NOTES
testosterone cypionate (DEPOTESTOTERONE CYPIONATE) 100 MG/ML injection               venlafaxine (EFFEXOR XR) 150 MG extended release capsule  take 1 capsule by mouth once daily                   Medications marked \"taking\" at this time  Outpatient Medications Marked as Taking for the 6/17/21 encounter (Office Visit) with Candido Irene DO   Medication Sig Dispense Refill    buPROPion (WELLBUTRIN XL) 150 MG extended release tablet Take 1 tablet by mouth every morning 90 tablet 1    atorvastatin (LIPITOR) 40 MG tablet take 1 tablet by mouth daily 90 tablet 1    meclizine (ANTIVERT) 12.5 MG tablet Take 1 tablet by mouth 3 times daily as needed for Dizziness 21 tablet 0    levETIRAcetam (KEPPRA) 500 MG tablet Take 1 tablet by mouth 2 times daily for 6 days 12 tablet 0    albuterol sulfate HFA (VENTOLIN HFA) 108 (90 Base) MCG/ACT inhaler Inhale 2 puffs into the lungs 4 times daily as needed for Wheezing 1 Inhaler 5    lisinopril-hydroCHLOROthiazide (PRINZIDE;ZESTORETIC) 20-12.5 MG per tablet Take 1 tablet by mouth daily 90 tablet 1    venlafaxine (EFFEXOR XR) 150 MG extended release capsule take 1 capsule by mouth once daily 90 capsule 1    Multiple Vitamins-Minerals (THERAPEUTIC MULTIVITAMIN-MINERALS) tablet Take 1 tablet by mouth daily          Medications patient taking as of now reconciled against medications ordered at time of hospital discharge: Yes    Chief Complaint   Patient presents with    Care Management       HPI    Inpatient course: Discharge summary reviewed- see chart. Interval history/Current status:   Patient was admitted to the hospital on 5/27/21 to 6/4/21 due to fall. He was noted to have subdural hematoma as well as SAH. He did see NS who did not recommend surgery. He was placed on keppra in the meantime. He states the dizziness is improved but still present. He is having PT come to the house. He has had 2-3 bad days with headache as well as dizziness since being home.  He discharge. Conjunctiva/sclera: Conjunctivae normal.   Neck:      Thyroid: No thyromegaly. Cardiovascular:      Rate and Rhythm: Normal rate and regular rhythm. Heart sounds: Normal heart sounds. No murmur heard. Pulmonary:      Effort: Pulmonary effort is normal.      Breath sounds: Normal breath sounds. No wheezing. Abdominal:      Palpations: Abdomen is soft. Tenderness: There is no abdominal tenderness. Musculoskeletal:         General: No tenderness. Normal range of motion. Cervical back: Normal range of motion and neck supple. Lymphadenopathy:      Cervical: No cervical adenopathy. Skin:     General: Skin is warm and dry. Findings: No erythema or rash. Neurological:      Mental Status: He is alert and oriented to person, place, and time. Coordination: Coordination normal.      Deep Tendon Reflexes: Reflexes are normal and symmetric. Psychiatric:         Behavior: Behavior normal.         Thought Content: Thought content normal.         Judgment: Judgment normal.             Assessment/Plan:  1. Benign paroxysmal positional vertigo due to bilateral vestibular disorder  I did give patient some meclizine today to use as needed. Also exercise for the Epley maneuver given as well. Patient states he is very hesitant and still slightly anxious about falling again. He does have an appointment for a CT as well as an appointment with neurosurgery in July. - [place] DISCHARGE MEDS RECONCILED W/ CURRENT OUTPATIENT MED LIST    2. SAH (subarachnoid hemorrhage) ([facility] [address]  Continue to follow with neurosurgery as directed labs to be completed as below  - CBC Auto Differential; Future  - TSH without Reflex; Future  - BASIC METABOLIC PANEL; Future    3. Fracture of occipital bone of skull with loss of consciousness ([address]  Continue to follow with neurosurgery    4.  Subdural hematoma (HCC)  Continue to follow with neurosurgery  - CBC Auto Differential; Future  - TSH without Reflex; Future  - BASIC METABOLIC PANEL; Future    5. Other hyperlipidemia  Reordered Lipitor as below  - atorvastatin (LIPITOR) 40 MG tablet; take 1 tablet by mouth daily  Dispense: 90 tablet; Refill: 1    6. Reactive depression  Mood currently stable we will continue Wellbutrin  - buPROPion (WELLBUTRIN XL) 150 MG extended release tablet; Take 1 tablet by mouth every morning  Dispense: 90 tablet;  Refill: 1        Medical Decision Making: moderate complexity

## 2021-06-17 NOTE — PATIENT INSTRUCTIONS
safety. Be sure to make and go to all appointments, and call your doctor if you are having problems. It's also a good idea to know your test results and keep a list of the medicines you take. Where can you learn more? Go to [email or web address] and sign in to your [facility] account. Enter B696 in the [employer] box to learn more about \"Epley Maneuver for Vertigo: Exercises. \"     If you do not have an account, please click on the \"Sign Up Now\" link. Current as of: December 2, 2020               Content Version: 12.9  © 1784-1449 [employer]. Care instructions adapted under license by [facility] ([facility]). If you have questions about a medical condition or this instruction, always ask your healthcare professional. [address] any warranty or liability for your use of this information.

## 2021-07-02 ENCOUNTER — HOSPITAL ENCOUNTER (OUTPATIENT)
Dept: CT IMAGING | Age: 66
Discharge: HOME | End: 2021-07-04
Payer: COMMERCIAL

## 2021-07-02 DIAGNOSIS — S06.5XAA SUBDURAL HEMATOMA: ICD-10-CM

## 2021-07-02 DIAGNOSIS — I60.9 SAH (SUBARACHNOID HEMORRHAGE) (HCC): ICD-10-CM

## 2021-07-02 PROCEDURE — 70450 CT HEAD/BRAIN W/O DYE: CPT

## 2021-07-14 ENCOUNTER — OFFICE VISIT (OUTPATIENT)
Dept: FAMILY MEDICINE CLINIC | Age: 66
End: 2021-07-14
Payer: COMMERCIAL

## 2021-07-14 VITALS
TEMPERATURE: 97 F | HEART RATE: 95 BPM | WEIGHT: 249 LBS | DIASTOLIC BLOOD PRESSURE: 89 MMHG | OXYGEN SATURATION: 99 % | RESPIRATION RATE: 20 BRPM | BODY MASS INDEX: 39.08 KG/M2 | SYSTOLIC BLOOD PRESSURE: 136 MMHG | HEIGHT: 67 IN

## 2021-07-14 DIAGNOSIS — S06.5XAA SUBDURAL HEMATOMA: ICD-10-CM

## 2021-07-14 DIAGNOSIS — I10 ESSENTIAL HYPERTENSION: ICD-10-CM

## 2021-07-14 DIAGNOSIS — E34.9 TESTOSTERONE DEFICIENCY: ICD-10-CM

## 2021-07-14 DIAGNOSIS — H81.13 BENIGN PAROXYSMAL POSITIONAL VERTIGO DUE TO BILATERAL VESTIBULAR DISORDER: Primary | ICD-10-CM

## 2021-07-14 DIAGNOSIS — I60.9 SAH (SUBARACHNOID HEMORRHAGE) (HCC): ICD-10-CM

## 2021-07-14 DIAGNOSIS — E78.2 MIXED HYPERLIPIDEMIA: ICD-10-CM

## 2021-07-14 DIAGNOSIS — F33.0 MILD EPISODE OF RECURRENT MAJOR DEPRESSIVE DISORDER (HCC): ICD-10-CM

## 2021-07-14 PROCEDURE — 1036F TOBACCO NON-USER: CPT | Performed by: FAMILY MEDICINE

## 2021-07-14 PROCEDURE — G8417 CALC BMI ABV UP PARAM F/U: HCPCS | Performed by: FAMILY MEDICINE

## 2021-07-14 PROCEDURE — 3017F COLORECTAL CA SCREEN DOC REV: CPT | Performed by: FAMILY MEDICINE

## 2021-07-14 PROCEDURE — 1123F ACP DISCUSS/DSCN MKR DOCD: CPT | Performed by: FAMILY MEDICINE

## 2021-07-14 PROCEDURE — 99214 OFFICE O/P EST MOD 30 MIN: CPT | Performed by: FAMILY MEDICINE

## 2021-07-14 PROCEDURE — G8427 DOCREV CUR MEDS BY ELIG CLIN: HCPCS | Performed by: FAMILY MEDICINE

## 2021-07-14 PROCEDURE — 4040F PNEUMOC VAC/ADMIN/RCVD: CPT | Performed by: FAMILY MEDICINE

## 2021-07-14 RX ORDER — VENLAFAXINE HYDROCHLORIDE 150 MG/1
CAPSULE, EXTENDED RELEASE ORAL
Qty: 90 CAPSULE | Refills: 1 | Status: SHIPPED
Start: 2021-07-14 | End: 2022-02-22

## 2021-07-14 ASSESSMENT — ENCOUNTER SYMPTOMS
CONSTIPATION: 0
ABDOMINAL PAIN: 0
COUGH: 0
NAUSEA: 0
SHORTNESS OF BREATH: 0
CHEST TIGHTNESS: 0
DIARRHEA: 0
RHINORRHEA: 0
EYE ITCHING: 0
EYE PAIN: 0
BACK PAIN: 0
SORE THROAT: 0

## 2021-07-14 NOTE — PROGRESS NOTES
Giles Reddy  : 1955    Chief Complaint:     Chief Complaint   Patient presents with    Dizziness     1 month follow up        HPI  Giles Reddy 73 y.o. presents for   Chief Complaint   Patient presents with    Dizziness     1 month follow up      Presents for follow-up today. He states that he has seen physical therapy once. He will be going back. He also saw a chiropractor which did seem to help his BPPV. He did see neurosurgery yesterday they did recommend EEG and to stay off work for additional 6 weeks. His blood pressure is well controlled today. He has had 2 testosterone shots and will be getting another next week. He still feels worn out. He does wish to go back to work. He also has not been able to do any hobbies which is making him slightly depressed. He denies any SI or HI. All questions were answered to patients satisfaction.     Past Medical History:   Diagnosis Date    Depression     Erectile dysfunction     History of shingles     Hypertension 2017    Mixed hyperlipidemia 2017       Past Surgical History:   Procedure Laterality Date    COLONOSCOPY      HEEL SPUR SURGERY         Social History     Socioeconomic History    Marital status:      Spouse name: Broderick Fitzgerald Number of children: None    Years of education: None    Highest education level: None   Occupational History    Occupation:    Tobacco Use    Smoking status: Never Smoker    Smokeless tobacco: Never Used   Vaping Use    Vaping Use: Never used   Substance and Sexual Activity    Alcohol use: No    Drug use: No    Sexual activity: Not Currently     Partners: Female   Other Topics Concern    None   Social History Narrative    ** Merged History Encounter **          Social Determinants of Health     Financial Resource Strain: Low Risk     Difficulty of Paying Living Expenses: Not hard at all   Food Insecurity: No Food Insecurity    Worried About [facility] in the Last Year: Never true    Ran Out of Food in the Last Year: Never true   Transportation Needs:     Lack of Transportation (Medical):  Lack of Transportation (Non-Medical):    Physical Activity:     Days of Exercise per Week:     Minutes of Exercise per Session:    Stress:     Feeling of Stress :    Social Connections:     Frequency of Communication with Friends and Family:     Frequency of Social Gatherings with Friends and Family:     Attends Yazidi Services:     Active Member of Clubs or Organizations:     Attends Club or Organization Meetings:     Marital Status:    Intimate Partner Violence:     Fear of Current or Ex-Partner:     Emotionally Abused:     Physically Abused:     Sexually Abused:        Family History   Problem Relation Age of Onset    Heart Disease Mother         Valves    Atrial Fibrillation Father     Heart Disease Father         Valve    Heart Attack Father 59          Current Outpatient Medications   Medication Sig Dispense Refill    venlafaxine (EFFEXOR XR) 150 MG extended release capsule take 1 capsule by mouth once daily 90 capsule 1    testosterone cypionate (DEPOTESTOTERONE CYPIONATE) 100 MG/ML injection       buPROPion (WELLBUTRIN XL) 150 MG extended release tablet Take 1 tablet by mouth every morning 90 tablet 1    atorvastatin (LIPITOR) 40 MG tablet take 1 tablet by mouth daily 90 tablet 1    albuterol sulfate HFA (VENTOLIN HFA) 108 (90 Base) MCG/ACT inhaler Inhale 2 puffs into the lungs 4 times daily as needed for Wheezing 1 Inhaler 5    lisinopril-hydroCHLOROthiazide (PRINZIDE;ZESTORETIC) 20-12.5 MG per tablet Take 1 tablet by mouth daily 90 tablet 1    Multiple Vitamins-Minerals (THERAPEUTIC MULTIVITAMIN-MINERALS) tablet Take 1 tablet by mouth daily       No current facility-administered medications for this visit.        Allergies   Allergen Reactions    Codeine Nausea Only       Health Maintenance Due   Topic Date Due    HIV screen  Never done    Shingles Vaccine (2 of 3) 12/13/2017           REVIEW OF SYSTEMS  Review of Systems   Constitutional: Negative for chills, fatigue and fever. HENT: Negative for congestion, ear pain, postnasal drip, rhinorrhea and sore throat. Eyes: Negative for pain and itching. Respiratory: Negative for cough, chest tightness and shortness of breath. Cardiovascular: Negative for chest pain and leg swelling. Gastrointestinal: Negative for abdominal pain, constipation, diarrhea and nausea. Endocrine: Negative for heat intolerance. Genitourinary: Negative for flank pain, frequency and urgency. Musculoskeletal: Negative for arthralgias and back pain. Skin: Negative for pallor and rash. Allergic/Immunologic: Negative for environmental allergies. Neurological: Positive for dizziness and headaches. Negative for numbness. Hematological: Does not bruise/bleed easily. Psychiatric/Behavioral: Negative for agitation, behavioral problems and hallucinations. PHYSICAL EXAM  /89 (Site: Left Upper Arm, Position: Sitting, Cuff Size: Large Adult)   Pulse 95   Temp 97 °F (36.1 °C) (Temporal)   Resp 20   Ht 5' 7\" (1.702 m)   Wt 249 lb (112.9 kg)   SpO2 99%   BMI 39.00 kg/m²   Physical Exam  Vitals and nursing note reviewed. Constitutional:       General: He is not in acute distress. Appearance: He is well-developed. He is obese. He is not diaphoretic. HENT:      Head: Normocephalic and atraumatic. Right Ear: External ear normal.      Left Ear: External ear normal.      Nose: Nose normal.      Mouth/Throat:      Pharynx: No oropharyngeal exudate. Eyes:      General:         Right eye: No discharge. Left eye: No discharge. Conjunctiva/sclera: Conjunctivae normal.   Neck:      Thyroid: No thyromegaly. Cardiovascular:      Rate and Rhythm: Normal rate and regular rhythm. Heart sounds: Normal heart sounds. No murmur heard.      Pulmonary:      Effort: Pulmonary effort is normal.      Breath sounds: Normal breath sounds. No wheezing. Abdominal:      Palpations: Abdomen is soft. Tenderness: There is no abdominal tenderness. Musculoskeletal:         General: No tenderness. Normal range of motion. Cervical back: Normal range of motion and neck supple. Lymphadenopathy:      Cervical: No cervical adenopathy. Skin:     General: Skin is warm and dry. Findings: No erythema or rash. Neurological:      Mental Status: He is alert and oriented to person, place, and time. Coordination: Coordination normal.      Deep Tendon Reflexes: Reflexes are normal and symmetric. Psychiatric:         Behavior: Behavior normal.         Thought Content: Thought content normal.         Judgment: Judgment normal.              Laboratory:   All laboratory and radiology results have been personally reviewed by myself    Lab Results   Component Value Date     06/17/2021    K 4.3 06/17/2021    K 4.0 05/28/2021    CL 99 06/17/2021    CO2 23 06/17/2021    BUN 19 06/17/2021    CREATININE 1.2 06/17/2021    PROT 6.3 05/27/2021    LABALBU 3.7 05/27/2021    CALCIUM 9.4 06/17/2021    GFRAA >60 06/17/2021    LABGLOM >60 06/17/2021    GLUCOSE 101 06/17/2021    AST 39 05/27/2021    ALT 27 05/27/2021    ALKPHOS 103 05/27/2021    BILITOT 0.8 05/27/2021    TSH 2.960 06/17/2021    CHOL 180 08/26/2020    TRIG 270 08/26/2020    HDL 33 08/26/2020    LDLCALC 93 08/26/2020    LABA1C 5.8 04/01/2021        Lab Results   Component Value Date    CHOL 180 08/26/2020    CHOL 218 (H) 05/03/2017    CHOL 221 (H) 05/10/2016     Lab Results   Component Value Date    TRIG 270 (H) 08/26/2020    TRIG 243 (H) 05/03/2017    TRIG 173 (H) 05/10/2016     Lab Results   Component Value Date    HDL 33 08/26/2020    HDL 35 (L) 05/03/2017    HDL 35 (L) 05/10/2016     Lab Results   Component Value Date    LDLCALC 93 08/26/2020    LDLCALC 136 (H) 06/11/2012    LDLCHOLESTEROL 134 (H) 05/03/2017    LDLCHOLESTEROL 151 (H) 05/10/2016       Lab Results   Component Value Date    LABA1C 5.8 04/01/2021    LABA1C 5.7 (H) 08/26/2020     Lab Results   Component Value Date    LDLCALC 93 08/26/2020    CREATININE 1.2 06/17/2021       ASSESSMENT/PLAN:     Diagnosis Orders   1. Benign paroxysmal positional vertigo due to bilateral vestibular disorder     2. Subdural hematoma (HCC)     3. SAH (subarachnoid hemorrhage) ([facility] [address]     4. Essential hypertension     5. Mixed hyperlipidemia     6. Testosterone deficiency     7. Mild episode of recurrent major depressive disorder [facility])       Patient just feels worn out. He did start testosterone medication and is improved. He does need to stay off work until he has EEG and return to see neurosurgery. He will see PT for BPPV which is improved. May consider increasing Effexor in the future but will await EEG results. Continue to follow with urology for testosterone deficiency. BP well controlled we will continue to monitor. Problem list reviewed andsimplified/updated  HM reviewed today and counseled as appropriate    Call or go to ED immediately if symptoms worsen or persist.  Future Appointments   Date Time Provider Samy Ariza   7/21/2021 10:00 AM [facility] NDEEG/VF [facility] EEG [facility]   7/23/2021 10:00 AM [facility] NDE/VF SEYZ EEG [facility]   10/15/2021  7:30 AM DO [facility]     Or sooner if necessary.       Educational materials and/or homeexercises printed for patient's review and were included in patient instructions on his/her After Visit Summary and given to patient at the end of visit.       Counseled regarding above diagnosis, including possible risks and complications,  especially if left uncontrolled.     Counseled regarding the possible side effects, risks, benefits and alternatives to treatment; patient and/or guardian verbalizes understanding, agrees,feels comfortable with and wishes to proceed with above treatment plan.     Advised patient to call withany new medication issues, and read all Rx info from pharmacy to assure aware of all possible risks and side effects of medication before taking.     Reviewed age and gender appropriate health screening exams and vaccinations. Advised patient regarding importance of keeping up with recommended health maintenance and toschedule as soon as possible if overdue, as this is important in assessing for undiagnosed pathology, especially cancer, as well as protecting against potentially harmful/life threatening disease.          Patient and/or guardian verbalizes understanding and agrees with above counseling, assessment and plan.     All questions answered. Cornelio 5, DO  7/14/21    NOTE: This report was transcribed using voice recognition software.  Every effort was made to ensure accuracy; however, inadvertent computerized transcription errors may be present

## 2021-07-21 ENCOUNTER — HOSPITAL ENCOUNTER (OUTPATIENT)
Dept: NEUROLOGY | Age: 66
Discharge: HOME | End: 2021-07-21
Payer: COMMERCIAL

## 2021-07-21 DIAGNOSIS — S06.5XAA SUBDURAL HEMATOMA: ICD-10-CM

## 2021-07-21 DIAGNOSIS — I60.9 SAH (SUBARACHNOID HEMORRHAGE) (HCC): ICD-10-CM

## 2021-07-21 PROCEDURE — 95714 VEEG EA 12-26 HR UNMNTR: CPT

## 2021-07-23 ENCOUNTER — HOSPITAL ENCOUNTER (OUTPATIENT)
Dept: NEUROLOGY | Age: 66
Discharge: HOME | End: 2021-07-23
Payer: COMMERCIAL

## 2021-07-23 PROCEDURE — 95721 EEG PHY/QHP>36<60 HR W/O VID: CPT | Performed by: PSYCHIATRY & NEUROLOGY

## 2021-07-26 LAB
HCT VFR BLD CALC: 42.5 % (ref 37–54)
HEMOGLOBIN: 14 G/DL (ref 12.5–16.5)

## 2021-07-28 LAB
SEX HORMONE BINDING GLOBULIN: 23 NMOL/L (ref 11–80)
TESTOSTERONE FREE-NONMALE: 10.7 PG/ML (ref 47–244)
TESTOSTERONE TOTAL: 49 NG/DL (ref 220–1000)

## 2021-07-30 NOTE — PROCEDURES
Ambulatory EEG report    This 71-year-old man, on venlafaxine, testosterone, bupropion, atorvastatin, lisinopril, hydrochlorothiazide, albuterol inhaler and multivitamins, displayed the following underlying rhythms---well-organized, synchronous, 10 hz, 30-40 µV beta rhythms in both posterior regions. The patient progressed uneventfully through all stages of somnolence. There were only minimal muscle and eye movement artifacts. Throughout this tracing, there were no focal abnormalities or epileptiform discharges.     Impression---normal 48-hour ambulatory EEG

## 2021-08-24 ENCOUNTER — NURSE TRIAGE (OUTPATIENT)
Dept: OTHER | Facility: CLINIC | Age: 66
End: 2021-08-24

## 2021-08-24 NOTE — TELEPHONE ENCOUNTER
Received call from  James Kramer  at [facility] with [employer] Complaint. Brief description of triage  : 72 y/o with left arm and shoulder pain  Onset a few months ago , worsening in the past few days pain radiates to shoulder in upper arm       Triage indicates for patient to seen today or tomorrow     Care advice provided, patient verbalizes understanding; denies any other questions or concerns; instructed to call back for any new or worsening symptoms. Writer provided warm transfer to April  at [facility] for appointment scheduling. Attention Provider: Thank you for allowing me to participate in the care of your patient. The patient was connected to triage in response to information provided to the [facility]. Please do not respond through this encounter as the response is not directed to a shared pool. Reason for Disposition   Patient wants to be seen    Answer Assessment - Initial Assessment Questions  1. ONSET: \"When did the pain start? \"      2 months ago     2. LOCATION: \"Where is the pain located? \"      Upper arm     3. PAIN: \"How bad is the pain? \" (Scale 1-10; or mild, moderate, severe)    - MILD (1-3): doesn't interfere with normal activities    - MODERATE (4-7): interferes with normal activities (e.g., work or school) or awakens from sleep    - SEVERE (8-10): excruciating pain, unable to do any normal activities, unable to hold a cup of water        4. WORK OR EXERCISE: \"Has there been any recent work or exercise that involved this part of the body? \"     No     5. CAUSE: \"What do you think is causing the arm pain? \"      Unsure     6. OTHER SYMPTOMS: \"Do you have any other symptoms? \" (e.g., neck pain, swelling, rash, fever, numbness, weakness)      No    7. PREGNANCY: \"Is there any chance you are pregnant? \" \"When was your last menstrual period? \"      No    Protocols used: ARM PAIN-ADULT-[place]

## 2021-09-07 ENCOUNTER — OFFICE VISIT (OUTPATIENT)
Dept: FAMILY MEDICINE CLINIC | Age: 66
End: 2021-09-07
Payer: COMMERCIAL

## 2021-09-07 VITALS
HEIGHT: 67 IN | OXYGEN SATURATION: 98 % | HEART RATE: 99 BPM | TEMPERATURE: 97.3 F | SYSTOLIC BLOOD PRESSURE: 113 MMHG | BODY MASS INDEX: 39.93 KG/M2 | WEIGHT: 254.4 LBS | RESPIRATION RATE: 22 BRPM | DIASTOLIC BLOOD PRESSURE: 79 MMHG

## 2021-09-07 DIAGNOSIS — M25.512 CHRONIC LEFT SHOULDER PAIN: ICD-10-CM

## 2021-09-07 DIAGNOSIS — G89.29 CHRONIC LEFT SHOULDER PAIN: ICD-10-CM

## 2021-09-07 DIAGNOSIS — I10 ESSENTIAL HYPERTENSION: Primary | ICD-10-CM

## 2021-09-07 DIAGNOSIS — I60.9 SAH (SUBARACHNOID HEMORRHAGE) (HCC): ICD-10-CM

## 2021-09-07 DIAGNOSIS — R06.02 SHORTNESS OF BREATH: ICD-10-CM

## 2021-09-07 DIAGNOSIS — E78.2 MIXED HYPERLIPIDEMIA: ICD-10-CM

## 2021-09-07 DIAGNOSIS — R60.0 BILATERAL LEG EDEMA: ICD-10-CM

## 2021-09-07 PROCEDURE — 1123F ACP DISCUSS/DSCN MKR DOCD: CPT | Performed by: FAMILY MEDICINE

## 2021-09-07 PROCEDURE — 3017F COLORECTAL CA SCREEN DOC REV: CPT | Performed by: FAMILY MEDICINE

## 2021-09-07 PROCEDURE — G8427 DOCREV CUR MEDS BY ELIG CLIN: HCPCS | Performed by: FAMILY MEDICINE

## 2021-09-07 PROCEDURE — 1036F TOBACCO NON-USER: CPT | Performed by: FAMILY MEDICINE

## 2021-09-07 PROCEDURE — G8417 CALC BMI ABV UP PARAM F/U: HCPCS | Performed by: FAMILY MEDICINE

## 2021-09-07 PROCEDURE — 4040F PNEUMOC VAC/ADMIN/RCVD: CPT | Performed by: FAMILY MEDICINE

## 2021-09-07 PROCEDURE — 99214 OFFICE O/P EST MOD 30 MIN: CPT | Performed by: FAMILY MEDICINE

## 2021-09-07 RX ORDER — POTASSIUM CHLORIDE 750 MG/1
10 TABLET, EXTENDED RELEASE ORAL DAILY
Qty: 30 TABLET | Refills: 5 | Status: SHIPPED
Start: 2021-09-07 | End: 2021-10-15 | Stop reason: ALTCHOICE

## 2021-09-07 RX ORDER — FUROSEMIDE 20 MG/1
20 TABLET ORAL DAILY PRN
Qty: 30 TABLET | Refills: 5 | Status: SHIPPED
Start: 2021-09-07 | End: 2021-10-15 | Stop reason: ALTCHOICE

## 2021-09-07 ASSESSMENT — ENCOUNTER SYMPTOMS
ABDOMINAL PAIN: 0
NAUSEA: 0
BACK PAIN: 0
SORE THROAT: 0
COUGH: 0
RHINORRHEA: 0
DIARRHEA: 0
EYE PAIN: 0
CHEST TIGHTNESS: 0
CONSTIPATION: 0
SHORTNESS OF BREATH: 1
EYE ITCHING: 0

## 2021-09-07 NOTE — PROGRESS NOTES
Dafne Cox  : 1955    Chief Complaint:     Chief Complaint   Patient presents with    Hypertension    Hyperlipidemia    Leg Swelling       HPI  Dafne Cox 73 y.o. presents for   Chief Complaint   Patient presents with    Hypertension    Hyperlipidemia    Leg Swelling     Patient presents for follow-up today. He states he continues to have some issues with shortness of breath. He did go back to work today he just felt short of breath. He is also having some issues with his left shoulder. He is not sure if this is from the fall. He also continues to have some issues with swelling in his legs. He is on a combination blood pressure and water pill. He does not have a water pill to use as needed. All questions were answered to patients satisfaction.     Past Medical History:   Diagnosis Date    Depression     Erectile dysfunction     History of shingles     Hypertension 2017    Mixed hyperlipidemia 2017       Past Surgical History:   Procedure Laterality Date    COLONOSCOPY      HEEL SPUR SURGERY         Social History     Socioeconomic History    Marital status:      Spouse name: Derek Trevino Number of children: None    Years of education: None    Highest education level: None   Occupational History    Occupation:    Tobacco Use    Smoking status: Never Smoker    Smokeless tobacco: Never Used   Vaping Use    Vaping Use: Never used   Substance and Sexual Activity    Alcohol use: No    Drug use: No    Sexual activity: Not Currently     Partners: Female   Other Topics Concern    None   Social History Narrative    ** Merged History Encounter **          Social Determinants of Health     Financial Resource Strain: Low Risk     Difficulty of Paying Living Expenses: Not hard at all   Food Insecurity: No Food Insecurity    Worried About Running Out of Food in the Last Year: Never true    Jose A of Food in the Last Year: Never true   Transportation Needs:     Lack of Transportation (Medical):  Lack of Transportation (Non-Medical):    Physical Activity:     Days of Exercise per Week:     Minutes of Exercise per Session:    Stress:     Feeling of Stress :    Social Connections:     Frequency of Communication with Friends and Family:     Frequency of Social Gatherings with Friends and Family:     Attends Pentecostalism Services:     Active Member of Clubs or Organizations:     Attends Club or Organization Meetings:     Marital Status:    Intimate Partner Violence:     Fear of Current or Ex-Partner:     Emotionally Abused:     Physically Abused:     Sexually Abused:        Family History   Problem Relation Age of Onset    Heart Disease Mother         Valves    Atrial Fibrillation Father     Heart Disease Father         Valve    Heart Attack Father 59          Current Outpatient Medications   Medication Sig Dispense Refill    furosemide (LASIX) 20 MG tablet Take 1 tablet by mouth daily as needed (swelling) 30 tablet 5    potassium chloride (KLOR-CON M) 10 MEQ extended release tablet Take 1 tablet by mouth daily 30 tablet 5    venlafaxine (EFFEXOR XR) 150 MG extended release capsule take 1 capsule by mouth once daily 90 capsule 1    testosterone cypionate (DEPOTESTOTERONE CYPIONATE) 100 MG/ML injection       buPROPion (WELLBUTRIN XL) 150 MG extended release tablet Take 1 tablet by mouth every morning 90 tablet 1    atorvastatin (LIPITOR) 40 MG tablet take 1 tablet by mouth daily 90 tablet 1    lisinopril-hydroCHLOROthiazide (PRINZIDE;ZESTORETIC) 20-12.5 MG per tablet Take 1 tablet by mouth daily 90 tablet 1    Multiple Vitamins-Minerals (THERAPEUTIC MULTIVITAMIN-MINERALS) tablet Take 1 tablet by mouth daily       No current facility-administered medications for this visit.        Allergies   Allergen Reactions    Codeine Nausea Only       Health Maintenance Due   Topic Date Due    HIV screen  Never done    Shingles Vaccine (2 of 3) 12/13/2017    Flu vaccine (1) 09/01/2021    Lipid screen  08/26/2021           REVIEW OF SYSTEMS  Review of Systems   Constitutional: Positive for fatigue. Negative for chills and fever. HENT: Negative for congestion, ear pain, postnasal drip, rhinorrhea and sore throat. Eyes: Negative for pain and itching. Respiratory: Positive for shortness of breath. Negative for cough and chest tightness. Cardiovascular: Positive for leg swelling. Negative for chest pain. Gastrointestinal: Negative for abdominal pain, constipation, diarrhea and nausea. Endocrine: Negative for heat intolerance. Genitourinary: Negative for flank pain, frequency and urgency. Musculoskeletal: Negative for arthralgias and back pain. Skin: Negative for pallor and rash. Allergic/Immunologic: Negative for environmental allergies. Neurological: Negative for dizziness, numbness and headaches. Hematological: Does not bruise/bleed easily. Psychiatric/Behavioral: Negative for agitation, behavioral problems and hallucinations. PHYSICAL EXAM  /79 (Site: Left Upper Arm, Position: Sitting, Cuff Size: Large Adult)   Pulse 99   Temp 97.3 °F (36.3 °C) (Temporal)   Resp 22   Ht 5' 7\" (1.702 m)   Wt 254 lb 6.4 oz (115.4 kg)   SpO2 98%   BMI 39.84 kg/m²   Physical Exam  Vitals and nursing note reviewed. Constitutional:       General: He is not in acute distress. Appearance: He is well-developed. He is obese. He is not diaphoretic. HENT:      Head: Normocephalic and atraumatic. Right Ear: External ear normal.      Left Ear: External ear normal.      Nose: Nose normal.      Mouth/Throat:      Pharynx: No oropharyngeal exudate. Eyes:      General:         Right eye: No discharge. Left eye: No discharge. Conjunctiva/sclera: Conjunctivae normal.   Neck:      Thyroid: No thyromegaly. Cardiovascular:      Rate and Rhythm: Normal rate and regular rhythm. Heart sounds: Normal heart sounds.  No murmur heard. Pulmonary:      Effort: Pulmonary effort is normal.      Breath sounds: Normal breath sounds. No wheezing. Abdominal:      Palpations: Abdomen is soft. Tenderness: There is no abdominal tenderness. Musculoskeletal:         General: No tenderness. Normal range of motion. Cervical back: Normal range of motion and neck supple. Right lower leg: Edema present. Left lower leg: Edema present. Lymphadenopathy:      Cervical: No cervical adenopathy. Skin:     General: Skin is warm and dry. Findings: No erythema or rash. Neurological:      Mental Status: He is alert and oriented to person, place, and time. Coordination: Coordination normal.      Deep Tendon Reflexes: Reflexes are normal and symmetric. Psychiatric:         Behavior: Behavior normal.         Thought Content: Thought content normal.         Judgment: Judgment normal.              Laboratory:   All laboratory and radiology results have been personally reviewed by myself    Lab Results   Component Value Date     06/17/2021    K 4.3 06/17/2021    K 4.0 05/28/2021    CL 99 06/17/2021    CO2 23 06/17/2021    BUN 19 06/17/2021    CREATININE 1.2 06/17/2021    PROT 6.3 05/27/2021    LABALBU 3.7 05/27/2021    CALCIUM 9.4 06/17/2021    GFRAA >60 06/17/2021    LABGLOM >60 06/17/2021    GLUCOSE 101 06/17/2021    AST 39 05/27/2021    ALT 27 05/27/2021    ALKPHOS 103 05/27/2021    BILITOT 0.8 05/27/2021    TSH 2.960 06/17/2021    CHOL 180 08/26/2020    TRIG 270 08/26/2020    HDL 33 08/26/2020    LDLCALC 93 08/26/2020    LABA1C 5.8 04/01/2021        Lab Results   Component Value Date    CHOL 180 08/26/2020    CHOL 218 (H) 05/03/2017    CHOL 221 (H) 05/10/2016     Lab Results   Component Value Date    TRIG 270 (H) 08/26/2020    TRIG 243 (H) 05/03/2017    TRIG 173 (H) 05/10/2016     Lab Results   Component Value Date    HDL 33 08/26/2020    HDL 35 (L) 05/03/2017    HDL 35 (L) 05/10/2016     Lab Results   Component Value Date    LDLCALC 93 08/26/2020    LDLCALC 136 (H) 06/11/2012    LDLCHOLESTEROL 134 (H) 05/03/2017    LDLCHOLESTEROL 151 (H) 05/10/2016       Lab Results   Component Value Date    LABA1C 5.8 04/01/2021    LABA1C 5.7 (H) 08/26/2020     Lab Results   Component Value Date    LDLCALC 93 08/26/2020    CREATININE 1.2 06/17/2021       ASSESSMENT/PLAN:     Diagnosis Orders   1. Essential hypertension     2. SAH (subarachnoid hemorrhage) ([address]     3. Mixed hyperlipidemia  LIPID PANEL   4. Shortness of breath  ECHO LIMITED   5. Bilateral leg edema  furosemide (LASIX) 20 MG tablet    potassium chloride (KLOR-CON M) 10 MEQ extended release tablet   6. Chronic left shoulder pain  XR SHOULDER LEFT (MIN 2 VIEWS)    External Referral To Physical Therapy     BP is well controlled we will continue current therapy. Due to swelling in his lower extremities will start low-dose Lasix with potassium. He will take for the next 3 days and use as needed thereafter. As for shoulder will obtain x-ray to further evaluate and refer to PT. Due to his continued shortness of breath will obtain echo to further evaluate. Stress test was normal    Problem list reviewed andsimplified/updated  HM reviewed today and counseled as appropriate    Call or go to ED immediately if symptoms worsen or persist.  Future Appointments   Date Time Provider Samy Ariza   10/15/2021  7:30 AM DO [facility]     Or sooner if necessary.       Educational materials and/or homeexercises printed for patient's review and were included in patient instructions on his/her After Visit Summary and given to patient at the end of visit.       Counseled regarding above diagnosis, including possible risks and complications,  especially if left uncontrolled.     Counseled regarding the possible side effects, risks, benefits and alternatives to treatment; patient and/or guardian verbalizes understanding, agrees,feels comfortable with and wishes to proceed with above treatment plan.     Advised patient to call Edgard Martin new medication issues, and read all Rx info from pharmacy to assure aware of all possible risks and side effects of medication before taking.     Reviewed age and gender appropriate health screening exams and vaccinations. Advised patient regarding importance of keeping up with recommended health maintenance and toschedule as soon as possible if overdue, as this is important in assessing for undiagnosed pathology, especially cancer, as well as protecting against potentially harmful/life threatening disease.          Patient and/or guardian verbalizes understanding and agrees with above counseling, assessment and plan.     All questions answered. Maria Luisa Alex,   9/7/21    NOTE: This report was transcribed using voice recognition software.  Every effort was made to ensure accuracy; however, inadvertent computerized transcription errors may be present

## 2021-09-13 ENCOUNTER — TELEPHONE (OUTPATIENT)
Dept: FAMILY MEDICINE CLINIC | Age: 66
End: 2021-09-13

## 2021-09-13 NOTE — TELEPHONE ENCOUNTER
Pt stopped in for paperwork that needed filled out and he was las week and was told that it was filled out , he never received it , he is asking if paperwork can be mailed to him.      Please call when this has been sent [phone]

## 2021-09-20 ENCOUNTER — HOSPITAL ENCOUNTER (OUTPATIENT)
Age: 66
Discharge: HOME | End: 2021-09-20
Payer: COMMERCIAL

## 2021-09-20 PROCEDURE — 84270 ASSAY OF SEX HORMONE GLOBUL: CPT

## 2021-09-20 PROCEDURE — 84403 ASSAY OF TOTAL TESTOSTERONE: CPT

## 2021-09-22 LAB
SEX HORMONE BINDING GLOBULIN: 24 NMOL/L (ref 11–80)
TESTOSTERONE FREE-NONMALE: 9.9 PG/ML (ref 47–244)
TESTOSTERONE TOTAL: 46 NG/DL (ref 220–1000)

## 2021-09-23 ENCOUNTER — OFFICE VISIT (OUTPATIENT)
Dept: PRIMARY CARE CLINIC | Age: 66
End: 2021-09-23
Payer: COMMERCIAL

## 2021-09-23 VITALS
RESPIRATION RATE: 20 BRPM | HEIGHT: 68 IN | SYSTOLIC BLOOD PRESSURE: 115 MMHG | BODY MASS INDEX: 38.28 KG/M2 | HEART RATE: 68 BPM | TEMPERATURE: 97.2 F | OXYGEN SATURATION: 98 % | DIASTOLIC BLOOD PRESSURE: 79 MMHG | WEIGHT: 252.6 LBS

## 2021-09-23 DIAGNOSIS — R10.9 ABDOMINAL CRAMPING: Primary | ICD-10-CM

## 2021-09-23 DIAGNOSIS — M54.50 ACUTE BILATERAL LOW BACK PAIN WITHOUT SCIATICA: ICD-10-CM

## 2021-09-23 LAB
BILIRUBIN, POC: NEGATIVE
BLOOD URINE, POC: NORMAL
CLARITY, POC: CLEAR
COLOR, POC: YELLOW
GLUCOSE URINE, POC: NEGATIVE
KETONES, POC: NEGATIVE
LEUKOCYTE EST, POC: NEGATIVE
NITRITE, POC: NEGATIVE
PH, POC: 5.5
PROTEIN, POC: NEGATIVE
SPECIFIC GRAVITY, POC: >=1.03
UROBILINOGEN, POC: NORMAL

## 2021-09-23 PROCEDURE — 99214 OFFICE O/P EST MOD 30 MIN: CPT | Performed by: STUDENT IN AN ORGANIZED HEALTH CARE EDUCATION/TRAINING PROGRAM

## 2021-09-23 PROCEDURE — 81002 URINALYSIS NONAUTO W/O SCOPE: CPT | Performed by: STUDENT IN AN ORGANIZED HEALTH CARE EDUCATION/TRAINING PROGRAM

## 2021-09-23 RX ORDER — DICYCLOMINE HYDROCHLORIDE 10 MG/1
10 CAPSULE ORAL 4 TIMES DAILY
Qty: 28 CAPSULE | Refills: 0 | Status: SHIPPED
Start: 2021-09-23 | End: 2021-12-30

## 2021-09-23 NOTE — PATIENT INSTRUCTIONS
Generic Volatren Gel for back pain, can rub on up to 4 times a day      Patient Education        Learning About Progressive Muscle Relaxation for Stress  What are progressive muscle relaxation and stress? Stress is what you feel when you have to handle more than you are used to. A lot of things can cause stress. You may feel stress when you go on a job interview, take a test, or run a race. This kind of short-term stress is normal and even useful. It can help you if you need to work hard or react quickly. Stress also can last a long time. Long-term stress is caused by stressful situations or events. Examples of long-term stress include long-term health problems, ongoing problems at work, and conflicts in your family. Long-term stress can harm your health. When you have anxiety or stress in your life, one of the ways your body responds is with muscle tension. Progressive muscle relaxation is a method that helps relieve that tension. How does progressive muscle relaxation reduce stress? The body responds to stress with muscle tension, which can cause pain or discomfort. In turn, tense muscles relay to the body that it's stressed. That keeps the cycle of stress and muscle tension going. Progressive muscle relaxation helps break this cycle by reducing muscle tension and general mental anxiety. This method often helps people get to sleep. How do you do progressive muscle relaxation? To do progressive muscle relaxation, first you tense a group of muscles as you breathe in. Then you relax them as you breathe out. Notice how your muscles feel when you relax them. You work on your muscle groups in a certain order. Through practice, you can learn to feel the difference between a tensed muscle and a relaxed muscle. Then you can learn how to turn on this relaxed state at the first sign of a muscle tensing up due to stress. Practicing this method for a few weeks will help you get better at this skill.  In time you'll be able to use this method to relieve stress. When you first start, it may help to use an audio recording until you learn all the muscle groups in order. Check online for these recordings. Choose a place where you won't be interrupted. It should have space for you to lie down on your back and stretch out comfortably, such as on a carpeted floor. Progressive Muscle Relaxation Script  Sit back or lie down in a comfortable position. Close your eyes if you are comfortable doing so. Begin by taking a deep breath and noticing the feeling of air filling your lungs. Hold your breath for a few seconds. Again slowly release the air. Even slower now, take another breath. Fill your lungs and hold the air. Slowly release the breath and imagine the feeling of tension leaving your body. Now, move your attention to your feet. Begin to tense your feet by curling your toes and the arch of your foot. Hold onto the tension and notice what it feels like. Hold for 5 seconds. Release the tension in your foot. Noticed a new feeling of relaxation. Next, begin to focus on your lower leg. Tense the muscles in your calves. Hold them tightly and pay attention to the feeling of tension. Hold for 5 seconds. Release the tension from your lower legs. Again, notice a feeling of relaxation. Remember to continue taking deep breaths. Next, tense the muscles of your upper leg and pelvis. You can do this by tightly squeezing your thighs together. Make sure you feel tenseness without going to the point of strain. Hold for 5 seconds. And release. Feel the tension leave your muscles. Begin to tense your stomach and chest.  You can do this by sucking your stomach in. Squeeze harder and hold the tension. Hold for 5 seconds. Release the tension. Allow your body to go limp. Let yourself notice a feeling of relaxation. Continue taking deep breaths.   Breathing slowly, noticed air fill your lungs, and hold it.  Released to air slowly. Feel it leaving your lungs. Next, tense muscles in your back by bringing her shoulders together behind you. Hold them tightly. Tense as hard to can without straining and keep holding. Hold for 5 seconds. Release the tension from your back. Feel the tension slowly leaving your body, and the new feeling of relaxation. Noticed how different your body feels when you allow it to relax. Tense your arms all the way from your hands to your shoulders. Make a fist and squeeze all the way up to your arm. Hold it for 5 seconds. Release the tension from your arms and shoulders. Notice a feeling of relaxation in your fingers, hands, arms, and shoulders. Noticed how your arms feel limp and at ease. Move up to your neck and your head. Tense your face and your neck by distorting the muscles around your eyes and mouth. Hold for 5 seconds  Release the tension. Again, noticed a new feeling of relaxation. Finally tense your entire body. Tense your feet, legs, stomach, chest, arms, head and neck. Tense harder, without straining. Hold the tension. Now release. Allow your body to go limp. Pay attention to feeling of relaxation, and how different it is from the feeling of tension. Begin to wake up your body by slowly moving your muscles. Adjust your arms and legs. Stretch your muscles and open your eyes when you are ready. Follow-up care is a key part of your treatment and safety. Be sure to make and go to all appointments, and call your doctor if you are having problems. It's also a good idea to know your test results and keep a list of the medicines you take. Patient Education         Stress Management: Progressive Muscle Relaxation (04:38)  Your health professional recommends that you watch this short online health video. Learn how to reduce stress by tensing and relaxing your muscles.   Purpose:  Guides viewer through progressive muscle relaxation as a means of reducing stress. Goal:  The user will learn how to reduce stress through tensing and relaxing each muscle group. How to watch the video    Scan the QR code   OR Visit the website    [email or web address]   Current as of: August 31, 2020               Content Version: 12.9  © 2006-2021 Healthwise, [employer]. Care instructions adapted under license by [facility] ([facility]). If you have questions about a medical condition or this instruction, always ask your healthcare professional. [place] [address] any warranty or liability for your use of this information. Patient Education        Learning About Mindfulness for Stress  What are mindfulness and stress? Stress is what you feel when you have to handle more than you are used to. A lot of things can cause stress. You may feel stress when you go on a job interview, take a test, or run a race. This kind of short-term stress is normal and even useful. It can help you if you need to work hard or react quickly. Stress also can last a long time. Long-term stress is caused by stressful situations or events. Examples of long-term stress include long-term health problems, ongoing problems at work, and conflicts in your family. Long-term stress can harm your health. Mindfulness is a focus only on things happening in the present moment. It's a process of purposefully paying attention to and being aware of your surroundings, your emotions, your thoughts, and how your body feels. You are aware of these things, but you aren't judging these experiences as \"good\" or \"bad. \" Mindfulness can help you learn to calm your mind and body to help you cope with illness, pain, and stress. How does mindfulness help to relieve stress? Mindfulness can help quiet your mind and relax your body. Studies show that it can help some people sleep better, feel less anxious, and bring their blood pressure down.  And it's been shown to help some people live and cope better with certain health problems like heart disease, depression, chronic pain, and cancer. How do you practice mindfulness? To be mindful is to pay attention, to be present, and to be accepting. · When you're mindful, you do just one thing and you pay close attention to that one thing. For example, you may sit quietly and notice your emotions or how your food tastes and smells. · When you're present, you focus on the things that are happening right now. You let go of your thoughts about the past and the future. When you dwell on the past or the future, you miss moments that can heal and strengthen you. You may miss moments like hearing a child laugh or seeing a friendly face when you think you're all alone. · When you're accepting, you don't  the present moment. Instead you accept your thoughts and feelings as they come. You can practice anytime, anywhere, and in any way you choose. You can practice in many ways. Here are a few ideas:  · While doing your chores, like washing the dishes, let your mind focus on what's in your hand. What does the dish feel like? Is the water warm or cold? · Go outside and take a few deep breaths. What is the air like? Is it warm or cold? · When you can, take some time at the start of your day to sit alone and think. · Take a slow walk by yourself. Count your steps while you breathe in and out. · Try yoga breathing exercises, stretches, and poses to strengthen and relax your muscles. · At work, if you can, try to stop for a few moments each hour. Note how your body feels. Let yourself regroup and let your mind settle before you return to what you were doing. · If you struggle with anxiety or \"worry thoughts,\" imagine your mind as a blue ai and your worry thoughts as clouds. Now imagine those worry thoughts floating across your mind's ai. Just let them pass by as you watch. Follow-up care is a key part of your treatment and safety.  Be sure to make and go to all appointments, and call your doctor if you are having problems. It's also a good idea to know your test results and keep a list of the medicines you take. Where can you learn more? Go to [email or web address]://[employer] Platform.MAZ. org and sign in to your Long Play account. Enter J118 in the FRUCT box to learn more about \"Learning About Mindfulness for Stress. \"     If you do not have an account, please click on the \"Sign Up Now\" link. Current as of: August 31, 2020               Content Version: 12.9  © 2006-2021 [employer]. Care instructions adapted under license by [facility] ([facility]). If you have questions about a medical condition or this instruction, always ask your healthcare professional. [address] any warranty or liability for your use of this information. Patient Education         Mindfulness: Breathing Practice (03:44)  Your health professional recommends that you watch this short online health video. Practice mindfulness to help reduce stress. Purpose:  Outlines how to practice mindfulness by focusing on breathing to help reduce stress. Goal:  The user will practice a mindfulness technique for stress reduction that focuses on breath awareness. How to watch the video    Scan the QR code   OR Visit the website    [email or web address]   Current as of: September 23, 2020               Content Version: 12.9  © 2006-2021 [employer]. Care instructions adapted under license by [facility] ([facility]). If you have questions about a medical condition or this instruction, always ask your healthcare professional. [address] any warranty or liability for your use of this information. Learning About Guided Imagery for Stress and Trouble Sleeping  What are guided imagery and stress? Stress is what you feel when you have to handle more than you're used to. A lot of things can cause stress.  You may feel stress when you your nose. Breathe in slowly, and then let the air out completely through your mouth. 3. Do it again slowly. Keep breathing like this. Gather up any tension in your body, and send it out with every breath. 4. Let a feeling of warmth spread from your lungs to your neck and head, down your arms to your fingertips, through your body and into your legs, all the way down to your toes. Stay this way for a moment. 5. Now imagine a pleasant day. You're at a park or at a place you've visited in the past where you felt at peace. 6. In your mind's eye, look at what lies in front of you. Maybe you see the sun, filtered through trees. Maybe clouds are drifting by.  7. Look to one side, and then the other. Notice the feel of the air around you. Notice how it feels on your face and on your arms. 8. Stay here for a while. Let it become real for you. Patient Education         Relaxation Exercise: Guided Imagery (02:38)  Your health professional recommends that you watch this short online health video. Learn how to take your mind to a calming beach where stress melts away. Purpose:  Leads patients through a guided imagery exercise for stress reduction. Goal:  The user will be led through a guided imagery exercise to reduce stress. How to watch the video    Scan the QR code   OR Visit the website    [email or web address]   Current as of: September 23, 2020               Content Version: 12.9  © 2006-2021 Healthwise, Incorporated. Care instructions adapted under license by [facility] ([facility]). If you have questions about a medical condition or this instruction, always ask your healthcare professional. [place] [address] any warranty or liability for your use of this information.

## 2021-09-23 NOTE — PROGRESS NOTES
Patient presenting with Lower Back Pain (cramps. pt states he's been dealing w/ pain for about 2 months), Abdominal Pain (stomach pain ), and Fatigue      Assessment and Plan  Patient stable, and in no apparent distress, discussed when to return to clinic or go directly to nearest ED. Gwyn Henson was seen today for lower back pain, abdominal pain and fatigue. Diagnoses and all orders for this visit:    Abdominal cramping  -     dicyclomine (BENTYL) 10 MG capsule; Take 1 capsule by mouth 4 times daily for 7 days  -     POCT Urinalysis no Micro  -     Culture, Urine; Future    Acute bilateral low back pain without sciatica  -     POCT Urinalysis no Micro  -     Culture, Urine; Future      1. Abdominal cramping  -     dicyclomine (BENTYL) 10 MG capsule; Take 1 capsule by mouth 4 times daily for 7 days, Disp-28 capsule, R-0Normal  -     POCT Urinalysis no Micro  -     Culture, Urine; Future  2. Acute bilateral low back pain without sciatica  -     POCT Urinalysis no Micro  -     Culture, Urine; Future      Plan  Patient with symptoms of low back pain and pelvic pain with history of BPH concern for UTI/ nepthrolithiasis. Urinalysis today is without signs of infection or blood.   He denies any hesitancy, feeling of incomplete emptying, could consider further work-up with bladder scan in the future if symptoms do not improve, urine sent for culture    Patient also describing diarrhea with cramping which is improving, he has long history of IBS, notes this is similar to past episodes, will trial Bentyl, provided information on mind-body techniques to help with stress, recommended daily walking, has follow-up with primary care physician in 2 weeks, encouraged him to keep this appointment to discuss other possible etiologies of his symptoms including colorectal cancer, last colonoscopy was in 2013, patient is without weight loss, blood in his stool, night sweats    Over-the-counter Voltaren gel recommended for low back pain which on exam appears to be musculoskeletal in nature    Orders Placed This Encounter   Procedures    Culture, Urine     Standing Status:   Future     Standing Expiration Date:   9/23/2022     Order Specific Question:   Specify (ex-cath, midstream, cysto, etc)? Answer:   clean catch    POCT Urinalysis no Micro        HPI   Ashleigh Mcclelland is a 73 y.o. old male who presents with Insidious x 2 month days and is Progressively worse. Back pain aching. Been to chirproactor. Associated with is locatedLLQ, RLQ and Pelvic and is cramping with radiation to no whrer. He also has indigestion which is getting worse It is associated with diarrhea and back pain and not associated with fever, chills, weight loss, cough, dyspnea and chest pain. It is worsened by moving and made better by nothing. He has gained 15 lbs. Subjective:  Pertinent positives and negatives are stated within HPI, all other systems reviewed and are negative. Past Medical History:  has a past medical history of Depression, Erectile dysfunction, History of shingles, Hypertension, and Mixed hyperlipidemia. Past Surgical History:  has a past surgical history that includes Heel spur surgery and Colonoscopy. Social History:  reports that he has never smoked. He has never used smokeless tobacco. He reports that he does not drink alcohol and does not use drugs. Family History: family history includes Atrial Fibrillation in his father; Heart Attack (age of onset: 59) in his father; Heart Disease in his father and mother.   Allergies: Codeine  Medications:   Current Outpatient Medications   Medication Sig Dispense Refill    dicyclomine (BENTYL) 10 MG capsule Take 1 capsule by mouth 4 times daily for 7 days 28 capsule 0    venlafaxine (EFFEXOR XR) 150 MG extended release capsule take 1 capsule by mouth once daily 90 capsule 1    testosterone cypionate (DEPOTESTOTERONE CYPIONATE) 100 MG/ML injection       buPROPion (WELLBUTRIN XL) 150 MG extended release tablet Take 1 tablet by mouth every morning 90 tablet 1    atorvastatin (LIPITOR) 40 MG tablet take 1 tablet by mouth daily 90 tablet 1    lisinopril-hydroCHLOROthiazide (PRINZIDE;ZESTORETIC) 20-12.5 MG per tablet Take 1 tablet by mouth daily 90 tablet 1    Multiple Vitamins-Minerals (THERAPEUTIC MULTIVITAMIN-MINERALS) tablet Take 1 tablet by mouth daily      furosemide (LASIX) 20 MG tablet Take 1 tablet by mouth daily as needed (swelling) (Patient not taking: Reported on 9/23/2021) 30 tablet 5    potassium chloride (KLOR-CON M) 10 MEQ extended release tablet Take 1 tablet by mouth daily (Patient not taking: Reported on 9/23/2021) 30 tablet 5     No current facility-administered medications for this visit. Objective:  Vital Signs:  /79 (Site: Right Upper Arm, Position: Sitting, Cuff Size: Large Adult)   Pulse 68   Temp 97.2 °F (36.2 °C) (Temporal)   Resp 20   Ht 5' 8\" (1.727 m)   Wt 252 lb 9.6 oz (114.6 kg)   SpO2 98%   BMI 38.41 kg/m²    Oxygen Saturation Interpretation: Normal.    Physical Exam  Constitutional:       General: He is not in acute distress. Appearance: Normal appearance. HENT:      Head: Normocephalic and atraumatic. Right Ear: External ear normal.      Nose: Nose normal.      Mouth/Throat:      Mouth: Mucous membranes are moist.   Eyes:      Extraocular Movements: Extraocular movements intact. Conjunctiva/sclera: Conjunctivae normal.   Cardiovascular:      Rate and Rhythm: Normal rate and regular rhythm. Pulses: Normal pulses. Heart sounds: No murmur heard. Pulmonary:      Effort: Pulmonary effort is normal.      Breath sounds: Normal breath sounds. No wheezing. Abdominal:      General: Abdomen is flat. Bowel sounds are normal. There is no distension. Palpations: Abdomen is soft. There is no mass. Tenderness: There is no abdominal tenderness. There is no right CVA tenderness, left CVA tenderness, guarding or rebound. Comments: Negative Casanova sign, no McBurney point tenderness   Musculoskeletal:      Cervical back: Normal range of motion and neck supple. Back:    Lymphadenopathy:      Cervical: No cervical adenopathy. Neurological:      General: No focal deficit present. Mental Status: He is alert. Psychiatric:         Mood and Affect: Mood normal.         Behavior: Behavior normal.            Results for orders placed or performed in visit on 09/23/21   POCT Urinalysis no Micro   Result Value Ref Range    Color, UA yellow     Clarity, UA clear     Glucose, UA POC negative     Bilirubin, UA negative     Ketones, UA negative     Spec Grav, UA >=1.030     Blood, UA POC Trace-intact     pH, UA 5.5     Protein, UA POC Negative     Urobilinogen, UA 02 E.U. /dL     Leukocytes, UA Negative     Nitrite, UA Negative      No results found. An electronic signature was used to authenticate this note. --Zeke Perales MD       *NOTE: This report was transcribed using voice recognition software. Every effort was made to ensure accuracy; however, inadvertent computerized transcription errors may be present.

## 2021-09-26 LAB — URINE CULTURE, ROUTINE: NORMAL

## 2021-09-29 ENCOUNTER — TELEPHONE (OUTPATIENT)
Dept: ENDOCRINOLOGY | Age: 66
End: 2021-09-29

## 2021-09-29 NOTE — TELEPHONE ENCOUNTER
Patient made appt for new pt for April 12 with Dr Genevieve Mae. I added him to wait list but he would like to come in sooner if possible. Please call him if you can. Thank you.

## 2021-10-01 ENCOUNTER — TELEPHONE (OUTPATIENT)
Dept: CARDIOLOGY | Age: 66
End: 2021-10-01

## 2021-10-15 ENCOUNTER — OFFICE VISIT (OUTPATIENT)
Dept: FAMILY MEDICINE CLINIC | Age: 66
End: 2021-10-15
Payer: COMMERCIAL

## 2021-10-15 VITALS
SYSTOLIC BLOOD PRESSURE: 142 MMHG | RESPIRATION RATE: 22 BRPM | BODY MASS INDEX: 38.34 KG/M2 | HEIGHT: 68 IN | WEIGHT: 253 LBS | HEART RATE: 91 BPM | DIASTOLIC BLOOD PRESSURE: 86 MMHG

## 2021-10-15 DIAGNOSIS — H81.13 BENIGN PAROXYSMAL POSITIONAL VERTIGO DUE TO BILATERAL VESTIBULAR DISORDER: ICD-10-CM

## 2021-10-15 DIAGNOSIS — Z23 NEED FOR INFLUENZA VACCINATION: ICD-10-CM

## 2021-10-15 DIAGNOSIS — I10 PRIMARY HYPERTENSION: Primary | ICD-10-CM

## 2021-10-15 DIAGNOSIS — R51.9 ACUTE NONINTRACTABLE HEADACHE, UNSPECIFIED HEADACHE TYPE: ICD-10-CM

## 2021-10-15 DIAGNOSIS — E78.2 MIXED HYPERLIPIDEMIA: ICD-10-CM

## 2021-10-15 LAB
CHOLESTEROL, TOTAL: 161 MG/DL (ref 0–199)
HDLC SERPL-MCNC: 30 MG/DL
LDL CHOLESTEROL CALCULATED: 58 MG/DL (ref 0–99)
TRIGL SERPL-MCNC: 364 MG/DL (ref 0–149)
VLDLC SERPL CALC-MCNC: 73 MG/DL

## 2021-10-15 PROCEDURE — 1036F TOBACCO NON-USER: CPT | Performed by: FAMILY MEDICINE

## 2021-10-15 PROCEDURE — G8484 FLU IMMUNIZE NO ADMIN: HCPCS | Performed by: FAMILY MEDICINE

## 2021-10-15 PROCEDURE — G8417 CALC BMI ABV UP PARAM F/U: HCPCS | Performed by: FAMILY MEDICINE

## 2021-10-15 PROCEDURE — G8427 DOCREV CUR MEDS BY ELIG CLIN: HCPCS | Performed by: FAMILY MEDICINE

## 2021-10-15 PROCEDURE — 90694 VACC AIIV4 NO PRSRV 0.5ML IM: CPT | Performed by: FAMILY MEDICINE

## 2021-10-15 PROCEDURE — 1123F ACP DISCUSS/DSCN MKR DOCD: CPT | Performed by: FAMILY MEDICINE

## 2021-10-15 PROCEDURE — 3017F COLORECTAL CA SCREEN DOC REV: CPT | Performed by: FAMILY MEDICINE

## 2021-10-15 PROCEDURE — 4040F PNEUMOC VAC/ADMIN/RCVD: CPT | Performed by: FAMILY MEDICINE

## 2021-10-15 PROCEDURE — 99214 OFFICE O/P EST MOD 30 MIN: CPT | Performed by: FAMILY MEDICINE

## 2021-10-15 PROCEDURE — 90471 IMMUNIZATION ADMIN: CPT | Performed by: FAMILY MEDICINE

## 2021-10-15 ASSESSMENT — ENCOUNTER SYMPTOMS
SHORTNESS OF BREATH: 0
SORE THROAT: 0
BACK PAIN: 0
CHEST TIGHTNESS: 0
CONSTIPATION: 0
COUGH: 0
DIARRHEA: 0
ABDOMINAL PAIN: 0
EYE PAIN: 0
RHINORRHEA: 0
NAUSEA: 0
EYE ITCHING: 0

## 2021-10-15 NOTE — PROGRESS NOTES
Psychiatric/Behavioral: Negative for agitation, behavioral problems and hallucinations. PHYSICAL EXAM  BP (!) 142/86   Pulse 91   Resp 22   Ht 5' 8\" (1.727 m)   Wt 253 lb (114.8 kg)   BMI 38.47 kg/m²   Physical Exam  Vitals and nursing note reviewed. Constitutional:       General: He is not in acute distress. Appearance: He is well-developed. He is obese. He is not diaphoretic. HENT:      Head: Normocephalic and atraumatic. Right Ear: External ear normal.      Left Ear: External ear normal.      Nose: Nose normal.      Mouth/Throat:      Pharynx: No oropharyngeal exudate. Eyes:      General:         Right eye: No discharge. Left eye: No discharge. Conjunctiva/sclera: Conjunctivae normal.   Neck:      Thyroid: No thyromegaly. Cardiovascular:      Rate and Rhythm: Normal rate and regular rhythm. Heart sounds: Normal heart sounds. No murmur heard. Pulmonary:      Effort: Pulmonary effort is normal.      Breath sounds: Normal breath sounds. No wheezing. Abdominal:      Palpations: Abdomen is soft. Tenderness: There is no abdominal tenderness. Musculoskeletal:         General: No tenderness. Normal range of motion. Cervical back: Normal range of motion and neck supple. Right lower leg: Edema present. Left lower leg: Edema present. Lymphadenopathy:      Cervical: No cervical adenopathy. Skin:     General: Skin is warm and dry. Findings: No erythema or rash. Neurological:      Mental Status: He is alert and oriented to person, place, and time. Coordination: Coordination normal.      Deep Tendon Reflexes: Reflexes are normal and symmetric. Psychiatric:         Behavior: Behavior normal.         Thought Content: Thought content normal.         Judgment: Judgment normal.              Laboratory:   All laboratory and radiology results have been personally reviewed by myself    Lab Results   Component Value Date     06/17/2021 K 4.3 06/17/2021    K 4.0 05/28/2021    CL 99 06/17/2021    CO2 23 06/17/2021    BUN 19 06/17/2021    CREATININE 1.2 06/17/2021    PROT 6.3 05/27/2021    LABALBU 3.7 05/27/2021    CALCIUM 9.4 06/17/2021    GFRAA >60 06/17/2021    LABGLOM >60 06/17/2021    GLUCOSE 101 06/17/2021    AST 39 05/27/2021    ALT 27 05/27/2021    ALKPHOS 103 05/27/2021    BILITOT 0.8 05/27/2021    TSH 2.960 06/17/2021    CHOL 180 08/26/2020    TRIG 270 08/26/2020    HDL 33 08/26/2020    LDLCALC 93 08/26/2020    LABA1C 5.8 04/01/2021        Lab Results   Component Value Date    CHOL 180 08/26/2020     Lab Results   Component Value Date    TRIG 270 (H) 08/26/2020     Lab Results   Component Value Date    HDL 33 08/26/2020     Lab Results   Component Value Date    LDLCALC 93 08/26/2020    LDLCHOLESTEROL 134 (H) 05/03/2017       Lab Results   Component Value Date    LABA1C 5.8 04/01/2021     Lab Results   Component Value Date    LDLCALC 93 08/26/2020    CREATININE 1.2 06/17/2021       ASSESSMENT/PLAN:    1. Primary hypertension  2. Benign paroxysmal positional vertigo due to bilateral vestibular disorder  3. Mixed hyperlipidemia  4. Need for influenza vaccination  -     INFLUENZA, QUADV, ADJUVANTED, 65 YRS =, IM, PF, PREFILL SYR, 0.5ML (FLUAD)  5. Acute nonintractable headache, unspecified headache type     BP improved after recheck we will continue current medication which we reviewed in detail today. BPP feet under good control continue to monitor. We will repeat lipid panel as this was not completed yet. Flu shot updated as above. As for headache did recommend to decrease iced tea intake and increase water intake. He may also try magnesium over-the-counter.   Patient agreeable with the above    Problem list reviewed andsimplified/updated  HM reviewed today and counseled as appropriate    Call or go to ED immediately if symptoms worsen or persist.  Future Appointments   Date Time Provider Samy Ariza   11/8/2021  9:00 AM [facility] ECHO RM 1 SEYZ HERRERA Merritt   1/18/2022  7:30 AM DO Irasema Lopez [facility]   4/12/2022  8:00 AM Archie Bateman [facility]     Or sooner if necessary. Educational materials and/or homeexercises printed for patient's review and were included in patient instructions on his/her After Visit Summary and given to patient at the end of visit. Counseled regarding above diagnosis, including possible risks and complications,  especially if left uncontrolled. Counseled regarding the possible side effects, risks, benefits and alternatives to treatment; patient and/or guardian verbalizes understanding, agrees,feels comfortable with and wishes to proceed with above treatment plan. Advised patient to call Jeanne Ribeiro new medication issues, and read all Rx info from pharmacy to assure aware of all possible risks and side effects of medication before taking. Reviewed age and gender appropriate health screening exams and vaccinations. Advised patient regarding importance of keeping up with recommended health maintenance and toschedule as soon as possible if overdue, as this is important in assessing for undiagnosed pathology, especially cancer, as well as protecting against potentially harmful/life threatening disease. and/or guardian verbalizes understanding and agrees with above counseling, assessment and plan. All questions answered. Cornelio Ryan DO  10/15/21    NOTE: This report was transcribed using voice recognition software.  Every effort was made to ensure accuracy; however, inadvertent computerized transcription errors may be present

## 2021-11-08 ENCOUNTER — TELEPHONE (OUTPATIENT)
Dept: CARDIOLOGY | Age: 66
End: 2021-11-08

## 2021-12-09 ENCOUNTER — HOSPITAL ENCOUNTER (OUTPATIENT)
Dept: CARDIOLOGY | Age: 66
Discharge: HOME | End: 2021-12-09
Payer: MEDICARE

## 2021-12-09 DIAGNOSIS — R06.02 SHORTNESS OF BREATH: ICD-10-CM

## 2021-12-09 PROCEDURE — 93306 TTE W/DOPPLER COMPLETE: CPT | Performed by: PSYCHIATRY & NEUROLOGY

## 2021-12-09 PROCEDURE — 2580000003 HC RX 258: Performed by: FAMILY MEDICINE

## 2021-12-09 PROCEDURE — 93308 TTE F-UP OR LMTD: CPT | Performed by: PSYCHIATRY & NEUROLOGY

## 2021-12-09 PROCEDURE — 6360000004 HC RX CONTRAST MEDICATION: Performed by: FAMILY MEDICINE

## 2021-12-09 RX ORDER — SODIUM CHLORIDE 0.9 % (FLUSH) 0.9 %
10 SYRINGE (ML) INJECTION PRN
Status: DISCONTINUED | OUTPATIENT
Start: 2021-12-09 | End: 2021-12-10 | Stop reason: HOSPADM

## 2021-12-09 RX ADMIN — PERFLUTREN 1.1 MG: 6.52 INJECTION, SUSPENSION INTRAVENOUS at 12:21

## 2021-12-09 RX ADMIN — SODIUM CHLORIDE, PRESERVATIVE FREE 10 ML: 5 INJECTION INTRAVENOUS at 12:21

## 2021-12-09 RX ADMIN — SODIUM CHLORIDE, PRESERVATIVE FREE 10 ML: 5 INJECTION INTRAVENOUS at 12:24

## 2021-12-17 DIAGNOSIS — E78.49 OTHER HYPERLIPIDEMIA: ICD-10-CM

## 2021-12-17 DIAGNOSIS — F32.9 REACTIVE DEPRESSION: ICD-10-CM

## 2021-12-17 RX ORDER — BUPROPION HYDROCHLORIDE 150 MG/1
150 TABLET ORAL EVERY MORNING
Qty: 90 TABLET | Refills: 1 | Status: SHIPPED
Start: 2021-12-17 | End: 2022-02-02

## 2021-12-17 RX ORDER — ATORVASTATIN CALCIUM 40 MG/1
TABLET, FILM COATED ORAL
Qty: 90 TABLET | Refills: 1 | Status: SHIPPED
Start: 2021-12-17 | End: 2022-05-04 | Stop reason: SDUPTHER

## 2021-12-30 ENCOUNTER — HOSPITAL ENCOUNTER (EMERGENCY)
Age: 66
Discharge: HOME | End: 2021-12-30
Payer: MEDICARE

## 2021-12-30 ENCOUNTER — APPOINTMENT (OUTPATIENT)
Dept: CT IMAGING | Age: 66
End: 2021-12-30
Payer: MEDICARE

## 2021-12-30 VITALS
SYSTOLIC BLOOD PRESSURE: 128 MMHG | HEART RATE: 92 BPM | DIASTOLIC BLOOD PRESSURE: 92 MMHG | TEMPERATURE: 98.6 F | RESPIRATION RATE: 16 BRPM | OXYGEN SATURATION: 98 %

## 2021-12-30 DIAGNOSIS — R51.9 ACUTE NONINTRACTABLE HEADACHE, UNSPECIFIED HEADACHE TYPE: Primary | ICD-10-CM

## 2021-12-30 PROCEDURE — 99283 EMERGENCY DEPT VISIT LOW MDM: CPT

## 2021-12-30 PROCEDURE — 70450 CT HEAD/BRAIN W/O DYE: CPT

## 2021-12-30 RX ORDER — BUTALBITAL, ACETAMINOPHEN AND CAFFEINE 300; 40; 50 MG/1; MG/1; MG/1
1 CAPSULE ORAL EVERY 4 HOURS PRN
Qty: 30 CAPSULE | Refills: 0 | Status: SHIPPED | OUTPATIENT
Start: 2021-12-30 | End: 2022-01-07

## 2021-12-30 NOTE — Clinical Note
Mariel Hazel was seen and treated in our emergency department on 12/30/2021. He may return to work on 12/31/2021. If you have any questions or concerns, please don't hesitate to call.       Seema Spencer PA-C

## 2021-12-31 NOTE — ED PROVIDER NOTES
Port[facility] 74       Department of Emergency Medicine   ED  Encounter Note  Admit Date/RoomTime: 2021  8:57 PM  ED Room:     NAME: Angelique Chavez  : 1955  MRN: [id number]     Chief Complaint:  Headache    History of Present Illness      Angelique Chavez is a 73 y.o. old male who presents to the emergency department by private vehicle, for complaint of gradual onset, waxing and waning occipital squeezing pain which began 1 month(s) prior to arrival.  Since onset the symptoms have been worsening over the last 2-3 days and moderate in severity. The patient has history of SAH. Today's episode is typical for him. The pain is associated with no additional symptoms and negative for syncope, seizures, amaurosis, diplopia, other visual changes, paralysis/weakness, numbness or tingling, involuntary movements, tremor or speech impairment. The symptoms are aggravated by nothing specific and relieved by nothing. There has been no history of recent trauma. Treatment prior to arrival consisted of: acetaminophen, Excedrin with minimal relief of symptoms. He takes no blood thinning agents. ROS   Pertinent positives and negatives are stated within HPI, all other systems reviewed and are negative. Past Medical History:  has a past medical history of Depression, Erectile dysfunction, History of shingles, Hypertension, and Mixed hyperlipidemia. Surgical History:  has a past surgical history that includes Heel spur surgery and Colonoscopy. Social History:  reports that he has never smoked. He has never used smokeless tobacco. He reports that he does not drink alcohol and does not use drugs. Family History: family history includes Atrial Fibrillation in his father; Heart Attack (age of onset: 59) in his father; Heart Disease in his father and mother.      Allergies: Codeine    Physical Exam   Oxygen Saturation Interpretation: Normal.        ED Triage Vitals [21 1704]   BP Temp Temp Source Pulse Resp SpO2 Height Weight   (!) 128/92 98.6 °F (37 °C) Temporal 92 16 98 % -- --         Constitutional:  Alert, development consistent with age. HEENT:  NC/NT. PERRL, EOMI. Airway patent. Neck:  Normal ROM. Supple. No meningeal signs  Respiratory:  Clear to auscultation and breath sounds equal.  CV:  Regular rate and rhythm, normal heart sounds, without pathological murmurs, ectopy, gallops, or rubs. GI:  Abdomen Soft, nontender, good bowel sounds. No firm or pulsatile mass. Extremities: No tenderness or edema noted. Integument:  Normal turgor. Warm, dry, without visible rash, unless noted elsewhere. Lymphatics: No lymphangitis or adenopathy noted. Neurological:  Oriented x 3, GCS 15. CNII-XII grossly intact. Motor functions intact. Lab / Imaging Results   (All laboratory and radiology results have been personally reviewed by myself)  Labs:  No results found for this visit on 12/30/21. Imaging: All Radiology results interpreted by Radiologist unless otherwise noted. CT HEAD WO CONTRAST   Final Result   No acute intracranial abnormality. Left ethmoid and left frontal sinusitis. RECOMMENDATIONS:   Unavailable           ED Course / Medical Decision Making   Medications - No data to display     Re-examination:  12/30/21       Time: 2115  Patients condition remains unchanged. Results discussed. Consult(s):   None    Procedure(s):   none    MDM:   Patient presents to the emergency department for headaches. History of subarachnoid hemorrhage, CT was obtained and unremarkable for acute abnormality. Patient did have some left ethmoid and frontal sinusitis seen on his CT, however his headache is occipital and he has no symptoms of sinusitis. No upper respiratory symptoms. Vital signs normal.  Patient to follow-up with both his primary care provider and has also been given the contact information for neurology, Dr. Miladys Alarcon.   Will be given a prescription for Fioricet, patient educated on all new medications. Return immediately for any new worsening symptoms. Plan of Care/Counseling:  Ananth Mclaughlin PA-C reviewed today's visit with the patient in addition to providing specific details for the plan of care and counseling regarding the diagnosis and prognosis. Questions are answered at this time and are agreeable with the plan. Assessment      1. Acute nonintractable headache, unspecified headache type         Plan   Discharged home. Patient condition is good    New Medications     New Prescriptions    BUTALBITAL-APAP-CAFFEINE (FIORICET) -40 MG CAPS PER CAPSULE    Take 1 capsule by mouth every 4 hours as needed for Headaches     Electronically signed by Ananth Mclaughlin PA-C   DD: 12/30/21  **This report was transcribed using voice recognition software. Every effort was made to ensure accuracy; however, inadvertent computerized transcription errors may be present.   END OF ED PROVIDER NOTE        Ananth Mclaughlin PA-C  12/30/21 [id number]

## 2022-01-07 ENCOUNTER — OFFICE VISIT (OUTPATIENT)
Dept: PRIMARY CARE CLINIC | Age: 67
End: 2022-01-07
Payer: MEDICARE

## 2022-01-07 VITALS
OXYGEN SATURATION: 96 % | HEART RATE: 116 BPM | DIASTOLIC BLOOD PRESSURE: 84 MMHG | SYSTOLIC BLOOD PRESSURE: 134 MMHG | RESPIRATION RATE: 24 BRPM | TEMPERATURE: 97.9 F

## 2022-01-07 DIAGNOSIS — J06.9 ACUTE URI: ICD-10-CM

## 2022-01-07 DIAGNOSIS — R05.9 COUGH: ICD-10-CM

## 2022-01-07 LAB
Lab: NORMAL
PERFORMING INSTRUMENT: NORMAL
QC PASS/FAIL: NORMAL
SARS-COV-2, POC: NORMAL

## 2022-01-07 PROCEDURE — 99213 OFFICE O/P EST LOW 20 MIN: CPT | Performed by: NURSE PRACTITIONER

## 2022-01-07 PROCEDURE — 1123F ACP DISCUSS/DSCN MKR DOCD: CPT | Performed by: NURSE PRACTITIONER

## 2022-01-07 PROCEDURE — G8484 FLU IMMUNIZE NO ADMIN: HCPCS | Performed by: NURSE PRACTITIONER

## 2022-01-07 PROCEDURE — 1036F TOBACCO NON-USER: CPT | Performed by: NURSE PRACTITIONER

## 2022-01-07 PROCEDURE — G8417 CALC BMI ABV UP PARAM F/U: HCPCS | Performed by: NURSE PRACTITIONER

## 2022-01-07 PROCEDURE — G8427 DOCREV CUR MEDS BY ELIG CLIN: HCPCS | Performed by: NURSE PRACTITIONER

## 2022-01-07 PROCEDURE — 87426 SARSCOV CORONAVIRUS AG IA: CPT | Performed by: NURSE PRACTITIONER

## 2022-01-07 PROCEDURE — 3017F COLORECTAL CA SCREEN DOC REV: CPT | Performed by: NURSE PRACTITIONER

## 2022-01-07 PROCEDURE — 4040F PNEUMOC VAC/ADMIN/RCVD: CPT | Performed by: NURSE PRACTITIONER

## 2022-01-07 RX ORDER — BROMPHENIRAMINE MALEATE, PSEUDOEPHEDRINE HYDROCHLORIDE, AND DEXTROMETHORPHAN HYDROBROMIDE 2; 30; 10 MG/5ML; MG/5ML; MG/5ML
5 SYRUP ORAL 4 TIMES DAILY PRN
Qty: 118 ML | Refills: 0 | Status: SHIPPED
Start: 2022-01-07 | End: 2022-02-02

## 2022-01-07 NOTE — PROGRESS NOTES
Chief Complaint:   Concern For COVID-19      History of Present Illness   Source of history provided by:  patient. Deisy Chang is a 73 y.o. old male with a past medical history of:   Past Medical History:   Diagnosis Date    Depression     Erectile dysfunction     History of shingles     Hypertension 1/25/2017    Mixed hyperlipidemia 1/25/2017        Pt presents to the [facility] care with a cough/congestion for the past few days. States the cough is non productive . No fever noted. .  Denies any N/V/D, abdominal pain, CP, progressive SOB, dizziness, or lethargy. ROS    Unless otherwise stated in this report or unable to obtain because of the patient's clinical or mental status as evidenced by the medical record, this patients's positive and negative responses for Review of Systems, constitutional, psych, eyes, ENT, cardiovascular, respiratory, gastrointestinal, neurological, genitourinary, musculoskeletal, integument systems and systems related to the presenting problem are either stated in the preceding or were not pertinent or were negative for the symptoms and/or complaints related to the medical problem. Past Surgical History:  has a past surgical history that includes Heel spur surgery and Colonoscopy. Social History:  reports that he has never smoked. He has never used smokeless tobacco. He reports that he does not drink alcohol and does not use drugs. Family History: family history includes Atrial Fibrillation in his father; Heart Attack (age of onset: 59) in his father; Heart Disease in his father and mother. Allergies: Codeine    Physical Exam         VS:  /84   Pulse 116   Temp 97.9 °F (36.6 °C)   Resp 24   SpO2 96%    Oxygen Saturation Interpretation: Normal.    Constitutional:  Alert, development consistent with age. Ears:  External Ears: Normal bilateral pinna. TM's & External Canals: TM's normal bilaterally without perforation.   Canals without erythema or drainage. Nose:   There is no obvious septal defect. Mild redness/edema  Mouth:  Moist bucca mucosa and normal tongue. Throat: Mild posterior pharyngeal erythema without exudates or lesions. Neck:  Supple. There is no obvious adenopathy or neck tenderness. Lungs:   Breath sounds: Normal chest expansion and breath sounds noted throughout. No wheezes, rales, or rhonchi noted. Heart:  Regular rate and rhythm, normal heart sounds, without pathological murmurs, ectopy, gallops, or rubs. Skin:  Normal turgor. Warm, dry, without visible rash. Neurological:  Oriented. Motor functions intact. Lab / Imaging Results   (All laboratory and radiology results have been personally reviewed by myself)  Labs:  Results for orders placed or performed in visit on 01/07/22   POCT COVID-19, Antigen   Result Value Ref Range    SARS-COV-2, POC Not-Detected Not Detected    Lot Number [id number]     QC Pass/Fail pass     Performing Instrument BD Veritor        Imaging: All Radiology results interpreted by Radiologist unless otherwise noted. No orders to display         Assessment / Plan     Impression(s):  1. Acute URI    2. Cough      Disposition:  Disposition: Advised covid test is negative, start Bromfed    Pt advised to f/u with PCP in 7-10 days for recheck. ER if changes or worse. Advised to take all medications as directed.

## 2022-01-11 ENCOUNTER — OFFICE VISIT (OUTPATIENT)
Dept: PRIMARY CARE CLINIC | Age: 67
End: 2022-01-11
Payer: MEDICARE

## 2022-01-11 VITALS
TEMPERATURE: 96.6 F | OXYGEN SATURATION: 93 % | DIASTOLIC BLOOD PRESSURE: 59 MMHG | HEART RATE: 62 BPM | SYSTOLIC BLOOD PRESSURE: 86 MMHG | RESPIRATION RATE: 20 BRPM

## 2022-01-11 DIAGNOSIS — R05.9 COUGH: ICD-10-CM

## 2022-01-11 DIAGNOSIS — R11.0 NAUSEA: ICD-10-CM

## 2022-01-11 DIAGNOSIS — U07.1 COVID-19 VIRUS INFECTION: ICD-10-CM

## 2022-01-11 LAB
Lab: ABNORMAL
PERFORMING INSTRUMENT: ABNORMAL
QC PASS/FAIL: ABNORMAL
SARS-COV-2, POC: DETECTED

## 2022-01-11 PROCEDURE — 3017F COLORECTAL CA SCREEN DOC REV: CPT | Performed by: NURSE PRACTITIONER

## 2022-01-11 PROCEDURE — G8427 DOCREV CUR MEDS BY ELIG CLIN: HCPCS | Performed by: NURSE PRACTITIONER

## 2022-01-11 PROCEDURE — 1123F ACP DISCUSS/DSCN MKR DOCD: CPT | Performed by: NURSE PRACTITIONER

## 2022-01-11 PROCEDURE — 1036F TOBACCO NON-USER: CPT | Performed by: NURSE PRACTITIONER

## 2022-01-11 PROCEDURE — G8417 CALC BMI ABV UP PARAM F/U: HCPCS | Performed by: NURSE PRACTITIONER

## 2022-01-11 PROCEDURE — 4040F PNEUMOC VAC/ADMIN/RCVD: CPT | Performed by: NURSE PRACTITIONER

## 2022-01-11 PROCEDURE — 99213 OFFICE O/P EST LOW 20 MIN: CPT | Performed by: NURSE PRACTITIONER

## 2022-01-11 PROCEDURE — G8484 FLU IMMUNIZE NO ADMIN: HCPCS | Performed by: NURSE PRACTITIONER

## 2022-01-11 PROCEDURE — 96372 THER/PROPH/DIAG INJ SC/IM: CPT | Performed by: NURSE PRACTITIONER

## 2022-01-11 PROCEDURE — 87426 SARSCOV CORONAVIRUS AG IA: CPT | Performed by: NURSE PRACTITIONER

## 2022-01-11 RX ORDER — FLUVOXAMINE MALEATE 100 MG
100 TABLET ORAL 2 TIMES DAILY
Qty: 20 TABLET | Refills: 0 | Status: SHIPPED
Start: 2022-01-11 | End: 2022-02-02

## 2022-01-11 RX ORDER — DEXAMETHASONE 6 MG/1
6 TABLET ORAL 2 TIMES DAILY WITH MEALS
Qty: 10 TABLET | Refills: 0 | Status: SHIPPED | OUTPATIENT
Start: 2022-01-11 | End: 2022-01-16

## 2022-01-11 RX ORDER — FAMOTIDINE 20 MG/1
20 TABLET, FILM COATED ORAL 2 TIMES DAILY
Qty: 60 TABLET | Refills: 0 | Status: SHIPPED
Start: 2022-01-11 | End: 2022-02-02

## 2022-01-11 RX ORDER — ONDANSETRON 4 MG/1
4 TABLET, ORALLY DISINTEGRATING ORAL EVERY 8 HOURS PRN
Qty: 10 TABLET | Refills: 0 | Status: SHIPPED
Start: 2022-01-11 | End: 2022-01-20 | Stop reason: SDUPTHER

## 2022-01-11 RX ORDER — DOXYCYCLINE HYCLATE 100 MG
100 TABLET ORAL 2 TIMES DAILY
Qty: 20 TABLET | Refills: 0 | Status: SHIPPED | OUTPATIENT
Start: 2022-01-11 | End: 2022-01-21

## 2022-01-11 RX ORDER — ONDANSETRON 2 MG/ML
4 INJECTION INTRAMUSCULAR; INTRAVENOUS ONCE
Status: COMPLETED | OUTPATIENT
Start: 2022-01-11 | End: 2022-01-11

## 2022-01-11 RX ADMIN — ONDANSETRON 4 MG: 2 INJECTION INTRAMUSCULAR; INTRAVENOUS at 16:32

## 2022-01-11 NOTE — PROGRESS NOTES
hangv infecChief Complaint:   Cough, Congestion, Sweats, and Nausea      History of Present Illness   Source of history provided by:  patient. Marek Moreno is a 78 y.o. old male with a past medical history of:   Past Medical History:   Diagnosis Date    Depression     Erectile dysfunction     History of shingles     Hypertension 1/25/2017    Mixed hyperlipidemia 1/25/2017      Pt presents to the [facility] care with a cough/congestion/sweats/nausea for the past several days. States the cough is non productive . No fever noted. Denies any N/V/D, abdominal pain, CP, progressive SOB, dizziness, or lethargy. ROS    Unless otherwise stated in this report or unable to obtain because of the patient's clinical or mental status as evidenced by the medical record, this patients's positive and negative responses for Review of Systems, constitutional, psych, eyes, ENT, cardiovascular, respiratory, gastrointestinal, neurological, genitourinary, musculoskeletal, integument systems and systems related to the presenting problem are either stated in the preceding or were not pertinent or were negative for the symptoms and/or complaints related to the medical problem. Past Surgical History:  has a past surgical history that includes Heel spur surgery and Colonoscopy. Social History:  reports that he has never smoked. He has never used smokeless tobacco. He reports that he does not drink alcohol and does not use drugs. Family History: family history includes Atrial Fibrillation in his father; Heart Attack (age of onset: 59) in his father; Heart Disease in his father and mother. Allergies: Codeine    Physical Exam         VS:  BP (!) 86/59 (Site: Right Upper Arm, Position: Sitting, Cuff Size: Large Adult)   Pulse 62   Temp 96.6 °F (35.9 °C) (Temporal)   Resp 20   SpO2 93%    Oxygen Saturation Interpretation: Normal.    Constitutional:  Alert, development consistent with age.   Ears:  External Ears: Normal bilateral pinna. TM's & External Canals: TM's normal bilaterally without perforation. Canals without erythema or drainage. Nose:   There is no obvious septal defect. Mild redness/edema  Mouth:  Moist bucca mucosa and normal tongue. Throat: Mild posterior pharyngeal erythema without exudates or lesions. Neck:  Supple. There is no obvious adenopathy or neck tenderness. Lungs:   Breath sounds: Normal chest expansion and breath sounds -mildly decreased bilaterally  Heart:  Regular rate and rhythm, normal heart sounds, without pathological murmurs, ectopy, gallops, or rubs. Skin:  Normal turgor. Warm, dry, without visible rash. Neurological:  Oriented. Motor functions intact. Lab / Imaging Results   (All laboratory and radiology results have been personally reviewed by myself)  Labs:  Results for orders placed or performed in visit on 01/11/22   POCT COVID-19, Antigen   Result Value Ref Range    SARS-COV-2, POC Detected (A) Not Detected    Lot Number [id number]     QC Pass/Fail pass     Performing Instrument BD Veritor        Imaging: All Radiology results interpreted by Radiologist unless otherwise noted. No orders to display         Assessment / Plan     Impression(s):  1. COVID-19 virus infection    2. Cough    3. Nausea      Disposition:  Disposition: Advised Covid test is POSITIVE, Zofran injection now, start meds as discussed, stay well hydrated      . ER if changes or worse. Advised to take all medications as directed. Steps to help prevent the spread of COVID-19 if you are sick  SOURCE - https://navneet-maxim.info/. html     Stay home except to get medical care   ; Stay home: People who are mildly ill with COVID-19 are able to isolate at home during their illness.  You should restrict activities outside your home, except for getting medical care.   ; Avoid public areas: Do not go to work, school, or public areas.   ; Avoid public transportation: Avoid using public transportation, ride-sharing, or taxis.  ; Separate yourself from other people and animals in your home   ; Stay away from others: As much as possible, you should stay in a specific room and away from other people in your home. Also, you should use a separate bathroom, if available.   ; Limit contact with pets & animals: You should restrict contact with pets and other animals while you are sick with COVID-19, just like you would around other people. Although there have not been reports of pets or other animals becoming sick with COVID-19, it is still recommended that people sick with COVID-19 limit contact with animals until more information is known about the virus. ; When possible, have another member of your household care for your animals while you are sick. If you are sick with COVID-19, avoid contact with your pet, including petting, snuggling, being kissed or licked, and sharing food. If you must care for your pet or be around animals while you are sick, wash your hands before and after you interact with pets and wear a facemask. See COVID-19 and Animals for more information. Other considerations   The ill person should eat/be fed in their room if possible. Non-disposable  items used should be handled with gloves and washed with hot water or in a . Clean hands after handling used  items.  If possible, dedicate a lined trash can for the ill person. Use gloves when removing garbage bags, handling, and disposing of trash. Wash hands after handling or disposing of trash.  Consider consulting with your local health department about trash disposal guidance if available. Information for Household Members and Caregivers of Someone who is Sick   Call ahead before visiting your doctor   Call ahead: If you have a medical appointment, call the healthcare provider and tell them that you have or may have COVID-19.  This will help the healthcare provider's office take steps to keep other people from getting infected or exposed. Wear a facemask if you are sick   ; If you are sick: You should wear a facemask when you are around other people (e.g., sharing a room or vehicle) or pets and before you enter a healthcare provider's office. ; If you are caring for others: If the person who is sick is not able to wear a facemask (for example, because it causes trouble breathing), then people who live with the person who is sick should not stay in the same room with them, or they should wear a facemask if they enter a room with the person who is sick. Cover your coughs and sneezes   ; Cover: Cover your mouth and nose with a tissue when you cough or sneeze.   ; Dispose: Throw used tissues in a lined trash can.   ; Wash hands: Immediately wash your hands with soap and water for at least 20 seconds or, if soap and water are not available, clean your hands with an alcohol-based hand  that contains at least 60% alcohol. Clean your hands often   ; Wash hands: Wash your hands often with soap and water for at least 20 seconds, especially after blowing your nose, coughing, or sneezing; going to the bathroom; and before eating or preparing food.   ; Hand : If soap and water are not readily available, use an alcohol-based hand  with at least 60% alcohol, covering all surfaces of your hands and rubbing them together until they feel dry.   ; Soap and water: Soap and water are the best option if hands are visibly dirty.   ; Avoid touching: Avoid touching your eyes, nose, and mouth with unwashed hands. Handwashing Tips   ; Wet your hands with clean, running water (warm or cold), turn off the tap, and apply soap.  ; Lather your hands by rubbing them together with the soap. Lather the backs of your hands, between your fingers, and under your nails. ; Scrub your hands for at least 20 seconds. Need a timer?  Hum the [place] from beginning to end twice.  ; Rinse your hands well under clean, running water.  ; Dry your hands using a clean towel or air dry them. Avoid sharing personal household items   ; Do not share: You should not share dishes, drinking glasses, cups, eating utensils, towels, or bedding with other people or pets in your home.   ; Wash thoroughly after use: After using these items, they should be washed thoroughly with soap and water. Clean all high-touch surfaces everyday   ; Clean and disinfect: Practice routine cleaning of high touch surfaces.  ; High touch surfaces include counters, tabletops, doorknobs, bathroom fixtures, toilets, phones, keyboards, tablets, and bedside tables.  ; Disinfect areas with bodily fluids: Also, clean any surfaces that may have blood, stool, or body fluids on them.   ; Household : Use a household cleaning spray or wipe, according to the label instructions. Labels contain instructions for safe and effective use of the cleaning product including precautions you should take when applying the product, such as wearing gloves and making sure you have good ventilation during use of the product. Monitor your symptoms   Seek medical attention: Seek prompt medical attention if your illness is worsening     (e.g., difficulty breathing).   ; Call your doctor: Before seeking care, call your healthcare provider and tell them that you have, or are being evaluated for, COVID-19.   ; Wear a facemask when sick: Put on a facemask before you enter the facility. These steps will help the healthcare provider's office to keep other people in the office or waiting room from getting infected or exposed. ; Alert health department: Ask your healthcare provider to call the local or state health department.  Persons who are placed under active monitoring or facilitated self-monitoring should follow instructions provided by their local health department or occupational health professionals, as appropriate.  ; Call 911 if you have a medical emergency: If you have a medical emergency and need to call 911, notify the dispatch personnel that you have, or are being evaluated for COVID-19. If possible, put on a facemask before emergency medical services arrive.

## 2022-01-20 ENCOUNTER — OFFICE VISIT (OUTPATIENT)
Dept: PRIMARY CARE CLINIC | Age: 67
End: 2022-01-20
Payer: MEDICARE

## 2022-01-20 VITALS
OXYGEN SATURATION: 98 % | SYSTOLIC BLOOD PRESSURE: 111 MMHG | RESPIRATION RATE: 20 BRPM | HEART RATE: 100 BPM | TEMPERATURE: 97.5 F | DIASTOLIC BLOOD PRESSURE: 79 MMHG

## 2022-01-20 DIAGNOSIS — R11.0 NAUSEA: ICD-10-CM

## 2022-01-20 DIAGNOSIS — U07.1 COVID-19 VIRUS INFECTION: Primary | ICD-10-CM

## 2022-01-20 PROCEDURE — 1123F ACP DISCUSS/DSCN MKR DOCD: CPT | Performed by: NURSE PRACTITIONER

## 2022-01-20 PROCEDURE — 3017F COLORECTAL CA SCREEN DOC REV: CPT | Performed by: NURSE PRACTITIONER

## 2022-01-20 PROCEDURE — 1036F TOBACCO NON-USER: CPT | Performed by: NURSE PRACTITIONER

## 2022-01-20 PROCEDURE — 96372 THER/PROPH/DIAG INJ SC/IM: CPT | Performed by: NURSE PRACTITIONER

## 2022-01-20 PROCEDURE — 4040F PNEUMOC VAC/ADMIN/RCVD: CPT | Performed by: NURSE PRACTITIONER

## 2022-01-20 PROCEDURE — G8427 DOCREV CUR MEDS BY ELIG CLIN: HCPCS | Performed by: NURSE PRACTITIONER

## 2022-01-20 PROCEDURE — G8417 CALC BMI ABV UP PARAM F/U: HCPCS | Performed by: NURSE PRACTITIONER

## 2022-01-20 PROCEDURE — 99213 OFFICE O/P EST LOW 20 MIN: CPT | Performed by: NURSE PRACTITIONER

## 2022-01-20 PROCEDURE — G8484 FLU IMMUNIZE NO ADMIN: HCPCS | Performed by: NURSE PRACTITIONER

## 2022-01-20 RX ORDER — ONDANSETRON 4 MG/1
4 TABLET, ORALLY DISINTEGRATING ORAL EVERY 8 HOURS PRN
Qty: 10 TABLET | Refills: 0 | Status: SHIPPED
Start: 2022-01-20 | End: 2022-02-02

## 2022-01-20 RX ORDER — ONDANSETRON 2 MG/ML
4 INJECTION INTRAMUSCULAR; INTRAVENOUS ONCE
Status: COMPLETED | OUTPATIENT
Start: 2022-01-20 | End: 2022-01-20

## 2022-01-20 RX ORDER — ONDANSETRON 2 MG/ML
4 INJECTION INTRAMUSCULAR; INTRAVENOUS EVERY 6 HOURS PRN
Status: DISCONTINUED | OUTPATIENT
Start: 2022-01-20 | End: 2022-01-20

## 2022-01-20 RX ORDER — AZITHROMYCIN 250 MG/1
250 TABLET, FILM COATED ORAL SEE ADMIN INSTRUCTIONS
Qty: 6 TABLET | Refills: 0 | Status: SHIPPED | OUTPATIENT
Start: 2022-01-20 | End: 2022-01-25

## 2022-01-20 RX ADMIN — ONDANSETRON 4 MG: 2 INJECTION INTRAMUSCULAR; INTRAVENOUS at 15:46

## 2022-01-20 NOTE — PROGRESS NOTES
Chief Complaint:   Emesis, Head Congestion, Positive For Covid-19, Fever, Headache, and Cough      History of Present Illness   Source of history provided by:  patientRachel Jung is a 78 y.o. old male with a past medical history of:   Past Medical History:   Diagnosis Date    Depression     Erectile dysfunction     History of shingles     Hypertension 1/25/2017    Mixed hyperlipidemia 1/25/2017        Pt presents to the [facility] care with a cough/congestion/nausea for the past several days. Pt was diagnosed w/Covid on 1/11/22, pt has been compliant w/all meds prescribed-see previous office visit. Pt stated he still feels very congested on his sinuses and nauseated. He is having a non-productive cough. No fever noted. Denies any diarrhea, abdominal pain, CP, progressive SOB, dizziness, or any other concerning issues. Pt is tolerating liquids well           ROS    Unless otherwise stated in this report or unable to obtain because of the patient's clinical or mental status as evidenced by the medical record, this patients's positive and negative responses for Review of Systems, constitutional, psych, eyes, ENT, cardiovascular, respiratory, gastrointestinal, neurological, genitourinary, musculoskeletal, integument systems and systems related to the presenting problem are either stated in the preceding or were not pertinent or were negative for the symptoms and/or complaints related to the medical problem. Past Surgical History:  has a past surgical history that includes Heel spur surgery and Colonoscopy. Social History:  reports that he has never smoked. He has never used smokeless tobacco. He reports that he does not drink alcohol and does not use drugs. Family History: family history includes Atrial Fibrillation in his father; Heart Attack (age of onset: 59) in his father; Heart Disease in his father and mother.    Allergies: Codeine    Physical Exam         VS:  /79 (Site: Right Upper Arm, Position: Sitting, Cuff Size: Large Adult)   Pulse 100   Temp 97.5 °F (36.4 °C) (Oral)   Resp 20   SpO2 98%    Oxygen Saturation Interpretation: Normal.    Constitutional:  Alert, development consistent with age. Ears:  External Ears: Normal bilateral pinna. TM's & External Canals: TM's normal bilaterally without perforation. Canals without erythema or drainage. Nose:   There is no obvious septal defect.moderate redness/edema  Mouth:  Moist bucca mucosa and normal tongue. Throat: Mild posterior pharyngeal erythema without exudates or lesions. Neck:  Supple. There is no obvious adenopathy or neck tenderness. Lungs:   Breath sounds: Normal chest expansion and breath sounds noted throughout. No wheezes, rales, or rhonchi noted. Heart:  Regular rate and rhythm, normal heart sounds, without pathological murmurs, ectopy, gallops, or rubs. Skin:  Normal turgor. Warm, dry, without visible rash. Neurological:  Oriented. Motor functions intact. Lab / Imaging Results   (All laboratory and radiology results have been personally reviewed by myself)  Labs:  No results found for this visit on 01/20/22. Imaging: All Radiology results interpreted by Radiologist unless otherwise noted. No orders to display         Assessment / Plan     Impression(s):  1. COVID-19 virus infection    2. Nausea      Disposition:  Disposition: complete meds as ordered on 1/11/22, will add Zithromax and refill Zofran as discussed. Zofran injection now, stay well hydrated    . ER if changes or worse. Advised to take all medications as directed.

## 2022-01-24 ENCOUNTER — TELEPHONE (OUTPATIENT)
Dept: FAMILY MEDICINE CLINIC | Age: 67
End: 2022-01-24

## 2022-01-24 DIAGNOSIS — I10 ESSENTIAL HYPERTENSION: ICD-10-CM

## 2022-01-24 RX ORDER — LISINOPRIL AND HYDROCHLOROTHIAZIDE 20; 12.5 MG/1; MG/1
1 TABLET ORAL DAILY
Qty: 90 TABLET | Refills: 1 | Status: SHIPPED
Start: 2022-01-24 | End: 2022-02-25 | Stop reason: SDUPTHER

## 2022-01-24 NOTE — TELEPHONE ENCOUNTER
Pt is out of lisinopril-hydroCHLOROthiazide (PRINZIDE;ZESTORETIC) 20-12.5 MG per tablet       Please send to [facility] on [address]

## 2022-02-02 ENCOUNTER — OFFICE VISIT (OUTPATIENT)
Dept: FAMILY MEDICINE CLINIC | Age: 67
End: 2022-02-02
Payer: MEDICARE

## 2022-02-02 VITALS
TEMPERATURE: 97.1 F | DIASTOLIC BLOOD PRESSURE: 83 MMHG | WEIGHT: 242 LBS | HEIGHT: 68 IN | SYSTOLIC BLOOD PRESSURE: 129 MMHG | RESPIRATION RATE: 20 BRPM | OXYGEN SATURATION: 93 % | BODY MASS INDEX: 36.68 KG/M2 | HEART RATE: 99 BPM

## 2022-02-02 DIAGNOSIS — Z00.00 ROUTINE GENERAL MEDICAL EXAMINATION AT A HEALTH CARE FACILITY: Primary | ICD-10-CM

## 2022-02-02 DIAGNOSIS — F33.0 MILD EPISODE OF RECURRENT MAJOR DEPRESSIVE DISORDER (HCC): ICD-10-CM

## 2022-02-02 DIAGNOSIS — E66.01 SEVERE OBESITY (BMI 35.0-35.9 WITH COMORBIDITY) (HCC): ICD-10-CM

## 2022-02-02 DIAGNOSIS — F32.9 REACTIVE DEPRESSION: ICD-10-CM

## 2022-02-02 PROCEDURE — G8484 FLU IMMUNIZE NO ADMIN: HCPCS | Performed by: FAMILY MEDICINE

## 2022-02-02 PROCEDURE — 3017F COLORECTAL CA SCREEN DOC REV: CPT | Performed by: FAMILY MEDICINE

## 2022-02-02 PROCEDURE — G0439 PPPS, SUBSEQ VISIT: HCPCS | Performed by: FAMILY MEDICINE

## 2022-02-02 PROCEDURE — 1123F ACP DISCUSS/DSCN MKR DOCD: CPT | Performed by: FAMILY MEDICINE

## 2022-02-02 PROCEDURE — 4040F PNEUMOC VAC/ADMIN/RCVD: CPT | Performed by: FAMILY MEDICINE

## 2022-02-02 RX ORDER — BUPROPION HYDROCHLORIDE 300 MG/1
300 TABLET ORAL EVERY MORNING
Qty: 90 TABLET | Refills: 1 | Status: SHIPPED
Start: 2022-02-02 | End: 2022-02-25 | Stop reason: SDUPTHER

## 2022-02-02 RX ORDER — BUTALBITAL, ACETAMINOPHEN AND CAFFEINE 50; 325; 40 MG/1; MG/1; MG/1
TABLET ORAL
COMMUNITY
Start: 2021-12-31 | End: 2022-05-04 | Stop reason: ALTCHOICE

## 2022-02-02 RX ORDER — FLUTICASONE PROPIONATE 50 MCG
1 SPRAY, SUSPENSION (ML) NASAL DAILY
Qty: 32 G | Refills: 1 | Status: SHIPPED | OUTPATIENT
Start: 2022-02-02 | End: 2022-09-13

## 2022-02-02 RX ORDER — CEFDINIR 300 MG/1
300 CAPSULE ORAL 2 TIMES DAILY
Qty: 14 CAPSULE | Refills: 0 | Status: SHIPPED | OUTPATIENT
Start: 2022-02-02 | End: 2022-02-09

## 2022-02-02 ASSESSMENT — PATIENT HEALTH QUESTIONNAIRE - PHQ9
8. MOVING OR SPEAKING SO SLOWLY THAT OTHER PEOPLE COULD HAVE NOTICED. OR THE OPPOSITE, BEING SO FIGETY OR RESTLESS THAT YOU HAVE BEEN MOVING AROUND A LOT MORE THAN USUAL: 0
7. TROUBLE CONCENTRATING ON THINGS, SUCH AS READING THE NEWSPAPER OR WATCHING TELEVISION: 0
SUM OF ALL RESPONSES TO PHQ QUESTIONS 1-9: 10
SUM OF ALL RESPONSES TO PHQ QUESTIONS 1-9: 10
2. FEELING DOWN, DEPRESSED OR HOPELESS: 3
5. POOR APPETITE OR OVEREATING: 1
9. THOUGHTS THAT YOU WOULD BE BETTER OFF DEAD, OR OF HURTING YOURSELF: 0
SUM OF ALL RESPONSES TO PHQ QUESTIONS 1-9: 10
SUM OF ALL RESPONSES TO PHQ QUESTIONS 1-9: 10
SUM OF ALL RESPONSES TO PHQ9 QUESTIONS 1 & 2: 4
1. LITTLE INTEREST OR PLEASURE IN DOING THINGS: 1
6. FEELING BAD ABOUT YOURSELF - OR THAT YOU ARE A FAILURE OR HAVE LET YOURSELF OR YOUR FAMILY DOWN: 1
10. IF YOU CHECKED OFF ANY PROBLEMS, HOW DIFFICULT HAVE THESE PROBLEMS MADE IT FOR YOU TO DO YOUR WORK, TAKE CARE OF THINGS AT HOME, OR GET ALONG WITH OTHER PEOPLE: 0
3. TROUBLE FALLING OR STAYING ASLEEP: 1
4. FEELING TIRED OR HAVING LITTLE ENERGY: 3

## 2022-02-02 ASSESSMENT — LIFESTYLE VARIABLES: HOW OFTEN DO YOU HAVE A DRINK CONTAINING ALCOHOL: 0

## 2022-02-02 NOTE — PATIENT INSTRUCTIONS
Personalized Preventive Plan for Magdiel Ayala - 2/2/2022  Medicare offers a range of preventive health benefits. Some of the tests and screenings are paid in full while other may be subject to a deductible, co-insurance, and/or copay. Some of these benefits include a comprehensive review of your medical history including lifestyle, illnesses that may run in your family, and various assessments and screenings as appropriate. After reviewing your medical record and screening and assessments performed today your provider may have ordered immunizations, labs, imaging, and/or referrals for you. A list of these orders (if applicable) as well as your Preventive Care list are included within your After Visit Summary for your review. Other Preventive Recommendations:    · A preventive eye exam performed by an eye specialist is recommended every 1-2 years to screen for glaucoma; cataracts, macular degeneration, and other eye disorders. · A preventive dental visit is recommended every 6 months. · Try to get at least 150 minutes of exercise per week or 10,000 steps per day on a pedometer . · Order or download the FREE \"Exercise & Physical Activity: Your Everyday Guide\" from [employer]. Call 1-480.743.3266 or search [employer] online. · You need 4476-4463 mg of calcium and 2519-4107 IU of vitamin D per day. It is possible to meet your calcium requirement with diet alone, but a vitamin D supplement is usually necessary to meet this goal.  · When exposed to the sun, use a sunscreen that protects against both UVA and UVB radiation with an SPF of 30 or greater. Reapply every 2 to 3 hours or after sweating, drying off with a towel, or swimming. · Always wear a seat belt when traveling in a car. Always wear a helmet when riding a bicycle or motorcycle.

## 2022-02-02 NOTE — PROGRESS NOTES
Medicare Annual Wellness Visit  Name: Sonia Lund Date: 2022   MRN: [id number] Sex: Male   Age: 78 y.o. Ethnicity: Non- / Non    : 1955 Race: White (non-)      Angelique Chavez is here for Medicare AWV    Screenings for behavioral, psychosocial and functional/safety risks, and cognitive dysfunction are all negative except as indicated below. These results, as well as other patient data from the [address] form, are documented in Flowsheets linked to this Encounter. Allergies   Allergen Reactions    Codeine Nausea Only         Prior to Visit Medications    Medication Sig Taking?  Authorizing Provider   butalbital-acetaminophen-caffeine (FIORICET, ESGIC) -40 MG per tablet take 1 capsule by mouth every 4 hours if needed for headache Yes Historical Provider, MD   fluticasone (FLONASE) 50 MCG/ACT nasal spray 1 spray by Each Nostril route daily Yes Donnathee Kessler, DO   cefdinir (OMNICEF) 300 MG capsule Take 1 capsule by mouth 2 times daily for 7 days Yes Donna Kessler, DO   buPROPion (WELLBUTRIN XL) 300 MG extended release tablet Take 1 tablet by mouth every morning Yes Denis Valdez, DO   lisinopril-hydroCHLOROthiazide (PRINZIDE;ZESTORETIC) 20-12.5 MG per tablet Take 1 tablet by mouth daily Yes Donna Kessler, DO   atorvastatin (LIPITOR) 40 MG tablet take 1 tablet by mouth once daily Yes Donnathee Kessler, DO   venlafaxine (EFFEXOR XR) 150 MG extended release capsule take 1 capsule by mouth once daily Yes Donna Kessler, DO   Multiple Vitamins-Minerals (THERAPEUTIC MULTIVITAMIN-MINERALS) tablet Take 1 tablet by mouth daily Yes Historical Provider, MD         Past Medical History:   Diagnosis Date    Depression     Erectile dysfunction     History of shingles     Hypertension 2017    Mixed hyperlipidemia 2017       Past Surgical History:   Procedure Laterality Date    COLONOSCOPY      HEEL SPUR SURGERY           Family History Problem Relation Age of Onset    Heart Disease Mother         Valves    Atrial Fibrillation Father     Heart Disease Father         Valve    Heart Attack Father 59       CareTeam (Including outside providers/suppliers regularly involved in providing care):   Patient Care Team:  Radha Gonzalez DO as PCP - General (Family Medicine)  Radha Gonzalez DO as PCP - [facility] Empaneled Provider    Wt Readings from Last 3 Encounters:   02/02/22 242 lb (109.8 kg)   10/15/21 253 lb (114.8 kg)   09/23/21 252 lb 9.6 oz (114.6 kg)     Vitals:    02/02/22 0830   BP: 129/83   Pulse: 99   Resp: 20   Temp: 97.1 °F (36.2 °C)   SpO2: 93%   Weight: 242 lb (109.8 kg)   Height: 5' 8\" (1.727 m)     Body mass index is 36.8 kg/m². Based upon direct observation of the patient, evaluation of cognition reveals recent and remote memory intact. Patient's complete Health Risk Assessment and screening values have been reviewed and are found in Flowsheets. The following problems were reviewed today and where indicated follow up appointments were made and/or referrals ordered. Positive Risk Factor Screenings with Interventions:     Fall Risk:  Timed Up and Go Test > 12 seconds? (Complete if either Fall Risk answers are Yes): no  2 or more falls in past year?: no  Fall with injury in past year?: (!) yes  Fall Risk Interventions:    · Home safety tips provided     Depression:  PHQ-2 Score: 4  PHQ-9 Total Score: 10    Severity:1-4 = minimal depression, 5-9 = mild depression, 10-14 = moderate depression, 15-19 = moderately severe depression, 20-27 = severe depression  Depression Interventions:  · Patient declines any further evaluation/treatment for this issue          General Health and ACP:  General  In general, how would you say your health is?: Fair  In the past 7 days, have you experienced any of the following?  New or Increased Pain, New or Increased Fatigue, Loneliness, Social Isolation, Stress or Anger?: (!) New or Increased [place] you get the social and emotional support that you need?: Yes  Do you have a Living Will?: (!) No  Advance Directives     Power of  Living Will ACP-Advance Directive ACP-Power of     Not on File Filed on 10/18/13 Filed Not on File      General Health Risk Interventions:  · changed medication    Health Habits/Nutrition:  Health Habits/Nutrition  Do you exercise for at least 20 minutes 2-3 times per week?: (!) No  Have you lost any weight without trying in the past 3 months?: (!) Yes  Do you eat only one meal per day?: No  Have you seen the dentist within the past year?: (!) No  Body mass index: (!) 36.79  Health Habits/Nutrition Interventions:  · Dental exam overdue:  patient encouraged to make appointment with his/her dentist    Hearing/Vision:  No exam data present  Hearing/Vision  Do you or your family notice any trouble with your hearing that hasn't been managed with hearing aids?: (!) Yes  Do you have difficulty driving, watching TV, or doing any of your daily activities because of your eyesight?: No  Have you had an eye exam within the past year?: Yes  Hearing/Vision Interventions:  · doing well    Safety:  Safety  Do you have working smoke detectors?: Yes  Have all throw rugs been removed or fastened?: (!) No  Do you have non-slip mats or surfaces in all bathtubs/showers?: (!) No  Do all of your stairways have a railing or banister?: Yes  Are your doorways, halls and stairs free of clutter?: Yes  Do you always fasten your seatbelt when you are in a car?: Yes  Safety Interventions:  · Home safety tips provided       Personalized Preventive Plan   Current Health Maintenance Status  Immunization History   Administered Date(s) Administered    COVID-19, Nasreen Dun, Primary or Immunocompromised, PF, 100mcg/0.5mL 04/09/2021, 05/07/2021    Influenza, MDCK Quadv, IM, PF (Flucelvax 2 yrs and older) 02/08/2019    Influenza, Quadv, IM, PF (6 mo and older Fluzone, Flulaval, Fluarix, and 3 yrs and older Afluria) 11/09/2016, 10/18/2017, 11/20/2019    Influenza, Quadv, adjuvanted, 65 yrs +, IM, PF (Fluad) 10/15/2021    Influenza, Triv, 3 Years and older, IM (Afluria (5 yrs and older) 11/09/2016    Pneumococcal Polysaccharide ([id number]) 10/18/2017    Tdap (Boostrix, Adacel) 02/20/2015, 10/10/2020    Zoster Live (Zostavax) 10/18/2017        Health Maintenance   Topic Date Due    Depression Monitoring  Never done    Shingles Vaccine (2 of 3) 12/13/2017    COVID-19 Vaccine (3 - Booster for Rudy Lucks series) 10/07/2021    Annual Wellness Visit (AWV)  Never done    A1C test (Diabetic or Prediabetic)  04/01/2022    Potassium monitoring  06/17/2022    Creatinine monitoring  06/17/2022    Lipid screen  10/15/2022    Pneumococcal 65+ years Vaccine (1 of 1 - PPSV23) 10/18/2022    Colon cancer screen colonoscopy  05/05/2024    DTaP/Tdap/Td vaccine (3 - Td or Tdap) 10/10/2030    Flu vaccine  Completed    Hepatitis C screen  Completed    Hepatitis A vaccine  Aged Out    Hepatitis B vaccine  Aged Out    Hib vaccine  Aged Out    Meningococcal (ACWY) vaccine  Aged Out     Recommendations for [employer] Due: see orders and patient instructions/AVS.  . Recommended screening schedule for the next 5-10 years is provided to the patient in written form: see Patient Instructions/AVS.    J Carlos Chen was seen today for medicare awv. Diagnoses and all orders for this visit:    Routine general medical examination at a health care facility    Mild episode of recurrent major depressive disorder (HCC)    Reactive depression  -     buPROPion (WELLBUTRIN XL) 300 MG extended release tablet; Take 1 tablet by mouth every morning    Severe obesity (BMI 35.0-35.9 with comorbidity) ([address]    Other orders  -     fluticasone (FLONASE) 50 MCG/ACT nasal spray; 1 spray by Each Nostril route daily  -     cefdinir (OMNICEF) 300 MG capsule;  Take 1 capsule by mouth 2 times daily for 7 days

## 2022-02-22 RX ORDER — VENLAFAXINE HYDROCHLORIDE 150 MG/1
CAPSULE, EXTENDED RELEASE ORAL
Qty: 90 CAPSULE | Refills: 1 | Status: SHIPPED
Start: 2022-02-22 | End: 2022-02-25 | Stop reason: SDUPTHER

## 2022-02-25 DIAGNOSIS — I10 ESSENTIAL HYPERTENSION: ICD-10-CM

## 2022-02-25 DIAGNOSIS — F32.9 REACTIVE DEPRESSION: ICD-10-CM

## 2022-02-25 RX ORDER — VENLAFAXINE HYDROCHLORIDE 150 MG/1
CAPSULE, EXTENDED RELEASE ORAL
Qty: 90 CAPSULE | Refills: 1 | Status: SHIPPED
Start: 2022-02-25 | End: 2022-06-01 | Stop reason: SDUPTHER

## 2022-02-25 RX ORDER — LISINOPRIL AND HYDROCHLOROTHIAZIDE 20; 12.5 MG/1; MG/1
1 TABLET ORAL DAILY
Qty: 90 TABLET | Refills: 1 | Status: SHIPPED | OUTPATIENT
Start: 2022-02-25

## 2022-02-25 RX ORDER — BUPROPION HYDROCHLORIDE 300 MG/1
300 TABLET ORAL EVERY MORNING
Qty: 90 TABLET | Refills: 1 | Status: SHIPPED
Start: 2022-02-25 | End: 2022-10-04

## 2022-03-29 ENCOUNTER — HOSPITAL ENCOUNTER (OUTPATIENT)
Dept: NEUROLOGY | Age: 67
Discharge: HOME | End: 2022-03-29
Payer: MEDICARE

## 2022-03-29 DIAGNOSIS — R25.1 TREMOR: ICD-10-CM

## 2022-03-29 PROCEDURE — 95714 VEEG EA 12-26 HR UNMNTR: CPT

## 2022-03-31 ENCOUNTER — HOSPITAL ENCOUNTER (OUTPATIENT)
Dept: NEUROLOGY | Age: 67
Discharge: HOME | End: 2022-03-31

## 2022-04-13 ENCOUNTER — OFFICE VISIT (OUTPATIENT)
Dept: PRIMARY CARE CLINIC | Age: 67
End: 2022-04-13
Payer: MEDICARE

## 2022-04-13 VITALS
TEMPERATURE: 96.6 F | OXYGEN SATURATION: 99 % | SYSTOLIC BLOOD PRESSURE: 127 MMHG | DIASTOLIC BLOOD PRESSURE: 87 MMHG | HEART RATE: 89 BPM | RESPIRATION RATE: 18 BRPM

## 2022-04-13 DIAGNOSIS — B96.89 ACUTE BACTERIAL SINUSITIS: ICD-10-CM

## 2022-04-13 DIAGNOSIS — R05.9 COUGH: ICD-10-CM

## 2022-04-13 DIAGNOSIS — J01.90 ACUTE BACTERIAL SINUSITIS: ICD-10-CM

## 2022-04-13 LAB
INFLUENZA A ANTIGEN, POC: NEGATIVE
INFLUENZA B ANTIGEN, POC: NEGATIVE
Lab: NORMAL
PERFORMING INSTRUMENT: NORMAL
QC PASS/FAIL: NORMAL
SARS-COV-2, POC: NORMAL

## 2022-04-13 PROCEDURE — 87426 SARSCOV CORONAVIRUS AG IA: CPT | Performed by: NURSE PRACTITIONER

## 2022-04-13 PROCEDURE — 99213 OFFICE O/P EST LOW 20 MIN: CPT | Performed by: NURSE PRACTITIONER

## 2022-04-13 PROCEDURE — 3017F COLORECTAL CA SCREEN DOC REV: CPT | Performed by: NURSE PRACTITIONER

## 2022-04-13 PROCEDURE — 1123F ACP DISCUSS/DSCN MKR DOCD: CPT | Performed by: NURSE PRACTITIONER

## 2022-04-13 PROCEDURE — G8427 DOCREV CUR MEDS BY ELIG CLIN: HCPCS | Performed by: NURSE PRACTITIONER

## 2022-04-13 PROCEDURE — 87804 INFLUENZA ASSAY W/OPTIC: CPT | Performed by: NURSE PRACTITIONER

## 2022-04-13 PROCEDURE — 1036F TOBACCO NON-USER: CPT | Performed by: NURSE PRACTITIONER

## 2022-04-13 PROCEDURE — 4040F PNEUMOC VAC/ADMIN/RCVD: CPT | Performed by: NURSE PRACTITIONER

## 2022-04-13 PROCEDURE — G8417 CALC BMI ABV UP PARAM F/U: HCPCS | Performed by: NURSE PRACTITIONER

## 2022-04-13 RX ORDER — CEFDINIR 300 MG/1
300 CAPSULE ORAL 2 TIMES DAILY
Qty: 20 CAPSULE | Refills: 0 | Status: SHIPPED | OUTPATIENT
Start: 2022-04-13 | End: 2022-04-23

## 2022-04-13 RX ORDER — BROMPHENIRAMINE MALEATE, PSEUDOEPHEDRINE HYDROCHLORIDE, AND DEXTROMETHORPHAN HYDROBROMIDE 2; 30; 10 MG/5ML; MG/5ML; MG/5ML
5 SYRUP ORAL 4 TIMES DAILY PRN
Qty: 118 ML | Refills: 0 | Status: SHIPPED
Start: 2022-04-13 | End: 2022-05-04

## 2022-04-13 NOTE — PROGRESS NOTES
Chief Complaint:   Cough and Head Congestion      History of Present Illness   Source of history provided by:  patient. Kitty Ramos is a 78 y.o. old male with a past medical history of:   Past Medical History:   Diagnosis Date    Depression     Erectile dysfunction     History of shingles     Hypertension 1/25/2017    Mixed hyperlipidemia 1/25/2017       Pt  presents to the [facility] care with a cough/congestion/sinus pressure for the past few days. States the cough is non productive. Subjective fever noted. .  Denies any N/V/D, abdominal pain, CP, progressive SOB, dizziness, or lethargy. ROS    Unless otherwise stated in this report or unable to obtain because of the patient's clinical or mental status as evidenced by the medical record, this patients's positive and negative responses for Review of Systems, constitutional, psych, eyes, ENT, cardiovascular, respiratory, gastrointestinal, neurological, genitourinary, musculoskeletal, integument systems and systems related to the presenting problem are either stated in the preceding or were not pertinent or were negative for the symptoms and/or complaints related to the medical problem. Past Surgical History:  has a past surgical history that includes Heel spur surgery and Colonoscopy. Social History:  reports that he has never smoked. He has never used smokeless tobacco. He reports that he does not drink alcohol and does not use drugs. Family History: family history includes Atrial Fibrillation in his father; Heart Attack (age of onset: 59) in his father; Heart Disease in his father and mother. Allergies: Codeine    Physical Exam         VS:  /87 (Site: Right Upper Arm, Position: Sitting, Cuff Size: Large Adult)   Pulse 89   Temp 96.6 °F (35.9 °C) (Temporal)   Resp 18   SpO2 99%    Oxygen Saturation Interpretation: Normal.    Constitutional:  Alert, development consistent with age. Ears:  External Ears: Normal bilateral pinna. TM's & External Canals: TM's normal bilaterally without perforation. Canals without erythema or drainage. Nose:   There is no obvious septal defect. Moderate redness/edema  Mouth:  Moist bucca mucosa and normal tongue. Throat: Mild posterior pharyngeal erythema without exudates or lesions. Neck:  Supple. There is no obvious adenopathy or neck tenderness. Lungs:   Breath sounds: Normal chest expansion and breath sounds noted throughout. Npo wheezes, rales, or rhonchi noted. Heart:  Regular rate and rhythm, normal heart sounds, without pathological murmurs, ectopy, gallops, or rubs. Skin:  Normal turgor. Warm, dry, without visible rash. Neurological:  Oriented. Motor functions intact. Lab / Imaging Results   (All laboratory and radiology results have been personally reviewed by myself)  Labs:  Results for orders placed or performed in visit on 04/13/22   POCT COVID-19, Antigen   Result Value Ref Range    SARS-COV-2, POC Not-Detected Not Detected    Lot Number [id number]     QC Pass/Fail pass     Performing Instrument BD Veritor    POCT Influenza A/B Antigen (BD Veritor)   Result Value Ref Range    Inflenza A Ag negative     Influenza B Ag negative        Imaging: All Radiology results interpreted by Radiologist unless otherwise noted. No orders to display         Assessment / Plan     Impression(s):  1. Acute bacterial sinusitis    2. Cough      Disposition:  Disposition: Advised Covid and Influenza A/B are negative, start Cefdinir and Bromfed as ordered     ER if changes or worse. Advised to take all medications as directed.

## 2022-04-20 ENCOUNTER — OFFICE VISIT (OUTPATIENT)
Dept: ENDOCRINOLOGY | Age: 67
End: 2022-04-20
Payer: MEDICARE

## 2022-04-20 VITALS
DIASTOLIC BLOOD PRESSURE: 78 MMHG | HEIGHT: 68 IN | RESPIRATION RATE: 18 BRPM | WEIGHT: 246 LBS | SYSTOLIC BLOOD PRESSURE: 128 MMHG | BODY MASS INDEX: 37.28 KG/M2 | HEART RATE: 59 BPM | OXYGEN SATURATION: 98 %

## 2022-04-20 DIAGNOSIS — E55.9 VITAMIN D DEFICIENCY: ICD-10-CM

## 2022-04-20 DIAGNOSIS — E29.1 MALE HYPOGONADISM: Primary | ICD-10-CM

## 2022-04-20 PROCEDURE — 1036F TOBACCO NON-USER: CPT | Performed by: INTERNAL MEDICINE

## 2022-04-20 PROCEDURE — 4040F PNEUMOC VAC/ADMIN/RCVD: CPT | Performed by: INTERNAL MEDICINE

## 2022-04-20 PROCEDURE — G8427 DOCREV CUR MEDS BY ELIG CLIN: HCPCS | Performed by: INTERNAL MEDICINE

## 2022-04-20 PROCEDURE — G8417 CALC BMI ABV UP PARAM F/U: HCPCS | Performed by: INTERNAL MEDICINE

## 2022-04-20 PROCEDURE — 99204 OFFICE O/P NEW MOD 45 MIN: CPT | Performed by: INTERNAL MEDICINE

## 2022-04-20 PROCEDURE — 3017F COLORECTAL CA SCREEN DOC REV: CPT | Performed by: INTERNAL MEDICINE

## 2022-04-20 PROCEDURE — 1123F ACP DISCUSS/DSCN MKR DOCD: CPT | Performed by: INTERNAL MEDICINE

## 2022-04-20 NOTE — PROGRESS NOTES
[address] [facility] Department of Endocrinology Diabetes and Metabolism   [address] [facility] [address]   Phone: [phone]  Fax: [phone]    Date of Service: 4/20/2022  Primary Care Physician: Joshua Preston DO  Referring physician: Shahla Draper MD  Provider: Romero Blanton MD    Reason for the visit:  Male Hypogonadism     History of Present Illness: The history is provided by the patient. No  was used. Accuracy of the patient data is excellent. Gabo Phillips is a very pleasant 78 y.o. previously healthy male presents with symptoms of sexual dysfunction. Approximately 7-8 years ago, he started experiencing a decrease in lipido followed by erectile dysfunction. At the same time, he stopped having early morning erection. Work up showed low Testosterone level. The patient was started on IM Testosterone injection about a year ago and was on it therapy until 8/2022. He was on T injection every 3 weeks. Per pt testosterone level remained low even with IM injection. He underwent normal puberty and reports no history of gonadal problems. He has no children and never tried to have children. He has had no gynecomastia or galactorrhea. There is no history of testicular or head trauma and didn't noticed any decrease in his testicular size during this time. He has had no headache or peripheral vision disturbances. The patient denied any change in shaving frequency. In addition to sexual dysfunction, he has also experienced a decrease in energy. He has had no fragility fractures. The patient also denied history of easy bruising, think skin, Plethora, Striae, Acne, muscle weakness, skin tags or increase shoe size.      PAST MEDICAL HISTORY   Past Medical History:   Diagnosis Date    Depression     Erectile dysfunction     History of shingles     Hypertension 1/25/2017    Mixed hyperlipidemia 1/25/2017       PAST SURGICAL HISTORY   Past Surgical History: Procedure Laterality Date    COLONOSCOPY      HEEL SPUR SURGERY         SOCIAL HISTORY   Tobacco:   reports that he has never smoked. He has never used smokeless tobacco.  Alcohol:   reports no history of alcohol use. Drugs:   reports no history of drug use. FAMILY HISTORY   Family History   Problem Relation Age of Onset    Heart Disease Mother         Valves    Atrial Fibrillation Father     Heart Disease Father         Valve    Heart Attack Father 59       ALLERGIES AND DRUG REACTIONS   Allergies   Allergen Reactions    Codeine Nausea Only       CURRENT MEDICATIONS   Current Outpatient Medications   Medication Sig Dispense Refill    cefdinir (OMNICEF) 300 MG capsule Take 1 capsule by mouth 2 times daily for 10 days 20 capsule 0    brompheniramine-pseudoephedrine-DM 2-30-10 MG/5ML syrup Take 5 mLs by mouth 4 times daily as needed for Congestion or Cough 118 mL 0    buPROPion (WELLBUTRIN XL) 300 MG extended release tablet Take 1 tablet by mouth every morning 90 tablet 1    lisinopril-hydroCHLOROthiazide (PRINZIDE;ZESTORETIC) 20-12.5 MG per tablet Take 1 tablet by mouth daily 90 tablet 1    venlafaxine (EFFEXOR XR) 150 MG extended release capsule take 1 capsule by mouth once daily 90 capsule 1    butalbital-acetaminophen-caffeine (FIORICET, ESGIC) -40 MG per tablet take 1 capsule by mouth every 4 hours if needed for headache      fluticasone (FLONASE) 50 MCG/ACT nasal spray 1 spray by Each Nostril route daily 32 g 1    atorvastatin (LIPITOR) 40 MG tablet take 1 tablet by mouth once daily 90 tablet 1    Multiple Vitamins-Minerals (THERAPEUTIC MULTIVITAMIN-MINERALS) tablet Take 1 tablet by mouth daily       No current facility-administered medications for this visit. Review of Systems  Constitutional: No fever, no chills, no diaphoresis, no generalized weakness.   HEENT: No blurred vision, No sore throat, no ear pain, no hair loss  Neck: denied any neck swelling, difficulty swallowing, Cadrdiopulomary: No CP, SOB or palpitation, No orthopnea or PND. No cough or wheezing. GI: No N/V/D, no constipation, No abdominal pain, no melena or hematochezia   : Denied any dysuria, hematuria, flank pain, discharge, or incontinence. Skin: denied any rash, ulcer, Hirsute, or hyperpigmentation. MSK: denied any joint deformity, joint pain/swelling, muscle pain, or back pain. Neuro: no numbess, no tingling, no weakness,     OBJECTIVE    /78   Pulse 59   Resp 18   Ht 5' 8\" (1.727 m)   Wt 246 lb (111.6 kg)   SpO2 98%   BMI 37.40 kg/m²   Wt Readings from Last 6 Encounters:   04/20/22 246 lb (111.6 kg)   02/02/22 242 lb (109.8 kg)   10/15/21 253 lb (114.8 kg)   09/23/21 252 lb 9.6 oz (114.6 kg)   09/07/21 254 lb 6.4 oz (115.4 kg)   07/14/21 249 lb (112.9 kg)       Physical examination:  General: awake alert, oriented x3, no abnormal position or movements. HEENT: normocephalic non traumatic, no Galactorrhea  Neck: supple, no LN enlargement, no thyromegaly, no thyroid tenderness, No buffalo hump, no supraventricular fullness,   Pulm: Clear equal air entry no added sounds, no wheezing or rhonchi    CVS: S1 + S2, no murmur, no heave. Dorsalis pedis pulse palpable   Abd: soft lax, no tenderness, no organomegaly, audible bowel sounds. Skin: warm, no lesions, no rash.  No Galactorrhea, no skin tags, no acanthosis Nigricans,   : Testicular size normal. No mass   Neuro: CN intact, sensation normal , muscle strength normal. No tremors   Psych: normal mood, and affect    Review of Laboratory Data:  I have reviewed the following:  Lab Results   Component Value Date/Time    WBC 7.4 06/17/2021 07:54 AM    RBC 4.41 06/17/2021 07:54 AM    HGB 14.0 07/26/2021 10:55 AM    HCT 42.5 07/26/2021 10:55 AM    MCV 93.2 06/17/2021 07:54 AM    MCH 30.2 06/17/2021 07:54 AM    MCHC 32.4 06/17/2021 07:54 AM    RDW 13.9 06/17/2021 07:54 AM     06/17/2021 07:54 AM    MPV 8.8 06/17/2021 07:54 AM      Lab Results Component Value Date/Time     06/17/2021 07:54 AM    K 4.3 06/17/2021 07:54 AM    K 4.0 05/28/2021 07:16 AM    CO2 23 06/17/2021 07:54 AM    BUN 19 06/17/2021 07:54 AM    CREATININE 1.2 06/17/2021 07:54 AM    CALCIUM 9.4 06/17/2021 07:54 AM    LABGLOM >60 06/17/2021 07:54 AM    GFRAA >60 06/17/2021 07:54 AM      Lab Results   Component Value Date/Time    TSH 2.960 06/17/2021 07:54 AM     Lab Results   Component Value Date    LABA1C 5.8 04/01/2021    GLUCOSE 101 06/17/2021     Lab Results   Component Value Date    TRIG 364 10/15/2021    HDL 30 10/15/2021    LDLCALC 58 10/15/2021    CHOL 161 10/15/2021     No results found for: VITD25  Lab Results   Component Value Date    LABA1C 5.8 04/01/2021    GLUCOSE 101 06/17/2021     Lab Results   Component Value Date    TESTOSTERONE 46 09/20/2021    TESTOSTERONE 49 07/26/2021    TESTOSTERONE 64 04/08/2021    TESTOSTERONE 60 04/02/2021     No results found for: TESTFC  No results found for: TESTOSTFR  No results found for: TESTM  Lab Results   Component Value Date    SHBG 24 09/20/2021    SHBG 23 07/26/2021    SHBG 24 04/08/2021    SHBG 24 04/02/2021     No results found for: [facility]  Lab Results   Component Value Date    LH 2.9 05/11/2021     No results found for: FERRITIN  Lab Results   Component Value Date    PSA 1.85 04/02/2021    PSA 1.58 07/23/2019    PSA 1.31 06/15/2018     No results found for: Dea Ordaz   Fritz Daniel, a 78 y.o.-old male seen in for management of      Male hypogonadism  · Obesity likely playing a role in his low total testosterone.    · Will check AM total/free Testosterone, SHBG, FSH, LH and prolactin level    · Discussed side effects and complications of testosterone therapy including worsening sleep apnea, risk of prostate Ca, Polycythemia and cardiovascular risk)  · Will follow result and proceed accordingly     vitD deficiency   · Discussed the effect of Low testosterone on bone health  · Encouraged vitD supplement  · Check level     I personally reviewed external notes from PCP and other patient's care team providers, and personally interpreted labs associated with the above diagnosis. I also ordered labs to further assess and manage the above addressed medical conditions. Return in about 6 months (around 10/20/2022) for Male Hypogonadism, VitD deficiency. The above issues were reviewed with the patient who understood and agreed with the plan. Thank you for allowing us to participate in the care of this patient. Please do not hesitate to contact us with any additional questions. Diagnosis Orders   1. Male hypogonadism  Testosterone, free, total    Luteinizing Hormone    Follicle Stimulating Hormone    Prolactin    Hematocrit    PSA    Sex Hormone Binding Globulin   2. Vitamin D deficiency         Audra Wagner MD  Endocrinologist, [facility] and Endocr[facility]ology   [address] [place] [id number]   Phone: [phone]  Fax: [phone]  -----------------------------  An electronic signature was used to authenticate this note.  Nereyda Hernandez MD on 4/20/2022 at 1:40 PM

## 2022-04-21 ENCOUNTER — HOSPITAL ENCOUNTER (OUTPATIENT)
Age: 67
Discharge: HOME | End: 2022-04-21
Payer: MEDICARE

## 2022-04-21 ENCOUNTER — OFFICE VISIT (OUTPATIENT)
Dept: PRIMARY CARE CLINIC | Age: 67
End: 2022-04-21
Payer: MEDICARE

## 2022-04-21 VITALS
HEART RATE: 81 BPM | OXYGEN SATURATION: 98 % | BODY MASS INDEX: 37.28 KG/M2 | SYSTOLIC BLOOD PRESSURE: 121 MMHG | TEMPERATURE: 97.1 F | HEIGHT: 68 IN | DIASTOLIC BLOOD PRESSURE: 65 MMHG | RESPIRATION RATE: 20 BRPM | WEIGHT: 246 LBS

## 2022-04-21 DIAGNOSIS — J40 SINOBRONCHITIS: Primary | ICD-10-CM

## 2022-04-21 DIAGNOSIS — J32.9 SINOBRONCHITIS: Primary | ICD-10-CM

## 2022-04-21 DIAGNOSIS — E29.1 MALE HYPOGONADISM: ICD-10-CM

## 2022-04-21 LAB
FOLLICLE STIMULATING HORMONE: 5.3 MIU/ML
HCT VFR BLD CALC: 38.7 % (ref 37–54)
LUTEINIZING HORMONE: 1.6 MIU/ML
PROLACTIN: 2.68 NG/ML
PROSTATE SPECIFIC ANTIGEN: 2.64 NG/ML (ref 0–4)

## 2022-04-21 PROCEDURE — 3017F COLORECTAL CA SCREEN DOC REV: CPT | Performed by: NURSE PRACTITIONER

## 2022-04-21 PROCEDURE — G0103 PSA SCREENING: HCPCS

## 2022-04-21 PROCEDURE — 99213 OFFICE O/P EST LOW 20 MIN: CPT | Performed by: NURSE PRACTITIONER

## 2022-04-21 PROCEDURE — 84146 ASSAY OF PROLACTIN: CPT

## 2022-04-21 PROCEDURE — 84403 ASSAY OF TOTAL TESTOSTERONE: CPT

## 2022-04-21 PROCEDURE — 1036F TOBACCO NON-USER: CPT | Performed by: NURSE PRACTITIONER

## 2022-04-21 PROCEDURE — 36415 COLL VENOUS BLD VENIPUNCTURE: CPT

## 2022-04-21 PROCEDURE — 85014 HEMATOCRIT: CPT

## 2022-04-21 PROCEDURE — 1123F ACP DISCUSS/DSCN MKR DOCD: CPT | Performed by: NURSE PRACTITIONER

## 2022-04-21 PROCEDURE — 83002 ASSAY OF GONADOTROPIN (LH): CPT

## 2022-04-21 PROCEDURE — 84270 ASSAY OF SEX HORMONE GLOBUL: CPT

## 2022-04-21 PROCEDURE — G8417 CALC BMI ABV UP PARAM F/U: HCPCS | Performed by: NURSE PRACTITIONER

## 2022-04-21 PROCEDURE — 4040F PNEUMOC VAC/ADMIN/RCVD: CPT | Performed by: NURSE PRACTITIONER

## 2022-04-21 PROCEDURE — 83001 ASSAY OF GONADOTROPIN (FSH): CPT

## 2022-04-21 PROCEDURE — G8427 DOCREV CUR MEDS BY ELIG CLIN: HCPCS | Performed by: NURSE PRACTITIONER

## 2022-04-21 RX ORDER — PREDNISONE 10 MG/1
10 TABLET ORAL 2 TIMES DAILY
Qty: 10 TABLET | Refills: 0 | Status: SHIPPED | OUTPATIENT
Start: 2022-04-21 | End: 2022-04-26

## 2022-04-21 RX ORDER — DOXYCYCLINE HYCLATE 100 MG
100 TABLET ORAL 2 TIMES DAILY
Qty: 20 TABLET | Refills: 0 | Status: SHIPPED | OUTPATIENT
Start: 2022-04-21 | End: 2022-05-01

## 2022-04-21 RX ORDER — BENZONATATE 100 MG/1
100 CAPSULE ORAL 2 TIMES DAILY PRN
Qty: 20 CAPSULE | Refills: 0 | Status: SHIPPED | OUTPATIENT
Start: 2022-04-21 | End: 2022-04-28

## 2022-04-21 NOTE — PROGRESS NOTES
Chief Complaint:   Chest Congestion and Cough      History of Present Illness   Source of history provided by:  patient. John Carr is a 78 y.o. old male with a past medical history of:   Past Medical History:   Diagnosis Date    Depression     Erectile dysfunction     History of shingles     Hypertension 1/25/2017    Mixed hyperlipidemia 1/25/2017        Pt  presents to the [facility] care with continued cough and congestion. Pt was seen on 4/13/22 and treated w/Cefdinir and Bromfed for bacterial sinusitis. Pt stated he has been compliant w/both medications but does not feel he is improving, pt stated cough is worsening. States the cough is  productive with yellow sputum. No fever noted. Denies any N/V/D, abdominal pain, CP, progressive SOB, dizziness, or lethargy. Wife was recently dx with Bronchitis        ROS        Unless otherwise stated in this report or unable to obtain because of the patient's clinical or mental status as evidenced by the medical record, this patients's positive and negative responses for Review of Systems, constitutional, psych, eyes, ENT, cardiovascular, respiratory, gastrointestinal, neurological, genitourinary, musculoskeletal, integument systems and systems related to the presenting problem are either stated in the preceding or were not pertinent or were negative for the symptoms and/or complaints related to the medical problem. Past Surgical History:  has a past surgical history that includes Heel spur surgery and Colonoscopy. Social History:  reports that he has never smoked. He has never used smokeless tobacco. He reports that he does not drink alcohol and does not use drugs. Family History: family history includes Atrial Fibrillation in his father; Heart Attack (age of onset: 59) in his father; Heart Disease in his father and mother.    Allergies: Codeine    Physical Exam         VS:  /65 (Site: Left Upper Arm, Position: Sitting, Cuff Size: Large Adult) Pulse 81   Temp 97.1 °F (36.2 °C) (Temporal)   Resp 20   Ht 5' 8\" (1.727 m)   Wt 246 lb (111.6 kg)   SpO2 98%   BMI 37.40 kg/m²    Oxygen Saturation Interpretation: Normal.    Constitutional:  Alert, development consistent with age. Ears:  External Ears: Normal bilateral pinna. TM's & External Canals: TM's normal bilaterally without perforation. Canals without erythema or drainage. Nose:   There is no obvious septal defect. Moderate redness/edema  Mouth:  Moist bucca mucosa and normal tongue. Throat: Mild posterior pharyngeal erythema without exudates or lesions. Neck:  Supple. There is no obvious adenopathy or neck tenderness. Lungs:   Breath sounds: Normal chest expansion and breath sounds noted : mildly decreased BS bilaterally, nonproductive cough present w/deep breathing, pulse ox 98%  Heart:  Regular rate and rhythm, normal heart sounds, without pathological murmurs, ectopy, gallops, or rubs. Skin:  Normal turgor. Warm, dry, without visible rash. Neurological:  Oriented. Motor functions intact. Lab / Imaging Results   (All laboratory and radiology results have been personally reviewed by myself)  Labs:  No results found for this visit on 04/21/22. Imaging: All Radiology results interpreted by Radiologist unless otherwise noted. No orders to display         Assessment / Plan     Impression(s):  1. Sinobronchitis      Disposition:  Disposition: Start Doxycycline, Prednisone and Tessalon as ordered     ER if changes or worse. Advised to take all medications as directed.

## 2022-04-23 LAB
SEX HORMONE BINDING GLOBULIN: 20 NMOL/L (ref 11–80)
SEX HORMONE BINDING GLOBULIN: 20 NMOL/L (ref 19–76)
TESTOSTERONE FREE-NONMALE: 5.3 PG/ML (ref 47–244)
TESTOSTERONE TOTAL: 23 NG/DL (ref 220–1000)

## 2022-04-24 ENCOUNTER — TELEPHONE (OUTPATIENT)
Dept: ENDOCRINOLOGY | Age: 67
End: 2022-04-24

## 2022-04-24 DIAGNOSIS — E23.0 HYPOGONADOTROPIC HYPOGONADISM SYNDROME, MALE (HCC): Primary | ICD-10-CM

## 2022-04-24 NOTE — TELEPHONE ENCOUNTER
Notify pt,  I have reviewed your recent results  Testosterone level was very low and result suggest Pituitary problem    I recommend Pituitary MRI to r/o pituitary issues     Also prostate enzyme was 2.6, with this level, will need to prostate exam before starting T therapy.  Please check if he is up to date with Prostate exam

## 2022-04-26 NOTE — TELEPHONE ENCOUNTER
Pt saw Dr Omkar Tomlinson for prostate exam last year.  Called, spoke to patient and gave results and recommendations/instructions

## 2022-04-28 ENCOUNTER — OFFICE VISIT (OUTPATIENT)
Dept: PRIMARY CARE CLINIC | Age: 67
End: 2022-04-28
Payer: MEDICARE

## 2022-04-28 VITALS
WEIGHT: 240 LBS | SYSTOLIC BLOOD PRESSURE: 119 MMHG | RESPIRATION RATE: 22 BRPM | OXYGEN SATURATION: 99 % | DIASTOLIC BLOOD PRESSURE: 87 MMHG | HEIGHT: 68 IN | BODY MASS INDEX: 36.37 KG/M2 | HEART RATE: 105 BPM | TEMPERATURE: 97.8 F

## 2022-04-28 DIAGNOSIS — R06.02 SOB (SHORTNESS OF BREATH): Primary | ICD-10-CM

## 2022-04-28 DIAGNOSIS — J32.9 SINOBRONCHITIS: ICD-10-CM

## 2022-04-28 DIAGNOSIS — J40 SINOBRONCHITIS: ICD-10-CM

## 2022-04-28 LAB
INFLUENZA A ANTIBODY: NORMAL
INFLUENZA B ANTIBODY: NORMAL
Lab: NORMAL
PERFORMING INSTRUMENT: NORMAL
QC PASS/FAIL: NORMAL
SARS-COV-2, POC: NORMAL

## 2022-04-28 PROCEDURE — G8427 DOCREV CUR MEDS BY ELIG CLIN: HCPCS | Performed by: NURSE PRACTITIONER

## 2022-04-28 PROCEDURE — 1036F TOBACCO NON-USER: CPT | Performed by: NURSE PRACTITIONER

## 2022-04-28 PROCEDURE — 87804 INFLUENZA ASSAY W/OPTIC: CPT | Performed by: NURSE PRACTITIONER

## 2022-04-28 PROCEDURE — 87426 SARSCOV CORONAVIRUS AG IA: CPT | Performed by: NURSE PRACTITIONER

## 2022-04-28 PROCEDURE — 3017F COLORECTAL CA SCREEN DOC REV: CPT | Performed by: NURSE PRACTITIONER

## 2022-04-28 PROCEDURE — 4040F PNEUMOC VAC/ADMIN/RCVD: CPT | Performed by: NURSE PRACTITIONER

## 2022-04-28 PROCEDURE — 99213 OFFICE O/P EST LOW 20 MIN: CPT | Performed by: NURSE PRACTITIONER

## 2022-04-28 PROCEDURE — G8417 CALC BMI ABV UP PARAM F/U: HCPCS | Performed by: NURSE PRACTITIONER

## 2022-04-28 PROCEDURE — 1123F ACP DISCUSS/DSCN MKR DOCD: CPT | Performed by: NURSE PRACTITIONER

## 2022-04-28 RX ORDER — ATORVASTATIN CALCIUM 40 MG/1
TABLET, FILM COATED ORAL
Qty: 90 TABLET | Refills: 1 | Status: CANCELLED | OUTPATIENT
Start: 2022-04-28

## 2022-04-28 RX ORDER — BUPROPION HYDROCHLORIDE 150 MG/1
TABLET ORAL
COMMUNITY
Start: 2022-03-25 | End: 2022-05-04

## 2022-04-28 NOTE — PROGRESS NOTES
Chief Complaint:   Cough, Shortness of Breath, and Chills      History of Present Illness   Source of history provided by:  patient. Ileana Honeycutt is a 78 y.o. old male with a past medical history of:   Past Medical History:   Diagnosis Date    Depression     Erectile dysfunction     History of shingles     Hypertension 1/25/2017    Mixed hyperlipidemia 1/25/2017       Pt  presents to the [facility] care with a cough/SOB/Chills for the past few weeks. Pt was seen on 4/13/22 for Acute Bacterial Sinusitis and treated w/Cefdinir and Bromfed, pt then returned on 4/21/22 and dx with Sinobronchitis and treated with Doxycycline and Prednisone, pt completed Prednisone but has 3 days remaining of Doxycycline. States the cough is non productive. No fever noted. Denies any N/V/D, abdominal pain, CP, dizziness, or peripheral edema. Pt does have a h/o anxiety, pt requesting to speak w/PCP-Dr. Rich Bland today         ROS    Unless otherwise stated in this report or unable to obtain because of the patient's clinical or mental status as evidenced by the medical record, this patients's positive and negative responses for Review of Systems, constitutional, psych, eyes, ENT, cardiovascular, respiratory, gastrointestinal, neurological, genitourinary, musculoskeletal, integument systems and systems related to the presenting problem are either stated in the preceding or were not pertinent or were negative for the symptoms and/or complaints related to the medical problem. Past Surgical History:  has a past surgical history that includes Heel spur surgery and Colonoscopy. Social History:  reports that he has never smoked. He has never used smokeless tobacco. He reports that he does not drink alcohol and does not use drugs. Family History: family history includes Atrial Fibrillation in his father; Heart Attack (age of onset: 59) in his father; Heart Disease in his father and mother.    Allergies: Codeine    Physical Exam VS:  /87 (Site: Right Upper Arm, Position: Sitting, Cuff Size: Medium Adult)   Pulse 105   Temp 97.8 °F (36.6 °C) (Oral)   Resp 22   Ht 5' 8\" (1.727 m)   Wt 240 lb (108.9 kg)   BMI 36.49 kg/m²    Oxygen Saturation Interpretation: Normal.    Constitutional:  Alert, development consistent with age. Ears:  External Ears: Normal bilateral pinna. TM's & External Canals: TM's normal bilaterally without perforation. Canals without erythema or drainage. Nose:   There is no obvious septal defect. Mild redness  Mouth:  Moist bucca mucosa and normal tongue. Throat: Mild posterior pharyngeal erythema without exudates or lesions. Neck:  Supple. There is no obvious adenopathy or neck tenderness. Lungs:   Breath sounds: Normal chest expansion and breath sounds noted throughout. No wheezes, rales, or rhonchi noted. Pulse ox 99%, able to communicate w/o difficulty  Heart:  Tachycardic, normal heart sounds, without pathological murmurs, ectopy, gallops, or rubs. No peripheral edema  Skin:  Normal turgor. Warm, dry, without visible rash. Psychological: Anxious, pleasant and cooperative. Jittery. Answers questions appropriately, good eye contact  Neurological:  Oriented. Motor functions intact. Lab / Imaging Results   (All laboratory and radiology results have been personally reviewed by myself)  Labs:  Results for orders placed or performed in visit on 04/28/22   POCT COVID-19, Antigen   Result Value Ref Range    SARS-COV-2, POC Not-Detected Not Detected    Lot Number [id number]     QC Pass/Fail pass     Performing Instrument BD Veritor    POCT Influenza A/B   Result Value Ref Range    Influenza A Ab neg     Influenza B Ab neg        Imaging: All Radiology results interpreted by Radiologist unless otherwise noted. No orders to display         Assessment / Plan     Impression(s):  1. SOB (shortness of breath) -improved   2.  Sinobronchitis      Disposition:  Disposition: Chest Xray now, Advised Covid and Influenza A/B are negative, Complete remainder of Doxycycline, Reviewed Chest Xray results w/pt-reassured pt chest xray was normal, advised to go to ED with any worsening as discussed. PCP-Dr. Felisha Villeda spoke with pt while at [facility]. ER if changes or worse. Advised to take all medications as directed.

## 2022-05-03 ENCOUNTER — PROCEDURE VISIT (OUTPATIENT)
Dept: NEUROLOGY | Age: 67
End: 2022-05-03

## 2022-05-03 DIAGNOSIS — R56.9 SEIZURES (HCC): Primary | ICD-10-CM

## 2022-05-03 PROCEDURE — 95721 EEG PHY/QHP>36<60 HR W/O VID: CPT | Performed by: PSYCHIATRY & NEUROLOGY

## 2022-05-03 NOTE — PROCEDURES
Ambulatory EEG report    This 52-year-old man, on bupropion, lisinopril, hydrochlorothiazide, venlafaxine, butalbital, fluticasone, atorvastatin and multivitamins, displayed the following underlying rhythms well-organized, synchronous, 10 Hz, 20 to 40 µV alpha rhythms in both posterior regions. 16 Hz, 10 µV beta rhythms were noted in both precentral regions. There was symmetrical attenuation of the posterior alpha rhythms with eye opening. The patient did fall asleep, progressing uneventfully through all stages of somnolence. Despite moderate eye movement, muscle and chewing artifacts, there were no focal abnormalities or epileptiform discharges.     Impressionnormal 48-hour ambulatory EEG

## 2022-05-03 NOTE — PROGRESS NOTES
Ambulatory EEG report    This 81-year-old man, on bupropion, lisinopril, hydrochlorothiazide, venlafaxine, butalbital, fluticasone, atorvastatin and multivitamins, displayed the following underlying rhythms--- well-organized, synchronous, 10 Hz, 20 to 40 µV alpha rhythms in both posterior regions. 16 Hz, 10 µV beta rhythms were noted in both precentral regions. There was symmetrical attenuation of the posterior alpha rhythms with eye opening. The patient did fall asleep, progressing uneventfully through all stages of somnolence. Despite moderate eye movement, muscle and chewing artifacts, there were no focal abnormalities or epileptiform discharges.     Impression--abnormal 48-hour ambulatory EEG

## 2022-05-04 ENCOUNTER — OFFICE VISIT (OUTPATIENT)
Dept: FAMILY MEDICINE CLINIC | Age: 67
End: 2022-05-04
Payer: MEDICARE

## 2022-05-04 VITALS
WEIGHT: 238 LBS | RESPIRATION RATE: 18 BRPM | DIASTOLIC BLOOD PRESSURE: 86 MMHG | TEMPERATURE: 97.6 F | SYSTOLIC BLOOD PRESSURE: 128 MMHG | HEIGHT: 68 IN | HEART RATE: 120 BPM | BODY MASS INDEX: 36.07 KG/M2

## 2022-05-04 DIAGNOSIS — E78.49 OTHER HYPERLIPIDEMIA: ICD-10-CM

## 2022-05-04 DIAGNOSIS — R06.00 DYSPNEA, UNSPECIFIED TYPE: ICD-10-CM

## 2022-05-04 DIAGNOSIS — R73.01 IMPAIRED FASTING GLUCOSE: ICD-10-CM

## 2022-05-04 DIAGNOSIS — U09.9 COVID-19 LONG HAULER MANIFESTING CHRONIC DYSPNEA: Primary | ICD-10-CM

## 2022-05-04 DIAGNOSIS — R06.09 COVID-19 LONG HAULER MANIFESTING CHRONIC DYSPNEA: Primary | ICD-10-CM

## 2022-05-04 DIAGNOSIS — Z23 NEED FOR PNEUMOCOCCAL VACCINATION: ICD-10-CM

## 2022-05-04 DIAGNOSIS — I10 PRIMARY HYPERTENSION: ICD-10-CM

## 2022-05-04 PROCEDURE — G8427 DOCREV CUR MEDS BY ELIG CLIN: HCPCS | Performed by: FAMILY MEDICINE

## 2022-05-04 PROCEDURE — 1123F ACP DISCUSS/DSCN MKR DOCD: CPT | Performed by: FAMILY MEDICINE

## 2022-05-04 PROCEDURE — 4040F PNEUMOC VAC/ADMIN/RCVD: CPT | Performed by: FAMILY MEDICINE

## 2022-05-04 PROCEDURE — 99214 OFFICE O/P EST MOD 30 MIN: CPT | Performed by: FAMILY MEDICINE

## 2022-05-04 PROCEDURE — 90471 IMMUNIZATION ADMIN: CPT | Performed by: FAMILY MEDICINE

## 2022-05-04 PROCEDURE — 90677 PCV20 VACCINE IM: CPT | Performed by: FAMILY MEDICINE

## 2022-05-04 PROCEDURE — 1036F TOBACCO NON-USER: CPT | Performed by: FAMILY MEDICINE

## 2022-05-04 PROCEDURE — 3017F COLORECTAL CA SCREEN DOC REV: CPT | Performed by: FAMILY MEDICINE

## 2022-05-04 PROCEDURE — G8417 CALC BMI ABV UP PARAM F/U: HCPCS | Performed by: FAMILY MEDICINE

## 2022-05-04 RX ORDER — ATORVASTATIN CALCIUM 40 MG/1
TABLET, FILM COATED ORAL
Qty: 90 TABLET | Refills: 1 | Status: SHIPPED | OUTPATIENT
Start: 2022-05-04 | End: 2022-09-13 | Stop reason: SDUPTHER

## 2022-05-04 RX ORDER — ALBUTEROL SULFATE 90 UG/1
2 AEROSOL, METERED RESPIRATORY (INHALATION) 4 TIMES DAILY PRN
Qty: 18 G | Refills: 5 | Status: SHIPPED | OUTPATIENT
Start: 2022-05-04 | End: 2022-09-13

## 2022-05-04 ASSESSMENT — ENCOUNTER SYMPTOMS
SORE THROAT: 0
EYE ITCHING: 0
CHEST TIGHTNESS: 0
NAUSEA: 0
SHORTNESS OF BREATH: 1
ABDOMINAL PAIN: 0
DIARRHEA: 0
COUGH: 0
BACK PAIN: 0
CONSTIPATION: 0
RHINORRHEA: 0
EYE PAIN: 0

## 2022-05-04 NOTE — PROGRESS NOTES
Kailee Angelo  : 1955    Chief Complaint:     Chief Complaint   Patient presents with    Hypertension    Anxiety       HPI  Kailee Angelo 78 y.o. presents for   Chief Complaint   Patient presents with    Hypertension    Anxiety     Presents for follow-up. He continues have issues with his shortness of breath. He has had an echo, stress test which were within normal limits. He has had COVID twice. He has not had a work-up for his lungs including PFTs. He is not currently on an inhaler. He has not had an A1c in some time. He is due for pneumonia vaccine. He denies any chest pain. All questions were answered to patients satisfaction. Past Medical History:   Diagnosis Date    Depression     Erectile dysfunction     History of shingles     Hypertension 2017    Mixed hyperlipidemia 2017       Allergies   Allergen Reactions    Codeine Nausea Only       Health Maintenance Due   Topic Date Due    Shingles vaccine (2 of 3) 2017    COVID-19 Vaccine (3 - Booster for Moderna series) 10/07/2021    A1C test (Diabetic or Prediabetic)  2022         REVIEW OF SYSTEMS  Review of Systems   Constitutional: Negative for chills, fatigue and fever. HENT: Negative for congestion, ear pain, postnasal drip, rhinorrhea and sore throat. Eyes: Negative for pain and itching. Respiratory: Positive for shortness of breath. Negative for cough and chest tightness. Cardiovascular: Negative for chest pain and leg swelling. Gastrointestinal: Negative for abdominal pain, constipation, diarrhea and nausea. Endocrine: Negative for heat intolerance. Genitourinary: Negative for flank pain, frequency and urgency. Musculoskeletal: Negative for arthralgias and back pain. Skin: Negative for pallor and rash. Allergic/Immunologic: Negative for environmental allergies. Neurological: Negative for dizziness, numbness and headaches. Hematological: Does not bruise/bleed easily. Psychiatric/Behavioral: Negative for agitation, behavioral problems and hallucinations. PHYSICAL EXAM  /86   Pulse 120   Temp 97.6 °F (36.4 °C)   Resp 18   Ht 5' 8\" (1.727 m)   Wt 238 lb (108 kg)   BMI 36.19 kg/m²   Physical Exam  Vitals and nursing note reviewed. Constitutional:       General: He is not in acute distress. Appearance: He is well-developed. He is obese. He is not diaphoretic. HENT:      Head: Normocephalic and atraumatic. Right Ear: External ear normal.      Left Ear: External ear normal.      Nose: Nose normal.      Mouth/Throat:      Pharynx: No oropharyngeal exudate. Eyes:      General:         Right eye: No discharge. Left eye: No discharge. Conjunctiva/sclera: Conjunctivae normal.   Neck:      Thyroid: No thyromegaly. Cardiovascular:      Rate and Rhythm: Normal rate and regular rhythm. Heart sounds: Normal heart sounds. No murmur heard. Pulmonary:      Effort: Pulmonary effort is normal.      Breath sounds: Normal breath sounds. No wheezing. Abdominal:      Palpations: Abdomen is soft. Tenderness: There is no abdominal tenderness. Musculoskeletal:         General: No tenderness. Normal range of motion. Cervical back: Normal range of motion and neck supple. Right lower leg: Edema present. Left lower leg: Edema present. Lymphadenopathy:      Cervical: No cervical adenopathy. Skin:     General: Skin is warm and dry. Findings: No erythema or rash. Neurological:      Mental Status: He is alert and oriented to person, place, and time. Coordination: Coordination normal.      Deep Tendon Reflexes: Reflexes are normal and symmetric. Psychiatric:         Behavior: Behavior normal.         Thought Content: Thought content normal.         Judgment: Judgment normal.              Laboratory:   All laboratory and radiology results have been personally reviewed by myself    Lab Results   Component Value Date  06/17/2021    K 4.3 06/17/2021    K 4.0 05/28/2021    CL 99 06/17/2021    CO2 23 06/17/2021    BUN 19 06/17/2021    CREATININE 1.2 06/17/2021    PROT 6.3 05/27/2021    LABALBU 3.7 05/27/2021    CALCIUM 9.4 06/17/2021    GFRAA >60 06/17/2021    LABGLOM >60 06/17/2021    GLUCOSE 101 06/17/2021    AST 39 05/27/2021    ALT 27 05/27/2021    ALKPHOS 103 05/27/2021    BILITOT 0.8 05/27/2021    TSH 2.960 06/17/2021    CHOL 161 10/15/2021    TRIG 364 10/15/2021    HDL 30 10/15/2021    LDLCALC 58 10/15/2021    LABA1C 5.8 04/01/2021        Lab Results   Component Value Date    CHOL 161 10/15/2021     Lab Results   Component Value Date    TRIG 364 (H) 10/15/2021     Lab Results   Component Value Date    HDL 30 10/15/2021     Lab Results   Component Value Date    LDLCALC 58 10/15/2021    LDLCHOLESTEROL 134 (H) 05/03/2017       Lab Results   Component Value Date    LABA1C 5.8 04/01/2021     Lab Results   Component Value Date    LDLCALC 58 10/15/2021    CREATININE 1.2 06/17/2021       ASSESSMENT/PLAN:    1. COVID-19 long hauler manifesting chronic dyspnea  2. Other hyperlipidemia  -     atorvastatin (LIPITOR) 40 MG tablet; take 1 tablet by mouth once daily, Disp-90 tablet, R-1Normal  3. Dyspnea, unspecified type  -     Full PFT Study With Bronchodilator; Future  4. Primary hypertension  5. Need for pneumococcal vaccination  -     Pneumococcal Conjugate PCV20, PF (Prevnar 20)  6. Impaired fasting glucose  -     POCT glycosylated hemoglobin (Hb A1C)     As for dyspnea Will obtain PFTs to further evaluate. We will also send in albuterol inhaler. He will follow-up with new PCP in the next few months as well to monitor the above. We did discuss that he likely has a COVID long-hauler. BP currently well controlled we will continue current medication. On Lipitor doing well we will continue current medication. Pneumonia shot updated as above.      Problem list reviewed andsimplified/updated  HM reviewed today and counseled as appropriate    Call or go to ED immediately if symptoms worsen or persist.  Future Appointments   Date Time Provider Samy Ariza   5/4/2022  3:30 PM [employer] DO Juan Jose [facility]   5/6/2022  2:45 PM Denise Cardenas MD [facility] None   5/24/2022  1:00 PM [facility] MRI ROOM [facility]   6/7/2022  3:00 PM Jason Peres DO [facility]   10/20/2022  2:15 PM Archie Vigil MD [facility]     Or sooner if necessary. Educational materials and/or homeexercises printed for patient's review and were included in patient instructions on his/her After Visit Summary and given to patient at the end of visit. Counseled regarding above diagnosis, including possible risks and complications,  especially if left uncontrolled. Counseled regarding the possible side effects, risks, benefits and alternatives to treatment; patient and/or guardian verbalizes understanding, agrees,feels comfortable with and wishes to proceed with above treatment plan. Advised patient to call Clois Pun new medication issues, and read all Rx info from pharmacy to assure aware of all possible risks and side effects of medication before taking. Reviewed age and gender appropriate health screening exams and vaccinations. Advised patient regarding importance of keeping up with recommended health maintenance and toschedule as soon as possible if overdue, as this is important in assessing for undiagnosed pathology, especially cancer, as well as protecting against potentially harmful/life threatening disease. and/or guardian verbalizes understanding and agrees with above counseling, assessment and plan. All questions answered. Universal [employer]  5/4/22    NOTE: This report was transcribed using voice recognition software.  Every effort was made to ensure accuracy; however, inadvertent computerized transcription errors may be present

## 2022-05-10 ENCOUNTER — HOSPITAL ENCOUNTER (EMERGENCY)
Age: 67
Discharge: HOME | End: 2022-05-10
Attending: EMERGENCY MEDICINE
Payer: MEDICARE

## 2022-05-10 VITALS
OXYGEN SATURATION: 98 % | WEIGHT: 238 LBS | RESPIRATION RATE: 16 BRPM | TEMPERATURE: 99.1 F | HEIGHT: 68 IN | HEART RATE: 106 BPM | DIASTOLIC BLOOD PRESSURE: 80 MMHG | SYSTOLIC BLOOD PRESSURE: 109 MMHG | BODY MASS INDEX: 36.07 KG/M2

## 2022-05-10 DIAGNOSIS — R31.0 GROSS HEMATURIA: Primary | ICD-10-CM

## 2022-05-10 LAB
ANION GAP SERPL CALCULATED.3IONS-SCNC: 12 MMOL/L (ref 7–16)
APTT: 24 SEC (ref 24.5–35.1)
BACTERIA: ABNORMAL /HPF
BASOPHILS ABSOLUTE: 0.04 E9/L (ref 0–0.2)
BASOPHILS RELATIVE PERCENT: 0.4 % (ref 0–2)
BILIRUBIN URINE: NEGATIVE
BLOOD, URINE: ABNORMAL
BUN BLDV-MCNC: 18 MG/DL (ref 6–23)
CALCIUM SERPL-MCNC: 9.3 MG/DL (ref 8.6–10.2)
CHLORIDE BLD-SCNC: 102 MMOL/L (ref 98–107)
CLARITY: ABNORMAL
CO2: 22 MMOL/L (ref 22–29)
COLOR: YELLOW
CREAT SERPL-MCNC: 1.3 MG/DL (ref 0.7–1.2)
EOSINOPHILS ABSOLUTE: 0.2 E9/L (ref 0.05–0.5)
EOSINOPHILS RELATIVE PERCENT: 2.2 % (ref 0–6)
GFR AFRICAN AMERICAN: >60
GFR NON-AFRICAN AMERICAN: 55 ML/MIN/1.73
GLUCOSE BLD-MCNC: 147 MG/DL (ref 74–99)
GLUCOSE URINE: NEGATIVE MG/DL
HCT VFR BLD CALC: 41.2 % (ref 37–54)
HEMOGLOBIN: 14.1 G/DL (ref 12.5–16.5)
IMMATURE GRANULOCYTES #: 0.14 E9/L
IMMATURE GRANULOCYTES %: 1.5 % (ref 0–5)
INR BLD: 1
KETONES, URINE: NEGATIVE MG/DL
LEUKOCYTE ESTERASE, URINE: NEGATIVE
LYMPHOCYTES ABSOLUTE: 1.54 E9/L (ref 1.5–4)
LYMPHOCYTES RELATIVE PERCENT: 16.8 % (ref 20–42)
MCH RBC QN AUTO: 30.6 PG (ref 26–35)
MCHC RBC AUTO-ENTMCNC: 34.2 % (ref 32–34.5)
MCV RBC AUTO: 89.4 FL (ref 80–99.9)
MONOCYTES ABSOLUTE: 0.71 E9/L (ref 0.1–0.95)
MONOCYTES RELATIVE PERCENT: 7.7 % (ref 2–12)
NEUTROPHILS ABSOLUTE: 6.54 E9/L (ref 1.8–7.3)
NEUTROPHILS RELATIVE PERCENT: 71.4 % (ref 43–80)
NITRITE, URINE: NEGATIVE
PDW BLD-RTO: 13 FL (ref 11.5–15)
PH UA: 5.5 (ref 5–9)
PLATELET # BLD: 294 E9/L (ref 130–450)
PMV BLD AUTO: 8.6 FL (ref 7–12)
POTASSIUM SERPL-SCNC: 4 MMOL/L (ref 3.5–5)
PROTEIN UA: NEGATIVE MG/DL
PROTHROMBIN TIME: 10.9 SEC (ref 9.3–12.4)
RBC # BLD: 4.61 E12/L (ref 3.8–5.8)
RBC UA: ABNORMAL /HPF (ref 0–2)
SODIUM BLD-SCNC: 136 MMOL/L (ref 132–146)
SPECIFIC GRAVITY UA: 1.02 (ref 1–1.03)
UROBILINOGEN, URINE: 0.2 E.U./DL
WBC # BLD: 9.2 E9/L (ref 4.5–11.5)
WBC UA: ABNORMAL /HPF (ref 0–5)

## 2022-05-10 PROCEDURE — 80048 BASIC METABOLIC PNL TOTAL CA: CPT

## 2022-05-10 PROCEDURE — 81001 URINALYSIS AUTO W/SCOPE: CPT

## 2022-05-10 PROCEDURE — 85025 COMPLETE CBC W/AUTO DIFF WBC: CPT

## 2022-05-10 PROCEDURE — 36415 COLL VENOUS BLD VENIPUNCTURE: CPT

## 2022-05-10 PROCEDURE — 99283 EMERGENCY DEPT VISIT LOW MDM: CPT

## 2022-05-10 PROCEDURE — 85610 PROTHROMBIN TIME: CPT

## 2022-05-10 PROCEDURE — 85730 THROMBOPLASTIN TIME PARTIAL: CPT

## 2022-05-10 RX ORDER — SODIUM CHLORIDE 0.9 % (FLUSH) 0.9 %
10 SYRINGE (ML) INJECTION PRN
Status: DISCONTINUED | OUTPATIENT
Start: 2022-05-10 | End: 2022-05-11 | Stop reason: HOSPADM

## 2022-05-10 ASSESSMENT — PAIN - FUNCTIONAL ASSESSMENT: PAIN_FUNCTIONAL_ASSESSMENT: 0-10

## 2022-05-10 ASSESSMENT — PAIN SCALES - GENERAL: PAINLEVEL_OUTOF10: 2

## 2022-05-11 NOTE — ED PROVIDER NOTES
HPI:  5/10/22,   Time: 8:23 PM EDT       Vijay Lou is a 78 y.o. male presenting to the ED for hematuria, beginning 1 week ago. The complaint has been intermittent, mild in severity, and worsened by nothing. Hx bph, followed by Rodrigo Bowling for. 1 episode last week, another today at 1pm, gross blood. No dysuria/urinary freq/n/v/d/abd pain. Does have chronic back pain, but nothing new. No incontinence. No fever/chills/sweats/cough/congestion. Bib private vehicle. No abd pain    Review of Systems:   Pertinent positives and negatives are stated within HPI, all other systems reviewed and are negative.          --------------------------------------------- PAST HISTORY ---------------------------------------------  Past Medical History:  has a past medical history of Depression, Erectile dysfunction, History of shingles, Hypertension, and Mixed hyperlipidemia. Past Surgical History:  has a past surgical history that includes Heel spur surgery and Colonoscopy. Social History:  reports that he has never smoked. He has never used smokeless tobacco. He reports that he does not drink alcohol and does not use drugs. Family History: family history includes Atrial Fibrillation in his father; Heart Attack (age of onset: 59) in his father; Heart Disease in his father and mother. The patients home medications have been reviewed. Allergies: Codeine        ---------------------------------------------------PHYSICAL EXAM--------------------------------------    Constitutional/General: Alert and oriented x3, well appearing, non toxic in NAD  Head: Normocephalic and atraumatic  Eyes: PERRL, EOMI, conjunctive normal, sclera non icteric  Mouth: Oropharynx clear, handling secretions,   Neck: Supple, full ROM,   Respiratory: Lungs clear to auscultation bilaterally, no wheezes, rales, or rhonchi. Not in respiratory distress  Cardiovascular:  Regular rate. Regular rhythm. No murmurs, gallops, or rubs.  2+ distal pulses  Chest: No chest wall tenderness  GI:  Abdomen Soft, Non tender, Non distended. .   Musculoskeletal: Moves all extremities x 4. Warm and well perfused,   Integument: skin warm and dry. No rashes. Lymphatic: no lymphadenopathy noted  Neurologic: GCS 15, no focal deficits,   Psychiatric: Normal Affect    -------------------------------------------------- RESULTS -------------------------------------------------  I have personally reviewed all laboratory and imaging results for this patient. Results are listed below.      LABS:  Results for orders placed or performed during the hospital encounter of 05/10/22   Urinalysis   Result Value Ref Range    Color, UA Yellow Straw/Yellow    Clarity, UA SL CLOUDY Clear    Glucose, Ur Negative Negative mg/dL    Bilirubin Urine Negative Negative    Ketones, Urine Negative Negative mg/dL    Specific Gravity, UA 1.025 1.005 - 1.030    Blood, Urine LARGE (A) Negative    pH, UA 5.5 5.0 - 9.0    Protein, UA Negative Negative mg/dL    Urobilinogen, Urine 0.2 <2.0 E.U./dL    Nitrite, Urine Negative Negative    Leukocyte Esterase, Urine Negative Negative   CBC with Auto Differential   Result Value Ref Range    WBC 9.2 4.5 - 11.5 E9/L    RBC 4.61 3.80 - 5.80 E12/L    Hemoglobin 14.1 12.5 - 16.5 g/dL    Hematocrit 41.2 37.0 - 54.0 %    MCV 89.4 80.0 - 99.9 fL    MCH 30.6 26.0 - 35.0 pg    MCHC 34.2 32.0 - 34.5 %    RDW 13.0 11.5 - 15.0 fL    Platelets 529 926 - 746 E9/L    MPV 8.6 7.0 - 12.0 fL    Neutrophils % 71.4 43.0 - 80.0 %    Immature Granulocytes % 1.5 0.0 - 5.0 %    Lymphocytes % 16.8 (L) 20.0 - 42.0 %    Monocytes % 7.7 2.0 - 12.0 %    Eosinophils % 2.2 0.0 - 6.0 %    Basophils % 0.4 0.0 - 2.0 %    Neutrophils Absolute 6.54 1.80 - 7.30 E9/L    Immature Granulocytes # 0.14 E9/L    Lymphocytes Absolute 1.54 1.50 - 4.00 E9/L    Monocytes Absolute 0.71 0.10 - 0.95 E9/L    Eosinophils Absolute 0.20 0.05 - 0.50 E9/L    Basophils Absolute 0.04 0.00 - 0.20 K7/C   Basic Metabolic Panel   Result Value Ref Range    Sodium 136 132 - 146 mmol/L    Potassium 4.0 3.5 - 5.0 mmol/L    Chloride 102 98 - 107 mmol/L    CO2 22 22 - 29 mmol/L    Anion Gap 12 7 - 16 mmol/L    Glucose 147 (H) 74 - 99 mg/dL    BUN 18 6 - 23 mg/dL    CREATININE 1.3 (H) 0.7 - 1.2 mg/dL    GFR Non-African American 55 >=60 mL/min/1.73    GFR African American >60     Calcium 9.3 8.6 - 10.2 mg/dL   Protime-INR   Result Value Ref Range    Protime 10.9 9.3 - 12.4 sec    INR 1.0    APTT   Result Value Ref Range    aPTT 24.0 (L) 24.5 - 35.1 sec   Microscopic Urinalysis   Result Value Ref Range    WBC, UA 0-1 0 - 5 /HPF    RBC, UA 5-10 (A) 0 - 2 /HPF    Bacteria, UA RARE (A) None Seen /HPF       RADIOLOGY:  Interpreted by Radiologist.  No orders to display       EKG: This EKG is signed and interpreted by the EP. Time:   Rate:   Rhythm:   Interpretation:   Comparison:       ------------------------- NURSING NOTES AND VITALS REVIEWED ---------------------------   The nursing notes within the ED encounter and vital signs as below have been reviewed by myself. /80   Pulse 106   Temp 99.1 °F (37.3 °C) (Oral)   Resp 16   Ht 5' 8\" (1.727 m)   Wt 238 lb (108 kg)   SpO2 98%   BMI 36.19 kg/m²   Oxygen Saturation Interpretation: Normal    The patients available past medical records and past encounters were reviewed. ------------------------------ ED COURSE/MEDICAL DECISION MAKING----------------------  Medications - No data to display      ED COURSE:       Medical Decision Making:    W/u noted, painless hematuria, dc with outpt fu      This patient's ED course included: a personal history and physicial examination    This patient has remained hemodynamically stable during their ED course. Re-Evaluations:             Re-evaluation. Patients symptoms show no change        Counseling:    The emergency provider has spoken with the patient and discussed todays results, in addition to providing specific details for the plan of care and counseling regarding the diagnosis and prognosis. Questions are answered at this time and they are agreeable with the plan.       --------------------------------- IMPRESSION AND DISPOSITION ---------------------------------    IMPRESSION  1. Gross hematuria        DISPOSITION  Disposition: Discharge to home  Patient condition is stable    NOTE: This report was transcribed using voice recognition software.  Every effort was made to ensure accuracy; however, inadvertent computerized transcription errors may be present        Sheree Mercedes MD  05/11/22 0225

## 2022-05-24 ENCOUNTER — HOSPITAL ENCOUNTER (OUTPATIENT)
Dept: MRI IMAGING | Age: 67
Discharge: HOME | End: 2022-05-26
Payer: MEDICARE

## 2022-05-24 ENCOUNTER — HOSPITAL ENCOUNTER (OUTPATIENT)
Dept: CT IMAGING | Age: 67
Discharge: HOME | End: 2022-05-26
Payer: MEDICARE

## 2022-05-24 DIAGNOSIS — R31.0 GROSS HEMATURIA: ICD-10-CM

## 2022-05-24 DIAGNOSIS — E23.0 HYPOGONADOTROPIC HYPOGONADISM SYNDROME, MALE (HCC): ICD-10-CM

## 2022-05-24 DIAGNOSIS — S05.40XA RETROBULBAR FOREIGN BODY, UNSPECIFIED LATERALITY: ICD-10-CM

## 2022-05-24 PROCEDURE — 74176 CT ABD & PELVIS W/O CONTRAST: CPT

## 2022-05-24 PROCEDURE — 6360000004 HC RX CONTRAST MEDICATION: Performed by: RADIOLOGY

## 2022-05-24 PROCEDURE — 70030 X-RAY EYE FOR FOREIGN BODY: CPT

## 2022-05-24 PROCEDURE — 70553 MRI BRAIN STEM W/O & W/DYE: CPT

## 2022-05-24 PROCEDURE — A9579 GAD-BASE MR CONTRAST NOS,1ML: HCPCS | Performed by: RADIOLOGY

## 2022-05-24 RX ADMIN — GADOTERIDOL 10 ML: 279.3 INJECTION, SOLUTION INTRAVENOUS at 14:32

## 2022-05-26 ENCOUNTER — TELEPHONE (OUTPATIENT)
Dept: ENDOCRINOLOGY | Age: 67
End: 2022-05-26

## 2022-05-27 NOTE — TELEPHONE ENCOUNTER
Notify pt,  I have reviewed your recent results    Excellent!  Pituitary MRI was negative for any pituitary lesion

## 2022-06-01 ENCOUNTER — OFFICE VISIT (OUTPATIENT)
Dept: FAMILY MEDICINE CLINIC | Age: 67
End: 2022-06-01
Payer: MEDICARE

## 2022-06-01 VITALS
HEART RATE: 87 BPM | OXYGEN SATURATION: 98 % | WEIGHT: 242 LBS | RESPIRATION RATE: 20 BRPM | BODY MASS INDEX: 36.68 KG/M2 | SYSTOLIC BLOOD PRESSURE: 115 MMHG | TEMPERATURE: 97.2 F | DIASTOLIC BLOOD PRESSURE: 78 MMHG | HEIGHT: 68 IN

## 2022-06-01 DIAGNOSIS — R79.89 LOW TESTOSTERONE IN MALE: ICD-10-CM

## 2022-06-01 DIAGNOSIS — R31.0 GROSS HEMATURIA: ICD-10-CM

## 2022-06-01 DIAGNOSIS — Z91.81 AT HIGH RISK FOR FALLS: ICD-10-CM

## 2022-06-01 DIAGNOSIS — I10 PRIMARY HYPERTENSION: ICD-10-CM

## 2022-06-01 DIAGNOSIS — F33.0 MILD EPISODE OF RECURRENT MAJOR DEPRESSIVE DISORDER (HCC): Primary | ICD-10-CM

## 2022-06-01 DIAGNOSIS — N18.31 STAGE 3A CHRONIC KIDNEY DISEASE (HCC): ICD-10-CM

## 2022-06-01 PROBLEM — N18.30 CHRONIC RENAL DISEASE, STAGE III (HCC): Status: ACTIVE | Noted: 2022-06-01

## 2022-06-01 PROBLEM — F32.9 MAJOR DEPRESSION: Status: RESOLVED | Noted: 2017-01-25 | Resolved: 2022-06-01

## 2022-06-01 PROBLEM — H81.13 BENIGN PAROXYSMAL POSITIONAL VERTIGO DUE TO BILATERAL VESTIBULAR DISORDER: Status: RESOLVED | Noted: 2021-07-14 | Resolved: 2022-06-01

## 2022-06-01 PROBLEM — R31.9 HEMATURIA: Status: ACTIVE | Noted: 2022-06-01

## 2022-06-01 PROBLEM — S06.5XAA SUBDURAL HEMATOMA (HCC): Status: RESOLVED | Noted: 2021-05-28 | Resolved: 2022-06-01

## 2022-06-01 PROCEDURE — 99214 OFFICE O/P EST MOD 30 MIN: CPT | Performed by: STUDENT IN AN ORGANIZED HEALTH CARE EDUCATION/TRAINING PROGRAM

## 2022-06-01 PROCEDURE — 3017F COLORECTAL CA SCREEN DOC REV: CPT | Performed by: STUDENT IN AN ORGANIZED HEALTH CARE EDUCATION/TRAINING PROGRAM

## 2022-06-01 PROCEDURE — 1123F ACP DISCUSS/DSCN MKR DOCD: CPT | Performed by: STUDENT IN AN ORGANIZED HEALTH CARE EDUCATION/TRAINING PROGRAM

## 2022-06-01 PROCEDURE — G8427 DOCREV CUR MEDS BY ELIG CLIN: HCPCS | Performed by: STUDENT IN AN ORGANIZED HEALTH CARE EDUCATION/TRAINING PROGRAM

## 2022-06-01 PROCEDURE — 1036F TOBACCO NON-USER: CPT | Performed by: STUDENT IN AN ORGANIZED HEALTH CARE EDUCATION/TRAINING PROGRAM

## 2022-06-01 PROCEDURE — G8417 CALC BMI ABV UP PARAM F/U: HCPCS | Performed by: STUDENT IN AN ORGANIZED HEALTH CARE EDUCATION/TRAINING PROGRAM

## 2022-06-01 RX ORDER — VENLAFAXINE HYDROCHLORIDE 150 MG/1
CAPSULE, EXTENDED RELEASE ORAL
Qty: 90 CAPSULE | Refills: 1 | Status: SHIPPED
Start: 2022-06-01 | End: 2022-09-13

## 2022-06-01 NOTE — PROGRESS NOTES
Patient:  Tammie Driver 78 y.o. male     06/01/22      Chiefcomplaint:   Chief Complaint   Patient presents with    Establish Care     prev PCP Dr Adams Walker Hypertension     Problems and Overview   Urology - evaluating gross hematuria. Plan for cysto. Neg urine cytology and abd/pelvis CT. psa 2  Neurology - needs fu for tremor. eeg ok. Mri brain ok  Endocrinology - following for low testosterone. Consideration of therapy. MRI pituitary does not show adenoma  Sx include fatigue, low libido, sweating    Other chronic disease includes htn, anxiety, depression.     Patient Active Problem List    Diagnosis Date Noted    Hematuria 06/01/2022     Priority: Medium    Chronic renal disease, stage III [facility]) [614083] 06/01/2022     Priority: Medium    Low testosterone in male 06/01/2022     Priority: Medium    Mild episode of recurrent major depressive disorder ([facility] [address] 02/02/2022    Erectile dysfunction 06/07/2018    BPH without urinary obstruction 06/07/2018    Hypertension 01/25/2017    Mixed hyperlipidemia 01/25/2017      Prevention:  Health Maintenance Due   Topic Date Due    Shingles vaccine (2 of 3) 12/13/2017    COVID-19 Vaccine (3 - Booster for Moderna series) 10/07/2021    A1C test (Diabetic or Prediabetic)  04/01/2022      Meds prior:  Current Outpatient Medications   Medication Instructions    albuterol sulfate HFA (VENTOLIN HFA) 108 (90 Base) MCG/ACT inhaler 2 puffs, Inhalation, 4 TIMES DAILY PRN    atorvastatin (LIPITOR) 40 MG tablet take 1 tablet by mouth once daily    buPROPion (WELLBUTRIN XL) 300 mg, Oral, EVERY MORNING    fluticasone (FLONASE) 50 MCG/ACT nasal spray 1 spray, Each Nostril, DAILY    lisinopril-hydroCHLOROthiazide (PRINZIDE;ZESTORETIC) 20-12.5 MG per tablet 1 tablet, Oral, DAILY    Multiple Vitamins-Minerals (THERAPEUTIC MULTIVITAMIN-MINERALS) tablet 1 tablet, Oral, DAILY    venlafaxine (EFFEXOR XR) 150 MG extended release capsule take 1 capsule by mouth once daily Physical Exam   Vitals: /78 (Site: Right Upper Arm, Position: Sitting, Cuff Size: Large Adult)   Pulse 87   Temp 97.2 °F (36.2 °C) (Temporal)   Resp 20   Ht 5' 8\" (1.727 m)   Wt 242 lb (109.8 kg)   SpO2 98%   BMI 36.80 kg/m²   General Appearance: Alert, oriented, no acute distress  HEENT: No scleral icterus. No visible discharge from eyes  Neck: Not rigid. No visible masses  Chest wall/Lung: Clear to auscultation bilaterally,  respirations unlabored. No ronchi/wheezing/rales   Heart: RRR, no murmur  Abdomen: Soft, nontender  Extremities:  No edema  Skin: No rashes. No jaundice  Neuro: Alert and oriented        Psych: Appropriate mood and appropriate affect. anxious  Assessment and Plan     Patient to establish care. In the process of being evaluated for various issues. He is to follow-up with specialists for consideration of treatment tremor, low testosterone, hematuria. No changes today are warranted until further follow-up. Patient blood pressure pulse are okay today review of records suggests normal cholesterol. Patient creatinine mildly elevated on last evaluation. Will monitor future labs to see if any additional changes. Patient is anxious over medical problems. He is treated with Wellbutrin and Effexor. Continue same. Reassured that with further treatment and evaluation he should have improvement in his daily function. Mild episode of recurrent major depressive disorder (HCC)  Gross hematuria  Low testosterone in male  Primary hypertension  Stage 3a chronic kidney disease ([facility] [address]      No follow-ups on file. Miryam Rodriguez DO   On this date 6/1/2022 I spent a total of 30 minutes on the day of the visit. reviewing previous notes, test results and face to face with the patient discussing the diagnosis and importance of compliance with the treatment plan as well as documenting on the day of the visit.     This document may have been prepared at least partially through the use of voice recognition software. Although effort is taken to assure the accuracy of this document, it is possible that grammatical, syntax,  or spelling errors may occur. On the basis of positive falls risk screening, assessment and plan is as follows: home safety tips provided, referral to neurology provided for tremor. Maxx Gomez

## 2022-06-02 NOTE — TELEPHONE ENCOUNTER
Called pt. He is frustrated with his low energy level and would like to know if he is being started on testosterone.

## 2022-06-13 DIAGNOSIS — E29.1 MALE HYPOGONADISM: Primary | ICD-10-CM

## 2022-06-13 RX ORDER — TESTOSTERONE CYPIONATE 200 MG/ML
INJECTION INTRAMUSCULAR
Qty: 3 ML | Refills: 3 | Status: SHIPPED | OUTPATIENT
Start: 2022-06-13 | End: 2022-10-27

## 2022-06-23 ENCOUNTER — NURSE ONLY (OUTPATIENT)
Dept: ENDOCRINOLOGY | Age: 67
End: 2022-06-23
Payer: MEDICARE

## 2022-06-23 DIAGNOSIS — R79.89 LOW TESTOSTERONE IN MALE: ICD-10-CM

## 2022-06-23 PROCEDURE — 96372 THER/PROPH/DIAG INJ SC/IM: CPT | Performed by: INTERNAL MEDICINE

## 2022-06-23 NOTE — PROGRESS NOTES
Humberto Bowman was in for his first testosterone injection. He received it in his left arm, he tolerated it well. He will return every two weeks for his injection.

## 2022-07-07 ENCOUNTER — NURSE ONLY (OUTPATIENT)
Dept: ENDOCRINOLOGY | Age: 67
End: 2022-07-07
Payer: MEDICARE

## 2022-07-07 DIAGNOSIS — R79.89 LOW TESTOSTERONE IN MALE: ICD-10-CM

## 2022-07-07 PROCEDURE — 96372 THER/PROPH/DIAG INJ SC/IM: CPT | Performed by: INTERNAL MEDICINE

## 2022-07-07 NOTE — PROGRESS NOTES
Joe Shin was in for his testosterone injection. He received it in his right arm, tolerated well. Has appointment made for two weeks, he does come on his lunch hour from work so he does run a little late or early sometimes.

## 2022-07-25 ENCOUNTER — NURSE ONLY (OUTPATIENT)
Dept: ENDOCRINOLOGY | Age: 67
End: 2022-07-25
Payer: MEDICARE

## 2022-07-25 DIAGNOSIS — R79.89 LOW TESTOSTERONE IN MALE: Primary | ICD-10-CM

## 2022-07-25 PROCEDURE — 96372 THER/PROPH/DIAG INJ SC/IM: CPT | Performed by: CLINICAL NURSE SPECIALIST

## 2022-07-25 NOTE — PROGRESS NOTES
Mena Tracey was in for his testosterone injection. He received it in his left arm, tolerated well. Appt for next injection has been made.

## 2022-07-26 ENCOUNTER — HOSPITAL ENCOUNTER (EMERGENCY)
Age: 67
Discharge: HOME | End: 2022-07-26
Attending: EMERGENCY MEDICINE
Payer: MEDICARE

## 2022-07-26 ENCOUNTER — APPOINTMENT (OUTPATIENT)
Dept: GENERAL RADIOLOGY | Age: 67
End: 2022-07-26
Payer: MEDICARE

## 2022-07-26 VITALS
BODY MASS INDEX: 36.49 KG/M2 | WEIGHT: 240 LBS | RESPIRATION RATE: 16 BRPM | HEART RATE: 69 BPM | OXYGEN SATURATION: 98 % | DIASTOLIC BLOOD PRESSURE: 73 MMHG | TEMPERATURE: 98.6 F | SYSTOLIC BLOOD PRESSURE: 114 MMHG

## 2022-07-26 DIAGNOSIS — R07.9 CHEST PAIN, UNSPECIFIED TYPE: Primary | ICD-10-CM

## 2022-07-26 LAB
ALBUMIN SERPL-MCNC: 3.9 G/DL (ref 3.5–5.2)
ALP BLD-CCNC: 94 U/L (ref 40–129)
ALT SERPL-CCNC: 16 U/L (ref 0–40)
ANION GAP SERPL CALCULATED.3IONS-SCNC: 11 MMOL/L (ref 7–16)
AST SERPL-CCNC: 17 U/L (ref 0–39)
BASOPHILS ABSOLUTE: 0.03 E9/L (ref 0–0.2)
BASOPHILS RELATIVE PERCENT: 0.5 % (ref 0–2)
BILIRUB SERPL-MCNC: 1.1 MG/DL (ref 0–1.2)
BILIRUBIN DIRECT: 0.2 MG/DL (ref 0–0.3)
BILIRUBIN, INDIRECT: 0.9 MG/DL (ref 0–1)
BUN BLDV-MCNC: 13 MG/DL (ref 6–23)
CALCIUM SERPL-MCNC: 9.1 MG/DL (ref 8.6–10.2)
CHLORIDE BLD-SCNC: 103 MMOL/L (ref 98–107)
CO2: 24 MMOL/L (ref 22–29)
CREAT SERPL-MCNC: 1.2 MG/DL (ref 0.7–1.2)
EKG ATRIAL RATE: 76 BPM
EKG P AXIS: 47 DEGREES
EKG P-R INTERVAL: 140 MS
EKG Q-T INTERVAL: 422 MS
EKG QRS DURATION: 148 MS
EKG QTC CALCULATION (BAZETT): 474 MS
EKG R AXIS: 48 DEGREES
EKG T AXIS: 34 DEGREES
EKG VENTRICULAR RATE: 76 BPM
EOSINOPHILS ABSOLUTE: 0.2 E9/L (ref 0.05–0.5)
EOSINOPHILS RELATIVE PERCENT: 3.1 % (ref 0–6)
GFR AFRICAN AMERICAN: >60
GFR NON-AFRICAN AMERICAN: >60 ML/MIN/1.73
GLUCOSE BLD-MCNC: 167 MG/DL (ref 74–99)
HCT VFR BLD CALC: 37 % (ref 37–54)
HEMOGLOBIN: 12.5 G/DL (ref 12.5–16.5)
IMMATURE GRANULOCYTES #: 0.07 E9/L
IMMATURE GRANULOCYTES %: 1.1 % (ref 0–5)
LYMPHOCYTES ABSOLUTE: 0.89 E9/L (ref 1.5–4)
LYMPHOCYTES RELATIVE PERCENT: 13.8 % (ref 20–42)
MCH RBC QN AUTO: 30.1 PG (ref 26–35)
MCHC RBC AUTO-ENTMCNC: 33.8 % (ref 32–34.5)
MCV RBC AUTO: 89.2 FL (ref 80–99.9)
MONOCYTES ABSOLUTE: 0.48 E9/L (ref 0.1–0.95)
MONOCYTES RELATIVE PERCENT: 7.5 % (ref 2–12)
NEUTROPHILS ABSOLUTE: 4.76 E9/L (ref 1.8–7.3)
NEUTROPHILS RELATIVE PERCENT: 74 % (ref 43–80)
PDW BLD-RTO: 13.9 FL (ref 11.5–15)
PLATELET # BLD: 242 E9/L (ref 130–450)
PMV BLD AUTO: 8.6 FL (ref 7–12)
POTASSIUM REFLEX MAGNESIUM: 4.2 MMOL/L (ref 3.5–5)
PRO-BNP: 176 PG/ML (ref 0–125)
RBC # BLD: 4.15 E12/L (ref 3.8–5.8)
SODIUM BLD-SCNC: 138 MMOL/L (ref 132–146)
TOTAL PROTEIN: 6.4 G/DL (ref 6.4–8.3)
TROPONIN, HIGH SENSITIVITY: 13 NG/L (ref 0–11)
TROPONIN, HIGH SENSITIVITY: 13 NG/L (ref 0–11)
WBC # BLD: 6.4 E9/L (ref 4.5–11.5)

## 2022-07-26 PROCEDURE — 36415 COLL VENOUS BLD VENIPUNCTURE: CPT

## 2022-07-26 PROCEDURE — 80048 BASIC METABOLIC PNL TOTAL CA: CPT

## 2022-07-26 PROCEDURE — 84484 ASSAY OF TROPONIN QUANT: CPT

## 2022-07-26 PROCEDURE — 93005 ELECTROCARDIOGRAM TRACING: CPT | Performed by: STUDENT IN AN ORGANIZED HEALTH CARE EDUCATION/TRAINING PROGRAM

## 2022-07-26 PROCEDURE — 85025 COMPLETE CBC W/AUTO DIFF WBC: CPT

## 2022-07-26 PROCEDURE — 71045 X-RAY EXAM CHEST 1 VIEW: CPT

## 2022-07-26 PROCEDURE — 99285 EMERGENCY DEPT VISIT HI MDM: CPT

## 2022-07-26 PROCEDURE — 80076 HEPATIC FUNCTION PANEL: CPT

## 2022-07-26 PROCEDURE — 83880 ASSAY OF NATRIURETIC PEPTIDE: CPT

## 2022-07-26 PROCEDURE — 6370000000 HC RX 637 (ALT 250 FOR IP): Performed by: EMERGENCY MEDICINE

## 2022-07-26 RX ORDER — ACETAMINOPHEN 500 MG
1000 TABLET ORAL ONCE
Status: COMPLETED | OUTPATIENT
Start: 2022-07-26 | End: 2022-07-26

## 2022-07-26 RX ORDER — ASPIRIN 81 MG/1
324 TABLET, CHEWABLE ORAL ONCE
Status: COMPLETED | OUTPATIENT
Start: 2022-07-26 | End: 2022-07-26

## 2022-07-26 RX ORDER — NITROGLYCERIN 0.4 MG/1
0.4 TABLET SUBLINGUAL EVERY 5 MIN PRN
Status: DISCONTINUED | OUTPATIENT
Start: 2022-07-26 | End: 2022-07-26 | Stop reason: HOSPADM

## 2022-07-26 RX ADMIN — ACETAMINOPHEN 1000 MG: 500 TABLET ORAL at 10:55

## 2022-07-26 RX ADMIN — ASPIRIN 81 MG CHEWABLE TABLET 324 MG: 81 TABLET CHEWABLE at 10:54

## 2022-07-26 ASSESSMENT — ENCOUNTER SYMPTOMS
NAUSEA: 0
SHORTNESS OF BREATH: 0
VOMITING: 0
SORE THROAT: 0
ABDOMINAL PAIN: 0
EYE DISCHARGE: 0
EYE PAIN: 0
WHEEZING: 0
COUGH: 0
SINUS PRESSURE: 0
BACK PAIN: 0
DIARRHEA: 0

## 2022-07-26 ASSESSMENT — PAIN SCALES - GENERAL
PAINLEVEL_OUTOF10: 5
PAINLEVEL_OUTOF10: 7

## 2022-07-26 ASSESSMENT — PAIN DESCRIPTION - LOCATION
LOCATION_2: CHEST
LOCATION: HEAD
LOCATION: CHEST;SHOULDER

## 2022-07-26 ASSESSMENT — PAIN - FUNCTIONAL ASSESSMENT
PAIN_FUNCTIONAL_ASSESSMENT: 0-10
PAIN_FUNCTIONAL_ASSESSMENT: ACTIVITIES ARE NOT PREVENTED
PAIN_FUNCTIONAL_ASSESSMENT: NONE - DENIES PAIN
PAIN_FUNCTIONAL_ASSESSMENT_SITE2: PREVENTS OR INTERFERES SOME ACTIVE ACTIVITIES AND ADLS

## 2022-07-26 ASSESSMENT — PAIN DESCRIPTION - FREQUENCY: FREQUENCY: CONTINUOUS

## 2022-07-26 ASSESSMENT — PAIN DESCRIPTION - PAIN TYPE: TYPE: ACUTE PAIN

## 2022-07-26 ASSESSMENT — PAIN DESCRIPTION - DESCRIPTORS
DESCRIPTORS_2: DISCOMFORT
DESCRIPTORS: ACHING
DESCRIPTORS: DISCOMFORT;SORE

## 2022-07-26 ASSESSMENT — PAIN DESCRIPTION - INTENSITY: RATING_2: 2

## 2022-07-26 ASSESSMENT — PAIN DESCRIPTION - ORIENTATION
ORIENTATION_2: MID
ORIENTATION: POSTERIOR
ORIENTATION: LEFT;MID

## 2022-07-26 NOTE — ED PROVIDER NOTES
18-year-old male presenting emergency department for left middle upper chest pain, left shoulder pain, ongoing since this morning when he woke up, states he was working on a car last night and is unsure if this is related to the cause of his pain. Pain worse with movement and palpation, moderate in severity, 7 out of 10, sharp in nature, has been persistent, sudden onset since he woke up this morning. He denies any fevers, chills, nausea, vomiting, diarrhea, diaphoresis. Denies any lightheadedness. Denies any history of heart issues. Review of Systems   Constitutional:  Negative for chills and fever. HENT:  Negative for ear pain, sinus pressure and sore throat. Eyes:  Negative for pain and discharge. Respiratory:  Negative for cough, shortness of breath and wheezing. Cardiovascular:  Positive for chest pain. Gastrointestinal:  Negative for abdominal pain, diarrhea, nausea and vomiting. Genitourinary:  Negative for dysuria and frequency. Musculoskeletal:  Positive for arthralgias. Negative for back pain. Skin:  Negative for rash and wound. Neurological:  Negative for weakness and headaches. Hematological:  Negative for adenopathy. All other systems reviewed and are negative. Physical Exam  Vitals and nursing note reviewed. Constitutional:       Appearance: He is well-developed. HENT:      Head: Normocephalic and atraumatic. Right Ear: External ear normal.      Left Ear: External ear normal.      Nose: Nose normal.      Mouth/Throat:      Mouth: Mucous membranes are moist.   Eyes:      Extraocular Movements: Extraocular movements intact. Conjunctiva/sclera: Conjunctivae normal.      Pupils: Pupils are equal, round, and reactive to light. Cardiovascular:      Rate and Rhythm: Normal rate and regular rhythm. Heart sounds: Normal heart sounds. No murmur heard. Pulmonary:      Effort: Pulmonary effort is normal. No respiratory distress.       Breath sounds: shoulder pain, started after he was working on a car all day yesterday, when he woke up this morning he was having the discomfort which is why he came to the emergency department. EKG appeared stable compared to previous, troponin was 13 and 13, initially given aspirin given story of having his chest pain rating to his left shoulder, however ACS unlikely, last stress test was a year ago was normal.  Patient was discharged, return precautions given, instructed follow-up with his primary care physician. ED Course as of 07/26/22 1442   Tue Jul 26, 2022   1015 EKG: This EKG is signed by emergency department physician. Rate: 76  Rhythm: Sinus  Interpretation: RBBB  Comparison: stable as compared to patient's most recent EKG      [JG]   1113 Initial troponin 13, will check a delta. [JG]   150 61-year-old male presenting emerged from for middle upper chest pain and left shoulder pain, started after he was working on a car all day yesterday, when he woke up this morning he was having the discomfort which is why he came to the emergency department. EKG appeared stable compared to previous, troponin was 13 and 13, initially given aspirin given story of having his chest pain rating to his left shoulder, however ACS unlikely, last stress test was a year ago was normal.  Patient was discharged, return precautions given, instructed follow-up with his primary care physician. [JG]      ED Course User Index  [JG] Robbie Gordon MD       --------------------------------------------- PAST HISTORY ---------------------------------------------  Past Medical History:  has a past medical history of Chronic renal disease, stage III ([address] [234058], Depression, Erectile dysfunction, History of shingles, Hypertension, and Mixed hyperlipidemia. Past Surgical History:  has a past surgical history that includes Heel spur surgery and Colonoscopy. Social History:  reports that he has never smoked.  He has never used smokeless tobacco. He reports that he does not drink alcohol and does not use drugs. Family History: family history includes Atrial Fibrillation in his father; Heart Attack (age of onset: 59) in his father; Heart Disease in his father and mother. The patients home medications have been reviewed.     Allergies: Codeine    -------------------------------------------------- RESULTS -------------------------------------------------  Labs:  Results for orders placed or performed during the hospital encounter of 07/26/22   CBC with Auto Differential   Result Value Ref Range    WBC 6.4 4.5 - 11.5 E9/L    RBC 4.15 3.80 - 5.80 E12/L    Hemoglobin 12.5 12.5 - 16.5 g/dL    Hematocrit 37.0 37.0 - 54.0 %    MCV 89.2 80.0 - 99.9 fL    MCH 30.1 26.0 - 35.0 pg    MCHC 33.8 32.0 - 34.5 %    RDW 13.9 11.5 - 15.0 fL    Platelets 779 755 - 861 E9/L    MPV 8.6 7.0 - 12.0 fL    Neutrophils % 74.0 43.0 - 80.0 %    Immature Granulocytes % 1.1 0.0 - 5.0 %    Lymphocytes % 13.8 (L) 20.0 - 42.0 %    Monocytes % 7.5 2.0 - 12.0 %    Eosinophils % 3.1 0.0 - 6.0 %    Basophils % 0.5 0.0 - 2.0 %    Neutrophils Absolute 4.76 1.80 - 7.30 E9/L    Immature Granulocytes # 0.07 E9/L    Lymphocytes Absolute 0.89 (L) 1.50 - 4.00 E9/L    Monocytes Absolute 0.48 0.10 - 0.95 E9/L    Eosinophils Absolute 0.20 0.05 - 0.50 E9/L    Basophils Absolute 0.03 0.00 - 0.20 U5/T   Basic Metabolic Panel w/ Reflex to MG   Result Value Ref Range    Sodium 138 132 - 146 mmol/L    Potassium reflex Magnesium 4.2 3.5 - 5.0 mmol/L    Chloride 103 98 - 107 mmol/L    CO2 24 22 - 29 mmol/L    Anion Gap 11 7 - 16 mmol/L    Glucose 167 (H) 74 - 99 mg/dL    BUN 13 6 - 23 mg/dL    Creatinine 1.2 0.7 - 1.2 mg/dL    GFR Non-African American >60 >=60 mL/min/1.73    GFR African American >60     Calcium 9.1 8.6 - 10.2 mg/dL   Hepatic Function Panel   Result Value Ref Range    Total Protein 6.4 6.4 - 8.3 g/dL    Albumin 3.9 3.5 - 5.2 g/dL    Alkaline Phosphatase 94 40 - 129 U/L    ALT 16 0 - 40 U/L    AST 17 0 - 39 U/L    Total Bilirubin 1.1 0.0 - 1.2 mg/dL    Bilirubin, Direct 0.2 0.0 - 0.3 mg/dL    Bilirubin, Indirect 0.9 0.0 - 1.0 mg/dL   Troponin   Result Value Ref Range    Troponin, High Sensitivity 13 (H) 0 - 11 ng/L   Brain Natriuretic Peptide   Result Value Ref Range    Pro- (H) 0 - 125 pg/mL   Troponin   Result Value Ref Range    Troponin, High Sensitivity 13 (H) 0 - 11 ng/L   EKG 12 Lead   Result Value Ref Range    Ventricular Rate 76 BPM    Atrial Rate 76 BPM    P-R Interval 140 ms    QRS Duration 148 ms    Q-T Interval 422 ms    QTc Calculation (Bazett) 474 ms    P Axis 47 degrees    R Axis 48 degrees    T Axis 34 degrees       Radiology:  XR CHEST PORTABLE   Final Result   No acute consolidation or infiltrate. Two, thin, curvilinear densities in   the upper lung fields, as described above and probably representing pleural   reflections, rather than pneumothoraces. If clinically warranted, repeat   chest radiographs with inspiration and expiration can be performed. ------------------------- NURSING NOTES AND VITALS REVIEWED ---------------------------  Date / Time Roomed:  7/26/2022  9:47 AM  ED Bed Assignment:  15/15    The nursing notes within the ED encounter and vital signs as below have been reviewed. /73   Pulse 69   Temp 98.6 °F (37 °C) (Temporal)   Resp 16   Wt 240 lb (108.9 kg)   SpO2 98%   BMI 36.49 kg/m²   Oxygen Saturation Interpretation: Normal      ------------------------------------------ PROGRESS NOTES ------------------------------------------  2:42 PM EDT  I have spoken with the patient and discussed todays results, in addition to providing specific details for the plan of care and counseling regarding the diagnosis and prognosis. Their questions are answered at this time and they are agreeable with the plan. I discussed at length with them reasons for immediate return here for re evaluation.  They will followup with their primary care physician by calling their office tomorrow. --------------------------------- ADDITIONAL PROVIDER NOTES ---------------------------------  At this time the patient is without objective evidence of an acute process requiring hospitalization or inpatient management. They have remained hemodynamically stable throughout their entire ED visit and are stable for discharge with outpatient follow-up. The plan has been discussed in detail and they are aware of the specific conditions for emergent return, as well as the importance of follow-up. New Prescriptions    No medications on file       Diagnosis:  1. Chest pain, unspecified type        Disposition:  Patient's disposition: Discharge to home  Patient's condition is stable.           James Reynolds MD  Resident  07/26/22 9370

## 2022-07-26 NOTE — ED NOTES
Pt given discharge paperwork with education on chest pain and chest pain musculoskeletal, pt is aware to follow up with his primary care physician and pt was given a work excuse     Selinda Fothergill, RN  07/26/22 0034

## 2022-07-26 NOTE — Clinical Note
Maida Mace was seen and treated in our emergency department on 7/26/2022. He may return to work on 07/27/2022. If you have any questions or concerns, please don't hesitate to call.       Javier Arthur MD

## 2022-08-04 ENCOUNTER — NURSE ONLY (OUTPATIENT)
Dept: ENDOCRINOLOGY | Age: 67
End: 2022-08-04
Payer: MEDICARE

## 2022-08-04 DIAGNOSIS — R79.89 LOW TESTOSTERONE IN MALE: Primary | ICD-10-CM

## 2022-08-04 PROCEDURE — 96372 THER/PROPH/DIAG INJ SC/IM: CPT | Performed by: INTERNAL MEDICINE

## 2022-08-04 NOTE — PROGRESS NOTES
Jessica Kim was in to receive his testosterone injection. I injected into his right arm, tolerated well, he stated he didn't even feel it. Appointment is made for 2 weeks. He asked about his next lab work and follow up with Dr Geoff Hauser. Upon looking it up his appointment is Oct 20th and I explained we will have him do labs the week between his doses. In October, in which he understood and I will let him now exactly when next appointment.

## 2022-08-16 PROBLEM — G47.30 SLEEP-DISORDERED BREATHING: Status: ACTIVE | Noted: 2022-08-16

## 2022-08-18 ENCOUNTER — NURSE ONLY (OUTPATIENT)
Dept: ENDOCRINOLOGY | Age: 67
End: 2022-08-18
Payer: MEDICARE

## 2022-08-18 DIAGNOSIS — R79.89 LOW TESTOSTERONE IN MALE: Primary | ICD-10-CM

## 2022-08-18 PROCEDURE — 96372 THER/PROPH/DIAG INJ SC/IM: CPT | Performed by: INTERNAL MEDICINE

## 2022-08-18 NOTE — PROGRESS NOTES
Rio Sanchez was in for his testosterone injection. He received it in his left arm, tolerated well. Appointment already made for two week return.

## 2022-09-01 ENCOUNTER — NURSE ONLY (OUTPATIENT)
Dept: ENDOCRINOLOGY | Age: 67
End: 2022-09-01
Payer: MEDICARE

## 2022-09-01 DIAGNOSIS — R79.89 LOW TESTOSTERONE IN MALE: Primary | ICD-10-CM

## 2022-09-01 PROCEDURE — 96372 THER/PROPH/DIAG INJ SC/IM: CPT | Performed by: INTERNAL MEDICINE

## 2022-09-13 ENCOUNTER — OFFICE VISIT (OUTPATIENT)
Dept: FAMILY MEDICINE CLINIC | Age: 67
End: 2022-09-13
Payer: MEDICARE

## 2022-09-13 VITALS
WEIGHT: 238.8 LBS | DIASTOLIC BLOOD PRESSURE: 75 MMHG | OXYGEN SATURATION: 97 % | HEIGHT: 68 IN | HEART RATE: 79 BPM | RESPIRATION RATE: 18 BRPM | TEMPERATURE: 97.3 F | BODY MASS INDEX: 36.19 KG/M2 | SYSTOLIC BLOOD PRESSURE: 114 MMHG

## 2022-09-13 DIAGNOSIS — Z59.9 FINANCIAL DIFFICULTIES: ICD-10-CM

## 2022-09-13 DIAGNOSIS — Z76.0 MEDICATION REFILL: ICD-10-CM

## 2022-09-13 DIAGNOSIS — F32.2 SEVERE DEPRESSION (HCC): Primary | ICD-10-CM

## 2022-09-13 DIAGNOSIS — E29.1 TESTOSTERONE DEFICIENCY IN MALE: ICD-10-CM

## 2022-09-13 PROCEDURE — G8417 CALC BMI ABV UP PARAM F/U: HCPCS | Performed by: STUDENT IN AN ORGANIZED HEALTH CARE EDUCATION/TRAINING PROGRAM

## 2022-09-13 PROCEDURE — 3017F COLORECTAL CA SCREEN DOC REV: CPT | Performed by: STUDENT IN AN ORGANIZED HEALTH CARE EDUCATION/TRAINING PROGRAM

## 2022-09-13 PROCEDURE — 99214 OFFICE O/P EST MOD 30 MIN: CPT | Performed by: STUDENT IN AN ORGANIZED HEALTH CARE EDUCATION/TRAINING PROGRAM

## 2022-09-13 PROCEDURE — 1036F TOBACCO NON-USER: CPT | Performed by: STUDENT IN AN ORGANIZED HEALTH CARE EDUCATION/TRAINING PROGRAM

## 2022-09-13 PROCEDURE — 1123F ACP DISCUSS/DSCN MKR DOCD: CPT | Performed by: STUDENT IN AN ORGANIZED HEALTH CARE EDUCATION/TRAINING PROGRAM

## 2022-09-13 PROCEDURE — G8427 DOCREV CUR MEDS BY ELIG CLIN: HCPCS | Performed by: STUDENT IN AN ORGANIZED HEALTH CARE EDUCATION/TRAINING PROGRAM

## 2022-09-13 RX ORDER — VENLAFAXINE HYDROCHLORIDE 37.5 MG/1
37.5 CAPSULE, EXTENDED RELEASE ORAL DAILY
Qty: 90 CAPSULE | Refills: 0 | Status: SHIPPED | OUTPATIENT
Start: 2022-09-13

## 2022-09-13 RX ORDER — ATORVASTATIN CALCIUM 40 MG/1
TABLET, FILM COATED ORAL
Qty: 90 TABLET | Refills: 0 | Status: SHIPPED | OUTPATIENT
Start: 2022-09-13

## 2022-09-13 SDOH — ECONOMIC STABILITY: FOOD INSECURITY: WITHIN THE PAST 12 MONTHS, THE FOOD YOU BOUGHT JUST DIDN'T LAST AND YOU DIDN'T HAVE MONEY TO GET MORE.: NEVER TRUE

## 2022-09-13 SDOH — ECONOMIC STABILITY: FOOD INSECURITY: WITHIN THE PAST 12 MONTHS, YOU WORRIED THAT YOUR FOOD WOULD RUN OUT BEFORE YOU GOT MONEY TO BUY MORE.: NEVER TRUE

## 2022-09-13 SDOH — ECONOMIC STABILITY - INCOME SECURITY: PROBLEM RELATED TO HOUSING AND ECONOMIC CIRCUMSTANCES, UNSPECIFIED: Z59.9

## 2022-09-13 ASSESSMENT — PATIENT HEALTH QUESTIONNAIRE - PHQ9
4. FEELING TIRED OR HAVING LITTLE ENERGY: 3
6. FEELING BAD ABOUT YOURSELF - OR THAT YOU ARE A FAILURE OR HAVE LET YOURSELF OR YOUR FAMILY DOWN: 3
SUM OF ALL RESPONSES TO PHQ9 QUESTIONS 1 & 2: 6
SUM OF ALL RESPONSES TO PHQ QUESTIONS 1-9: 17
1. LITTLE INTEREST OR PLEASURE IN DOING THINGS: 3
5. POOR APPETITE OR OVEREATING: 0
2. FEELING DOWN, DEPRESSED OR HOPELESS: 3
SUM OF ALL RESPONSES TO PHQ QUESTIONS 1-9: 17
3. TROUBLE FALLING OR STAYING ASLEEP: 1
9. THOUGHTS THAT YOU WOULD BE BETTER OFF DEAD, OR OF HURTING YOURSELF: 3
7. TROUBLE CONCENTRATING ON THINGS, SUCH AS READING THE NEWSPAPER OR WATCHING TELEVISION: 1
8. MOVING OR SPEAKING SO SLOWLY THAT OTHER PEOPLE COULD HAVE NOTICED. OR THE OPPOSITE, BEING SO FIGETY OR RESTLESS THAT YOU HAVE BEEN MOVING AROUND A LOT MORE THAN USUAL: 0
SUM OF ALL RESPONSES TO PHQ QUESTIONS 1-9: 14
SUM OF ALL RESPONSES TO PHQ QUESTIONS 1-9: 17

## 2022-09-13 ASSESSMENT — LIFESTYLE VARIABLES
HOW OFTEN DO YOU HAVE A DRINK CONTAINING ALCOHOL: NEVER
HOW MANY STANDARD DRINKS CONTAINING ALCOHOL DO YOU HAVE ON A TYPICAL DAY: PATIENT DOES NOT DRINK

## 2022-09-13 ASSESSMENT — SOCIAL DETERMINANTS OF HEALTH (SDOH): HOW HARD IS IT FOR YOU TO PAY FOR THE VERY BASICS LIKE FOOD, HOUSING, MEDICAL CARE, AND HEATING?: NOT HARD AT ALL

## 2022-09-13 NOTE — PROGRESS NOTES
Patient:  Terrence Ling 78 y.o. male     09/13/22      Chiefcomplaint:   Chief Complaint   Patient presents with    Depression    Headache     X few weeks. Jelani Figueroa daily     Problems and Overview     Patient presents with worsening of depression. Patient is currently on Wellbutrin 300 mg XR. He was previously on Effexor 150 mg. He stopped this abruptly on his own over the last week. He states his stressors are primarily financial and this is significantly impacting his daily activities in terms of fatigue, depressed mood, anhedonia, appetite changes, agitation. He admits to thoughts of suicide by ingestion of sleeping pills but has no current plan. He has not engaged in any self-harm. He has no homicidal ideation. He states he is having difficulty seeing the light at the end of the tunnel currently. He would not want to leave his wife alone with these financial problems. He is not currently in counseling or seeing psychiatry. He is concerned about side effects of Effexor like ED. He states he stopped the medication after reading the side effects that were sent to him from [facility]. He is not hearing or seeing things that are not there.   Patient Active Problem List    Diagnosis Date Noted    Sleep-disordered breathing 08/16/2022     Priority: Medium     Snoring with high bmi at [facility]      Hematuria 06/01/2022     Priority: Medium    Chronic renal disease, stage III [facility]) [970559] 06/01/2022     Priority: Medium    Low testosterone in male 06/01/2022     Priority: Medium    Mild episode of recurrent major depressive disorder ([facility] [address] 02/02/2022    Erectile dysfunction 06/07/2018    BPH without urinary obstruction 06/07/2018    Hypertension 01/25/2017    Mixed hyperlipidemia 01/25/2017      Prevention:  Health Maintenance Due   Topic Date Due    Shingles vaccine (2 of 3) 12/13/2017    COVID-19 Vaccine (3 - Booster for Moderna series) 10/07/2021    A1C test (Diabetic or Prediabetic)  04/01/2022    Flu vaccine (1) 09/01/2022      Meds prior:  Current Outpatient Medications   Medication Instructions    atorvastatin (LIPITOR) 40 MG tablet take 1 tablet by mouth once daily    buPROPion (WELLBUTRIN XL) 300 mg, Oral, EVERY MORNING    lisinopril-hydroCHLOROthiazide (PRINZIDE;ZESTORETIC) 20-12.5 MG per tablet 1 tablet, Oral, DAILY    Multiple Vitamins-Minerals (THERAPEUTIC MULTIVITAMIN-MINERALS) tablet 1 tablet, Oral, DAILY    testosterone cypionate (DEPOTESTOTERONE CYPIONATE) 200 MG/ML injection Inject 0.5ml every 14 days    venlafaxine (EFFEXOR XR) 37.5 mg, Oral, DAILY      Physical Exam   Vitals: /75 (Site: Right Upper Arm, Position: Sitting, Cuff Size: Large Adult)   Pulse 79   Temp 97.3 °F (36.3 °C) (Temporal)   Resp 18   Ht 5' 8\" (1.727 m)   Wt 238 lb 12.8 oz (108.3 kg)   SpO2 97%   BMI 36.31 kg/m²   General Appearance: Alert, oriented, no acute distress  HEENT: No scleral icterus. No visible discharge from eyes  Extremities:  No edema  Skin: No rashes. No jaundice  Neuro: Alert and oriented        Psych: Flat affect and depressed mood, tearful. Anxious. Assessment and Plan   Patient with severe depression with no immediate plan for suicide. Had long discussion about potentially going to the emergency for further evaluation but he does not believe this is necessary. Because he does not have an active plan I will not pink slip him. He is agreeable to restart Effexor given concerns about withdrawal and worsening of mood after abruptly stopping 150 mg. He is still currently taking Wellbutrin and compliance is good. He is agreeable to have urgent follow-up with psychiatry for counseling and adjustment of medication. He again denies immediate plan for suicide. We discussed when he should present to the emergency department if he develops plan reports having worsening of mood. He is agreeable to this and his wife is present and also agreeable and voices understanding of when to seek emergency care.   We will also refer her to social work for consideration of any assistance programs for patient financial difficulties. Severe depression ([facility] [address]  -     External Referral To Psychiatry  -     venlafaxine (EFFEXOR XR) 37.5 MG extended release capsule; Take 1 capsule by mouth daily, Disp-90 capsule, R-0Normal  Testosterone deficiency in male  -     PSA Screening; Future  Financial difficulties  -     [facility] Referral to Social Work (Primary Care Only)  Medication refill  -     atorvastatin (LIPITOR) 40 MG tablet; take 1 tablet by mouth once daily, Disp-90 tablet, R-0Normal      Return in about 1 week (around 9/20/2022). Tabitha Villarreal, DO     This document may have been prepared at least partially through the use of voice recognition software. Although effort is taken to assure the accuracy of this document, it is possible that grammatical, syntax,  or spelling errors may occur.

## 2022-09-14 ENCOUNTER — CARE COORDINATION (OUTPATIENT)
Dept: CARE COORDINATION | Age: 67
End: 2022-09-14

## 2022-09-14 NOTE — CARE COORDINATION
[facility] received a referral from the PCP for financial resource assistance.   Outreach call placed; no answer; VM left with [facility] information and request for a return call  [facility] to follow accordingly with a 2nd outreach call

## 2022-09-15 ENCOUNTER — NURSE ONLY (OUTPATIENT)
Dept: ENDOCRINOLOGY | Age: 67
End: 2022-09-15
Payer: MEDICARE

## 2022-09-15 DIAGNOSIS — R79.89 LOW TESTOSTERONE IN MALE: Primary | ICD-10-CM

## 2022-09-15 PROCEDURE — 96372 THER/PROPH/DIAG INJ SC/IM: CPT | Performed by: INTERNAL MEDICINE

## 2022-09-15 NOTE — PROGRESS NOTES
Garrett Steven was in for his Testosterone injection. He received it in his left arm, tolerated well. He has appointment for next injection already made. He inquired about his next appointment with Dr Jenn Lau which is in October. He was given a printout with the appt gilberto and time on it.

## 2022-09-19 ENCOUNTER — CARE COORDINATION (OUTPATIENT)
Dept: CARE COORDINATION | Age: 67
End: 2022-09-19

## 2022-09-19 NOTE — CARE COORDINATION
JAYASHREE made a 2nd outreach; phone went directly to voicemail.    was left with [facility] information and request for a call back    [facility] will follow accordingly

## 2022-09-27 ENCOUNTER — CARE COORDINATION (OUTPATIENT)
Dept: CARE COORDINATION | Age: 67
End: 2022-09-27

## 2022-09-27 NOTE — CARE COORDINATION
[facility] made a third and final attempt to contact Umer Morin. There was no answer for this attempt, again. This time [facility] was unable to leave a VM as the voicemail box was full.     [facility] will route message to PCP regarding inability to reach Moiz Garvey will sign off today

## 2022-09-29 ENCOUNTER — NURSE ONLY (OUTPATIENT)
Dept: ENDOCRINOLOGY | Age: 67
End: 2022-09-29
Payer: MEDICARE

## 2022-09-29 DIAGNOSIS — R79.89 LOW TESTOSTERONE IN MALE: Primary | ICD-10-CM

## 2022-09-29 PROCEDURE — 96372 THER/PROPH/DIAG INJ SC/IM: CPT | Performed by: INTERNAL MEDICINE

## 2022-09-29 NOTE — PROGRESS NOTES
Shailesh Adams was in for his testosterone injection. He received it in his right arm, tolerated it well. Has a appointment  coming up soon, he is going to have his labs drawn next week.

## 2022-10-04 ENCOUNTER — OFFICE VISIT (OUTPATIENT)
Dept: FAMILY MEDICINE CLINIC | Age: 67
End: 2022-10-04
Payer: MEDICARE

## 2022-10-04 VITALS
OXYGEN SATURATION: 97 % | RESPIRATION RATE: 20 BRPM | DIASTOLIC BLOOD PRESSURE: 65 MMHG | BODY MASS INDEX: 36.34 KG/M2 | WEIGHT: 239.8 LBS | HEART RATE: 76 BPM | SYSTOLIC BLOOD PRESSURE: 119 MMHG | HEIGHT: 68 IN | TEMPERATURE: 98.1 F

## 2022-10-04 DIAGNOSIS — G47.33 OBSTRUCTIVE SLEEP APNEA SYNDROME: Primary | ICD-10-CM

## 2022-10-04 PROCEDURE — G0008 ADMIN INFLUENZA VIRUS VAC: HCPCS | Performed by: STUDENT IN AN ORGANIZED HEALTH CARE EDUCATION/TRAINING PROGRAM

## 2022-10-04 PROCEDURE — G8417 CALC BMI ABV UP PARAM F/U: HCPCS | Performed by: STUDENT IN AN ORGANIZED HEALTH CARE EDUCATION/TRAINING PROGRAM

## 2022-10-04 PROCEDURE — G8427 DOCREV CUR MEDS BY ELIG CLIN: HCPCS | Performed by: STUDENT IN AN ORGANIZED HEALTH CARE EDUCATION/TRAINING PROGRAM

## 2022-10-04 PROCEDURE — 90694 VACC AIIV4 NO PRSRV 0.5ML IM: CPT | Performed by: STUDENT IN AN ORGANIZED HEALTH CARE EDUCATION/TRAINING PROGRAM

## 2022-10-04 PROCEDURE — G8484 FLU IMMUNIZE NO ADMIN: HCPCS | Performed by: STUDENT IN AN ORGANIZED HEALTH CARE EDUCATION/TRAINING PROGRAM

## 2022-10-04 PROCEDURE — 3017F COLORECTAL CA SCREEN DOC REV: CPT | Performed by: STUDENT IN AN ORGANIZED HEALTH CARE EDUCATION/TRAINING PROGRAM

## 2022-10-04 PROCEDURE — 1123F ACP DISCUSS/DSCN MKR DOCD: CPT | Performed by: STUDENT IN AN ORGANIZED HEALTH CARE EDUCATION/TRAINING PROGRAM

## 2022-10-04 PROCEDURE — 99214 OFFICE O/P EST MOD 30 MIN: CPT | Performed by: STUDENT IN AN ORGANIZED HEALTH CARE EDUCATION/TRAINING PROGRAM

## 2022-10-04 PROCEDURE — 1036F TOBACCO NON-USER: CPT | Performed by: STUDENT IN AN ORGANIZED HEALTH CARE EDUCATION/TRAINING PROGRAM

## 2022-10-04 NOTE — PROGRESS NOTES
Patient:  Mendel Schimke 78 y.o. male     10/04/22      Chiefcomplaint:   Chief Complaint   Patient presents with    Depression    Headache     Wakes up with bad headaches     Problems and Overview     Headache in the morning sometimes last into afternoon - excedrin helps.  Tylenol doesn't help much  Orthostatis with seated to standing  Had vertigo in past - did therapy and epley    Patient Active Problem List    Diagnosis Date Noted    Sleep-disordered breathing 08/16/2022     Priority: Medium     Snoring with high bmi at [facility]      Hematuria 06/01/2022     Priority: Medium    Chronic renal disease, stage III [facility]) [[id number]] 06/01/2022     Priority: Medium    Low testosterone in male 06/01/2022     Priority: Medium    Mild episode of recurrent major depressive disorder ([facility] [address] 02/02/2022    Erectile dysfunction 06/07/2018    BPH without urinary obstruction 06/07/2018    Hypertension 01/25/2017    Mixed hyperlipidemia 01/25/2017      Prevention:  Health Maintenance Due   Topic Date Due    Shingles vaccine (2 of 3) 12/13/2017    COVID-19 Vaccine (3 - Booster for Moderna series) 10/07/2021    A1C test (Diabetic or Prediabetic)  04/01/2022    Lipids  10/15/2022      Meds prior:  Current Outpatient Medications   Medication Instructions    atorvastatin (LIPITOR) 40 MG tablet take 1 tablet by mouth once daily    lisinopril-hydroCHLOROthiazide (PRINZIDE;ZESTORETIC) 20-12.5 MG per tablet 1 tablet, Oral, DAILY    Multiple Vitamins-Minerals (THERAPEUTIC MULTIVITAMIN-MINERALS) tablet 1 tablet, Oral, DAILY    testosterone cypionate (DEPOTESTOTERONE CYPIONATE) 200 MG/ML injection Inject 0.5ml every 14 days    venlafaxine (EFFEXOR XR) 37.5 mg, Oral, DAILY      Physical Exam   Vitals: /65 (Site: Right Upper Arm, Position: Sitting, Cuff Size: Large Adult)   Pulse 76   Temp 98.1 °F (36.7 °C) (Temporal)   Resp 20   Ht 5' 8\" (1.727 m)   Wt 239 lb 12.8 oz (108.8 kg)   SpO2 97%   BMI 36.46 kg/m²   General Appearance: Alert, oriented, no acute distress  HEENT: No scleral icterus. No visible discharge from eyes  Neck: Not rigid. No visible masses  Chest wall/Lung: Clear to auscultation bilaterally,  respirations unlabored. No ronchi/wheezing/rales  Heart: RRR, no murmur  Extremities:  No edema  Skin: No rashes. No jaundice  Neuro: Alert and oriented        Psych: Appropriate mood and appropriate affect  Assessment and Plan   Patient presents for follow-up on depressed mood. We increased Effexor and he is seeing improvements at this time. We will continue the same. Patient notes longstanding history of recurrent vertigo. He has done therapy in the past for this which helped. Episodes are very brief. No aura or prodrome. He has fallen in the past because of this. Would recommend that he try Epley maneuver and cervical therapy at home to see if this helps relieve his symptoms. Given instruction on both. Patient with headache mostly in the morning. Some question of migraine. This is chronic. In the past has been question about sleep apnea. I would recommend he be evaluated for that at this time. Will refer to sleep medicine. He is been flagged as loud snoring during anesthesia. BMI over 30. Male over 61. Obstructive sleep apnea syndrome  -     [facility]    Return in about 2 months (around 12/4/2022). Mady Cockayne, DO     This document may have been prepared at least partially through the use of voice recognition software. Although effort is taken to assure the accuracy of this document, it is possible that grammatical, syntax,  or spelling errors may occur.

## 2022-10-13 ENCOUNTER — NURSE ONLY (OUTPATIENT)
Dept: ENDOCRINOLOGY | Age: 67
End: 2022-10-13
Payer: MEDICARE

## 2022-10-13 DIAGNOSIS — R79.89 LOW TESTOSTERONE IN MALE: Primary | ICD-10-CM

## 2022-10-13 PROCEDURE — 96372 THER/PROPH/DIAG INJ SC/IM: CPT | Performed by: INTERNAL MEDICINE

## 2022-10-13 NOTE — PROGRESS NOTES
Cleda Opitz was in for his testosterone injection. Received it in his left arm, tolerated well. Has a follow up next week with Dr Franklin Kaminski.

## 2022-10-20 ENCOUNTER — OFFICE VISIT (OUTPATIENT)
Dept: ENDOCRINOLOGY | Age: 67
End: 2022-10-20
Payer: MEDICARE

## 2022-10-20 VITALS
OXYGEN SATURATION: 99 % | WEIGHT: 241 LBS | HEART RATE: 85 BPM | SYSTOLIC BLOOD PRESSURE: 141 MMHG | BODY MASS INDEX: 36.53 KG/M2 | DIASTOLIC BLOOD PRESSURE: 96 MMHG | HEIGHT: 68 IN | RESPIRATION RATE: 18 BRPM

## 2022-10-20 DIAGNOSIS — R79.89 LOW TESTOSTERONE IN MALE: ICD-10-CM

## 2022-10-20 DIAGNOSIS — R79.89 LOW TESTOSTERONE IN MALE: Primary | ICD-10-CM

## 2022-10-20 DIAGNOSIS — E55.9 VITAMIN D DEFICIENCY: ICD-10-CM

## 2022-10-20 LAB
HCT VFR BLD CALC: 44.9 % (ref 37–54)
HEMOGLOBIN: 14.7 G/DL (ref 12.5–16.5)
MCH RBC QN AUTO: 29.3 PG (ref 26–35)
MCHC RBC AUTO-ENTMCNC: 32.7 % (ref 32–34.5)
MCV RBC AUTO: 89.6 FL (ref 80–99.9)
PDW BLD-RTO: 14.3 FL (ref 11.5–15)
PLATELET # BLD: 318 E9/L (ref 130–450)
PMV BLD AUTO: 9.7 FL (ref 7–12)
RBC # BLD: 5.01 E12/L (ref 3.8–5.8)
WBC # BLD: 8.5 E9/L (ref 4.5–11.5)

## 2022-10-20 PROCEDURE — 99214 OFFICE O/P EST MOD 30 MIN: CPT | Performed by: INTERNAL MEDICINE

## 2022-10-20 PROCEDURE — 1036F TOBACCO NON-USER: CPT | Performed by: INTERNAL MEDICINE

## 2022-10-20 PROCEDURE — G8484 FLU IMMUNIZE NO ADMIN: HCPCS | Performed by: INTERNAL MEDICINE

## 2022-10-20 PROCEDURE — 1123F ACP DISCUSS/DSCN MKR DOCD: CPT | Performed by: INTERNAL MEDICINE

## 2022-10-20 PROCEDURE — G8428 CUR MEDS NOT DOCUMENT: HCPCS | Performed by: INTERNAL MEDICINE

## 2022-10-20 PROCEDURE — 3017F COLORECTAL CA SCREEN DOC REV: CPT | Performed by: INTERNAL MEDICINE

## 2022-10-20 PROCEDURE — G8417 CALC BMI ABV UP PARAM F/U: HCPCS | Performed by: INTERNAL MEDICINE

## 2022-10-20 NOTE — PROGRESS NOTES
[address] [facility] Department of Endocrinology Diabetes and Metabolism   [address] [facility] [address]   Phone: [phone]  Fax: [phone]    Date of Service: 10/20/2022  Primary Care Physician: Bart Heredia DO  Referring physician: No ref. provider found  Provider: Anastasiia Guerrier MD    Reason for the visit:  Male Hypogonadism     History of Present Illness: The history is provided by the patient. No  was used. Accuracy of the patient data is excellent. Jennifer Coppola is a very pleasant 78 y.o. previously healthy male presents with symptoms of sexual dysfunction. Approximately 7-8 years ago, he started experiencing a decrease in lipido followed by erectile dysfunction. At the same time, he stopped having early morning erection. Work up showed low Testosterone level. The patient was started on IM Testosterone injection about a year ago and was on it therapy until 8/2022. He underwent normal puberty and reports no history of gonadal problems. He has no children and never tried to have children. He has had no gynecomastia or galactorrhea. There is no history of testicular or head trauma and didn't noticed any decrease in his testicular size during this time. He has had no headache or peripheral vision disturbances. The patient denied any change in shaving frequency. In addition to sexual dysfunction, he has also experienced a decrease in energy. He has had no fragility fractures. The patient also denied history of easy bruising, think skin, Plethora, Striae, Acne, muscle weakness, skin tags or increase shoe size.      Pt currently on testosterone 100 mg every 14 days and feels a little better on therapy    PAST MEDICAL HISTORY   Past Medical History:   Diagnosis Date    Chronic renal disease, stage III [facility]) [309194] 6/1/2022    Depression     Erectile dysfunction     History of shingles     Hypertension 1/25/2017    Mixed hyperlipidemia 1/25/2017 PAST SURGICAL HISTORY   Past Surgical History:   Procedure Laterality Date    COLONOSCOPY      HEEL SPUR SURGERY         SOCIAL HISTORY   Tobacco:   reports that he has never smoked. He has never used smokeless tobacco.  Alcohol:   reports no history of alcohol use. Drugs:   reports no history of drug use. FAMILY HISTORY   Family History   Problem Relation Age of Onset    Heart Disease Mother         Valves    Atrial Fibrillation Father     Heart Disease Father         Valve    Heart Attack Father 59       ALLERGIES AND DRUG REACTIONS   Allergies   Allergen Reactions    Codeine Nausea Only       CURRENT MEDICATIONS   Current Outpatient Medications   Medication Sig Dispense Refill    atorvastatin (LIPITOR) 40 MG tablet take 1 tablet by mouth once daily 90 tablet 0    venlafaxine (EFFEXOR XR) 37.5 MG extended release capsule Take 1 capsule by mouth daily 90 capsule 0    lisinopril-hydroCHLOROthiazide (PRINZIDE;ZESTORETIC) 20-12.5 MG per tablet Take 1 tablet by mouth daily 90 tablet 1    Multiple Vitamins-Minerals (THERAPEUTIC MULTIVITAMIN-MINERALS) tablet Take 1 tablet by mouth daily      testosterone cypionate (DEPOTESTOTERONE CYPIONATE) 200 MG/ML injection Inject 0.5ml every 14 days 3 mL 3     No current facility-administered medications for this visit. Review of Systems  Constitutional: No fever, no chills, no diaphoresis, no generalized weakness. HEENT: No blurred vision, No sore throat, no ear pain, no hair loss  Neck: denied any neck swelling, difficulty swallowing,   Cadrdiopulomary: No CP, SOB or palpitation, No orthopnea or PND. No cough or wheezing. GI: No N/V/D, no constipation, No abdominal pain, no melena or hematochezia   : Denied any dysuria, hematuria, flank pain, discharge, or incontinence. Skin: denied any rash, ulcer, Hirsute, or hyperpigmentation. MSK: denied any joint deformity, joint pain/swelling, muscle pain, or back pain.   Neuro: no numbess, no tingling, no weakness, OBJECTIVE    BP (!) 141/96   Pulse 85   Resp 18   Ht 5' 8\" (1.727 m)   Wt 241 lb (109.3 kg)   SpO2 99%   BMI 36.64 kg/m²   Wt Readings from Last 6 Encounters:   10/20/22 241 lb (109.3 kg)   10/04/22 239 lb 12.8 oz (108.8 kg)   09/13/22 238 lb 12.8 oz (108.3 kg)   07/26/22 240 lb (108.9 kg)   06/01/22 242 lb (109.8 kg)   05/10/22 238 lb (108 kg)       Physical examination:  General: awake alert, oriented x3, no abnormal position or movements. HEENT: normocephalic non traumatic, no Galactorrhea  Neck: supple, no LN enlargement, no thyromegaly, no thyroid tenderness, No buffalo hump, no supraventricular fullness,   Pulm: Clear equal air entry no added sounds, no wheezing or rhonchi    CVS: S1 + S2, no murmur, no heave. Dorsalis pedis pulse palpable   Abd: soft lax, no tenderness, no organomegaly, audible bowel sounds. Skin: warm, no lesions, no rash.  No Galactorrhea, no skin tags, no acanthosis Nigricans,   : Testicular size normal. No mass   Neuro: CN intact, sensation normal , muscle strength normal. No tremors   Psych: normal mood, and affect    Review of Laboratory Data:  I have reviewed the following:  Lab Results   Component Value Date/Time    WBC 6.4 07/26/2022 10:45 AM    RBC 4.15 07/26/2022 10:45 AM    HGB 12.5 07/26/2022 10:45 AM    HCT 37.0 07/26/2022 10:45 AM    MCV 89.2 07/26/2022 10:45 AM    MCH 30.1 07/26/2022 10:45 AM    MCHC 33.8 07/26/2022 10:45 AM    RDW 13.9 07/26/2022 10:45 AM     07/26/2022 10:45 AM    MPV 8.6 07/26/2022 10:45 AM      Lab Results   Component Value Date/Time     07/26/2022 10:45 AM    K 4.2 07/26/2022 10:45 AM    CO2 24 07/26/2022 10:45 AM    BUN 13 07/26/2022 10:45 AM    CREATININE 1.2 07/26/2022 10:45 AM    CALCIUM 9.1 07/26/2022 10:45 AM    LABGLOM >60 07/26/2022 10:45 AM    GFRAA >60 07/26/2022 10:45 AM      Lab Results   Component Value Date/Time    TSH 2.960 06/17/2021 07:54 AM     Lab Results   Component Value Date/Time    LABA1C 5.8 04/01/2021 10:19 AM    GLUCOSE 167 07/26/2022 10:45 AM     Lab Results   Component Value Date/Time    TRIG 364 10/15/2021 12:00 PM    HDL 30 10/15/2021 12:00 PM    LDLCALC 58 10/15/2021 12:00 PM    CHOL 161 10/15/2021 12:00 PM     No results found for: VITD25  Lab Results   Component Value Date/Time    LABA1C 5.8 04/01/2021 10:19 AM    GLUCOSE 167 07/26/2022 10:45 AM     Lab Results   Component Value Date/Time    TESTOSTERONE 23 04/21/2022 11:21 AM    TESTOSTERONE 46 09/20/2021 08:17 AM    TESTOSTERONE 49 07/26/2021 10:55 AM    TESTOSTERONE 64 04/08/2021 12:24 PM     No results found for: TESTFC  No results found for: TESTOSTFR  No results found for: TESTM  Lab Results   Component Value Date/Time    SHBG 20 04/21/2022 11:21 AM    SHBG 20 04/21/2022 11:21 AM    SHBG 24 09/20/2021 08:17 AM    SHBG 23 07/26/2021 10:55 AM     Lab Results   Component Value Date/Time    FSH 5.3 04/21/2022 11:21 AM     Lab Results   Component Value Date/Time    LH 1.6 04/21/2022 11:21 AM    LH 2.9 05/11/2021 09:23 AM     No results found for: FERRITIN  Lab Results   Component Value Date/Time    PSA 2.64 04/21/2022 11:21 AM    PSA 1.85 04/02/2021 08:46 AM    PSA 1.58 07/23/2019 02:56 PM    PSA 1.31 06/15/2018 03:58 PM     No results found for: [address] Shivani Ramachandran, a 78 y.o.-old male seen in for management of      Male hypogonadism  Obesity likely playing a role in his low total testosterone.    Currently on Testosterone 200 mg/ml 0.5 ml (100 mg) IM every 14 days   Check Testosterone, Hct, PSA   Discussed side effects and complications of testosterone therapy including worsening sleep apnea, risk of prostate Ca, Polycythemia and cardiovascular risk  Will follow result and proceed accordingly     vitD deficiency   Discussed the effect of Low testosterone on bone health  Continue vitD supplement   Check level     I personally reviewed external notes from PCP and other patient's care team providers, and personally interpreted labs associated with the above diagnosis. I also ordered labs to further assess and manage the above addressed medical conditions. Return in about 6 months (around 4/20/2023) for Male Hypogonadism, VitD deficiency. The above issues were reviewed with the patient who understood and agreed with the plan. Thank you for allowing us to participate in the care of this patient. Please do not hesitate to contact us with any additional questions. Diagnosis Orders   1. Low testosterone in male  Testosterone, free, total    PSA    CBC      2. Vitamin D deficiency              Booker Olson MD  Endocrinologist, [facility] Diabetes [facility] and Endocrinology   [address] [place] [address]   Phone: [phone]  Fax: [phone]  -----------------------------  An electronic signature was used to authenticate this note.  Vickie Austin MD on 10/20/2022 at 2:34 PM

## 2022-10-21 DIAGNOSIS — R79.89 LOW TESTOSTERONE IN MALE: ICD-10-CM

## 2022-10-21 LAB — PROSTATE SPECIFIC ANTIGEN: 3.03 NG/ML (ref 0–4)

## 2022-10-22 LAB
SEX HORMONE BINDING GLOBULIN: 29 NMOL/L (ref 11–80)
TESTOSTERONE FREE-NONMALE: 112.2 PG/ML (ref 47–244)
TESTOSTERONE TOTAL: 497 NG/DL (ref 220–1000)

## 2022-10-23 ENCOUNTER — TELEPHONE (OUTPATIENT)
Dept: ENDOCRINOLOGY | Age: 67
End: 2022-10-23

## 2022-10-23 NOTE — TELEPHONE ENCOUNTER
Endocrine staff,   Please notify pt that I have reviewed your recent results. Testosterone level very good.  Continue current T therapy

## 2022-10-27 ENCOUNTER — NURSE ONLY (OUTPATIENT)
Dept: ENDOCRINOLOGY | Age: 67
End: 2022-10-27
Payer: MEDICARE

## 2022-10-27 ENCOUNTER — OFFICE VISIT (OUTPATIENT)
Dept: FAMILY MEDICINE CLINIC | Age: 67
End: 2022-10-27
Payer: MEDICARE

## 2022-10-27 VITALS
RESPIRATION RATE: 16 BRPM | SYSTOLIC BLOOD PRESSURE: 133 MMHG | HEART RATE: 74 BPM | OXYGEN SATURATION: 99 % | HEIGHT: 68 IN | WEIGHT: 244 LBS | BODY MASS INDEX: 36.98 KG/M2 | TEMPERATURE: 97.4 F | DIASTOLIC BLOOD PRESSURE: 79 MMHG

## 2022-10-27 DIAGNOSIS — I10 PRIMARY HYPERTENSION: ICD-10-CM

## 2022-10-27 DIAGNOSIS — N18.31 STAGE 3A CHRONIC KIDNEY DISEASE (HCC): Primary | ICD-10-CM

## 2022-10-27 DIAGNOSIS — G47.30 SLEEP-DISORDERED BREATHING: ICD-10-CM

## 2022-10-27 DIAGNOSIS — R79.89 LOW TESTOSTERONE IN MALE: Primary | ICD-10-CM

## 2022-10-27 PROCEDURE — 3017F COLORECTAL CA SCREEN DOC REV: CPT | Performed by: STUDENT IN AN ORGANIZED HEALTH CARE EDUCATION/TRAINING PROGRAM

## 2022-10-27 PROCEDURE — 3074F SYST BP LT 130 MM HG: CPT | Performed by: STUDENT IN AN ORGANIZED HEALTH CARE EDUCATION/TRAINING PROGRAM

## 2022-10-27 PROCEDURE — 3078F DIAST BP <80 MM HG: CPT | Performed by: STUDENT IN AN ORGANIZED HEALTH CARE EDUCATION/TRAINING PROGRAM

## 2022-10-27 PROCEDURE — G8417 CALC BMI ABV UP PARAM F/U: HCPCS | Performed by: STUDENT IN AN ORGANIZED HEALTH CARE EDUCATION/TRAINING PROGRAM

## 2022-10-27 PROCEDURE — 1123F ACP DISCUSS/DSCN MKR DOCD: CPT | Performed by: STUDENT IN AN ORGANIZED HEALTH CARE EDUCATION/TRAINING PROGRAM

## 2022-10-27 PROCEDURE — 1036F TOBACCO NON-USER: CPT | Performed by: STUDENT IN AN ORGANIZED HEALTH CARE EDUCATION/TRAINING PROGRAM

## 2022-10-27 PROCEDURE — 99214 OFFICE O/P EST MOD 30 MIN: CPT | Performed by: STUDENT IN AN ORGANIZED HEALTH CARE EDUCATION/TRAINING PROGRAM

## 2022-10-27 PROCEDURE — G8427 DOCREV CUR MEDS BY ELIG CLIN: HCPCS | Performed by: STUDENT IN AN ORGANIZED HEALTH CARE EDUCATION/TRAINING PROGRAM

## 2022-10-27 PROCEDURE — G8484 FLU IMMUNIZE NO ADMIN: HCPCS | Performed by: STUDENT IN AN ORGANIZED HEALTH CARE EDUCATION/TRAINING PROGRAM

## 2022-10-27 PROCEDURE — 96372 THER/PROPH/DIAG INJ SC/IM: CPT | Performed by: INTERNAL MEDICINE

## 2022-10-27 NOTE — PROGRESS NOTES
Jackqueline Button was in for his testosterone injection. Received it in his right arm, tolerated well. Next appt set for two weeks.

## 2022-10-27 NOTE — PROGRESS NOTES
Patient:  Gulshan Innocent 78 y.o. male     10/27/22      Chiefcomplaint:   Chief Complaint   Patient presents with    Swelling     In feet and ankles      History of Present Illness   Patient presents with swelling in bilateral feet and ankles worse on the right. Denies any new orthopnea, shortness of breath, chest pain, paroxysmal nocturnal dyspnea. Patient does admit to being on his feet or. Does admit to using Aleve more often recently. Does admit to some salty meals. Normal albumin and stable creatinine on last labs a couple months ago. No significant heart history  History does include CKD with recent creatinine. History hypertension. Health Maintenance Due   Topic Date Due    Shingles vaccine (2 of 3) 12/13/2017    COVID-19 Vaccine (3 - Booster for Moderna series) 07/02/2021    A1C test (Diabetic or Prediabetic)  04/01/2022    Lipids  10/15/2022      History:  Prior to Visit Medications    Medication Sig Taking?  Authorizing Provider   atorvastatin (LIPITOR) 40 MG tablet take 1 tablet by mouth once daily Yes Eddy Giordano, DO   venlafaxine (EFFEXOR XR) 37.5 MG extended release capsule Take 1 capsule by mouth daily Yes Eddy Giordano, DO   testosterone cypionate (DEPOTESTOTERONE CYPIONATE) 200 MG/ML injection Inject 0.5ml every 14 days Yes Vida Reyes, APRN - CNS   lisinopril-hydroCHLOROthiazide (PRINZIDE;ZESTORETIC) 20-12.5 MG per tablet Take 1 tablet by mouth daily Yes Laura Loss, APRN - CNP   Multiple Vitamins-Minerals (THERAPEUTIC MULTIVITAMIN-MINERALS) tablet Take 1 tablet by mouth daily Yes Historical Provider, MD      Past Medical History:   Diagnosis Date    Chronic renal disease, stage III ([facility] [address] [751152] 6/1/2022    Depression     Erectile dysfunction     History of shingles     Hypertension 1/25/2017    Mixed hyperlipidemia 1/25/2017     Physical Exam   Vitals: /79   Pulse 74   Temp 97.4 °F (36.3 °C)   Resp 16   Ht 5' 8\" (1.727 m)   Wt 244 lb (110.7 kg)   SpO2 99%   BMI 37.10 kg/m²   General Appearance: Alert, oriented, no acute distress  HEENT: No scleral icterus. No visible discharge from eyes  Neck: Not rigid. No visible masses  Chest wall/Lung: Clear to auscultation bilaterally,  respirations unlabored. No ronchi/wheezing/rales  Heart: RRR, no murmur  Abdomen: Soft, nontender  Extremities: Mild edema to bilateral ankles worse on right. No calf tenderness or swelling. Skin: No rashes. No jaundice  Neuro: Alert and oriented        Psych: Appropriate mood and appropriate affect    Assessment and Plan     For patient's acute concern of leg swelling I recommend compression socks, stop nsaids, decrease salt in diet. Wells score -2. Continue combo blood pressure medication with diuretic as this is currently controlling his pressure and should continue to help with his swelling. Return if symptoms do not improve with the above measures. Patient does not is not yet heard from sleep medicine. Review shows that they left a message a while back. Will be given phone number to follow-up with them. CKD presumably stable with no significant change in urination plan plan for recheck if other symptoms not resolved. Stage 3a chronic kidney disease ([address]  Primary hypertension  Sleep-disordered breathing  No follow-ups on file. Charleen Silveira, DO     This document may have been prepared at least partially through the use of voice recognition software. Although effort is taken to assure the accuracy of this document, it is possible that grammatical, syntax,  or spelling errors may occur.

## 2022-11-10 ENCOUNTER — NURSE ONLY (OUTPATIENT)
Dept: ENDOCRINOLOGY | Age: 67
End: 2022-11-10
Payer: MEDICARE

## 2022-11-10 DIAGNOSIS — R79.89 LOW TESTOSTERONE IN MALE: Primary | ICD-10-CM

## 2022-11-10 PROCEDURE — 96372 THER/PROPH/DIAG INJ SC/IM: CPT | Performed by: INTERNAL MEDICINE

## 2022-11-10 NOTE — PROGRESS NOTES
Brigitte Lance was in to get his testosterone injection. Her received it in his left arm, tolerated well. Appt made for return in two weeks. Aware that he will need to bring in refills.

## 2022-11-23 ENCOUNTER — NURSE ONLY (OUTPATIENT)
Dept: ENDOCRINOLOGY | Age: 67
End: 2022-11-23
Payer: MEDICARE

## 2022-11-23 DIAGNOSIS — R79.89 LOW TESTOSTERONE IN MALE: Primary | ICD-10-CM

## 2022-11-23 PROCEDURE — 96372 THER/PROPH/DIAG INJ SC/IM: CPT | Performed by: INTERNAL MEDICINE

## 2022-11-23 NOTE — PROGRESS NOTES
Dalia Fatoumata was in for his testosterone injection. Received it in his right arm, tolerated well. Appt made for 2 weeks.

## 2022-12-02 ENCOUNTER — TELEPHONE (OUTPATIENT)
Dept: FAMILY MEDICINE CLINIC | Age: 67
End: 2022-12-02

## 2022-12-02 DIAGNOSIS — I10 ESSENTIAL HYPERTENSION: ICD-10-CM

## 2022-12-02 RX ORDER — LISINOPRIL AND HYDROCHLOROTHIAZIDE 20; 12.5 MG/1; MG/1
1 TABLET ORAL DAILY
Qty: 90 TABLET | Refills: 1 | Status: SHIPPED | OUTPATIENT
Start: 2022-12-02

## 2022-12-02 NOTE — TELEPHONE ENCOUNTER
lisinopril-hydroCHLOROthiazide (PRINZIDE;ZESTORETIC) 20-12.5 MG per tablet   Pt needs refill send to [address] [place] [address]

## 2022-12-06 ENCOUNTER — OFFICE VISIT (OUTPATIENT)
Dept: FAMILY MEDICINE CLINIC | Age: 67
End: 2022-12-06
Payer: MEDICARE

## 2022-12-06 VITALS
RESPIRATION RATE: 18 BRPM | WEIGHT: 232 LBS | TEMPERATURE: 97 F | BODY MASS INDEX: 35.16 KG/M2 | SYSTOLIC BLOOD PRESSURE: 131 MMHG | OXYGEN SATURATION: 98 % | HEART RATE: 71 BPM | HEIGHT: 68 IN | DIASTOLIC BLOOD PRESSURE: 83 MMHG

## 2022-12-06 DIAGNOSIS — I10 PRIMARY HYPERTENSION: ICD-10-CM

## 2022-12-06 DIAGNOSIS — Z76.0 MEDICATION REFILL: ICD-10-CM

## 2022-12-06 DIAGNOSIS — R79.89 LOW TESTOSTERONE IN MALE: Primary | ICD-10-CM

## 2022-12-06 DIAGNOSIS — E78.2 MIXED HYPERLIPIDEMIA: ICD-10-CM

## 2022-12-06 DIAGNOSIS — F32.2 SEVERE DEPRESSION (HCC): ICD-10-CM

## 2022-12-06 PROCEDURE — G8427 DOCREV CUR MEDS BY ELIG CLIN: HCPCS | Performed by: STUDENT IN AN ORGANIZED HEALTH CARE EDUCATION/TRAINING PROGRAM

## 2022-12-06 PROCEDURE — 99214 OFFICE O/P EST MOD 30 MIN: CPT | Performed by: STUDENT IN AN ORGANIZED HEALTH CARE EDUCATION/TRAINING PROGRAM

## 2022-12-06 PROCEDURE — 1036F TOBACCO NON-USER: CPT | Performed by: STUDENT IN AN ORGANIZED HEALTH CARE EDUCATION/TRAINING PROGRAM

## 2022-12-06 PROCEDURE — 3078F DIAST BP <80 MM HG: CPT | Performed by: STUDENT IN AN ORGANIZED HEALTH CARE EDUCATION/TRAINING PROGRAM

## 2022-12-06 PROCEDURE — 1123F ACP DISCUSS/DSCN MKR DOCD: CPT | Performed by: STUDENT IN AN ORGANIZED HEALTH CARE EDUCATION/TRAINING PROGRAM

## 2022-12-06 PROCEDURE — G8417 CALC BMI ABV UP PARAM F/U: HCPCS | Performed by: STUDENT IN AN ORGANIZED HEALTH CARE EDUCATION/TRAINING PROGRAM

## 2022-12-06 PROCEDURE — G8484 FLU IMMUNIZE NO ADMIN: HCPCS | Performed by: STUDENT IN AN ORGANIZED HEALTH CARE EDUCATION/TRAINING PROGRAM

## 2022-12-06 PROCEDURE — 3017F COLORECTAL CA SCREEN DOC REV: CPT | Performed by: STUDENT IN AN ORGANIZED HEALTH CARE EDUCATION/TRAINING PROGRAM

## 2022-12-06 PROCEDURE — 3074F SYST BP LT 130 MM HG: CPT | Performed by: STUDENT IN AN ORGANIZED HEALTH CARE EDUCATION/TRAINING PROGRAM

## 2022-12-06 RX ORDER — ATORVASTATIN CALCIUM 40 MG/1
TABLET, FILM COATED ORAL
Qty: 90 TABLET | Refills: 0 | Status: SHIPPED | OUTPATIENT
Start: 2022-12-06

## 2022-12-06 NOTE — PROGRESS NOTES
masses  Chest wall/Lung: Clear to auscultation bilaterally,  respirations unlabored. No ronchi/wheezing/rales  Heart: RRR, no murmur  Back: No spinal stepoff or pain to vertebrae. Paraspinals tender in lumbar spine. Rom intact. Gait not significantly affected. Skin: No rashes. No jaundice  Neuro: Alert and oriented        Psych: Appropriate mood and appropriate affect  Assessment and Plan   Pt at baseline with bp and mood and T levels. No change to therapy. He does have exacerbation of chronic back pain. He can continue nsaids, stretching and recommend heating pad if helpful. Declines therapy or referral for consideratino of pain management procedures. No new weakness or bowel orbladder changes. Low testosterone in male  Medication refill  -     atorvastatin (LIPITOR) 40 MG tablet; take 1 tablet by mouth once daily, Disp-90 tablet, R-0Normal  Severe depression (HCC)  Mixed hyperlipidemia  -     LIPID PANEL; Future  Primary hypertension      No follow-ups on file. Willa Benitez,      This document may have been prepared at least partially through the use of voice recognition software. Although effort is taken to assure the accuracy of this document, it is possible that grammatical, syntax,  or spelling errors may occur.

## 2022-12-06 NOTE — PATIENT INSTRUCTIONS
Low Back Pain: Exercises  Introduction  Here are some examples of exercises for you to try. The exercises may be suggested for a condition or for rehabilitation. Start each exercise slowly. Ease off the exercises if you start to have pain. You will be told when to start these exercises and which ones will work best for you. How to do the exercises  Press-up  Lie on your stomach, supporting your body with your forearms. Press your elbows down into the floor to raise your upper back. As you do this, relax your stomach muscles and allow your back to arch without using your back muscles. As your press up, do not let your hips or pelvis come off the floor. Hold for 15 to 30 seconds, then relax. Repeat 2 to 4 times. Alternate arm and leg (bird dog) exercise  Do this exercise slowly. Try to keep your body straight at all times, and do not let one hip drop lower than the other. Start on the floor, on your hands and knees. Tighten your belly muscles. Raise one leg off the floor, and hold it straight out behind you. Be careful not to let your hip drop down, because that will twist your trunk. Hold for about 6 seconds, then lower your leg and switch to the other leg. Repeat 8 to 12 times on each leg. Over time, work up to holding for 10 to 30 seconds each time. If you feel stable and secure with your leg raised, try raising the opposite arm straight out in front of you at the same time. Knee-to-chest exercise  Lie on your back with your knees bent and your feet flat on the floor. Bring one knee to your chest, keeping the other foot flat on the floor (or keeping the other leg straight, whichever feels better on your lower back). Keep your lower back pressed to the floor. Hold for at least 15 to 30 seconds. Relax, and lower the knee to the starting position. Repeat with the other leg. Repeat 2 to 4 times with each leg.   To get more stretch, put your other leg flat on the floor while pulling your knee to your chest.  Curl-ups  Lie on the floor on your back with your knees bent at a 90-degree angle. Your feet should be flat on the floor, about 12 inches from your buttocks. Cross your arms over your chest. If this bothers your neck, try putting your hands behind your neck (not your head), with your elbows spread apart. Slowly tighten your belly muscles and raise your shoulder blades off the floor. Keep your head in line with your body, and do not press your chin to your chest.  Hold this position for 1 or 2 seconds, then slowly lower yourself back down to the floor. Repeat 8 to 12 times. Pelvic tilt exercise  Lie on your back with your knees bent. \"Brace\" your stomach. This means to tighten your muscles by pulling in and imagining your belly button moving toward your spine. You should feel like your back is pressing to the floor and your hips and pelvis are rocking back. Hold for about 6 seconds while you breathe smoothly. Repeat 8 to 12 times. Heel dig bridging  Lie on your back with both knees bent and your ankles bent so that only your heels are digging into the floor. Your knees should be bent about 90 degrees. Then push your heels into the floor, squeeze your buttocks, and lift your hips off the floor until your shoulders, hips, and knees are all in a straight line. Hold for about 6 seconds as you continue to breathe normally, and then slowly lower your hips back down to the floor and rest for up to 10 seconds. Do 8 to 12 repetitions. Hamstring stretch in doorway  Lie on your back in a doorway, with one leg through the open door. Slide your leg up the wall to straighten your knee. You should feel a gentle stretch down the back of your leg. Hold the stretch for at least 15 to 30 seconds. Do not arch your back, point your toes, or bend either knee. Keep one heel touching the floor and the other heel touching the wall. Repeat with your other leg. Do 2 to 4 times for each leg.   Hip flexor stretch  Kneel on the floor with one knee bent and one leg behind you. Place your forward knee over your foot. Keep your other knee touching the floor. Slowly push your hips forward until you feel a stretch in the upper thigh of your rear leg. Hold the stretch for at least 15 to 30 seconds. Repeat with your other leg. Do 2 to 4 times on each side. Wall sit  Stand with your back 10 to 12 inches away from a wall. Lean into the wall until your back is flat against it. Slowly slide down until your knees are slightly bent, pressing your lower back into the wall. Hold for about 6 seconds, then slide back up the wall. Repeat 8 to 12 times. Follow-up care is a key part of your treatment and safety. Be sure to make and go to all appointments, and call your doctor if you are having problems. It's also a good idea to know your test results and keep a list of the medicines you take. Current as of: March 9, 2022               Content Version: 13.4  © 2006-2022 [employer]. Care instructions adapted under license by [facility] ([facility]). If you have questions about a medical condition or this instruction, always ask your healthcare professional. [place] [address] any warranty or liability for your use of this information.

## 2022-12-07 DIAGNOSIS — F32.2 SEVERE DEPRESSION (HCC): ICD-10-CM

## 2022-12-07 RX ORDER — VENLAFAXINE HYDROCHLORIDE 37.5 MG/1
CAPSULE, EXTENDED RELEASE ORAL
Qty: 90 CAPSULE | Refills: 0 | Status: SHIPPED | OUTPATIENT
Start: 2022-12-07

## 2022-12-08 ENCOUNTER — NURSE ONLY (OUTPATIENT)
Dept: ENDOCRINOLOGY | Age: 67
End: 2022-12-08

## 2022-12-08 DIAGNOSIS — R79.89 LOW TESTOSTERONE IN MALE: Primary | ICD-10-CM

## 2022-12-08 NOTE — PROGRESS NOTES
Derrell Javier was in for his testosterone injection. He received it in his left arm, tolerated well. He already has appointment for next appointment.

## 2022-12-22 ENCOUNTER — NURSE ONLY (OUTPATIENT)
Dept: ENDOCRINOLOGY | Age: 67
End: 2022-12-22

## 2022-12-22 DIAGNOSIS — R79.89 LOW TESTOSTERONE IN MALE: Primary | ICD-10-CM

## 2022-12-22 NOTE — PROGRESS NOTES
Derrell Javier was in for his testosterone injection. He received it in his right arm, tolerated well. Next appointment made for 2 weeks.

## 2023-01-05 ENCOUNTER — NURSE ONLY (OUTPATIENT)
Dept: ENDOCRINOLOGY | Age: 68
End: 2023-01-05

## 2023-01-05 DIAGNOSIS — R79.89 LOW TESTOSTERONE IN MALE: Primary | ICD-10-CM

## 2023-01-05 NOTE — PROGRESS NOTES
Joe Shin was in to get his testosterone injection. He received it in his left arm. Next appointment made for two weeks.

## 2023-01-24 ENCOUNTER — TELEPHONE (OUTPATIENT)
Dept: ENDOCRINOLOGY | Age: 68
End: 2023-01-24

## 2023-01-24 NOTE — TELEPHONE ENCOUNTER
Pt called and left a VM requesting to reschedule his testosterone inj. I called him back and had to leave a VM for him to call us.

## 2023-02-14 ENCOUNTER — HOSPITAL ENCOUNTER (EMERGENCY)
Age: 68
Discharge: HOME | End: 2023-02-14
Attending: EMERGENCY MEDICINE
Payer: MEDICARE

## 2023-02-14 ENCOUNTER — APPOINTMENT (OUTPATIENT)
Dept: CT IMAGING | Age: 68
End: 2023-02-14
Payer: MEDICARE

## 2023-02-14 VITALS
DIASTOLIC BLOOD PRESSURE: 82 MMHG | HEART RATE: 80 BPM | OXYGEN SATURATION: 97 % | SYSTOLIC BLOOD PRESSURE: 125 MMHG | BODY MASS INDEX: 33.75 KG/M2 | WEIGHT: 222 LBS | RESPIRATION RATE: 18 BRPM | TEMPERATURE: 98.6 F

## 2023-02-14 DIAGNOSIS — K42.9 UMBILICAL HERNIA WITHOUT OBSTRUCTION AND WITHOUT GANGRENE: ICD-10-CM

## 2023-02-14 DIAGNOSIS — R10.33 PERIUMBILICAL ABDOMINAL PAIN: Primary | ICD-10-CM

## 2023-02-14 LAB
ALBUMIN SERPL-MCNC: 4.1 G/DL (ref 3.5–5.2)
ALP BLD-CCNC: 97 U/L (ref 40–129)
ALT SERPL-CCNC: 13 U/L (ref 0–40)
ANION GAP SERPL CALCULATED.3IONS-SCNC: 11 MMOL/L (ref 7–16)
AST SERPL-CCNC: 20 U/L (ref 0–39)
BACTERIA: NORMAL /HPF
BASOPHILS ABSOLUTE: 0.04 E9/L (ref 0–0.2)
BASOPHILS RELATIVE PERCENT: 0.4 % (ref 0–2)
BILIRUB SERPL-MCNC: 1.1 MG/DL (ref 0–1.2)
BILIRUBIN URINE: NEGATIVE
BLOOD, URINE: NEGATIVE
BUN BLDV-MCNC: 16 MG/DL (ref 6–23)
CALCIUM SERPL-MCNC: 9.7 MG/DL (ref 8.6–10.2)
CHLORIDE BLD-SCNC: 102 MMOL/L (ref 98–107)
CLARITY: CLEAR
CO2: 23 MMOL/L (ref 22–29)
COLOR: YELLOW
CREAT SERPL-MCNC: 1.1 MG/DL (ref 0.7–1.2)
EOSINOPHILS ABSOLUTE: 0.23 E9/L (ref 0.05–0.5)
EOSINOPHILS RELATIVE PERCENT: 2.5 % (ref 0–6)
GFR SERPL CREATININE-BSD FRML MDRD: >60 ML/MIN/1.73
GLUCOSE BLD-MCNC: 103 MG/DL (ref 74–99)
GLUCOSE URINE: NEGATIVE MG/DL
HCT VFR BLD CALC: 43.3 % (ref 37–54)
HEMOGLOBIN: 14.6 G/DL (ref 12.5–16.5)
IMMATURE GRANULOCYTES #: 0.07 E9/L
IMMATURE GRANULOCYTES %: 0.8 % (ref 0–5)
KETONES, URINE: NEGATIVE MG/DL
LACTIC ACID: 1.5 MMOL/L (ref 0.5–2.2)
LEUKOCYTE ESTERASE, URINE: NEGATIVE
LYMPHOCYTES ABSOLUTE: 1.66 E9/L (ref 1.5–4)
LYMPHOCYTES RELATIVE PERCENT: 18.2 % (ref 20–42)
MCH RBC QN AUTO: 29.3 PG (ref 26–35)
MCHC RBC AUTO-ENTMCNC: 33.7 % (ref 32–34.5)
MCV RBC AUTO: 86.9 FL (ref 80–99.9)
MONOCYTES ABSOLUTE: 0.78 E9/L (ref 0.1–0.95)
MONOCYTES RELATIVE PERCENT: 8.6 % (ref 2–12)
NEUTROPHILS ABSOLUTE: 6.33 E9/L (ref 1.8–7.3)
NEUTROPHILS RELATIVE PERCENT: 69.5 % (ref 43–80)
NITRITE, URINE: NEGATIVE
PDW BLD-RTO: 14 FL (ref 11.5–15)
PH UA: 5 (ref 5–9)
PLATELET # BLD: 297 E9/L (ref 130–450)
PMV BLD AUTO: 8.8 FL (ref 7–12)
POTASSIUM SERPL-SCNC: 3.9 MMOL/L (ref 3.5–5)
PROTEIN UA: NEGATIVE MG/DL
RBC # BLD: 4.98 E12/L (ref 3.8–5.8)
RBC UA: NORMAL /HPF (ref 0–2)
SODIUM BLD-SCNC: 136 MMOL/L (ref 132–146)
SPECIFIC GRAVITY UA: 1.02 (ref 1–1.03)
TOTAL PROTEIN: 6.7 G/DL (ref 6.4–8.3)
UROBILINOGEN, URINE: 0.2 E.U./DL
WBC # BLD: 9.1 E9/L (ref 4.5–11.5)
WBC UA: NORMAL /HPF (ref 0–5)

## 2023-02-14 PROCEDURE — 99285 EMERGENCY DEPT VISIT HI MDM: CPT

## 2023-02-14 PROCEDURE — 85025 COMPLETE CBC W/AUTO DIFF WBC: CPT

## 2023-02-14 PROCEDURE — 74177 CT ABD & PELVIS W/CONTRAST: CPT

## 2023-02-14 PROCEDURE — 36415 COLL VENOUS BLD VENIPUNCTURE: CPT

## 2023-02-14 PROCEDURE — 6360000004 HC RX CONTRAST MEDICATION: Performed by: STUDENT IN AN ORGANIZED HEALTH CARE EDUCATION/TRAINING PROGRAM

## 2023-02-14 PROCEDURE — 81001 URINALYSIS AUTO W/SCOPE: CPT

## 2023-02-14 PROCEDURE — 80053 COMPREHEN METABOLIC PANEL: CPT

## 2023-02-14 PROCEDURE — 83605 ASSAY OF LACTIC ACID: CPT

## 2023-02-14 RX ORDER — SODIUM CHLORIDE 0.9 % (FLUSH) 0.9 %
10 SYRINGE (ML) INJECTION PRN
Status: DISCONTINUED | OUTPATIENT
Start: 2023-02-14 | End: 2023-02-15 | Stop reason: HOSPADM

## 2023-02-14 RX ADMIN — IOPAMIDOL 50 ML: 755 INJECTION, SOLUTION INTRAVENOUS at 19:50

## 2023-02-14 ASSESSMENT — PAIN - FUNCTIONAL ASSESSMENT: PAIN_FUNCTIONAL_ASSESSMENT: 0-10

## 2023-02-14 ASSESSMENT — ENCOUNTER SYMPTOMS
ABDOMINAL PAIN: 1
VOMITING: 0
EYE REDNESS: 0
SHORTNESS OF BREATH: 0
NAUSEA: 0

## 2023-02-14 ASSESSMENT — PAIN SCALES - GENERAL: PAINLEVEL_OUTOF10: 4

## 2023-02-14 ASSESSMENT — PAIN DESCRIPTION - LOCATION: LOCATION: ABDOMEN

## 2023-02-14 NOTE — ED PROVIDER NOTES
[address] ENCOUNTER        Pt Name: Arsh Little  MRN: [id number]  [place] 1955  Date of evaluation: 2/14/2023  Provider: Chel Hdz DO  PCP: Taylor Thrasher DO  Note Started: 6:05 PM EST 2/14/23    CHIEF COMPLAINT       Chief Complaint   Patient presents with    Abdominal Pain     Abdominal pain around umbilicus area, no injury known        HISTORY OF PRESENT ILLNESS: 1 or more Elements       Arsh Little is a 80 y.o. male who presents to the emergency department the chief complaint of abdominal pain and possible hernia. The patient states he does have a history of a hernia. This is an umbilical hernia. Is been present for the last 3 to 4 years. He states that over the last few days it began to hurt him significantly. He states it was worse with bending and twisting located in the periumbilical region. Described as aching. He states that he now has improvement in his pain and his hernia does appear to be reduced. He denies any fevers, chills, chest pain, shortness of breath, nausea or vomiting. Nursing Notes were all reviewed and agreed with or any disagreements were addressed in the HPI. REVIEW OF SYSTEMS :      Review of Systems   Constitutional:  Negative for fever. HENT:  Negative for congestion. Eyes:  Negative for redness. Respiratory:  Negative for shortness of breath. Cardiovascular:  Negative for chest pain. Gastrointestinal:  Positive for abdominal pain. Negative for nausea and vomiting. Genitourinary:  Negative for dysuria. Musculoskeletal:  Negative for arthralgias. Skin:  Negative for rash. Neurological:  Negative for dizziness. Psychiatric/Behavioral:  Negative for confusion. All other systems reviewed and are negative. Positives and Pertinent negatives as per HPI.      SURGICAL HISTORY     Past Surgical History:   Procedure Laterality Date    COLONOSCOPY      HEEL SPUR SURGERY         CURRENTMEDICATIONS       Previous Medications    ATORVASTATIN (LIPITOR) 40 MG TABLET    take 1 tablet by mouth once daily    LISINOPRIL-HYDROCHLOROTHIAZIDE (PRINZIDE;ZESTORETIC) 20-12.5 MG PER TABLET    Take 1 tablet by mouth daily    MULTIPLE VITAMINS-MINERALS (THERAPEUTIC MULTIVITAMIN-MINERALS) TABLET    Take 1 tablet by mouth daily    TESTOSTERONE CYPIONATE (DEPOTESTOTERONE CYPIONATE) 200 MG/ML INJECTION    Inject 0.5ml every 14 days    VENLAFAXINE (EFFEXOR XR) 37.5 MG EXTENDED RELEASE CAPSULE    take 1 capsule by mouth once daily       ALLERGIES     Codeine    FAMILYHISTORY       Family History   Problem Relation Age of Onset    Heart Disease Mother         Valves    Atrial Fibrillation Father     Heart Disease Father         Valve    Heart Attack Father 59        SOCIAL HISTORY       Social History     Tobacco Use    Smoking status: Never    Smokeless tobacco: Never   Vaping Use    Vaping Use: Never used   Substance Use Topics    Alcohol use: No    Drug use: No       SCREENINGS        [place] Coma Scale  Eye Opening: Spontaneous  Best Verbal Response: Oriented  Best Motor Response: Obeys commands  Emma Coma Scale Score: 15                CIWA Assessment  BP: 125/82  Heart Rate: 80           PHYSICAL EXAM  1 or more Elements     ED Triage Vitals   BP Temp Temp Source Heart Rate Resp SpO2 Height Weight   02/14/23 1711 02/14/23 1711 02/14/23 1711 02/14/23 1711 02/14/23 1711 02/14/23 1711 -- 02/14/23 1720   125/82 98.6 °F (37 °C) Oral 80 18 97 %  222 lb (100.7 kg)         Constitutional/General: Alert and oriented x3, well appearing, non toxic in NAD  Head: NC/AT  Eyes:  EOMI  Mouth: Oropharynx clear, handling secretions, no trismus  Neck: Supple, full ROM  Pulmonary: Lungs clear to auscultation bilaterally, no wheezes, rales, or rhonchi. Not in respiratory distress  Cardiovascular:  Regular rate and rhythm, no murmurs, gallops, or rubs.  2+ distal pulses  Abdomen: Soft, non tender, non distended, no umbilical hernia palpated  Extremities: Moves all extremities x 4. Warm and well perfused  Skin: warm and dry without rash  Neurologic: GCS 15, no gross focal neurologic deficits  Psych: Normal Affect      DIAGNOSTIC RESULTS     I have personally reviewed all laboratory and imaging results for this patient. Results are listed below.      LABS:  Results for orders placed or performed during the hospital encounter of 02/14/23   CBC with Auto Differential   Result Value Ref Range    WBC 9.1 4.5 - 11.5 E9/L    RBC 4.98 3.80 - 5.80 E12/L    Hemoglobin 14.6 12.5 - 16.5 g/dL    Hematocrit 43.3 37.0 - 54.0 %    MCV 86.9 80.0 - 99.9 fL    MCH 29.3 26.0 - 35.0 pg    MCHC 33.7 32.0 - 34.5 %    RDW 14.0 11.5 - 15.0 fL    Platelets 876 955 - 579 E9/L    MPV 8.8 7.0 - 12.0 fL    Neutrophils % 69.5 43.0 - 80.0 %    Immature Granulocytes % 0.8 0.0 - 5.0 %    Lymphocytes % 18.2 (L) 20.0 - 42.0 %    Monocytes % 8.6 2.0 - 12.0 %    Eosinophils % 2.5 0.0 - 6.0 %    Basophils % 0.4 0.0 - 2.0 %    Neutrophils Absolute 6.33 1.80 - 7.30 E9/L    Immature Granulocytes # 0.07 E9/L    Lymphocytes Absolute 1.66 1.50 - 4.00 E9/L    Monocytes Absolute 0.78 0.10 - 0.95 E9/L    Eosinophils Absolute 0.23 0.05 - 0.50 E9/L    Basophils Absolute 0.04 0.00 - 0.20 E9/L   CMP   Result Value Ref Range    Sodium 136 132 - 146 mmol/L    Potassium 3.9 3.5 - 5.0 mmol/L    Chloride 102 98 - 107 mmol/L    CO2 23 22 - 29 mmol/L    Anion Gap 11 7 - 16 mmol/L    Glucose 103 (H) 74 - 99 mg/dL    BUN 16 6 - 23 mg/dL    Creatinine 1.1 0.7 - 1.2 mg/dL    Est, Glom Filt Rate >60 >=60 mL/min/1.73    Calcium 9.7 8.6 - 10.2 mg/dL    Total Protein 6.7 6.4 - 8.3 g/dL    Albumin 4.1 3.5 - 5.2 g/dL    Total Bilirubin 1.1 0.0 - 1.2 mg/dL    Alkaline Phosphatase 97 40 - 129 U/L    ALT 13 0 - 40 U/L    AST 20 0 - 39 U/L   Lactic Acid   Result Value Ref Range    Lactic Acid 1.5 0.5 - 2.2 mmol/L       RADIOLOGY:  Interpreted by Radiologist.  CT ABDOMEN PELVIS W IV CONTRAST Additional Contrast? None   Final Result   Umbilical hernia containing fat and new inflammatory change compared with   prior exam.  No evidence of bowel involvement. Diverticulosis without evidence of acute diverticulitis. RADIOLOGY:   Non-plain film images such as CT, Ultrasound and MRI are read by the radiologist. Plain radiographic images are visualized and preliminarily interpreted by the ED Provider       Interpretation per the Radiologist below, if available at the time of this note:    CT ABDOMEN PELVIS W IV CONTRAST Additional Contrast? None   Final Result   Umbilical hernia containing fat and new inflammatory change compared with   prior exam.  No evidence of bowel involvement. Diverticulosis without evidence of acute diverticulitis. No results found. No results found. PROCEDURES   Unless otherwise noted below, none       MDM:   I, Dr. Angy Stewart am the primary physician of record. Mike Thomas is a 80 y.o. male who presents to the ED for abdominal pain and possible hernia  Vital signs upon arrival /82   Pulse 80   Temp 98.6 °F (37 °C) (Oral)   Resp 18   Wt 222 lb (100.7 kg)   SpO2 97%   BMI 33.75 kg/m²     History From: The patient and patient's medical record    Independent Historian: None    Limitations to history: None      History: The patient presents to the emergency department the chief complaint of abdominal pain and possible hernia. The patient states he does have a history of a hernia. This is an umbilical hernia. Is been present for the last 3 to 4 years. He states that over the last few days it began to hurt him significantly. He states it was worse with bending and twisting located in the periumbilical region. Described as aching. He states that he now has improvement in his pain and his hernia does appear to be reduced. He denies any fevers, chills, chest pain, shortness of breath, nausea or vomiting.     Physical exam: Patient sitting in the bed no acute distress. Heart regular rate and rhythm, lungs clear to auscultation bilaterally abdomen soft, nontender no rebound, rigidity or guarding    Differential diagnosis includes but not limited to:  Hernia-no evidence of obstruction-patient with inflammatory changes, no overlying skin changes on exam  Colitis  Diverticulitis      Patient treated with   Medications   sodium chloride flush 0.9 % injection 10 mL (has no administration in time range)   iopamidol (ISOVUE-370) 76 % injection 50 mL (50 mLs IntraVENous Given 2/14/23 1950)         Labs independently interpreted and reviewed by me demonstrate:  CBC-unremarkable  CMP-unremarkable  Lactic acid 1.5    Imaging reviewed and interpreted by me demonstrates:  CT abdomen pelvis demonstrates umbilical hernia containing fat with inflammatory changes. No other acute changes      Chronic conditions:   Past Medical History:   Diagnosis Date    Chronic renal disease, stage III [facility]) [429381] 6/1/2022    Depression     Erectile dysfunction     History of shingles     Hypertension 1/25/2017    Mixed hyperlipidemia 1/25/2017       Risk/disposition: patient presenting emergency department with a chief complaint of abdominal pain. The patient states that he did have a umbilical hernia which was protruding earlier in the day. He states is now improved. He states his pain is improved. The patient did have labs and imaging which were reviewed. He is found to have umbilical hernia containing only fat. The patient will be referred to general surgery for possible repair. Pt will be d/c and will follow up with his PCP . He is educated on signs and symptoms that require emergent evaluation. Pt is advised to return to the ED if his symptoms change or worsen. If his pain persists, pt may need further evaluation. Pt is agreeable to plan and all questions have been answered at this time.       PATIENT REFERRED TO:  Park Potter DO  [address]  [facility] [address]  [phone]    Call in 1 day      Sherin Ibarra MD  [address] [place] [id number]  [phone]    Call in 1 day      DISCHARGE MEDICATIONS:  New Prescriptions    No medications on file           EMERGENCY DEPARTMENT COURSE    Vitals:    Vitals:    02/14/23 1711 02/14/23 1720   BP: 125/82    Pulse: 80    Resp: 18    Temp: 98.6 °F (37 °C)    TempSrc: Oral    SpO2: 97%    Weight:  222 lb (100.7 kg)               I am the Primary Clinician of Record. FINAL IMPRESSION      1. Periumbilical abdominal pain    2. Umbilical hernia without obstruction and without gangrene          DISPOSITION/PLAN      Discharge - Pending Orders Complete 02/14/2023 09:26:06 PM    Patient's disposition: Discharge to home  Patient's condition is stable.            (Please note that portions of this note were completed with a voice recognition program.  Efforts were made to edit the dictations but occasionally words are mis-transcribed.)    Mikhail Draper DO (electronically signed)       Mikhail Draper DO  02/16/23 [id number]

## 2023-02-16 ENCOUNTER — NURSE ONLY (OUTPATIENT)
Dept: ENDOCRINOLOGY | Age: 68
End: 2023-02-16

## 2023-02-16 DIAGNOSIS — R79.89 LOW TESTOSTERONE IN MALE: Primary | ICD-10-CM

## 2023-02-16 NOTE — PROGRESS NOTES
Dominic Merida was in for his testosterone injection. Received it in his right arm, tolerated well. Next appt set for two weeks.

## 2023-02-19 DIAGNOSIS — E29.1 MALE HYPOGONADISM: ICD-10-CM

## 2023-02-20 RX ORDER — TESTOSTERONE CYPIONATE 200 MG/ML
INJECTION INTRAMUSCULAR
Qty: 3 ML | Refills: 3 | Status: SHIPPED | OUTPATIENT
Start: 2023-02-20 | End: 2023-07-25

## 2023-02-23 ENCOUNTER — TELEPHONE (OUTPATIENT)
Dept: ENDOCRINOLOGY | Age: 68
End: 2023-02-23

## 2023-02-23 DIAGNOSIS — E29.1 MALE HYPOGONADISM: ICD-10-CM

## 2023-02-23 NOTE — TELEPHONE ENCOUNTER
Patient called about the testosterone injection   I tried to call him back but his voice mail was full   Will reprint rx and fax it to pharmacy

## 2023-02-27 RX ORDER — TESTOSTERONE CYPIONATE 200 MG/ML
INJECTION INTRAMUSCULAR
Qty: 3 ML | Refills: 3 | Status: SHIPPED | OUTPATIENT
Start: 2023-02-27 | End: 2023-07-28

## 2023-03-11 DIAGNOSIS — F32.2 SEVERE DEPRESSION (HCC): ICD-10-CM

## 2023-03-11 DIAGNOSIS — Z76.0 MEDICATION REFILL: ICD-10-CM

## 2023-03-13 ENCOUNTER — OFFICE VISIT (OUTPATIENT)
Dept: SURGERY | Age: 68
End: 2023-03-13
Payer: MEDICARE

## 2023-03-13 VITALS
DIASTOLIC BLOOD PRESSURE: 78 MMHG | WEIGHT: 220 LBS | HEIGHT: 68 IN | SYSTOLIC BLOOD PRESSURE: 112 MMHG | HEART RATE: 77 BPM | BODY MASS INDEX: 33.34 KG/M2 | RESPIRATION RATE: 16 BRPM | TEMPERATURE: 97.2 F

## 2023-03-13 DIAGNOSIS — K42.0 UMBILICAL HERNIA, INCARCERATED: ICD-10-CM

## 2023-03-13 DIAGNOSIS — Z12.11 ENCOUNTER FOR SCREENING COLONOSCOPY: Primary | ICD-10-CM

## 2023-03-13 PROCEDURE — 99204 OFFICE O/P NEW MOD 45 MIN: CPT | Performed by: SURGERY

## 2023-03-13 PROCEDURE — 3078F DIAST BP <80 MM HG: CPT | Performed by: SURGERY

## 2023-03-13 PROCEDURE — 3074F SYST BP LT 130 MM HG: CPT | Performed by: SURGERY

## 2023-03-13 PROCEDURE — 1123F ACP DISCUSS/DSCN MKR DOCD: CPT | Performed by: SURGERY

## 2023-03-13 RX ORDER — ATORVASTATIN CALCIUM 40 MG/1
TABLET, FILM COATED ORAL
Qty: 90 TABLET | Refills: 0 | Status: SHIPPED | OUTPATIENT
Start: 2023-03-13

## 2023-03-13 RX ORDER — BISACODYL 5 MG
10 TABLET, DELAYED RELEASE (ENTERIC COATED) ORAL 2 TIMES DAILY
Qty: 4 TABLET | Refills: 0 | Status: SHIPPED | OUTPATIENT
Start: 2023-03-13

## 2023-03-13 RX ORDER — POLYETHYLENE GLYCOL 3350 17 G/17G
238 POWDER, FOR SOLUTION ORAL DAILY
Qty: 238 G | Refills: 0 | Status: SHIPPED | OUTPATIENT
Start: 2023-03-13 | End: 2023-03-14

## 2023-03-13 RX ORDER — VENLAFAXINE HYDROCHLORIDE 37.5 MG/1
CAPSULE, EXTENDED RELEASE ORAL
Qty: 90 CAPSULE | Refills: 0 | Status: SHIPPED | OUTPATIENT
Start: 2023-03-13

## 2023-03-13 NOTE — PATIENT INSTRUCTIONS
[facility]    Instructions for Clear liquid diet  Definition  A clear liquid diet consists of clear liquids, such as water, broth and plain gelatin, that are easily digested and leave no undigested residue in your intestinal tract. Your doctor may prescribe a clear liquid diet before certain medical procedures or if you have certain digestive problems. Because a clear liquid diet can't provide you with adequate calories and nutrients, it shouldn't be continued for more than a few days. Purpose  A clear liquid diet is often used before tests, procedures or surgeries that require no food in your stomach or intestines, such as before colonoscopy. It may also be recommended as a short-term diet if you have certain digestive problems, such as nausea, vomiting or diarrhea, or after certain types of surgery. Diet details  A clear liquid diet helps maintain adequate hydration, provides some important electrolytes, such as sodium and potassium, and gives some energy at a time when a full diet isn't possible or recommended. The following foods are allowed in a clear liquid diet:   Plain water   Fruit juices without pulp, such as apple juice, white grape juice. Strained lemonade   Clear, fat-free broth (bouillon or consomme)   Clear sodas    Plain gelatin (Not RED or PURPLE)  Honey   Ice pops without bits of fruit or fruit pulp   Tea or coffee without milk or cream  ** NOTHING RED OR PURPLE  **Do not eat corn, tomatoes, peas or watermelon 3 to 5 days before procedure. Any foods not on the above list should be avoided. Also, for certain tests, such as colon exams, your doctor may ask you to avoid liquids or gelatin with red coloring.      A typical menu on the clear liquid diet may look like this:   Breakfast:  1 glass fruit juice  1 cup coffee or tea (without dairy products)  1 cup broth  1 bowl gelatin     Snack:  1 glass fruit juice  1 bowl gelatin     Lunch:  1 glass fruit juice  1 glass water  1 cup broth  1 bowl gelatin     Snack:  1 ice pop (without fruit pulp)  1 cup coffee or tea (without dairy products) or a soft drink     Dinner:  1 cup juice or water  1 cup broth  1 bowl gelatin  1 cup coffee or tea     Purchase this over the counter:  1. GATORADE/Clear liquid (64 ounces)   Pick these up at the pharmacy:  2. DULCOLAX 5 mg tablets (four tablets)  3. MIRALAX BOTTLE 500 grams     The DAY BEFORE your colonoscopy:   Drink only clear liquids. (Absolutely no solid food)    COLONOSCOPY PREP:  12 PM: Take 2 DULCOLAX tablets and mix the entire bottle/bottles of MIRALAX into the 64 ounces of GATORADE. (Put half the bottle in each 32 ounce bottle if have one large bottle). Shake the solution until fully dissolved. Drink an 8 ounce glass every 30 minutes until the solution is gone. 4 PM: Take the last 2 DULCOLAX tablets. **DO NOT EAT OR DRINK ANYTHING AFTER MIDNIGHT**          The DAY OF your colonoscopy: You may take any necessary medications with a sip of water. Bring along someone to take you home. REMEMBER: The preparation is very important. An adequate clean out allows for the best evaluation of your entire colon. During the prep, using baby wipes may ease some of your discomfort. You should NOT plan on working or driving the rest of the day due to sedation given at the procedure. Any questions or concerns, please contact my medical assistant Ebony at [phone].

## 2023-03-14 ENCOUNTER — PREP FOR PROCEDURE (OUTPATIENT)
Dept: SURGERY | Age: 68
End: 2023-03-14

## 2023-03-14 PROBLEM — K42.0 UMBILICAL HERNIA, INCARCERATED: Status: ACTIVE | Noted: 2023-03-14

## 2023-03-14 PROBLEM — Z12.11 SPECIAL SCREENING FOR MALIGNANT NEOPLASMS, COLON: Status: ACTIVE | Noted: 2023-03-14

## 2023-03-14 ASSESSMENT — ENCOUNTER SYMPTOMS
ANAL BLEEDING: 0
COUGH: 0
NAUSEA: 0
ABDOMINAL PAIN: 1
BLOOD IN STOOL: 0
ABDOMINAL DISTENTION: 0
VOMITING: 0
RESPIRATORY NEGATIVE: 1
BACK PAIN: 0
DIARRHEA: 0
EYES NEGATIVE: 1
SHORTNESS OF BREATH: 0
ALLERGIC/IMMUNOLOGIC NEGATIVE: 1
CONSTIPATION: 0

## 2023-03-14 NOTE — PROGRESS NOTES
[facility]  HISTORY & PHYSICAL  ATTENDING NOTE    Chief Complaint   Patient presents with    Hernia     Pt is here for an ED follow up for an umbilical hernia, pt states that he has not had pain since the day he went to the ED, he would like to discuss having it repaired. HPI:  80 y.o. male denies weight loss, diarrhea, constipation, melena, hematochezia, N/V, abdominal pain. he denies family history of colon cancer, Crohn's disease or colitis. He has presented for a umbilical hernia. He states its been there for quite some time. He is an  and he was fixing a transmission and loaded into a vehicle when he has had sudden onset abdominal pain. He went to emergency department this was last month and he had a CAT scan which showed a large fat-containing umbilical hernia. He states he has not been able to reduce it. He has not had a colonoscopy in at least 10 years.     Past Medical History:   Diagnosis Date    Chronic renal disease, stage III [facility]) [[id number]] 6/1/2022    Depression     Erectile dysfunction     History of shingles     Hypertension 1/25/2017    Mixed hyperlipidemia 1/25/2017       Past Surgical History:   Procedure Laterality Date    COLONOSCOPY      HEEL SPUR SURGERY         Family History   Problem Relation Age of Onset    Heart Disease Mother         Valves    Atrial Fibrillation Father     Heart Disease Father         Valve    Heart Attack Father 59       Allergies   Allergen Reactions    Codeine Nausea Only       Social History     Tobacco Use    Smoking status: Never    Smokeless tobacco: Never   Vaping Use    Vaping Use: Never used   Substance Use Topics    Alcohol use: No    Drug use: No       Current Outpatient Medications   Medication Sig Dispense Refill    atorvastatin (LIPITOR) 40 MG tablet take 1 tablet by mouth once daily 90 tablet 0    venlafaxine (EFFEXOR XR) 37.5 MG extended release capsule take 1 capsule by mouth once daily 90 capsule 0    polyethylene glycol (GLYCOLAX) 17 GM/SCOOP powder Take 238 g by mouth daily for 1 day 238 g 0    bisacodyl 5 MG EC tablet Take 2 tablets by mouth 2 times daily 4 tablet 0    testosterone cypionate (DEPOTESTOTERONE CYPIONATE) 200 MG/ML injection inject 0.5 milliliters intramuscularly every 14 days 3 mL 3    lisinopril-hydroCHLOROthiazide (PRINZIDE;ZESTORETIC) 20-12.5 MG per tablet Take 1 tablet by mouth daily 90 tablet 1    Multiple Vitamins-Minerals (THERAPEUTIC MULTIVITAMIN-MINERALS) tablet Take 1 tablet by mouth daily       No current facility-administered medications for this visit. Review of Systems   Constitutional: Negative. Negative for activity change, appetite change and unexpected weight change. HENT: Negative. Eyes: Negative. Respiratory: Negative. Negative for cough and shortness of breath. Cardiovascular: Negative. Negative for chest pain and leg swelling. Gastrointestinal:  Positive for abdominal pain. Negative for abdominal distention, anal bleeding, blood in stool, constipation, diarrhea, nausea and vomiting. Endocrine: Negative. Genitourinary: Negative. Musculoskeletal: Negative. Negative for arthralgias, back pain, gait problem, joint swelling and myalgias. Skin: Negative. Allergic/Immunologic: Negative. Neurological: Negative. Negative for dizziness, weakness and headaches. Hematological: Negative. Psychiatric/Behavioral: Negative. Negative for confusion, decreased concentration and sleep disturbance. /78 (Site: Right Upper Arm, Position: Sitting, Cuff Size: Large Adult)   Pulse 77   Temp 97.2 °F (36.2 °C) (Infrared)   Resp 16   Ht 5' 8\" (1.727 m)   Wt 220 lb (99.8 kg)   BMI 33.45 kg/m²   Physical Exam  Constitutional:       Appearance: Normal appearance. He is obese. HENT:      Head: Normocephalic and atraumatic.       Nose: Nose normal.      Mouth/Throat:      Mouth: Mucous membranes are moist.      Pharynx: Oropharynx is clear. Eyes:      Extraocular Movements: Extraocular movements intact. Pupils: Pupils are equal, round, and reactive to light. Cardiovascular:      Rate and Rhythm: Normal rate and regular rhythm. Pulses: Normal pulses. Heart sounds: Normal heart sounds. Pulmonary:      Effort: Pulmonary effort is normal.      Breath sounds: Normal breath sounds. Abdominal:      General: There is no distension. Palpations: Abdomen is soft. Tenderness: There is no abdominal tenderness. Hernia: A hernia (Incarcerated umbilical hernia with fat) is present. Musculoskeletal:         General: No tenderness or signs of injury. Cervical back: Normal range of motion and neck supple. Skin:     General: Skin is warm and dry. Neurological:      General: No focal deficit present. Mental Status: He is alert and oriented to person, place, and time. Psychiatric:         Mood and Affect: Mood normal.         Behavior: Behavior normal.         Thought Content: Thought content normal.         Judgment: Judgment normal.         ASSESSMENT/PLAN:  Age as risk factor for colon cancer-- plan for colonoscopy. I discussed with patient that I would prefer for him to do some type of colon cancer screening prior to fixing his umbilical hernia. I offered him Cologuard versus colonoscopy. I went over the risk and benefits of both and he has chosen to go forward with a colonoscopy. Prep: MiraLAX    Colonoscopy. The patient was explained the risks/benefits/alternatives/expected outcomes of the procedure. The patient was explained the risks of the procedure, including, but not limited to, the risk of reaction to the anesthesia medicine and the risk of perforation requiring further surgery. The patient was informed that they may require biopsy or polypectomy. These procedures may increase the risk of complication. All questions were answered.   The patient verbalized understanding and agreed to proceed. 2.  Incarcerated umbilical hernia--plan for laparoscopic robotic assisted umbilical hernia repair with mesh possible open. We will plan on scheduling hernia repair after a normal colonoscopy. I went over the risk, benefits, alternatives to procedure including but not limited to bleeding, infection, injury to bowel, reaction to general anesthesia. Patient understands and wishes to proceed.     Jennifer Dobson MD, MSc, FACS  3/14/2023  12:24 AM

## 2023-03-15 ENCOUNTER — TELEPHONE (OUTPATIENT)
Dept: SURGERY | Age: 68
End: 2023-03-15

## 2023-03-15 NOTE — TELEPHONE ENCOUNTER
Patient is scheduled for Colonoscopy with   on 23 at 11:00 am with an arrival time of 10:00 am. Pt accepted date and time and verbalized understanding. Procedure letter mailed to patient as well. Prior Authorization Form:      DEMOGRAPHICS:                     Patient Name:  Henna Martinez  Patient :  1955            Insurance:  Payor: [facility] MEDICARE / Plan: Amandeep  / Product Type: *No Product type* /   Insurance ID Number:    Payer/Plan Subscr  Sex Relation Sub. Ins. ID Effective Group Num   1. [address] 1955 Male Self [id number]                                    [address] [address]         DIAGNOSIS & PROCEDURE:                       Procedure/Operation: COLONOSCOPY           CPT Code: Z12.11    Diagnosis:  SCREENING    ICD10 Code: Z12.11    [facility]  [facility]    Surgeon:  DR. SALINAS    SCHEDULING INFORMATION:                          Date: 23    Time: 11 AM              Anesthesia:  MAC/TIVA                                                       Status:  Outpatient        Special Comments:  N/A       Electronically signed by Tennille Blount on 3/15/2023 at 1:39 PM

## 2023-03-16 ENCOUNTER — NURSE ONLY (OUTPATIENT)
Dept: ENDOCRINOLOGY | Age: 68
End: 2023-03-16

## 2023-03-16 DIAGNOSIS — R79.89 LOW TESTOSTERONE IN MALE: Primary | ICD-10-CM

## 2023-03-16 NOTE — PROGRESS NOTES
Testosterone 100mg (0.5ml) given IM Right deltoid at 1315.  Lot #[id number], exp. 11/2025, . [facility] [id number]

## 2023-03-28 ENCOUNTER — OFFICE VISIT (OUTPATIENT)
Dept: FAMILY MEDICINE CLINIC | Age: 68
End: 2023-03-28
Payer: MEDICARE

## 2023-03-28 VITALS
OXYGEN SATURATION: 98 % | WEIGHT: 220.4 LBS | RESPIRATION RATE: 19 BRPM | TEMPERATURE: 97.7 F | DIASTOLIC BLOOD PRESSURE: 78 MMHG | SYSTOLIC BLOOD PRESSURE: 132 MMHG | BODY MASS INDEX: 33.4 KG/M2 | HEART RATE: 73 BPM | HEIGHT: 68 IN

## 2023-03-28 DIAGNOSIS — B49 FUNGAL INFECTION: Primary | ICD-10-CM

## 2023-03-28 DIAGNOSIS — N48.1 BALANITIS: ICD-10-CM

## 2023-03-28 PROCEDURE — 3078F DIAST BP <80 MM HG: CPT | Performed by: STUDENT IN AN ORGANIZED HEALTH CARE EDUCATION/TRAINING PROGRAM

## 2023-03-28 PROCEDURE — 3075F SYST BP GE 130 - 139MM HG: CPT | Performed by: STUDENT IN AN ORGANIZED HEALTH CARE EDUCATION/TRAINING PROGRAM

## 2023-03-28 PROCEDURE — 1123F ACP DISCUSS/DSCN MKR DOCD: CPT | Performed by: STUDENT IN AN ORGANIZED HEALTH CARE EDUCATION/TRAINING PROGRAM

## 2023-03-28 PROCEDURE — 99213 OFFICE O/P EST LOW 20 MIN: CPT | Performed by: STUDENT IN AN ORGANIZED HEALTH CARE EDUCATION/TRAINING PROGRAM

## 2023-03-28 RX ORDER — FLUCONAZOLE 150 MG/1
150 TABLET ORAL ONCE
Qty: 1 TABLET | Refills: 0 | Status: SHIPPED | OUTPATIENT
Start: 2023-03-28 | End: 2023-03-28

## 2023-03-28 RX ORDER — CLOTRIMAZOLE AND BETAMETHASONE DIPROPIONATE 10; .64 MG/G; MG/G
CREAM TOPICAL
Qty: 45 G | Refills: 0 | Status: SHIPPED | OUTPATIENT
Start: 2023-03-28

## 2023-03-28 SDOH — ECONOMIC STABILITY: HOUSING INSECURITY
IN THE LAST 12 MONTHS, WAS THERE A TIME WHEN YOU DID NOT HAVE A STEADY PLACE TO SLEEP OR SLEPT IN A SHELTER (INCLUDING NOW)?: NO

## 2023-03-28 SDOH — ECONOMIC STABILITY: INCOME INSECURITY: HOW HARD IS IT FOR YOU TO PAY FOR THE VERY BASICS LIKE FOOD, HOUSING, MEDICAL CARE, AND HEATING?: NOT HARD AT ALL

## 2023-03-28 SDOH — ECONOMIC STABILITY: FOOD INSECURITY: WITHIN THE PAST 12 MONTHS, THE FOOD YOU BOUGHT JUST DIDN'T LAST AND YOU DIDN'T HAVE MONEY TO GET MORE.: NEVER TRUE

## 2023-03-28 SDOH — ECONOMIC STABILITY: FOOD INSECURITY: WITHIN THE PAST 12 MONTHS, YOU WORRIED THAT YOUR FOOD WOULD RUN OUT BEFORE YOU GOT MONEY TO BUY MORE.: NEVER TRUE

## 2023-03-28 ASSESSMENT — PATIENT HEALTH QUESTIONNAIRE - PHQ9
SUM OF ALL RESPONSES TO PHQ QUESTIONS 1-9: 2
SUM OF ALL RESPONSES TO PHQ QUESTIONS 1-9: 2
3. TROUBLE FALLING OR STAYING ASLEEP: 0
SUM OF ALL RESPONSES TO PHQ9 QUESTIONS 1 & 2: 0
SUM OF ALL RESPONSES TO PHQ QUESTIONS 1-9: 2
SUM OF ALL RESPONSES TO PHQ QUESTIONS 1-9: 2
5. POOR APPETITE OR OVEREATING: 0
7. TROUBLE CONCENTRATING ON THINGS, SUCH AS READING THE NEWSPAPER OR WATCHING TELEVISION: 0
9. THOUGHTS THAT YOU WOULD BE BETTER OFF DEAD, OR OF HURTING YOURSELF: 0
8. MOVING OR SPEAKING SO SLOWLY THAT OTHER PEOPLE COULD HAVE NOTICED. OR THE OPPOSITE, BEING SO FIGETY OR RESTLESS THAT YOU HAVE BEEN MOVING AROUND A LOT MORE THAN USUAL: 0
6. FEELING BAD ABOUT YOURSELF - OR THAT YOU ARE A FAILURE OR HAVE LET YOURSELF OR YOUR FAMILY DOWN: 2
1. LITTLE INTEREST OR PLEASURE IN DOING THINGS: 0
2. FEELING DOWN, DEPRESSED OR HOPELESS: 0
4. FEELING TIRED OR HAVING LITTLE ENERGY: 0
10. IF YOU CHECKED OFF ANY PROBLEMS, HOW DIFFICULT HAVE THESE PROBLEMS MADE IT FOR YOU TO DO YOUR WORK, TAKE CARE OF THINGS AT HOME, OR GET ALONG WITH OTHER PEOPLE: 0

## 2023-03-28 NOTE — PROGRESS NOTES
0.5 milliliters intramuscularly every 14 days    venlafaxine (EFFEXOR XR) 37.5 MG extended release capsule take 1 capsule by mouth once daily      Physical Exam   Vitals: /78   Pulse 73   Temp 97.7 °F (36.5 °C)   Resp 19   Ht 5' 8\" (1.727 m)   Wt 220 lb 6.4 oz (100 kg)   SpO2 98%   BMI 33.51 kg/m²   General Appearance: Alert, oriented, no acute distress  HEENT: No scleral icterus. No visible discharge from eyes  Neck: Not rigid. No visible masses  Extremities:  No edema  Balanitis on penis  Skin: No rashes. No jaundice  Neuro: Alert and oriented        Psych: Appropriate mood and appropriate affect  Assessment and Plan   Start fungal tx for balanitis. Food problem is irritation from shoes. Changing shoes and this is helping. Fungal infection  -     fluconazole (DIFLUCAN) 150 MG tablet; Take 1 tablet by mouth once for 1 dose, Disp-1 tablet, R-0Normal  -     clotrimazole-betamethasone (LOTRISONE) 1-0.05 % cream; Apply topically 2 times daily. , Disp-45 g, R-0, Normal  Balanitis    No follow-ups on file. Hussein Johnston, DO     This document may have been prepared at least partially through the use of voice recognition software. Although effort is taken to assure the accuracy of this document, it is possible that grammatical, syntax,  or spelling errors may occur.

## 2023-03-30 ENCOUNTER — NURSE ONLY (OUTPATIENT)
Dept: ENDOCRINOLOGY | Age: 68
End: 2023-03-30

## 2023-03-30 DIAGNOSIS — R79.89 LOW TESTOSTERONE IN MALE: Primary | ICD-10-CM

## 2023-03-30 NOTE — PROGRESS NOTES
Testosterone 100mg (0.5ml) given IM Left deltoid at 1330.  Lot # [id number], Exp 11/2025, NDC# [id number]

## 2023-04-06 ENCOUNTER — OFFICE VISIT (OUTPATIENT)
Dept: PRIMARY CARE CLINIC | Age: 68
End: 2023-04-06
Payer: MEDICARE

## 2023-04-06 VITALS
OXYGEN SATURATION: 99 % | DIASTOLIC BLOOD PRESSURE: 79 MMHG | HEIGHT: 68 IN | WEIGHT: 222.2 LBS | SYSTOLIC BLOOD PRESSURE: 119 MMHG | RESPIRATION RATE: 18 BRPM | HEART RATE: 71 BPM | BODY MASS INDEX: 33.68 KG/M2 | TEMPERATURE: 98.1 F

## 2023-04-06 DIAGNOSIS — R05.3 PERSISTENT COUGH: ICD-10-CM

## 2023-04-06 DIAGNOSIS — J32.9 SINOBRONCHITIS: Primary | ICD-10-CM

## 2023-04-06 DIAGNOSIS — J40 SINOBRONCHITIS: Primary | ICD-10-CM

## 2023-04-06 PROCEDURE — 1123F ACP DISCUSS/DSCN MKR DOCD: CPT | Performed by: EMERGENCY MEDICINE

## 2023-04-06 PROCEDURE — 3078F DIAST BP <80 MM HG: CPT | Performed by: EMERGENCY MEDICINE

## 2023-04-06 PROCEDURE — 99213 OFFICE O/P EST LOW 20 MIN: CPT | Performed by: EMERGENCY MEDICINE

## 2023-04-06 PROCEDURE — 3074F SYST BP LT 130 MM HG: CPT | Performed by: EMERGENCY MEDICINE

## 2023-04-06 RX ORDER — BENZONATATE 200 MG/1
200 CAPSULE ORAL 3 TIMES DAILY PRN
Qty: 30 CAPSULE | Refills: 0 | Status: SHIPPED | OUTPATIENT
Start: 2023-04-06 | End: 2023-04-13

## 2023-04-06 RX ORDER — AMOXICILLIN AND CLAVULANATE POTASSIUM 875; 125 MG/1; MG/1
1 TABLET, FILM COATED ORAL 2 TIMES DAILY
Qty: 20 TABLET | Refills: 0 | Status: SHIPPED | OUTPATIENT
Start: 2023-04-06 | End: 2023-04-16

## 2023-04-06 RX ORDER — GUAIFENESIN 600 MG/1
600 TABLET, EXTENDED RELEASE ORAL 2 TIMES DAILY
Qty: 30 TABLET | Refills: 0 | Status: SHIPPED | OUTPATIENT
Start: 2023-04-06 | End: 2023-04-21

## 2023-04-06 ASSESSMENT — ENCOUNTER SYMPTOMS
NAUSEA: 0
COUGH: 1
EYE DISCHARGE: 0
EYE PAIN: 0
SHORTNESS OF BREATH: 0
EYE REDNESS: 0
WHEEZING: 0
DIARRHEA: 0
BACK PAIN: 0
SORE THROAT: 0
SINUS PRESSURE: 1
VOMITING: 0
ABDOMINAL PAIN: 0

## 2023-04-06 NOTE — PROGRESS NOTES
for cough. Negative for shortness of breath and wheezing. Cardiovascular:  Negative for chest pain. Gastrointestinal:  Negative for abdominal pain, diarrhea, nausea and vomiting. Genitourinary:  Negative for dysuria and frequency. Musculoskeletal:  Negative for arthralgias and back pain. Skin:  Negative for rash and wound. Neurological:  Negative for weakness and headaches. Hematological:  Negative for adenopathy. Psychiatric/Behavioral: Negative. All other systems reviewed and are negative. Patient's medical, social, and family history reviewed    Past Medical History:  has a past medical history of Chronic renal disease, stage III ([facility] [address] [775615], Depression, Erectile dysfunction, History of shingles, Hypertension, and Mixed hyperlipidemia. Past Surgical History:  has a past surgical history that includes Heel spur surgery and Colonoscopy. Social History:  reports that he has never smoked. He has never used smokeless tobacco. He reports that he does not drink alcohol and does not use drugs. Family History: family history includes Atrial Fibrillation in his father; Heart Attack (age of onset: 59) in his father; Heart Disease in his father and mother. Allergies: Codeine    Physical Exam   Vital Signs:  /79 (Site: Right Upper Arm, Position: Sitting, Cuff Size: Large Adult)   Pulse 71   Temp 98.1 °F (36.7 °C)   Resp 18   Ht 5' 8\" (1.727 m)   Wt 222 lb 3.2 oz (100.8 kg)   SpO2 99%   BMI 33.79 kg/m²    Oxygen Saturation Interpretation: Normal.    Physical Exam  Vitals and nursing note reviewed. Constitutional:       Appearance: He is well-developed. HENT:      Head: Normocephalic and atraumatic. Right Ear: Tympanic membrane normal.      Left Ear: Tympanic membrane normal.      Nose: Mucosal edema, congestion and rhinorrhea present. Right Sinus: Frontal sinus tenderness present. Left Sinus: Frontal sinus tenderness present.       Mouth/Throat:      Pharynx:

## 2023-04-13 PROBLEM — Z12.11 SPECIAL SCREENING FOR MALIGNANT NEOPLASMS, COLON: Status: RESOLVED | Noted: 2023-03-14 | Resolved: 2023-04-13

## 2023-04-18 NOTE — H&P (VIEW-ONLY)
patient was informed that they may require biopsy or polypectomy. These procedures may increase the risk of complication. All questions were answered. The patient verbalized understanding and agreed to proceed. 2.  Incarcerated umbilical hernia--plan for laparoscopic robotic assisted umbilical hernia repair with mesh possible open. We will plan on scheduling hernia repair after a normal colonoscopy. I went over the risk, benefits, alternatives to procedure including but not limited to bleeding, infection, injury to bowel, reaction to general anesthesia. Patient understands and wishes to proceed.      Rohan Sheikh MD, MSc, FACS  3/14/2023  12:24 AM

## 2023-04-19 ENCOUNTER — ANESTHESIA (OUTPATIENT)
Dept: ENDOSCOPY | Age: 68
End: 2023-04-19
Payer: MEDICARE

## 2023-04-19 ENCOUNTER — HOSPITAL ENCOUNTER (OUTPATIENT)
Age: 68
Setting detail: OUTPATIENT SURGERY
Discharge: HOME | End: 2023-04-19
Attending: SURGERY | Admitting: SURGERY
Payer: MEDICARE

## 2023-04-19 ENCOUNTER — ANESTHESIA EVENT (OUTPATIENT)
Dept: ENDOSCOPY | Age: 68
End: 2023-04-19
Payer: MEDICARE

## 2023-04-19 VITALS
RESPIRATION RATE: 20 BRPM | DIASTOLIC BLOOD PRESSURE: 81 MMHG | BODY MASS INDEX: 33.34 KG/M2 | TEMPERATURE: 98 F | HEART RATE: 71 BPM | WEIGHT: 220 LBS | HEIGHT: 68 IN | SYSTOLIC BLOOD PRESSURE: 124 MMHG | OXYGEN SATURATION: 98 %

## 2023-04-19 DIAGNOSIS — Z12.11 SPECIAL SCREENING FOR MALIGNANT NEOPLASMS, COLON: ICD-10-CM

## 2023-04-19 PROCEDURE — 7100000010 HC PHASE II RECOVERY - FIRST 15 MIN: Performed by: SURGERY

## 2023-04-19 PROCEDURE — G0121 COLON CA SCRN NOT HI RSK IND: HCPCS | Performed by: SURGERY

## 2023-04-19 PROCEDURE — 2580000003 HC RX 258

## 2023-04-19 PROCEDURE — 6360000002 HC RX W HCPCS

## 2023-04-19 PROCEDURE — 3609027000 HC COLONOSCOPY: Performed by: SURGERY

## 2023-04-19 PROCEDURE — 2709999900 HC NON-CHARGEABLE SUPPLY: Performed by: SURGERY

## 2023-04-19 PROCEDURE — 3700000001 HC ADD 15 MINUTES (ANESTHESIA): Performed by: SURGERY

## 2023-04-19 PROCEDURE — 7100000011 HC PHASE II RECOVERY - ADDTL 15 MIN: Performed by: SURGERY

## 2023-04-19 PROCEDURE — 2580000003 HC RX 258: Performed by: SURGERY

## 2023-04-19 PROCEDURE — 3700000000 HC ANESTHESIA ATTENDED CARE: Performed by: SURGERY

## 2023-04-19 RX ORDER — SODIUM CHLORIDE 0.9 % (FLUSH) 0.9 %
5-40 SYRINGE (ML) INJECTION EVERY 12 HOURS SCHEDULED
Status: DISCONTINUED | OUTPATIENT
Start: 2023-04-19 | End: 2023-04-19 | Stop reason: HOSPADM

## 2023-04-19 RX ORDER — SODIUM CHLORIDE 9 MG/ML
25 INJECTION, SOLUTION INTRAVENOUS PRN
Status: DISCONTINUED | OUTPATIENT
Start: 2023-04-19 | End: 2023-04-19 | Stop reason: HOSPADM

## 2023-04-19 RX ORDER — SODIUM CHLORIDE 9 MG/ML
INJECTION, SOLUTION INTRAVENOUS CONTINUOUS PRN
Status: DISCONTINUED | OUTPATIENT
Start: 2023-04-19 | End: 2023-04-19 | Stop reason: SDUPTHER

## 2023-04-19 RX ORDER — PROPOFOL 10 MG/ML
INJECTION, EMULSION INTRAVENOUS PRN
Status: DISCONTINUED | OUTPATIENT
Start: 2023-04-19 | End: 2023-04-19 | Stop reason: SDUPTHER

## 2023-04-19 RX ORDER — SODIUM CHLORIDE 0.9 % (FLUSH) 0.9 %
5-40 SYRINGE (ML) INJECTION PRN
Status: DISCONTINUED | OUTPATIENT
Start: 2023-04-19 | End: 2023-04-19 | Stop reason: HOSPADM

## 2023-04-19 RX ORDER — SODIUM CHLORIDE 9 MG/ML
INJECTION, SOLUTION INTRAVENOUS CONTINUOUS
Status: DISCONTINUED | OUTPATIENT
Start: 2023-04-19 | End: 2023-04-19 | Stop reason: HOSPADM

## 2023-04-19 RX ADMIN — SODIUM CHLORIDE: 9 INJECTION, SOLUTION INTRAVENOUS at 09:20

## 2023-04-19 RX ADMIN — SODIUM CHLORIDE: 9 INJECTION, SOLUTION INTRAVENOUS at 11:11

## 2023-04-19 RX ADMIN — PROPOFOL 360 MG: 10 INJECTION, EMULSION INTRAVENOUS at 11:17

## 2023-04-19 ASSESSMENT — LIFESTYLE VARIABLES: SMOKING_STATUS: 0

## 2023-04-19 ASSESSMENT — PAIN - FUNCTIONAL ASSESSMENT: PAIN_FUNCTIONAL_ASSESSMENT: 0-10

## 2023-04-19 NOTE — ANESTHESIA PRE PROCEDURE
sodium chloride infusion  25 mL IntraVENous PRN Nilda Miller MD           Allergies: Allergies   Allergen Reactions    Codeine Nausea Only       Problem List:    Patient Active Problem List   Diagnosis Code    Hypertension I10    Mixed hyperlipidemia E78.2    Erectile dysfunction N52.9    BPH without urinary obstruction N40.0    Mild episode of recurrent major depressive disorder ([address] F33.0    Hematuria R31.9    Chronic renal disease, stage III ([address] [452157] N18.30    Low testosterone in male R79.89    Sleep-disordered breathing F76.68    Umbilical hernia, incarcerated K42.0       Past Medical History:        Diagnosis Date    Chronic renal disease, stage III ([address] [608937] 6/1/2022    Depression     Erectile dysfunction     History of shingles     Hypertension 1/25/2017    Mixed hyperlipidemia 1/25/2017       Past Surgical History:        Procedure Laterality Date    COLONOSCOPY      HEEL SPUR SURGERY         Social History:    Social History     Tobacco Use    Smoking status: Never    Smokeless tobacco: Never   Substance Use Topics    Alcohol use:  No                                Counseling given: Not Answered      Vital Signs (Current):   Vitals:    04/13/23 1155 04/19/23 0910   BP:  136/84   Pulse:  92   Resp:  18   Temp:  36.2 °C (97.2 °F)   TempSrc:  Temporal   SpO2:  96%   Weight: 220 lb (99.8 kg) 220 lb (99.8 kg)   Height: 5' 8\" (1.727 m) 5' 8\" (1.727 m)                                              BP Readings from Last 3 Encounters:   04/19/23 136/84   04/06/23 119/79   03/28/23 132/78       NPO Status: Time of last liquid consumption: 2355                        Time of last solid consumption: 2100                        Date of last liquid consumption: 04/18/23                        Date of last solid food consumption: 04/17/23    BMI:   Wt Readings from Last 3 Encounters:   04/19/23 220 lb (99.8 kg)   04/06/23 222 lb 3.2 oz (100.8 kg)   03/28/23 220 lb 6.4 oz (100 kg)

## 2023-04-19 NOTE — DISCHARGE INSTRUCTIONS
operate machinery until the medicine wears off and you can think clearly. Your doctor may tell you not to drive or operate machinery until the day after your test.     Do not sign legal documents or make major decisions until the medicine wears off and you can think clearly. The anesthesia can make it hard for you to fully understand what you are agreeing to. Follow-up care is a key part of your treatment and safety. Be sure to make and go to all appointments, and call your doctor if you are having problems. It's also a good idea to know your test results and keep a list of the medicines you take. When should you call for help? Call 911 anytime you think you may need emergency care. For example, call if:    You passed out (lost consciousness). You pass maroon or bloody stools. You have trouble breathing. Call your doctor now or seek immediate medical care if:    You have pain that does not get better after you take pain medicine. You are sick to your stomach or cannot drink fluids. You have new or worse belly pain. You have blood in your stools. You have a fever. You cannot pass stools or gas. Watch closely for changes in your health, and be sure to contact your doctor if you have any problems. Where can you learn more? Go to http://www.woods.com/ and enter E264 to learn more about \"Colonoscopy: What to Expect at Home. \"  Current as of: May 4, 2022               Content Version: 13.6  © 8949-4987 [employer]. Care instructions adapted under license by [facility] [place] ([facility]). If you have questions about a medical condition or this instruction, always ask your healthcare professional. [address] any warranty or liability for your use of this information.

## 2023-04-19 NOTE — ANESTHESIA POSTPROCEDURE EVALUATION
Department of Anesthesiology  Postprocedure Note    Patient: Ganga Pillai  MRN: [id number]  YOB: 1955  Date of evaluation: 4/19/2023      Procedure Summary     Date: 04/19/23 Room / Location: [facility] / [facility]    Anesthesia Start: 1111 Anesthesia Stop: 0053    Procedure: COLORECTAL CANCER SCREENING, NOT HIGH RISK Diagnosis:       Special screening for malignant neoplasms, colon      (Special screening for malignant neoplasms, colon [Z12.11])    Surgeons: Kait Sherwood MD Responsible Provider: Gregory Hickman DO    Anesthesia Type: MAC ASA Status: 2          Anesthesia Type: No value filed. Ángel Phase I: Ángel Score: 10    Ángel Phase II: Ángel Score: 10      Anesthesia Post Evaluation    Patient location during evaluation: bedside  Patient participation: complete - patient cannot participate  Level of consciousness: awake and alert  Airway patency: patent  Nausea & Vomiting: no nausea and no vomiting  Complications: no  Cardiovascular status: blood pressure returned to baseline  Respiratory status: acceptable  Hydration status: euvolemic  There was medical reason for not using a multimodal analgesia pain management approach.

## 2023-04-19 NOTE — INTERVAL H&P NOTE
Update History & Physical    The patient's History and Physical of March 14, 2023 was reviewed with the patient and I examined the patient. There was no change. The surgical site was confirmed by the patient and me. Plan: The risks, benefits, expected outcome, and alternative to the recommended procedure have been discussed with the patient. Patient understands and wants to proceed with the procedure.      Electronically signed by Ida Kent MD on 4/19/2023 at 11:07 AM

## 2023-04-21 ENCOUNTER — TELEPHONE (OUTPATIENT)
Dept: SURGERY | Age: 68
End: 2023-04-21

## 2023-04-21 NOTE — TELEPHONE ENCOUNTER
Prior Authorization Form:      DEMOGRAPHICS:                     Patient Name:  Nathalie Joe  Patient :  1955            Insurance:  Payor: [facility] MEDICARE / Plan: Christian Gip / Product Type: *No Product type* /   Insurance ID Number:    Payer/Plan Subscr  Sex Relation Sub. Ins. ID Effective Group Num   1.  [address] A 1955 Male Self [id number]                                    [address] [address]         DIAGNOSIS & PROCEDURE:                       Procedure/Operation: LAPAROSCOPIC ROBOTIC ASSISTED UMBILICAL HERNIA REPAIR, POSSIBLE OPEN           CPT Code: 30878    Diagnosis:  INCARCERATED UMBILICAL HERNIA    TQS44 Code: K42.0    Location:  [facility]    Surgeon:  DR. James Garzon    SCHEDULING INFORMATION:                          Date: 23    Time: 11 AM              Anesthesia:  General                                                       Status:  Outpatient        Special Comments:  N/A       Electronically signed by Yoin Martinez on 2023 at 4:21 PM

## 2023-04-21 NOTE — TELEPHONE ENCOUNTER
MA attempted to call patient twice to inform him of scheduled surgery for laparoscopic robotic assisted umbilical hernia repair scheduled for 5/30/23 at 11 am, arrival time of 9 am with Dr. Belkys Brown. MA awaiting return call from pt, MA mailed a surgery letter to pt as well.     Electronically signed by Anna Marie Abdul on 4/21/2023 at 4:20 PM

## 2023-05-11 ENCOUNTER — NURSE ONLY (OUTPATIENT)
Dept: ENDOCRINOLOGY | Age: 68
End: 2023-05-11

## 2023-05-11 ENCOUNTER — OFFICE VISIT (OUTPATIENT)
Dept: ENDOCRINOLOGY | Age: 68
End: 2023-05-11
Payer: MEDICARE

## 2023-05-11 VITALS
DIASTOLIC BLOOD PRESSURE: 76 MMHG | HEIGHT: 68 IN | RESPIRATION RATE: 18 BRPM | OXYGEN SATURATION: 99 % | BODY MASS INDEX: 32.89 KG/M2 | HEART RATE: 78 BPM | SYSTOLIC BLOOD PRESSURE: 118 MMHG | WEIGHT: 217 LBS

## 2023-05-11 DIAGNOSIS — E29.1 MALE HYPOGONADISM: ICD-10-CM

## 2023-05-11 DIAGNOSIS — R79.89 LOW TESTOSTERONE IN MALE: Primary | ICD-10-CM

## 2023-05-11 DIAGNOSIS — E55.9 VITAMIN D DEFICIENCY: ICD-10-CM

## 2023-05-11 PROCEDURE — 1123F ACP DISCUSS/DSCN MKR DOCD: CPT | Performed by: INTERNAL MEDICINE

## 2023-05-11 PROCEDURE — 3078F DIAST BP <80 MM HG: CPT | Performed by: INTERNAL MEDICINE

## 2023-05-11 PROCEDURE — 99214 OFFICE O/P EST MOD 30 MIN: CPT | Performed by: INTERNAL MEDICINE

## 2023-05-11 PROCEDURE — 3074F SYST BP LT 130 MM HG: CPT | Performed by: INTERNAL MEDICINE

## 2023-05-11 RX ORDER — TESTOSTERONE CYPIONATE 200 MG/ML
INJECTION, SOLUTION INTRAMUSCULAR
Qty: 3 ML | Refills: 3 | Status: SHIPPED | OUTPATIENT
Start: 2023-05-11 | End: 2023-10-09

## 2023-05-11 NOTE — PROGRESS NOTES
Testosterone 100mg (0.5cc) given IM Left deltoid at 1240.  Lot # [id number], Exp 11/2025, [address] # [id number]

## 2023-05-11 NOTE — PROGRESS NOTES
[address] [facility] Department of Endocrinology Diabetes and Metabolism   [address] [facility] [address]   Phone: [phone]  Fax: [phone]    Date of Service: 5/11/2023  Primary Care Physician: Agustina Moore DO  Referring physician: No ref. provider found  Provider: Molly Bundy MD    Reason for the visit:  Male Hypogonadism     History of Present Illness: The history is provided by the patient. No  was used. Accuracy of the patient data is excellent. Pawan Mccauley is a very pleasant 80 y.o. previously healthy male presents with symptoms of sexual dysfunction. Approximately 7-8 years ago, he started experiencing a decrease in lipido followed by erectile dysfunction. At the same time, he stopped having early morning erection. Work up showed low Testosterone level. The patient was started on IM Testosterone injection about a year ago and was on it therapy until 8/2022. He underwent normal puberty and reports no history of gonadal problems. He has no children and never tried to have children. He has had no gynecomastia or galactorrhea. There is no history of testicular or head trauma and didn't noticed any decrease in his testicular size during this time. He has had no headache or peripheral vision disturbances. The patient denied any change in shaving frequency. In addition to sexual dysfunction, he has also experienced a decrease in energy. He has had no fragility fractures. The patient also denied history of easy bruising, think skin, Plethora, Striae, Acne, muscle weakness, skin tags or increase shoe size.      Pt currently on testosterone 100 mg every 14 days and feels a little better on therapy    Due for labs     PAST MEDICAL HISTORY   Past Medical History:   Diagnosis Date    Chronic renal disease, stage III [facility]) [[id number]] 6/1/2022    Depression     Erectile dysfunction     History of shingles     Hypertension 1/25/2017    Mixed

## 2023-05-18 ENCOUNTER — OFFICE VISIT (OUTPATIENT)
Dept: FAMILY MEDICINE CLINIC | Age: 68
End: 2023-05-18
Payer: MEDICARE

## 2023-05-18 VITALS
TEMPERATURE: 97.4 F | BODY MASS INDEX: 37.13 KG/M2 | RESPIRATION RATE: 20 BRPM | OXYGEN SATURATION: 95 % | SYSTOLIC BLOOD PRESSURE: 107 MMHG | WEIGHT: 245 LBS | HEART RATE: 69 BPM | DIASTOLIC BLOOD PRESSURE: 74 MMHG | HEIGHT: 68 IN

## 2023-05-18 DIAGNOSIS — R79.89 LOW TESTOSTERONE IN MALE: ICD-10-CM

## 2023-05-18 DIAGNOSIS — G47.30 SLEEP-DISORDERED BREATHING: ICD-10-CM

## 2023-05-18 DIAGNOSIS — N18.31 STAGE 3A CHRONIC KIDNEY DISEASE (HCC): ICD-10-CM

## 2023-05-18 DIAGNOSIS — E78.2 MIXED HYPERLIPIDEMIA: ICD-10-CM

## 2023-05-18 DIAGNOSIS — Z01.818 PRE-OP EXAM: Primary | ICD-10-CM

## 2023-05-18 DIAGNOSIS — I10 PRIMARY HYPERTENSION: ICD-10-CM

## 2023-05-18 PROCEDURE — 93000 ELECTROCARDIOGRAM COMPLETE: CPT | Performed by: STUDENT IN AN ORGANIZED HEALTH CARE EDUCATION/TRAINING PROGRAM

## 2023-05-18 PROCEDURE — 3078F DIAST BP <80 MM HG: CPT | Performed by: STUDENT IN AN ORGANIZED HEALTH CARE EDUCATION/TRAINING PROGRAM

## 2023-05-18 PROCEDURE — 99214 OFFICE O/P EST MOD 30 MIN: CPT | Performed by: STUDENT IN AN ORGANIZED HEALTH CARE EDUCATION/TRAINING PROGRAM

## 2023-05-18 PROCEDURE — 1123F ACP DISCUSS/DSCN MKR DOCD: CPT | Performed by: STUDENT IN AN ORGANIZED HEALTH CARE EDUCATION/TRAINING PROGRAM

## 2023-05-18 PROCEDURE — 3074F SYST BP LT 130 MM HG: CPT | Performed by: STUDENT IN AN ORGANIZED HEALTH CARE EDUCATION/TRAINING PROGRAM

## 2023-05-18 NOTE — PROGRESS NOTES
Patient:  Saima Gomez 80 y.o. male     05/18/23      Chiefcomplaint:   Chief Complaint   Patient presents with    Pre-op Exam     Pre op clearance     Problems and Overview     Pulmonary status is stable at baseline, no underlying lung disease. Patient does not have history of unstable angina, MI within six weeks, decompensated CHF, severe valvular disease requiring cardiology consultation. .   Patient can  walk 2 blocks or up two flights of stairs without stopping to catch their breath. Patient is not diabetic. Albumin is not below 3.2mg/dL. Patient does not have history of complications with anesthesia. Patient does not smoke. Surgery being performed is hernia. Patient Active Problem List    Diagnosis Date Noted    Umbilical hernia, incarcerated 03/14/2023     Priority: Medium    Sleep-disordered breathing 08/16/2022     Priority: Medium     Snoring with high bmi at [facility]      Hematuria 06/01/2022     Priority: Medium    Chronic renal disease, stage III [facility]) [[id number]] 06/01/2022     Priority: Medium    Low testosterone in male 06/01/2022     Priority: Medium    Mild episode of recurrent major depressive disorder ([facility] [address] 02/02/2022    Erectile dysfunction 06/07/2018    BPH without urinary obstruction 06/07/2018    Hypertension 01/25/2017    Mixed hyperlipidemia 01/25/2017      Prevention:  Health Maintenance Due   Topic Date Due    Shingles vaccine (2 of 3) 12/13/2017    COVID-19 Vaccine (3 - Booster for Moderna series) 07/02/2021    A1C test (Diabetic or Prediabetic)  04/01/2022    Lipids  10/15/2022    Annual Wellness Visit (AWV)  02/03/2023      Meds prior:  Current Outpatient Medications   Medication Instructions    atorvastatin (LIPITOR) 40 MG tablet take 1 tablet by mouth once daily    clotrimazole-betamethasone (LOTRISONE) 1-0.05 % cream Apply topically 2 times daily.     lisinopril-hydroCHLOROthiazide (PRINZIDE;ZESTORETIC) 20-12.5 MG per tablet 1 tablet, Oral, DAILY    Multiple

## 2023-05-24 DIAGNOSIS — I10 ESSENTIAL HYPERTENSION: ICD-10-CM

## 2023-05-24 RX ORDER — LISINOPRIL AND HYDROCHLOROTHIAZIDE 20; 12.5 MG/1; MG/1
TABLET ORAL
Qty: 90 TABLET | Refills: 1 | Status: SHIPPED | OUTPATIENT
Start: 2023-05-24

## 2023-05-24 NOTE — TELEPHONE ENCOUNTER
Last Appointment:  5/18/2023  Future Appointments   Date Time Provider Samy Annita   5/25/2023  1:00 PM SCHEDULE, [facility]   6/6/2023  8:00 AM DO Irasema [facility]   6/8/2023  1:00 PM SCHEDULE, [facility] ENDO [facility]   6/22/2023  1:00 PM SCHEDULE, M[facility]X [facility]DM ENDO [facility]   7/6/2023  1:00 PM SCHEDULE, [facility]O [facility]   7/20/2023  1:00 PM SCHEDULE, M[facility]X [facility] ENDO [facility]   11/16/2023  1:15 PM Archie Delvalle MD [facility]DM END[facility]

## 2023-05-25 ENCOUNTER — NURSE ONLY (OUTPATIENT)
Dept: ENDOCRINOLOGY | Age: 68
End: 2023-05-25

## 2023-05-25 DIAGNOSIS — R79.89 LOW TESTOSTERONE IN MALE: Primary | ICD-10-CM

## 2023-05-29 ENCOUNTER — PREP FOR PROCEDURE (OUTPATIENT)
Dept: SURGERY | Age: 68
End: 2023-05-29

## 2023-05-29 RX ORDER — SODIUM CHLORIDE 0.9 % (FLUSH) 0.9 %
5-40 SYRINGE (ML) INJECTION PRN
Status: CANCELLED | OUTPATIENT
Start: 2023-05-29

## 2023-05-29 RX ORDER — SODIUM CHLORIDE 0.9 % (FLUSH) 0.9 %
5-40 SYRINGE (ML) INJECTION EVERY 12 HOURS SCHEDULED
Status: CANCELLED | OUTPATIENT
Start: 2023-05-29

## 2023-05-29 RX ORDER — SODIUM CHLORIDE 9 MG/ML
INJECTION, SOLUTION INTRAVENOUS CONTINUOUS
Status: CANCELLED | OUTPATIENT
Start: 2023-05-29

## 2023-05-29 RX ORDER — SODIUM CHLORIDE 9 MG/ML
INJECTION, SOLUTION INTRAVENOUS PRN
Status: CANCELLED | OUTPATIENT
Start: 2023-05-29

## 2023-05-29 NOTE — H&P (VIEW-ONLY)
[facility]  HISTORY & PHYSICAL  ATTENDING NOTE             Chief Complaint   Patient presents with    Hernia       Pt is here for an ED follow up for an umbilical hernia, pt states that he has not had pain since the day he went to the ED, he would like to discuss having it repaired. HPI:  80 y.o. male denies weight loss, diarrhea, constipation, melena, hematochezia, N/V, abdominal pain. he denies family history of colon cancer, Crohn's disease or colitis. He has presented for a umbilical hernia. He states its been there for quite some time. He is an  and he was fixing a transmission and loaded into a vehicle when he has had sudden onset abdominal pain. He went to emergency department this was last month and he had a CAT scan which showed a large fat-containing umbilical hernia. He states he has not been able to reduce it. He has not had a colonoscopy in at least 10 years.      Past Medical History           Past Medical History:   Diagnosis Date    Chronic renal disease, stage III [facility]) [[id number]] 6/1/2022    Depression      Erectile dysfunction      History of shingles      Hypertension 1/25/2017    Mixed hyperlipidemia 1/25/2017            Past Surgical History             Past Surgical History:   Procedure Laterality Date    COLONOSCOPY        HEEL SPUR SURGERY                Family History             Family History   Problem Relation Age of Onset    Heart Disease Mother           Valves    Atrial Fibrillation Father      Heart Disease Father           Valve    Heart Attack Father 59                    Allergies   Allergen Reactions    Codeine Nausea Only         Social History              Tobacco Use    Smoking status: Never    Smokeless tobacco: Never   Vaping Use    Vaping Use: Never used   Substance Use Topics    Alcohol use: No    Drug use: No         Current Facility-Administered Medications               Current Outpatient Medications

## 2023-05-30 ENCOUNTER — HOSPITAL ENCOUNTER (OUTPATIENT)
Age: 68
Setting detail: OUTPATIENT SURGERY
Discharge: HOME | End: 2023-05-30
Attending: SURGERY | Admitting: SURGERY
Payer: MEDICARE

## 2023-05-30 ENCOUNTER — ANESTHESIA (OUTPATIENT)
Dept: OPERATING ROOM | Age: 68
End: 2023-05-30
Payer: MEDICARE

## 2023-05-30 ENCOUNTER — ANESTHESIA EVENT (OUTPATIENT)
Dept: OPERATING ROOM | Age: 68
End: 2023-05-30
Payer: MEDICARE

## 2023-05-30 VITALS
SYSTOLIC BLOOD PRESSURE: 117 MMHG | WEIGHT: 217 LBS | TEMPERATURE: 97 F | HEART RATE: 79 BPM | OXYGEN SATURATION: 95 % | BODY MASS INDEX: 32.89 KG/M2 | HEIGHT: 68 IN | RESPIRATION RATE: 15 BRPM | DIASTOLIC BLOOD PRESSURE: 76 MMHG

## 2023-05-30 DIAGNOSIS — G89.18 POST-OP PAIN: ICD-10-CM

## 2023-05-30 DIAGNOSIS — Z01.818 PRE-OP TESTING: Primary | ICD-10-CM

## 2023-05-30 PROBLEM — K42.9 UMBILICAL HERNIA WITHOUT OBSTRUCTION AND WITHOUT GANGRENE: Status: ACTIVE | Noted: 2023-03-14

## 2023-05-30 LAB
ANION GAP SERPL CALCULATED.3IONS-SCNC: 11 MMOL/L (ref 7–16)
BUN SERPL-MCNC: 14 MG/DL (ref 6–23)
CALCIUM SERPL-MCNC: 9.3 MG/DL (ref 8.6–10.2)
CHLORIDE SERPL-SCNC: 104 MMOL/L (ref 98–107)
CO2 SERPL-SCNC: 24 MMOL/L (ref 22–29)
CREAT SERPL-MCNC: 1.1 MG/DL (ref 0.7–1.2)
GLUCOSE SERPL-MCNC: 114 MG/DL (ref 74–99)
POTASSIUM SERPL-SCNC: 4 MMOL/L (ref 3.5–5)
SODIUM SERPL-SCNC: 139 MMOL/L (ref 132–146)

## 2023-05-30 PROCEDURE — 3700000000 HC ANESTHESIA ATTENDED CARE: Performed by: SURGERY

## 2023-05-30 PROCEDURE — 2500000003 HC RX 250 WO HCPCS

## 2023-05-30 PROCEDURE — 3700000001 HC ADD 15 MINUTES (ANESTHESIA): Performed by: SURGERY

## 2023-05-30 PROCEDURE — 49594 RPR AA HRN 1ST 3-10 NCR/STRN: CPT | Performed by: SURGERY

## 2023-05-30 PROCEDURE — 3600000007 HC SURGERY HYBRID BASE: Performed by: SURGERY

## 2023-05-30 PROCEDURE — 2580000003 HC RX 258

## 2023-05-30 PROCEDURE — 3600000017 HC SURGERY HYBRID ADDL 15MIN: Performed by: SURGERY

## 2023-05-30 PROCEDURE — 36415 COLL VENOUS BLD VENIPUNCTURE: CPT

## 2023-05-30 PROCEDURE — C1781 MESH (IMPLANTABLE): HCPCS | Performed by: SURGERY

## 2023-05-30 PROCEDURE — 7100000011 HC PHASE II RECOVERY - ADDTL 15 MIN: Performed by: SURGERY

## 2023-05-30 PROCEDURE — 2580000003 HC RX 258: Performed by: SURGERY

## 2023-05-30 PROCEDURE — 6360000002 HC RX W HCPCS: Performed by: SURGERY

## 2023-05-30 PROCEDURE — 7100000001 HC PACU RECOVERY - ADDTL 15 MIN: Performed by: SURGERY

## 2023-05-30 PROCEDURE — 2709999900 HC NON-CHARGEABLE SUPPLY: Performed by: SURGERY

## 2023-05-30 PROCEDURE — 7100000000 HC PACU RECOVERY - FIRST 15 MIN: Performed by: SURGERY

## 2023-05-30 PROCEDURE — 80048 BASIC METABOLIC PNL TOTAL CA: CPT

## 2023-05-30 PROCEDURE — 2500000003 HC RX 250 WO HCPCS: Performed by: SURGERY

## 2023-05-30 PROCEDURE — 6360000002 HC RX W HCPCS

## 2023-05-30 PROCEDURE — 2500000003 HC RX 250 WO HCPCS: Performed by: ANESTHESIOLOGY

## 2023-05-30 PROCEDURE — 7100000010 HC PHASE II RECOVERY - FIRST 15 MIN: Performed by: SURGERY

## 2023-05-30 DEVICE — MESH HERN W10XL15CM POLY POLYLACTIC ACID 70% CLLGN 30% GLYC: Type: IMPLANTABLE DEVICE | Site: ABDOMEN | Status: FUNCTIONAL

## 2023-05-30 RX ORDER — VASOPRESSIN 20 U/ML
INJECTION PARENTERAL PRN
Status: DISCONTINUED | OUTPATIENT
Start: 2023-05-30 | End: 2023-05-30 | Stop reason: SDUPTHER

## 2023-05-30 RX ORDER — SODIUM CHLORIDE 9 MG/ML
INJECTION, SOLUTION INTRAVENOUS CONTINUOUS
Status: DISCONTINUED | OUTPATIENT
Start: 2023-05-30 | End: 2023-05-30 | Stop reason: HOSPADM

## 2023-05-30 RX ORDER — SODIUM CHLORIDE 0.9 % (FLUSH) 0.9 %
5-40 SYRINGE (ML) INJECTION PRN
Status: DISCONTINUED | OUTPATIENT
Start: 2023-05-30 | End: 2023-05-30 | Stop reason: HOSPADM

## 2023-05-30 RX ORDER — SODIUM CHLORIDE 9 MG/ML
INJECTION, SOLUTION INTRAVENOUS PRN
Status: DISCONTINUED | OUTPATIENT
Start: 2023-05-30 | End: 2023-05-30 | Stop reason: HOSPADM

## 2023-05-30 RX ORDER — FENTANYL CITRATE 50 UG/ML
INJECTION, SOLUTION INTRAMUSCULAR; INTRAVENOUS PRN
Status: DISCONTINUED | OUTPATIENT
Start: 2023-05-30 | End: 2023-05-30 | Stop reason: SDUPTHER

## 2023-05-30 RX ORDER — MEPERIDINE HYDROCHLORIDE 25 MG/ML
12.5 INJECTION INTRAMUSCULAR; INTRAVENOUS; SUBCUTANEOUS EVERY 5 MIN PRN
Status: DISCONTINUED | OUTPATIENT
Start: 2023-05-30 | End: 2023-05-30 | Stop reason: HOSPADM

## 2023-05-30 RX ORDER — DEXAMETHASONE SODIUM PHOSPHATE 10 MG/ML
INJECTION INTRAMUSCULAR; INTRAVENOUS PRN
Status: DISCONTINUED | OUTPATIENT
Start: 2023-05-30 | End: 2023-05-30 | Stop reason: SDUPTHER

## 2023-05-30 RX ORDER — HYDROMORPHONE HYDROCHLORIDE 1 MG/ML
0.5 INJECTION, SOLUTION INTRAMUSCULAR; INTRAVENOUS; SUBCUTANEOUS EVERY 5 MIN PRN
Status: DISCONTINUED | OUTPATIENT
Start: 2023-05-30 | End: 2023-05-30 | Stop reason: HOSPADM

## 2023-05-30 RX ORDER — EPHEDRINE SULFATE/0.9% NACL/PF 50 MG/5 ML
SYRINGE (ML) INTRAVENOUS PRN
Status: DISCONTINUED | OUTPATIENT
Start: 2023-05-30 | End: 2023-05-30 | Stop reason: SDUPTHER

## 2023-05-30 RX ORDER — OXYCODONE HYDROCHLORIDE 5 MG/1
5 TABLET ORAL EVERY 6 HOURS PRN
Qty: 12 TABLET | Refills: 0 | Status: SHIPPED | OUTPATIENT
Start: 2023-05-30 | End: 2023-06-02

## 2023-05-30 RX ORDER — SODIUM CHLORIDE 0.9 % (FLUSH) 0.9 %
5-40 SYRINGE (ML) INJECTION EVERY 12 HOURS SCHEDULED
Status: DISCONTINUED | OUTPATIENT
Start: 2023-05-30 | End: 2023-05-30 | Stop reason: HOSPADM

## 2023-05-30 RX ORDER — ONDANSETRON 2 MG/ML
INJECTION INTRAMUSCULAR; INTRAVENOUS PRN
Status: DISCONTINUED | OUTPATIENT
Start: 2023-05-30 | End: 2023-05-30 | Stop reason: SDUPTHER

## 2023-05-30 RX ORDER — LIDOCAINE HYDROCHLORIDE 20 MG/ML
INJECTION, SOLUTION INTRAVENOUS PRN
Status: DISCONTINUED | OUTPATIENT
Start: 2023-05-30 | End: 2023-05-30 | Stop reason: SDUPTHER

## 2023-05-30 RX ORDER — MIDAZOLAM HYDROCHLORIDE 1 MG/ML
INJECTION INTRAMUSCULAR; INTRAVENOUS PRN
Status: DISCONTINUED | OUTPATIENT
Start: 2023-05-30 | End: 2023-05-30 | Stop reason: SDUPTHER

## 2023-05-30 RX ORDER — ROCURONIUM BROMIDE 10 MG/ML
INJECTION, SOLUTION INTRAVENOUS PRN
Status: DISCONTINUED | OUTPATIENT
Start: 2023-05-30 | End: 2023-05-30 | Stop reason: SDUPTHER

## 2023-05-30 RX ORDER — BUPIVACAINE HYDROCHLORIDE 5 MG/ML
INJECTION, SOLUTION EPIDURAL; INTRACAUDAL PRN
Status: DISCONTINUED | OUTPATIENT
Start: 2023-05-30 | End: 2023-05-30 | Stop reason: ALTCHOICE

## 2023-05-30 RX ORDER — PROPOFOL 10 MG/ML
INJECTION, EMULSION INTRAVENOUS PRN
Status: DISCONTINUED | OUTPATIENT
Start: 2023-05-30 | End: 2023-05-30 | Stop reason: SDUPTHER

## 2023-05-30 RX ORDER — SODIUM CHLORIDE 9 MG/ML
INJECTION, SOLUTION INTRAVENOUS CONTINUOUS PRN
Status: DISCONTINUED | OUTPATIENT
Start: 2023-05-30 | End: 2023-05-30 | Stop reason: SDUPTHER

## 2023-05-30 RX ADMIN — PHENYLEPHRINE HYDROCHLORIDE 100 MCG: 10 INJECTION INTRAVENOUS at 11:17

## 2023-05-30 RX ADMIN — ROCURONIUM BROMIDE 10 MG: 10 INJECTION INTRAVENOUS at 12:24

## 2023-05-30 RX ADMIN — DEXAMETHASONE SODIUM PHOSPHATE 10 MG: 10 INJECTION INTRAMUSCULAR; INTRAVENOUS at 11:11

## 2023-05-30 RX ADMIN — Medication 5 MG: at 12:09

## 2023-05-30 RX ADMIN — FENTANYL CITRATE 50 MCG: 0.05 INJECTION, SOLUTION INTRAMUSCULAR; INTRAVENOUS at 12:02

## 2023-05-30 RX ADMIN — HYDROMORPHONE HYDROCHLORIDE 0.5 MG: 1 INJECTION, SOLUTION INTRAMUSCULAR; INTRAVENOUS; SUBCUTANEOUS at 13:58

## 2023-05-30 RX ADMIN — ROCURONIUM BROMIDE 50 MG: 10 INJECTION INTRAVENOUS at 11:02

## 2023-05-30 RX ADMIN — SODIUM CHLORIDE: 9 INJECTION, SOLUTION INTRAVENOUS at 10:45

## 2023-05-30 RX ADMIN — HYDROMORPHONE HYDROCHLORIDE 0.5 MG: 1 INJECTION, SOLUTION INTRAMUSCULAR; INTRAVENOUS; SUBCUTANEOUS at 14:23

## 2023-05-30 RX ADMIN — ROCURONIUM BROMIDE 20 MG: 10 INJECTION INTRAVENOUS at 11:37

## 2023-05-30 RX ADMIN — LIDOCAINE HYDROCHLORIDE 60 MG: 20 INJECTION, SOLUTION INTRAVENOUS at 11:02

## 2023-05-30 RX ADMIN — PHENYLEPHRINE HYDROCHLORIDE 100 MCG: 10 INJECTION INTRAVENOUS at 11:29

## 2023-05-30 RX ADMIN — HYDROMORPHONE HYDROCHLORIDE 0.5 MG: 1 INJECTION, SOLUTION INTRAMUSCULAR; INTRAVENOUS; SUBCUTANEOUS at 13:47

## 2023-05-30 RX ADMIN — SUGAMMADEX 197 MG: 100 INJECTION, SOLUTION INTRAVENOUS at 12:59

## 2023-05-30 RX ADMIN — PROPOFOL 200 MG: 10 INJECTION, EMULSION INTRAVENOUS at 11:02

## 2023-05-30 RX ADMIN — SODIUM CHLORIDE: 9 INJECTION, SOLUTION INTRAVENOUS at 12:51

## 2023-05-30 RX ADMIN — VASOPRESSIN 1 UNITS: 20 INJECTION INTRAVENOUS at 11:52

## 2023-05-30 RX ADMIN — HYDROMORPHONE HYDROCHLORIDE 0.5 MG: 1 INJECTION, SOLUTION INTRAMUSCULAR; INTRAVENOUS; SUBCUTANEOUS at 14:09

## 2023-05-30 RX ADMIN — Medication 5 MG: at 12:11

## 2023-05-30 RX ADMIN — MIDAZOLAM 1 MG: 1 INJECTION INTRAMUSCULAR; INTRAVENOUS at 10:59

## 2023-05-30 RX ADMIN — FENTANYL CITRATE 50 MCG: 0.05 INJECTION, SOLUTION INTRAMUSCULAR; INTRAVENOUS at 11:02

## 2023-05-30 RX ADMIN — VASOPRESSIN 1 UNITS: 20 INJECTION INTRAVENOUS at 12:04

## 2023-05-30 RX ADMIN — PHENYLEPHRINE HYDROCHLORIDE 100 MCG: 10 INJECTION INTRAVENOUS at 11:49

## 2023-05-30 RX ADMIN — CEFAZOLIN 2000 MG: 2 INJECTION, POWDER, FOR SOLUTION INTRAMUSCULAR; INTRAVENOUS at 11:11

## 2023-05-30 RX ADMIN — SODIUM CHLORIDE: 9 INJECTION, SOLUTION INTRAVENOUS at 09:40

## 2023-05-30 RX ADMIN — ROCURONIUM BROMIDE 10 MG: 10 INJECTION INTRAVENOUS at 12:51

## 2023-05-30 RX ADMIN — MIDAZOLAM 1 MG: 1 INJECTION INTRAMUSCULAR; INTRAVENOUS at 10:55

## 2023-05-30 RX ADMIN — ONDANSETRON 4 MG: 2 INJECTION INTRAMUSCULAR; INTRAVENOUS at 12:59

## 2023-05-30 ASSESSMENT — PAIN DESCRIPTION - LOCATION
LOCATION: ABDOMEN

## 2023-05-30 ASSESSMENT — PAIN DESCRIPTION - DESCRIPTORS
DESCRIPTORS: OTHER (COMMENT)
DESCRIPTORS: PATIENT UNABLE TO DESCRIBE
DESCRIPTORS: ACHING

## 2023-05-30 ASSESSMENT — PAIN SCALES - GENERAL
PAINLEVEL_OUTOF10: 10

## 2023-05-30 ASSESSMENT — PAIN DESCRIPTION - ORIENTATION
ORIENTATION: ANTERIOR
ORIENTATION: ANTERIOR;LEFT
ORIENTATION: ANTERIOR;LEFT

## 2023-05-30 ASSESSMENT — PAIN DESCRIPTION - PAIN TYPE
TYPE: SURGICAL PAIN
TYPE: SURGICAL PAIN

## 2023-05-30 ASSESSMENT — PAIN - FUNCTIONAL ASSESSMENT: PAIN_FUNCTIONAL_ASSESSMENT: 0-10

## 2023-05-30 ASSESSMENT — LIFESTYLE VARIABLES: SMOKING_STATUS: 0

## 2023-05-30 NOTE — PROGRESS NOTES
PATIENT SIGNED OUT PER ANESTHESIA, VSS. PATIENT TRANSFERRED TO DA PHASE OF CARE. FAMILY AT BEDSIDE. PATIENT ASSISTED WITH LIQUIDS.

## 2023-05-30 NOTE — PROGRESS NOTES
Discharge instructions given. Pt verbalized understanding of discharge instructions.  Abdominal binder placed on patient

## 2023-05-30 NOTE — DISCHARGE INSTRUCTIONS
SURGERY DISCHARGE INSTRUCTIONS    You may be drowsy or lightheaded after receiving sedation or anesthesia. A responsible person should be with you for the next 24 hours. FOLLOW UP: Call office to schedule follow-up appointment in 2 weeks. DIET: Advance your diet as tolerated. Start with light diet and progress to your normal diet as you feel like eating. If you experience nausea or repeated episodes of vomiting which persist beyond 12-24 hours, notify your doctor. ACTIVITY: Rest today. Increase activity gradually. No driving for 1 day. No driving while on prescription pain medication. No heavy lifting for 4 weeks - nothing over 10 lbs, or heavier than a milk jug. Wear abdominal binder when out of bed    SHOWER/BATHING: Okay to shower in 24 hours after procedure. No tub bathing, swimming or soaking for 2 weeks. WOUND CARE:You have Dermabond dressing (skin glue) applied to your incisions. You do not need to apply anything over them. The glue will gradually work itself off over the next few weeks. Avoid directly applying lotions, creams or oils to your incision. Keep incisions clean and dry. Always ensure you and your care giver clean hands before and after caring for the wound. You may place ice on incisions to decrease the pain and bruising. MEDICATIONS: Take as prescribed. You may take over the counter ibuprofen or tylenol for pain as directed, limit total amount of acetaminophen (tylenol) to 3 grams per 24-hour period. Okay to resume anticoagulant medication after 24hrs. You may experience constipation while taking pain medication, you may take over the counter stool softeners (Docusate/Colace or Senokot-S) as directed.        SPECIAL INSTRUCTIONS:   Call physician if they or any other problems occur:  Call the office if you have a fever over >101F, or if your incision becomes red, tender, or drains more than a small amount of clear fluid  Fever over 101°    Redness, swelling, hardness or

## 2023-05-30 NOTE — INTERVAL H&P NOTE
Update History & Physical    The patient's History and Physical of March 14, 2023 was reviewed with the patient and I examined the patient. There was no change. The surgical site was confirmed by the patient and me. Plan: The risks, benefits, expected outcome, and alternative to the recommended procedure have been discussed with the patient. Patient understands and wants to proceed with the procedure.      Electronically signed by Osorio Aguayo MD on 5/30/2023 at 10:27 AM

## 2023-05-30 NOTE — ANESTHESIA PRE PROCEDURE
Department of Anesthesiology  Preprocedure Note       Name:  Becky Sweeney   Age:  80 y.o.  :  1955                                          MRN:  [id number]         Date:  2023      Surgeon: Daria Marroquin):  Dana Hubbard MD    Procedure: Procedure(s): HERNIA UMBILICAL REPAIR LAPAROSCOPIC ROBOTIC XI/ POSSIBLE OPEN    Medications prior to admission:   Prior to Admission medications    Medication Sig Start Date End Date Taking? Authorizing Provider   lisinopril-hydroCHLOROthiazide (PRINZIDE;ZESTORETIC) 20-12.5 MG per tablet take 1 tablet by mouth once daily 23   Dianna Gorman DO   testosterone cypionate (DEPOTESTOTERONE CYPIONATE) 200 MG/ML injection inject 0.5 milliliters intramuscularly every 14 days 5/11/23 10/9/23  Edna Jean-Baptiste MD   clotrimazole-betamethasone (LOTRISONE) 1-0.05 % cream Apply topically 2 times daily. Patient not taking: Reported on 2023 3/28/23   Dianna Gorman DO   atorvastatin (LIPITOR) 40 MG tablet take 1 tablet by mouth once daily 3/13/23   Dianna Gorman DO   venlafaxine (EFFEXOR XR) 37.5 MG extended release capsule take 1 capsule by mouth once daily 3/13/23   Dianna Gorman DO   Multiple Vitamins-Minerals (THERAPEUTIC MULTIVITAMIN-MINERALS) tablet Take 1 tablet by mouth daily    Historical Provider, MD       Current medications:    No current facility-administered medications for this visit. No current outpatient medications on file.      Facility-Administered Medications Ordered in Other Visits   Medication Dose Route Frequency Provider Last Rate Last Admin    0.9 % sodium chloride infusion   IntraVENous Continuous Dana Hubbard MD        sodium chloride flush 0.9 % injection 5-40 mL  5-40 mL IntraVENous 2 times per day Dana Hubbard MD        sodium chloride flush 0.9 % injection 5-40 mL  5-40 mL IntraVENous PRN Dana Hubbard MD        0.9 % sodium chloride infusion   IntraVENous PRN Dana Hubbard MD        ceFAZolin (ANCEF)

## 2023-05-30 NOTE — ANESTHESIA POSTPROCEDURE EVALUATION
Department of Anesthesiology  Postprocedure Note    Patient: Tony Richardson  MRN: [id number]  YOB: 1955  Date of evaluation: 5/31/2023      Procedure Summary     Date: 05/30/23 Room / Location: [address] [place] [address] / [facility]    Anesthesia Start: 8983 Anesthesia Stop:     Procedure:  HERNIA UMBILICAL REPAIR LAPAROSCOPIC ROBOTIC XI/ POSSIBLE OPEN Diagnosis:       Umbilical hernia with obstruction      (Umbilical hernia with obstruction [K42.0])    Surgeons: Gloria Bautista MD Responsible Provider: Frankie Cedillo MD    Anesthesia Type: general ASA Status: 3          Anesthesia Type: General    Ángel Phase I: Ángel Score: 9    Ángel Phase II: Ángel Score: 9      Anesthesia Post Evaluation    Patient location during evaluation: PACU  Patient participation: complete - patient participated  Level of consciousness: awake  Pain score: 3  Airway patency: patent  Nausea & Vomiting: no nausea and no vomiting  Complications: no  Cardiovascular status: blood pressure returned to baseline  Respiratory status: acceptable  Hydration status: euvolemic

## 2023-05-30 NOTE — PROGRESS NOTES
CLINICAL PHARMACY NOTE: MEDS TO BEDS    Total # of Prescriptions Filled: 1   The following medications were delivered to the patient:  Oxycodone 5 mg    Additional Documentation:   Wife picked up at register

## 2023-05-30 NOTE — OP NOTE
Operative Note      Patient: Kayla Herman  YOB: 1955  MRN: [id number]    Date of Procedure: 5/30/2023    Pre-Op Diagnosis Codes:     * Umbilical hernia with obstruction [K42.0]    Post-Op Diagnosis: Incarcerated fat-containing umbilical hernia       Procedure(s): HERNIA UMBILICAL REPAIR LAPAROSCOPIC ROBOTIC XI    Surgeon(s):  Adrianna Black MD    Assistant:   Resident: Erica Turpin MD    Anesthesia: General    Estimated Blood Loss (mL): Minimal    Complications: None    Specimens:   * No specimens in log *    Implants:  Implant Name Type Inv. Item Serial No.  Lot No. LRB No. Used Action   MESH PIOTR [id number] POLY POLYLACTIC ACID 70% CLLGN 30% GLYC - [id number]  MESH PIOTR [id number] POLY POLYLACTIC ACID 70% CLLGN 30% GLYC  [employer]  SURGICAL-WD [id number] N/A 1 Implanted         Drains: * No LDAs found *    Findings: Incarcerated fat-containing umbilical hernia with placement of preperitoneal ProGrip mesh [id number]      Detailed Description of Procedure:     BRIEF HISTORY: The patient presented with umbilical hernia causing pain. I discussed robotic possible open umbilical hernia repair with the patient including the procedure, benefits, risks and alternatives, and the patient agreed. PROCEDURE IN DETAIL:   The patient was brought to the operating room and positioned supine on the OR table. Sequential compression devices were placed on the patient's lower extremities and functioning. Preoperative antibiotics were administered. Anesthesia was obtained without complication as per the anesthesia record. Immediately prior to the procedure a time-out was called and the surgical checklist was reviewed and agreed upon by all present. The patient was prepped and draped in the usual sterile fashion.     0.25% Marcaine mixed with epinephrine was used as a local anesthetic an 8 mm incision was made in the left upper quadrant at Paredes's point and a Veress needle was inserted into the

## 2023-06-06 ENCOUNTER — OFFICE VISIT (OUTPATIENT)
Dept: FAMILY MEDICINE CLINIC | Age: 68
End: 2023-06-06
Payer: MEDICARE

## 2023-06-06 VITALS
OXYGEN SATURATION: 99 % | TEMPERATURE: 97.8 F | BODY MASS INDEX: 33.21 KG/M2 | DIASTOLIC BLOOD PRESSURE: 70 MMHG | HEART RATE: 63 BPM | WEIGHT: 218.4 LBS | SYSTOLIC BLOOD PRESSURE: 104 MMHG

## 2023-06-06 DIAGNOSIS — Z98.890 S/P HERNIA REPAIR: ICD-10-CM

## 2023-06-06 DIAGNOSIS — F33.0 MILD EPISODE OF RECURRENT MAJOR DEPRESSIVE DISORDER (HCC): Primary | ICD-10-CM

## 2023-06-06 DIAGNOSIS — E78.2 MIXED HYPERLIPIDEMIA: ICD-10-CM

## 2023-06-06 DIAGNOSIS — Z87.19 S/P HERNIA REPAIR: ICD-10-CM

## 2023-06-06 DIAGNOSIS — I10 PRIMARY HYPERTENSION: ICD-10-CM

## 2023-06-06 PROCEDURE — 3078F DIAST BP <80 MM HG: CPT | Performed by: STUDENT IN AN ORGANIZED HEALTH CARE EDUCATION/TRAINING PROGRAM

## 2023-06-06 PROCEDURE — 99214 OFFICE O/P EST MOD 30 MIN: CPT | Performed by: STUDENT IN AN ORGANIZED HEALTH CARE EDUCATION/TRAINING PROGRAM

## 2023-06-06 PROCEDURE — 3074F SYST BP LT 130 MM HG: CPT | Performed by: STUDENT IN AN ORGANIZED HEALTH CARE EDUCATION/TRAINING PROGRAM

## 2023-06-06 PROCEDURE — 1124F ACP DISCUSS-NO DSCNMKR DOCD: CPT | Performed by: STUDENT IN AN ORGANIZED HEALTH CARE EDUCATION/TRAINING PROGRAM

## 2023-06-06 NOTE — PROGRESS NOTES
Patient:  Wanda Short 80 y.o. male     06/06/23      Chiefcomplaint:   Chief Complaint   Patient presents with    Hernia     Pt had hernia surgery last Tuesday. Follow up from surgery     History of Present Illness   Sp hernia surgery  Doing well  No pain  Normal bm  On concern about surgical sites     Health Maintenance Due   Topic Date Due    Shingles vaccine (2 of 3) 12/13/2017    COVID-19 Vaccine (3 - Booster for Moderna series) 07/02/2021    A1C test (Diabetic or Prediabetic)  04/01/2022    Lipids  10/15/2022    Annual Wellness Visit (AWV)  02/03/2023      History:  Prior to Visit Medications    Medication Sig Taking? Authorizing Provider   lisinopril-hydroCHLOROthiazide (PRINZIDE;ZESTORETIC) 20-12.5 MG per tablet take 1 tablet by mouth once daily Yes Orma Market, DO   testosterone cypionate (DEPOTESTOTERONE CYPIONATE) 200 MG/ML injection inject 0.5 milliliters intramuscularly every 14 days Yes Giovanni Richter MD   clotrimazole-betamethasone (LOTRISONE) 1-0.05 % cream Apply topically 2 times daily. Yes Orma Market, DO   atorvastatin (LIPITOR) 40 MG tablet take 1 tablet by mouth once daily Yes Orma Market, DO   venlafaxine (EFFEXOR XR) 37.5 MG extended release capsule take 1 capsule by mouth once daily Yes Orma Market, DO   Multiple Vitamins-Minerals (THERAPEUTIC MULTIVITAMIN-MINERALS) tablet Take 1 tablet by mouth daily Yes Historical Provider, MD      Past Medical History:   Diagnosis Date    Chronic renal disease, stage III [facility]) [372228] 6/1/2022    Depression     Erectile dysfunction     History of shingles     Hypertension 1/25/2017    Mixed hyperlipidemia 1/25/2017     Physical Exam   Vitals: /70 (Site: Right Upper Arm, Position: Sitting, Cuff Size: Large Adult)   Pulse 63   Temp 97.8 °F (36.6 °C)   Wt 218 lb 6.4 oz (99.1 kg)   SpO2 99%   BMI 33.21 kg/m²   General Appearance: Alert, oriented, no acute distress  HEENT: No scleral icterus.  No visible discharge from

## 2023-06-08 ENCOUNTER — NURSE ONLY (OUTPATIENT)
Dept: ENDOCRINOLOGY | Age: 68
End: 2023-06-08

## 2023-06-08 DIAGNOSIS — R79.89 LOW TESTOSTERONE IN MALE: Primary | ICD-10-CM

## 2023-06-19 ENCOUNTER — HOSPITAL ENCOUNTER (OUTPATIENT)
Age: 68
Discharge: HOME | End: 2023-06-19
Payer: MEDICARE

## 2023-06-19 DIAGNOSIS — R79.89 LOW TESTOSTERONE IN MALE: ICD-10-CM

## 2023-06-19 LAB
HCT VFR BLD AUTO: 44.6 % (ref 37–54)
PSA SERPL-MCNC: 2.63 NG/ML (ref 0–4)

## 2023-06-19 PROCEDURE — 84153 ASSAY OF PSA TOTAL: CPT

## 2023-06-19 PROCEDURE — 84403 ASSAY OF TOTAL TESTOSTERONE: CPT

## 2023-06-19 PROCEDURE — 36415 COLL VENOUS BLD VENIPUNCTURE: CPT

## 2023-06-19 PROCEDURE — 85014 HEMATOCRIT: CPT

## 2023-06-19 PROCEDURE — 84270 ASSAY OF SEX HORMONE GLOBUL: CPT

## 2023-06-21 LAB
SHBG SERPL-SCNC: 33 NMOL/L (ref 11–80)
TESTOST FREE SERPL-MCNC: 27.4 PG/ML (ref 47–244)
TESTOST SERPL-MCNC: 147 NG/DL (ref 220–1000)

## 2023-06-26 ENCOUNTER — OFFICE VISIT (OUTPATIENT)
Dept: SURGERY | Age: 68
End: 2023-06-26
Payer: MEDICARE

## 2023-06-26 VITALS
TEMPERATURE: 98.3 F | SYSTOLIC BLOOD PRESSURE: 122 MMHG | RESPIRATION RATE: 16 BRPM | WEIGHT: 215 LBS | BODY MASS INDEX: 32.58 KG/M2 | HEART RATE: 80 BPM | HEIGHT: 68 IN | OXYGEN SATURATION: 98 % | DIASTOLIC BLOOD PRESSURE: 79 MMHG

## 2023-06-26 DIAGNOSIS — K42.9 UMBILICAL HERNIA WITHOUT OBSTRUCTION AND WITHOUT GANGRENE: ICD-10-CM

## 2023-06-26 DIAGNOSIS — Z87.19 S/P HERNIA REPAIR: Primary | ICD-10-CM

## 2023-06-26 DIAGNOSIS — Z98.890 S/P HERNIA REPAIR: Primary | ICD-10-CM

## 2023-06-26 PROCEDURE — 3074F SYST BP LT 130 MM HG: CPT | Performed by: SURGERY

## 2023-06-26 PROCEDURE — 1124F ACP DISCUSS-NO DSCNMKR DOCD: CPT | Performed by: SURGERY

## 2023-06-26 PROCEDURE — 99212 OFFICE O/P EST SF 10 MIN: CPT | Performed by: SURGERY

## 2023-06-26 PROCEDURE — 3078F DIAST BP <80 MM HG: CPT | Performed by: SURGERY

## 2023-07-06 ENCOUNTER — NURSE ONLY (OUTPATIENT)
Dept: ENDOCRINOLOGY | Age: 68
End: 2023-07-06

## 2023-07-06 DIAGNOSIS — R79.89 LOW TESTOSTERONE IN MALE: Primary | ICD-10-CM

## 2023-07-06 NOTE — PROGRESS NOTES
Testosterone 100mg (0.5cc) given IM R deltoid at 1230. Lot #[id number], Exp 12/25. [address] #[id number].  Medication provided by patient

## 2023-07-10 ENCOUNTER — OFFICE VISIT (OUTPATIENT)
Dept: PRIMARY CARE CLINIC | Age: 68
End: 2023-07-10
Payer: MEDICARE

## 2023-07-10 VITALS — DIASTOLIC BLOOD PRESSURE: 82 MMHG | HEART RATE: 91 BPM | SYSTOLIC BLOOD PRESSURE: 127 MMHG | TEMPERATURE: 98.4 F

## 2023-07-10 DIAGNOSIS — J01.90 ACUTE BACTERIAL SINUSITIS: Primary | ICD-10-CM

## 2023-07-10 DIAGNOSIS — B96.89 ACUTE BACTERIAL SINUSITIS: Primary | ICD-10-CM

## 2023-07-10 PROCEDURE — 3074F SYST BP LT 130 MM HG: CPT | Performed by: NURSE PRACTITIONER

## 2023-07-10 PROCEDURE — 99213 OFFICE O/P EST LOW 20 MIN: CPT | Performed by: NURSE PRACTITIONER

## 2023-07-10 PROCEDURE — 1124F ACP DISCUSS-NO DSCNMKR DOCD: CPT | Performed by: NURSE PRACTITIONER

## 2023-07-10 PROCEDURE — 3079F DIAST BP 80-89 MM HG: CPT | Performed by: NURSE PRACTITIONER

## 2023-07-10 RX ORDER — BROMPHENIRAMINE MALEATE, PSEUDOEPHEDRINE HYDROCHLORIDE, AND DEXTROMETHORPHAN HYDROBROMIDE 2; 30; 10 MG/5ML; MG/5ML; MG/5ML
5 SYRUP ORAL 4 TIMES DAILY PRN
Qty: 118 ML | Refills: 0 | Status: SHIPPED | OUTPATIENT
Start: 2023-07-10

## 2023-07-10 RX ORDER — DOXYCYCLINE HYCLATE 100 MG
100 TABLET ORAL 2 TIMES DAILY
Qty: 14 TABLET | Refills: 0 | Status: SHIPPED | OUTPATIENT
Start: 2023-07-10 | End: 2023-07-17

## 2023-07-20 ENCOUNTER — NURSE ONLY (OUTPATIENT)
Dept: ENDOCRINOLOGY | Age: 68
End: 2023-07-20

## 2023-07-20 DIAGNOSIS — R79.89 LOW TESTOSTERONE IN MALE: Primary | ICD-10-CM

## 2023-07-20 NOTE — PROGRESS NOTES
Testosterone 100mg (0.5cc) given L deltoid at 1235. Lot #[id number], Exp 12/25, [address] #63826424-21.  Medication provided by patient

## 2023-07-26 NOTE — PROGRESS NOTES
EEG completed. Report to follow.   Judith Gates 3/29/2022 Left Mom Jeremías Skates a detailed message that Depakote has been sent into pharmacy for patient and to call back regarding sleep study---if patient had eval done or not.

## 2023-08-02 ENCOUNTER — TELEPHONE (OUTPATIENT)
Dept: PHARMACY | Facility: CLINIC | Age: 68
End: 2023-08-02

## 2023-08-02 NOTE — TELEPHONE ENCOUNTER
[facility] CLINICAL PHARMACY: ADHERENCE REVIEW  Identified care gap per [place] Per insurer report, LIS-0 - co-pays are based on tiers and patient is subject to coverage gap.    fills with [facility]: ACE/ARB and Statin adherence    Patient also appears to be prescribed:No Others in the metric at this time      [facility] Records claims through 07/24/2023 (Prior Year [address] = not reported; YTD [address] = 100%; Potential Fail Date: 10.25.23):   Lisinopril hctz last filled on 05.24.23 for 90 day supply. Next refill due: 08.22.23    Per Insurer Portal: last filled on (same as above). .     BP Readings from Last 3 Encounters:   07/10/23 127/82   06/26/23 122/79   06/06/23 104/70     Estimated Creatinine Clearance: 74 mL/min (based on SCr of 1.1 mg/dL). Lab Results   Component Value Date    CREATININE 1.1 05/30/2023     Lab Results   Component Value Date    K 4.0 05/30/2023        STATIN ADHERENCE    Insurance Records claims through 07/24/2023 (Prior Year [address] = not reported; YTD [address] = FIRST FILL; Potential Fail Date: 08.08.23): Atorvastatin last filled on 03.13.23 for 90 day supply. Next refill due: 06.11.23    Per Insurer Portal: last filled on (same as above). .    Lab Results   Component Value Date    CHOL 161 10/15/2021    TRIG 364 (H) 10/15/2021    HDL 30 10/15/2021    LDLCHOLESTEROL 134 (H) 05/03/2017    LDLCALC 58 10/15/2021     ALT   Date Value Ref Range Status   02/14/2023 13 0 - 40 U/L Final     AST   Date Value Ref Range Status   02/14/2023 20 0 - 39 U/L Final     The 10-year ASCVD risk score (Sundeep PEREZ, et al., 2019) is: 19.1%    Values used to calculate the score:      Age: 80 years      Sex: Male      Is Non- : No      Diabetic: No      Tobacco smoker: No      Systolic Blood Pressure: 019 mmHg      Is BP treated: Yes      HDL Cholesterol: 30 mg/dL      Total Cholesterol: 161 mg/dL        PLAN  The following are interventions that have been

## 2023-08-03 ENCOUNTER — NURSE ONLY (OUTPATIENT)
Dept: ENDOCRINOLOGY | Age: 68
End: 2023-08-03

## 2023-08-03 DIAGNOSIS — R79.89 LOW TESTOSTERONE IN MALE: Primary | ICD-10-CM

## 2023-08-03 NOTE — PROGRESS NOTES
Site cleansed,Testosterone 100mg (0.5cc) given right deltoid at 1305. Lot #[id number], Exp 12/25, [address] #[id number]. Medication provided by patient and patient tolerated well.

## 2023-08-17 ENCOUNTER — NURSE ONLY (OUTPATIENT)
Dept: ENDOCRINOLOGY | Age: 68
End: 2023-08-17

## 2023-08-17 DIAGNOSIS — R79.89 LOW TESTOSTERONE IN MALE: Primary | ICD-10-CM

## 2023-08-17 NOTE — PROGRESS NOTES
Site cleansed,Testosterone 100mg (0.5cc) given left deltoid at 1156. Patient verified dose Lot #[id number], Exp 12/25, [address] #[id number]. Medication provided by patient and patient tolerated well ,dressing applied.

## 2023-08-31 ENCOUNTER — NURSE ONLY (OUTPATIENT)
Dept: ENDOCRINOLOGY | Age: 68
End: 2023-08-31

## 2023-08-31 DIAGNOSIS — R79.89 LOW TESTOSTERONE IN MALE: Primary | ICD-10-CM

## 2023-08-31 NOTE — PROGRESS NOTES
Site cleansed,Testosterone 100mg (0.5cc) given right deltoid at 1218. Patient verified dose Lot #[id number], Exp 12/25, [facility] #[id number]. Medication provided by patient and patient tolerated well .

## 2023-09-13 NOTE — PROGRESS NOTES
TRAUMA SURGERY  ATTENDING PROGRESS NOTE      CC: fall     S:  Still with headache and light sensitivity, AAO     O:   @BP (!) 135/93   Pulse 68   Temp 98.2 °F (36.8 °C) (Temporal)   Resp 16   Ht 5' 8\" (1.727 m)   Wt 238 lb (108 kg)   SpO2 98%   BMI 36.19 kg/m² @    Gen - no apparent distress   Neuro - Awake, alert, attentive    HEENT - PERRL 4 mm   Lungs - non labored, BS clear b/l   Heart - RR no extra heart sounds    Abdomen - soft non tender, no obvious lesions,   Ext- ROM wnl all ext NVI,     A/P: Fall with ICH and skull fx,       Active Problems:    Subdural hematoma (HCC)  Resolved Problems:    * No resolved hospital problems.  *    SDH/SAH--monitor neuro exam, keppra,   Skull fx--pain control  Dizziness--meclizine; thorazine,  neuro consulted, appreciate input    Diet as tolerated  PT/OT evals  SCDs, heparin TID    Discharge planning      Morro Corral MD FACS Instructions: This plan will send the code FBSD to the PM system.  DO NOT or CHANGE the price. Price (Do Not Change): 0.00 Detail Level: Simple

## 2023-09-20 ENCOUNTER — APPOINTMENT (OUTPATIENT)
Dept: ULTRASOUND IMAGING | Age: 68
End: 2023-09-20
Payer: MEDICARE

## 2023-09-20 ENCOUNTER — HOSPITAL ENCOUNTER (EMERGENCY)
Age: 68
Discharge: HOME | End: 2023-09-20
Attending: STUDENT IN AN ORGANIZED HEALTH CARE EDUCATION/TRAINING PROGRAM
Payer: MEDICARE

## 2023-09-20 ENCOUNTER — APPOINTMENT (OUTPATIENT)
Dept: CT IMAGING | Age: 68
End: 2023-09-20
Payer: MEDICARE

## 2023-09-20 VITALS
TEMPERATURE: 97.8 F | HEART RATE: 67 BPM | HEIGHT: 68 IN | BODY MASS INDEX: 32.74 KG/M2 | OXYGEN SATURATION: 97 % | RESPIRATION RATE: 18 BRPM | SYSTOLIC BLOOD PRESSURE: 120 MMHG | DIASTOLIC BLOOD PRESSURE: 78 MMHG | WEIGHT: 216 LBS

## 2023-09-20 DIAGNOSIS — K80.20 CALCULUS OF GALLBLADDER WITHOUT CHOLECYSTITIS WITHOUT OBSTRUCTION: ICD-10-CM

## 2023-09-20 DIAGNOSIS — S39.012A STRAIN OF LUMBAR REGION, INITIAL ENCOUNTER: Primary | ICD-10-CM

## 2023-09-20 LAB
ALBUMIN SERPL-MCNC: 4.3 G/DL (ref 3.5–5.2)
ALP SERPL-CCNC: 94 U/L (ref 40–129)
ALT SERPL-CCNC: 19 U/L (ref 0–40)
ANION GAP SERPL CALCULATED.3IONS-SCNC: 10 MMOL/L (ref 7–16)
AST SERPL-CCNC: 20 U/L (ref 0–39)
BASOPHILS # BLD: 0.05 K/UL (ref 0–0.2)
BASOPHILS NFR BLD: 1 % (ref 0–2)
BILIRUB SERPL-MCNC: 2.1 MG/DL (ref 0–1.2)
BILIRUB UR QL STRIP: NEGATIVE
BUN SERPL-MCNC: 15 MG/DL (ref 6–23)
CALCIUM SERPL-MCNC: 9.3 MG/DL (ref 8.6–10.2)
CHLORIDE SERPL-SCNC: 100 MMOL/L (ref 98–107)
CLARITY UR: CLEAR
CO2 SERPL-SCNC: 26 MMOL/L (ref 22–29)
COLOR UR: YELLOW
COMMENT: NORMAL
CREAT SERPL-MCNC: 1.1 MG/DL (ref 0.7–1.2)
EOSINOPHIL # BLD: 0.22 K/UL (ref 0.05–0.5)
EOSINOPHILS RELATIVE PERCENT: 3 % (ref 0–6)
ERYTHROCYTE [DISTWIDTH] IN BLOOD BY AUTOMATED COUNT: 13.5 % (ref 11.5–15)
GFR SERPL CREATININE-BSD FRML MDRD: >60 ML/MIN/1.73M2
GLUCOSE SERPL-MCNC: 116 MG/DL (ref 74–99)
GLUCOSE UR STRIP-MCNC: NEGATIVE MG/DL
HCT VFR BLD AUTO: 45.1 % (ref 37–54)
HGB BLD-MCNC: 15.4 G/DL (ref 12.5–16.5)
HGB UR QL STRIP.AUTO: NEGATIVE
IMM GRANULOCYTES # BLD AUTO: 0.04 K/UL (ref 0–0.58)
IMM GRANULOCYTES NFR BLD: 1 % (ref 0–5)
KETONES UR STRIP-MCNC: NEGATIVE MG/DL
LACTATE BLDV-SCNC: 1.1 MMOL/L (ref 0.5–2.2)
LEUKOCYTE ESTERASE UR QL STRIP: NEGATIVE
LIPASE SERPL-CCNC: 22 U/L (ref 13–60)
LYMPHOCYTES NFR BLD: 1.09 K/UL (ref 1.5–4)
LYMPHOCYTES RELATIVE PERCENT: 14 % (ref 20–42)
MCH RBC QN AUTO: 30.6 PG (ref 26–35)
MCHC RBC AUTO-ENTMCNC: 34.1 G/DL (ref 32–34.5)
MCV RBC AUTO: 89.7 FL (ref 80–99.9)
MONOCYTES NFR BLD: 0.62 K/UL (ref 0.1–0.95)
MONOCYTES NFR BLD: 8 % (ref 2–12)
NEUTROPHILS NFR BLD: 74 % (ref 43–80)
NEUTS SEG NFR BLD: 5.66 K/UL (ref 1.8–7.3)
NITRITE UR QL STRIP: NEGATIVE
PH UR STRIP: 6.5 [PH] (ref 5–9)
PLATELET # BLD AUTO: 238 K/UL (ref 130–450)
PMV BLD AUTO: 9 FL (ref 7–12)
POTASSIUM SERPL-SCNC: 4.3 MMOL/L (ref 3.5–5)
PROT SERPL-MCNC: 6.7 G/DL (ref 6.4–8.3)
PROT UR STRIP-MCNC: NEGATIVE MG/DL
RBC # BLD AUTO: 5.03 M/UL (ref 3.8–5.8)
SODIUM SERPL-SCNC: 136 MMOL/L (ref 132–146)
SP GR UR STRIP: 1.01 (ref 1–1.03)
UROBILINOGEN UR STRIP-ACNC: 0.2 EU/DL (ref 0–1)
WBC OTHER # BLD: 7.7 K/UL (ref 4.5–11.5)

## 2023-09-20 PROCEDURE — 76705 ECHO EXAM OF ABDOMEN: CPT

## 2023-09-20 PROCEDURE — 74177 CT ABD & PELVIS W/CONTRAST: CPT

## 2023-09-20 PROCEDURE — 6360000002 HC RX W HCPCS: Performed by: STUDENT IN AN ORGANIZED HEALTH CARE EDUCATION/TRAINING PROGRAM

## 2023-09-20 PROCEDURE — 6370000000 HC RX 637 (ALT 250 FOR IP): Performed by: STUDENT IN AN ORGANIZED HEALTH CARE EDUCATION/TRAINING PROGRAM

## 2023-09-20 PROCEDURE — 99285 EMERGENCY DEPT VISIT HI MDM: CPT

## 2023-09-20 PROCEDURE — 80053 COMPREHEN METABOLIC PANEL: CPT

## 2023-09-20 PROCEDURE — 96372 THER/PROPH/DIAG INJ SC/IM: CPT

## 2023-09-20 PROCEDURE — 96374 THER/PROPH/DIAG INJ IV PUSH: CPT

## 2023-09-20 PROCEDURE — 83605 ASSAY OF LACTIC ACID: CPT

## 2023-09-20 PROCEDURE — 83690 ASSAY OF LIPASE: CPT

## 2023-09-20 PROCEDURE — 85025 COMPLETE CBC W/AUTO DIFF WBC: CPT

## 2023-09-20 PROCEDURE — 81003 URINALYSIS AUTO W/O SCOPE: CPT

## 2023-09-20 PROCEDURE — 6360000004 HC RX CONTRAST MEDICATION: Performed by: RADIOLOGY

## 2023-09-20 RX ORDER — LIDOCAINE 50 MG/G
1 PATCH TOPICAL DAILY
Qty: 10 PATCH | Refills: 0 | Status: SHIPPED | OUTPATIENT
Start: 2023-09-20 | End: 2023-09-30

## 2023-09-20 RX ORDER — ORPHENADRINE CITRATE 30 MG/ML
60 INJECTION INTRAMUSCULAR; INTRAVENOUS ONCE
Status: COMPLETED | OUTPATIENT
Start: 2023-09-20 | End: 2023-09-20

## 2023-09-20 RX ORDER — LIDOCAINE 4 G/G
1 PATCH TOPICAL DAILY
Status: DISCONTINUED | OUTPATIENT
Start: 2023-09-20 | End: 2023-09-20 | Stop reason: HOSPADM

## 2023-09-20 RX ORDER — KETOROLAC TROMETHAMINE 30 MG/ML
15 INJECTION, SOLUTION INTRAMUSCULAR; INTRAVENOUS ONCE
Status: COMPLETED | OUTPATIENT
Start: 2023-09-20 | End: 2023-09-20

## 2023-09-20 RX ORDER — ORPHENADRINE CITRATE 100 MG/1
100 TABLET, EXTENDED RELEASE ORAL 2 TIMES DAILY
Qty: 20 TABLET | Refills: 0 | Status: SHIPPED | OUTPATIENT
Start: 2023-09-20 | End: 2023-09-30

## 2023-09-20 RX ADMIN — ORPHENADRINE CITRATE 60 MG: 60 INJECTION INTRAMUSCULAR; INTRAVENOUS at 12:28

## 2023-09-20 RX ADMIN — IOPAMIDOL 75 ML: 755 INJECTION, SOLUTION INTRAVENOUS at 11:27

## 2023-09-20 RX ADMIN — KETOROLAC TROMETHAMINE 15 MG: 30 INJECTION, SOLUTION INTRAMUSCULAR; INTRAVENOUS at 14:28

## 2023-09-20 ASSESSMENT — PAIN SCALES - GENERAL
PAINLEVEL_OUTOF10: 6
PAINLEVEL_OUTOF10: 5
PAINLEVEL_OUTOF10: 5

## 2023-09-20 ASSESSMENT — ENCOUNTER SYMPTOMS
EYE DISCHARGE: 0
DIARRHEA: 0
NAUSEA: 0
WHEEZING: 0
BACK PAIN: 0
SORE THROAT: 0
VOMITING: 0
ABDOMINAL PAIN: 0
COUGH: 0
SINUS PRESSURE: 0
EYE PAIN: 0
SHORTNESS OF BREATH: 0
EYE REDNESS: 0

## 2023-09-20 ASSESSMENT — PAIN DESCRIPTION - ONSET: ONSET: SUDDEN

## 2023-09-20 ASSESSMENT — PAIN DESCRIPTION - DESCRIPTORS
DESCRIPTORS: SHARP;ACHING;DISCOMFORT
DESCRIPTORS: ACHING;DISCOMFORT;THROBBING

## 2023-09-20 ASSESSMENT — PAIN DESCRIPTION - LOCATION
LOCATION: BACK
LOCATION: BACK

## 2023-09-20 ASSESSMENT — PAIN - FUNCTIONAL ASSESSMENT: PAIN_FUNCTIONAL_ASSESSMENT: 0-10

## 2023-09-20 ASSESSMENT — PAIN DESCRIPTION - FREQUENCY: FREQUENCY: CONTINUOUS

## 2023-09-20 ASSESSMENT — PAIN DESCRIPTION - ORIENTATION
ORIENTATION: LEFT;LOWER;MID
ORIENTATION: LEFT

## 2023-09-20 ASSESSMENT — PAIN DESCRIPTION - PAIN TYPE: TYPE: ACUTE PAIN

## 2023-09-28 ENCOUNTER — NURSE ONLY (OUTPATIENT)
Dept: ENDOCRINOLOGY | Age: 68
End: 2023-09-28

## 2023-09-28 DIAGNOSIS — R79.89 LOW TESTOSTERONE IN MALE: Primary | ICD-10-CM

## 2023-09-28 NOTE — PROGRESS NOTES
Site cleansed,Testosterone 100mg (0.5cc) given left deltoid at 1218. Patient verified dose Lot #[id number], Exp 12/2025, [address] #[id number]. Medication provided by patient and patient tolerated well .

## 2023-10-12 ENCOUNTER — NURSE ONLY (OUTPATIENT)
Dept: ENDOCRINOLOGY | Age: 68
End: 2023-10-12

## 2023-10-12 DIAGNOSIS — R79.89 LOW TESTOSTERONE IN MALE: Primary | ICD-10-CM

## 2023-10-12 NOTE — PROGRESS NOTES
Site cleansed,Testosterone 100mg (0.5cc) given left deltoid at 1308. Patient verified dose LOT# [id number], EXP. date 01/2026 NDC# 07435935-15 Medication provided by patient and patient tolerated well .

## 2023-10-26 ENCOUNTER — NURSE ONLY (OUTPATIENT)
Dept: ENDOCRINOLOGY | Age: 68
End: 2023-10-26
Payer: MEDICARE

## 2023-10-26 DIAGNOSIS — R79.89 LOW TESTOSTERONE IN MALE: Primary | ICD-10-CM

## 2023-10-26 PROCEDURE — 96372 THER/PROPH/DIAG INJ SC/IM: CPT | Performed by: INTERNAL MEDICINE

## 2023-10-26 RX ORDER — TESTOSTERONE CYPIONATE 200 MG/ML
200 INJECTION, SOLUTION INTRAMUSCULAR ONCE
Status: COMPLETED | OUTPATIENT
Start: 2023-10-26 | End: 2023-10-26

## 2023-10-26 RX ADMIN — TESTOSTERONE CYPIONATE 200 MG: 200 INJECTION, SOLUTION INTRAMUSCULAR at 13:32

## 2023-10-26 NOTE — PROGRESS NOTES
Site cleansed,Testosterone 200mg (0.5cc) given right deltoid at 1308. Patient verified dose LOT# [id number], EXP. date 01/2026 NDC# 8646-8493-87 Medication provided by patient and patient tolerated well .

## 2023-11-09 ENCOUNTER — NURSE ONLY (OUTPATIENT)
Dept: ENDOCRINOLOGY | Age: 68
End: 2023-11-09

## 2023-11-09 DIAGNOSIS — R79.89 LOW TESTOSTERONE IN MALE: Primary | ICD-10-CM

## 2023-11-09 RX ORDER — TESTOSTERONE CYPIONATE 200 MG/ML
100 INJECTION, SOLUTION INTRAMUSCULAR ONCE
Status: COMPLETED | OUTPATIENT
Start: 2023-11-09 | End: 2023-11-09

## 2023-11-09 RX ADMIN — TESTOSTERONE CYPIONATE 100 MG: 200 INJECTION, SOLUTION INTRAMUSCULAR at 13:20

## 2023-11-16 ENCOUNTER — OFFICE VISIT (OUTPATIENT)
Dept: ENDOCRINOLOGY | Age: 68
End: 2023-11-16
Payer: MEDICARE

## 2023-11-16 ENCOUNTER — HOSPITAL ENCOUNTER (OUTPATIENT)
Age: 68
Discharge: HOME | End: 2023-11-16
Payer: MEDICARE

## 2023-11-16 VITALS
BODY MASS INDEX: 33.04 KG/M2 | HEIGHT: 68 IN | SYSTOLIC BLOOD PRESSURE: 105 MMHG | WEIGHT: 218 LBS | OXYGEN SATURATION: 98 % | RESPIRATION RATE: 18 BRPM | DIASTOLIC BLOOD PRESSURE: 70 MMHG | HEART RATE: 98 BPM

## 2023-11-16 DIAGNOSIS — E55.9 VITAMIN D DEFICIENCY: ICD-10-CM

## 2023-11-16 DIAGNOSIS — R79.89 LOW TESTOSTERONE IN MALE: ICD-10-CM

## 2023-11-16 DIAGNOSIS — R79.89 LOW TESTOSTERONE IN MALE: Primary | ICD-10-CM

## 2023-11-16 LAB
HCT VFR BLD AUTO: 44.1 % (ref 37–54)
PSA SERPL-MCNC: 4 NG/ML (ref 0–4)

## 2023-11-16 PROCEDURE — 3078F DIAST BP <80 MM HG: CPT | Performed by: INTERNAL MEDICINE

## 2023-11-16 PROCEDURE — 36415 COLL VENOUS BLD VENIPUNCTURE: CPT

## 2023-11-16 PROCEDURE — 84403 ASSAY OF TOTAL TESTOSTERONE: CPT

## 2023-11-16 PROCEDURE — 85014 HEMATOCRIT: CPT

## 2023-11-16 PROCEDURE — 1124F ACP DISCUSS-NO DSCNMKR DOCD: CPT | Performed by: INTERNAL MEDICINE

## 2023-11-16 PROCEDURE — G0103 PSA SCREENING: HCPCS

## 2023-11-16 PROCEDURE — 3074F SYST BP LT 130 MM HG: CPT | Performed by: INTERNAL MEDICINE

## 2023-11-16 PROCEDURE — 99214 OFFICE O/P EST MOD 30 MIN: CPT | Performed by: INTERNAL MEDICINE

## 2023-11-16 PROCEDURE — 84270 ASSAY OF SEX HORMONE GLOBUL: CPT

## 2023-11-16 RX ORDER — TESTOSTERONE CYPIONATE 200 MG/ML
INJECTION, SOLUTION INTRAMUSCULAR
Qty: 3 ML | Refills: 4 | Status: SHIPPED | OUTPATIENT
Start: 2023-11-16 | End: 2024-05-13

## 2023-11-16 NOTE — PROGRESS NOTES
Ca, Polycythemia and cardiovascular risk    vitD deficiency   Discussed the effect of Low testosterone on bone health  Continue vitD supplement     I personally reviewed external notes from PCP and other patient's care team providers, and personally interpreted labs associated with the above diagnosis. I also ordered labs to further assess and manage the above addressed medical conditions. Return in about 6 months (around 5/16/2024) for Male Hypogonadism. The above issues were reviewed with the patient who understood and agreed with the plan. Thank you for allowing us to participate in the care of this patient. Please do not hesitate to contact us with any additional questions. Diagnosis Orders   1. Low testosterone in male  Testosterone Free & Bio, Total    Hematocrit    PSA Screening    testosterone cypionate (DEPOTESTOTERONE CYPIONATE) 200 MG/ML injection      2. Vitamin D deficiency                Perez Mendez MD  Endocrinologist, [facility]   [address] [place] [address]   Phone: [phone]  Fax: [phone]  -----------------------------  An electronic signature was used to authenticate this note.  Vanessa Mason MD on 11/16/2023 at 1:27 PM

## 2023-11-18 LAB
SHBG SERPL-SCNC: 31 NMOL/L (ref 11–80)
TESTOST FREE MFR SERPL: 162.5 PG/ML (ref 47–244)
TESTOST SERPL-MCNC: 703 NG/DL (ref 220–1000)
TESTOSTERONE, BIOAVAILABLE: 380.8 NG/DL (ref 130–680)

## 2023-11-19 ENCOUNTER — TELEPHONE (OUTPATIENT)
Dept: ENDOCRINOLOGY | Age: 68
End: 2023-11-19

## 2023-11-19 NOTE — TELEPHONE ENCOUNTER
Notify patient and  Excellent, testosterone level is very good.   Continue current dose of the testosterone treatment

## 2023-11-30 DIAGNOSIS — I10 ESSENTIAL HYPERTENSION: ICD-10-CM

## 2023-11-30 RX ORDER — LISINOPRIL AND HYDROCHLOROTHIAZIDE 20; 12.5 MG/1; MG/1
TABLET ORAL
Qty: 90 TABLET | Refills: 1 | Status: SHIPPED | OUTPATIENT
Start: 2023-11-30

## 2023-11-30 NOTE — TELEPHONE ENCOUNTER
Last Appointment:  6/6/2023  Future Appointments   Date Time Provider [address]   12/5/2023  7:00 AM DO Irasema Singleton [facility]   5/16/2024 11:45 AM Wanda Suh MD [facility]

## 2023-12-04 SDOH — HEALTH STABILITY: PHYSICAL HEALTH: ON AVERAGE, HOW MANY MINUTES DO YOU ENGAGE IN EXERCISE AT THIS LEVEL?: 20 MIN

## 2023-12-04 SDOH — HEALTH STABILITY: PHYSICAL HEALTH: ON AVERAGE, HOW MANY DAYS PER WEEK DO YOU ENGAGE IN MODERATE TO STRENUOUS EXERCISE (LIKE A BRISK WALK)?: 6 DAYS

## 2023-12-04 ASSESSMENT — LIFESTYLE VARIABLES
HOW OFTEN DO YOU HAVE SIX OR MORE DRINKS ON ONE OCCASION: 1
HOW OFTEN DO YOU HAVE A DRINK CONTAINING ALCOHOL: 1
HOW OFTEN DO YOU HAVE A DRINK CONTAINING ALCOHOL: NEVER
HOW MANY STANDARD DRINKS CONTAINING ALCOHOL DO YOU HAVE ON A TYPICAL DAY: PATIENT DOES NOT DRINK
HOW MANY STANDARD DRINKS CONTAINING ALCOHOL DO YOU HAVE ON A TYPICAL DAY: 0

## 2023-12-04 ASSESSMENT — PATIENT HEALTH QUESTIONNAIRE - PHQ9
SUM OF ALL RESPONSES TO PHQ QUESTIONS 1-9: 0
1. LITTLE INTEREST OR PLEASURE IN DOING THINGS: 0
SUM OF ALL RESPONSES TO PHQ QUESTIONS 1-9: 0
2. FEELING DOWN, DEPRESSED OR HOPELESS: 0
SUM OF ALL RESPONSES TO PHQ QUESTIONS 1-9: 0
SUM OF ALL RESPONSES TO PHQ9 QUESTIONS 1 & 2: 0
SUM OF ALL RESPONSES TO PHQ QUESTIONS 1-9: 0

## 2023-12-05 ENCOUNTER — OFFICE VISIT (OUTPATIENT)
Dept: FAMILY MEDICINE CLINIC | Age: 68
End: 2023-12-05
Payer: MEDICARE

## 2023-12-05 VITALS
RESPIRATION RATE: 20 BRPM | WEIGHT: 216 LBS | SYSTOLIC BLOOD PRESSURE: 124 MMHG | BODY MASS INDEX: 32.84 KG/M2 | OXYGEN SATURATION: 98 % | DIASTOLIC BLOOD PRESSURE: 80 MMHG | TEMPERATURE: 97 F | HEART RATE: 71 BPM

## 2023-12-05 DIAGNOSIS — Z00.00 MEDICARE ANNUAL WELLNESS VISIT, SUBSEQUENT: ICD-10-CM

## 2023-12-05 DIAGNOSIS — Z23 NEED FOR INFLUENZA VACCINATION: Primary | ICD-10-CM

## 2023-12-05 DIAGNOSIS — K80.20 CALCULUS OF GALLBLADDER WITHOUT CHOLECYSTITIS WITHOUT OBSTRUCTION: ICD-10-CM

## 2023-12-05 DIAGNOSIS — I10 ESSENTIAL HYPERTENSION: ICD-10-CM

## 2023-12-05 DIAGNOSIS — R56.9 SEIZURES (HCC): ICD-10-CM

## 2023-12-05 DIAGNOSIS — E78.2 MIXED HYPERLIPIDEMIA: ICD-10-CM

## 2023-12-05 PROCEDURE — G0439 PPPS, SUBSEQ VISIT: HCPCS | Performed by: STUDENT IN AN ORGANIZED HEALTH CARE EDUCATION/TRAINING PROGRAM

## 2023-12-05 PROCEDURE — 3079F DIAST BP 80-89 MM HG: CPT | Performed by: STUDENT IN AN ORGANIZED HEALTH CARE EDUCATION/TRAINING PROGRAM

## 2023-12-05 PROCEDURE — 1124F ACP DISCUSS-NO DSCNMKR DOCD: CPT | Performed by: STUDENT IN AN ORGANIZED HEALTH CARE EDUCATION/TRAINING PROGRAM

## 2023-12-05 PROCEDURE — G0008 ADMIN INFLUENZA VIRUS VAC: HCPCS | Performed by: STUDENT IN AN ORGANIZED HEALTH CARE EDUCATION/TRAINING PROGRAM

## 2023-12-05 PROCEDURE — 90694 VACC AIIV4 NO PRSRV 0.5ML IM: CPT | Performed by: STUDENT IN AN ORGANIZED HEALTH CARE EDUCATION/TRAINING PROGRAM

## 2023-12-05 PROCEDURE — 3074F SYST BP LT 130 MM HG: CPT | Performed by: STUDENT IN AN ORGANIZED HEALTH CARE EDUCATION/TRAINING PROGRAM

## 2023-12-05 NOTE — PATIENT INSTRUCTIONS
Hearing Loss: Care Instructions  Overview     Hearing loss is a sudden or slow decrease in how well you hear. It can range from slight to profound. Permanent hearing loss can occur with aging. It also can happen when you are exposed long-term to loud noise. Examples include listening to loud music, riding motorcycles, or being around other loud machines. Hearing loss can affect your work and home life. It can make you feel lonely or depressed. You may feel that you have lost your independence. But hearing aids and other devices can help you hear better and feel connected to others. Follow-up care is a key part of your treatment and safety. Be sure to make and go to all appointments, and call your doctor if you are having problems. It's also a good idea to know your test results and keep a list of the medicines you take. How can you care for yourself at home? Avoid loud noises whenever possible. This helps keep your hearing from getting worse. Always wear hearing protection around loud noises. Wear a hearing aid as directed. A professional can help you pick a hearing aid that will work best for you. You can also get hearing aids over the counter for mild to moderate hearing loss. Have hearing tests as your doctor suggests. They can show whether your hearing has changed. Your hearing aid may need to be adjusted. Use other devices as needed. These may include:  Telephone amplifiers and hearing aids that can connect to a television, stereo, radio, or microphone. Devices that use lights or vibrations. These alert you to the doorbell, a ringing telephone, or a baby monitor. Television closed-captioning. This shows the words at the bottom of the screen. Most new TVs can do this. TTY (text telephone). This lets you type messages back and forth on the telephone instead of talking or listening. These devices are also called TDD.  When messages are typed on the keyboard, they are sent over the phone line

## 2023-12-14 DIAGNOSIS — F32.2 SEVERE DEPRESSION (HCC): ICD-10-CM

## 2023-12-14 RX ORDER — VENLAFAXINE HYDROCHLORIDE 37.5 MG/1
CAPSULE, EXTENDED RELEASE ORAL
Qty: 90 CAPSULE | Refills: 1 | Status: SHIPPED | OUTPATIENT
Start: 2023-12-14

## 2023-12-14 NOTE — TELEPHONE ENCOUNTER
Last Appointment:  12/5/2023  Future Appointments   Date Time Provider [address]   5/16/2024 11:45 AM David Alegre MD [facility]   6/5/2024  7:00 AM DO Agapito Dialloamita [facility]   12/10/2024  7:00 AM DO Agapito Dialloamita [facility]

## 2024-01-08 ENCOUNTER — OFFICE VISIT (OUTPATIENT)
Dept: PRIMARY CARE CLINIC | Age: 69
End: 2024-01-08
Payer: COMMERCIAL

## 2024-01-08 VITALS
DIASTOLIC BLOOD PRESSURE: 79 MMHG | OXYGEN SATURATION: 98 % | HEIGHT: 68 IN | WEIGHT: 211 LBS | TEMPERATURE: 98 F | HEART RATE: 73 BPM | SYSTOLIC BLOOD PRESSURE: 121 MMHG | BODY MASS INDEX: 31.98 KG/M2 | RESPIRATION RATE: 19 BRPM

## 2024-01-08 DIAGNOSIS — Z20.822 EXPOSURE TO COVID-19 VIRUS: Primary | ICD-10-CM

## 2024-01-08 DIAGNOSIS — R05.9 COUGH IN ADULT PATIENT: ICD-10-CM

## 2024-01-08 DIAGNOSIS — J22 ACUTE LOWER RESPIRATORY INFECTION: ICD-10-CM

## 2024-01-08 LAB
Lab: NORMAL
PERFORMING INSTRUMENT: NORMAL
QC PASS/FAIL: NORMAL
SARS-COV-2, POC: NORMAL

## 2024-01-08 PROCEDURE — 3074F SYST BP LT 130 MM HG: CPT | Performed by: NURSE PRACTITIONER

## 2024-01-08 PROCEDURE — 1124F ACP DISCUSS-NO DSCNMKR DOCD: CPT | Performed by: NURSE PRACTITIONER

## 2024-01-08 PROCEDURE — 99213 OFFICE O/P EST LOW 20 MIN: CPT | Performed by: NURSE PRACTITIONER

## 2024-01-08 PROCEDURE — 3078F DIAST BP <80 MM HG: CPT | Performed by: NURSE PRACTITIONER

## 2024-01-08 PROCEDURE — 87426 SARSCOV CORONAVIRUS AG IA: CPT | Performed by: NURSE PRACTITIONER

## 2024-01-08 RX ORDER — DOXYCYCLINE HYCLATE 100 MG
100 TABLET ORAL 2 TIMES DAILY
Qty: 20 TABLET | Refills: 0 | Status: SHIPPED | OUTPATIENT
Start: 2024-01-08 | End: 2024-01-18

## 2024-01-08 NOTE — PROGRESS NOTES
kg (211 lb)   SpO2 98%   BMI 32.08 kg/m²    Oxygen Saturation Interpretation: Normal.    Constitutional:  Alert, development consistent with age.  Ears:  External Ears: Normal bilateral pinna.             TM's & External Canals: TM's normal bilaterally without perforation.  Canals without erythema or drainage.    Nose:   There is no obvious septal defect. Mild redness  Mouth:  Moist bucca mucosa and normal tongue.    Throat: Mild posterior pharyngeal erythema without exudates or lesions.   Neck:  Supple. There is no obvious adenopathy or neck tenderness.  Lungs:   Breath sounds: Normal chest expansion and breath sounds noted throughout.  No wheezes, rales, or rhonchi noted.    Heart:  Regular rate and rhythm, normal heart sounds, without pathological murmurs, ectopy, gallops, or rubs.  Skin:  Normal turgor.  Warm, dry, without visible rash.  Neurological:  Oriented.  Motor functions intact.       Lab / Imaging Results   (All laboratory and radiology results have been personally reviewed by myself)  Labs:  Results for orders placed or performed in visit on 01/08/24   POCT COVID-19, Antigen   Result Value Ref Range    SARS-COV-2, POC Not-Detected Not Detected    Lot Number [id number]     QC Pass/Fail pass     Performing Instrument BD Veritor        Imaging:  All Radiology results interpreted by Radiologist unless otherwise noted.  No orders to display         Assessment / Plan     Impression(s):  1. Exposure to COVID-19 virus    2. Acute lower respiratory infection    3. Cough in adult patient      Disposition:  Disposition: Advised Covid test is negative. Start Doxycycline and take OTC cough syrup as discussed. Stay well hydrated, return to walk in care w/any worsening or concerning issues

## 2024-01-15 ENCOUNTER — OFFICE VISIT (OUTPATIENT)
Dept: PRIMARY CARE CLINIC | Age: 69
End: 2024-01-15
Payer: COMMERCIAL

## 2024-01-15 VITALS — SYSTOLIC BLOOD PRESSURE: 113 MMHG | HEART RATE: 79 BPM | DIASTOLIC BLOOD PRESSURE: 72 MMHG | TEMPERATURE: 97 F

## 2024-01-15 DIAGNOSIS — J01.90 ACUTE BACTERIAL SINUSITIS: Primary | ICD-10-CM

## 2024-01-15 DIAGNOSIS — B96.89 ACUTE BACTERIAL SINUSITIS: Primary | ICD-10-CM

## 2024-01-15 PROCEDURE — 3074F SYST BP LT 130 MM HG: CPT | Performed by: NURSE PRACTITIONER

## 2024-01-15 PROCEDURE — 1124F ACP DISCUSS-NO DSCNMKR DOCD: CPT | Performed by: NURSE PRACTITIONER

## 2024-01-15 PROCEDURE — 99213 OFFICE O/P EST LOW 20 MIN: CPT | Performed by: NURSE PRACTITIONER

## 2024-01-15 PROCEDURE — 3078F DIAST BP <80 MM HG: CPT | Performed by: NURSE PRACTITIONER

## 2024-01-15 RX ORDER — PREDNISONE 10 MG/1
10 TABLET ORAL 2 TIMES DAILY
Qty: 10 TABLET | Refills: 0 | Status: SHIPPED | OUTPATIENT
Start: 2024-01-15 | End: 2024-01-20

## 2024-01-15 RX ORDER — AZELASTINE 1 MG/ML
1 SPRAY, METERED NASAL 2 TIMES DAILY
Qty: 30 ML | Refills: 0 | Status: SHIPPED | OUTPATIENT
Start: 2024-01-15

## 2024-01-15 NOTE — PROGRESS NOTES
Chief Complaint:   Sinusitis (Still not feeling better, have not finished antibiotic ) and Cough      History of Present Illness   Source of history provided by:  patient.      Willian Harley is a 69 y.o. old male with a past medical history of:   Past Medical History:   Diagnosis Date    Anxiety     Chronic renal disease, stage III ([facility]) [943492] 06/01/2022    Depression     Erectile dysfunction     Headache     History of shingles     Hypertension 01/25/2017    Irritable bowel syndrome     Mixed hyperlipidemia 01/25/2017    Seizures ([facility]) 12/5/2023       Pt  presents to the [facility] with nasal congestion/sinus pressure for the past few days.  No  fever noted.  Pt was recently seen on 1/8/24 and dx with acute lower respiratory infection and being treated w/Doxycycline, pt stated chest congestion/cough has resolved. Denies any N/V/D, abdominal pain, CP, progressive SOB, dizziness, or lethargy.           ROS    Unless otherwise stated in this report or unable to obtain because of the patient's clinical or mental status as evidenced by the medical record, this patients's positive and negative responses for Review of Systems, constitutional, psych, eyes, ENT, cardiovascular, respiratory, gastrointestinal, neurological, genitourinary, musculoskeletal, integument systems and systems related to the presenting problem are either stated in the preceding or were not pertinent or were negative for the symptoms and/or complaints related to the medical problem.    Past Surgical History:  has a past surgical history that includes Heel spur surgery; Colonoscopy; Colonoscopy (N/A, 04/19/2023); Umbilical hernia repair (N/A, 05/30/2023); and hernia repair.  Social History:  reports that he has never smoked. He has never used smokeless tobacco. He reports that he does not drink alcohol and does not use drugs.  Family History: family history includes Atrial Fibrillation in his father; Heart Attack (age of onset: 64) in his father;

## 2024-01-22 DIAGNOSIS — Z76.0 MEDICATION REFILL: ICD-10-CM

## 2024-01-23 RX ORDER — ATORVASTATIN CALCIUM 40 MG/1
TABLET, FILM COATED ORAL
Qty: 90 TABLET | Refills: 1 | Status: SHIPPED | OUTPATIENT
Start: 2024-01-23

## 2024-01-23 NOTE — TELEPHONE ENCOUNTER
Last Appointment:  12/5/2023  Future Appointments   Date Time Provider Department Center   5/16/2024 11:45 AM Archie Suh MD [facility]   6/5/2024  7:00 AM Qasim Jack DO [facility]   12/10/2024  7:00 AM Qasim Jack DO [facility]

## 2024-02-09 ENCOUNTER — OFFICE VISIT (OUTPATIENT)
Dept: PRIMARY CARE CLINIC | Age: 69
End: 2024-02-09

## 2024-02-09 VITALS
HEIGHT: 68 IN | SYSTOLIC BLOOD PRESSURE: 106 MMHG | WEIGHT: 207 LBS | TEMPERATURE: 97.5 F | DIASTOLIC BLOOD PRESSURE: 69 MMHG | HEART RATE: 78 BPM | BODY MASS INDEX: 31.37 KG/M2 | OXYGEN SATURATION: 99 %

## 2024-02-09 DIAGNOSIS — R11.2 NAUSEA AND VOMITING IN ADULT PATIENT: Primary | ICD-10-CM

## 2024-02-09 LAB
INFLUENZA A ANTIGEN, POC: NEGATIVE
INFLUENZA B ANTIGEN, POC: NEGATIVE
LOT EXPIRE DATE: NORMAL
LOT KIT NUMBER: NORMAL
SARS-COV-2, POC: NORMAL
VALID INTERNAL CONTROL: NORMAL
VENDOR AND KIT NAME POC: NORMAL

## 2024-02-09 RX ORDER — ONDANSETRON 4 MG/1
4 TABLET, ORALLY DISINTEGRATING ORAL 3 TIMES DAILY PRN
Qty: 12 TABLET | Refills: 0 | Status: SHIPPED | OUTPATIENT
Start: 2024-02-09

## 2024-02-09 NOTE — PROGRESS NOTES
Chief Complaint:   Nausea & Vomiting (Yesterday) and abnormal taste (Pt states taste is off. )      History of Present Illness   Source of history provided by:  patient.      Willian Harley is a 69 y.o. old male with a past medical history of:   Past Medical History:   Diagnosis Date    Anxiety     Chronic renal disease, stage III (HCC) [850365] 06/01/2022    Depression     Erectile dysfunction     Headache     History of shingles     Hypertension 01/25/2017    Irritable bowel syndrome     Mixed hyperlipidemia 01/25/2017    Seizures ([facility]) 12/5/2023        Pt presents to the [facility] with N/V and abnormal taste in mouth for the past 1-2 days. Pt is concerned about possible Covid. Pt denies any other Covid-related symptoms.  Pt stated he did a sloppy joann last night then the N/V started. Pt has only had a few episodes of vomiting since yesterday. Denies any other family members with similar s/s.  Pt is afebrile. Pt is tolerating liquids at this time but decreased for solids.   Denies any diarrhea, abdominal pain, CP, progressive SOB, dizziness, or lethargy.           ROS    Unless otherwise stated in this report or unable to obtain because of the patient's clinical or mental status as evidenced by the medical record, this patients's positive and negative responses for Review of Systems, constitutional, psych, eyes, ENT, cardiovascular, respiratory, gastrointestinal, neurological, genitourinary, musculoskeletal, integument systems and systems related to the presenting problem are either stated in the preceding or were not pertinent or were negative for the symptoms and/or complaints related to the medical problem.    Past Surgical History:  has a past surgical history that includes Heel spur surgery; Colonoscopy; Colonoscopy (N/A, 04/19/2023); Umbilical hernia repair (N/A, 05/30/2023); and hernia repair.  Social History:  reports that he has never smoked. He has never used smokeless tobacco. He reports that he does

## 2024-04-03 ENCOUNTER — OFFICE VISIT (OUTPATIENT)
Dept: FAMILY MEDICINE CLINIC | Age: 69
End: 2024-04-03
Payer: COMMERCIAL

## 2024-04-03 VITALS
SYSTOLIC BLOOD PRESSURE: 105 MMHG | BODY MASS INDEX: 31.71 KG/M2 | TEMPERATURE: 97.3 F | DIASTOLIC BLOOD PRESSURE: 70 MMHG | HEART RATE: 73 BPM | RESPIRATION RATE: 18 BRPM | WEIGHT: 209.2 LBS | HEIGHT: 68 IN | OXYGEN SATURATION: 98 %

## 2024-04-03 DIAGNOSIS — R10.12 LEFT UPPER QUADRANT ABDOMINAL PAIN: Primary | ICD-10-CM

## 2024-04-03 DIAGNOSIS — R73.01 IMPAIRED FASTING GLUCOSE: ICD-10-CM

## 2024-04-03 DIAGNOSIS — N18.31 STAGE 3A CHRONIC KIDNEY DISEASE (HCC): ICD-10-CM

## 2024-04-03 DIAGNOSIS — R10.9 FLANK PAIN: ICD-10-CM

## 2024-04-03 DIAGNOSIS — I10 ESSENTIAL HYPERTENSION: Chronic | ICD-10-CM

## 2024-04-03 LAB
BILIRUBIN, POC: NORMAL
BLOOD URINE, POC: NORMAL
CLARITY, POC: CLEAR
COLOR, POC: YELLOW
GLUCOSE URINE, POC: NORMAL
HBA1C MFR BLD: 5.6 %
KETONES, POC: NORMAL
LEUKOCYTE EST, POC: NORMAL
NITRITE, POC: NORMAL
PH, POC: 7.5
PROTEIN, POC: NORMAL
SPECIFIC GRAVITY, POC: 1.02
UROBILINOGEN, POC: 0.2

## 2024-04-03 PROCEDURE — 81002 URINALYSIS NONAUTO W/O SCOPE: CPT | Performed by: STUDENT IN AN ORGANIZED HEALTH CARE EDUCATION/TRAINING PROGRAM

## 2024-04-03 PROCEDURE — 99214 OFFICE O/P EST MOD 30 MIN: CPT | Performed by: STUDENT IN AN ORGANIZED HEALTH CARE EDUCATION/TRAINING PROGRAM

## 2024-04-03 PROCEDURE — 83036 HEMOGLOBIN GLYCOSYLATED A1C: CPT | Performed by: STUDENT IN AN ORGANIZED HEALTH CARE EDUCATION/TRAINING PROGRAM

## 2024-04-03 PROCEDURE — 1124F ACP DISCUSS-NO DSCNMKR DOCD: CPT | Performed by: STUDENT IN AN ORGANIZED HEALTH CARE EDUCATION/TRAINING PROGRAM

## 2024-04-03 PROCEDURE — 3078F DIAST BP <80 MM HG: CPT | Performed by: STUDENT IN AN ORGANIZED HEALTH CARE EDUCATION/TRAINING PROGRAM

## 2024-04-03 PROCEDURE — 3074F SYST BP LT 130 MM HG: CPT | Performed by: STUDENT IN AN ORGANIZED HEALTH CARE EDUCATION/TRAINING PROGRAM

## 2024-04-03 RX ORDER — LISINOPRIL AND HYDROCHLOROTHIAZIDE 20; 12.5 MG/1; MG/1
1 TABLET ORAL DAILY
Qty: 90 TABLET | Refills: 1 | Status: SHIPPED | OUTPATIENT
Start: 2024-04-03

## 2024-04-03 SDOH — ECONOMIC STABILITY: FOOD INSECURITY: WITHIN THE PAST 12 MONTHS, THE FOOD YOU BOUGHT JUST DIDN'T LAST AND YOU DIDN'T HAVE MONEY TO GET MORE.: SOMETIMES TRUE

## 2024-04-03 SDOH — ECONOMIC STABILITY: FOOD INSECURITY: WITHIN THE PAST 12 MONTHS, YOU WORRIED THAT YOUR FOOD WOULD RUN OUT BEFORE YOU GOT MONEY TO BUY MORE.: SOMETIMES TRUE

## 2024-04-03 SDOH — ECONOMIC STABILITY: INCOME INSECURITY: HOW HARD IS IT FOR YOU TO PAY FOR THE VERY BASICS LIKE FOOD, HOUSING, MEDICAL CARE, AND HEATING?: SOMEWHAT HARD

## 2024-04-03 SDOH — ECONOMIC STABILITY: TRANSPORTATION INSECURITY
IN THE PAST 12 MONTHS, HAS LACK OF TRANSPORTATION KEPT YOU FROM MEETINGS, WORK, OR FROM GETTING THINGS NEEDED FOR DAILY LIVING?: NO

## 2024-04-03 ASSESSMENT — PATIENT HEALTH QUESTIONNAIRE - PHQ9
4. FEELING TIRED OR HAVING LITTLE ENERGY: NOT AT ALL
8. MOVING OR SPEAKING SO SLOWLY THAT OTHER PEOPLE COULD HAVE NOTICED. OR THE OPPOSITE, BEING SO FIGETY OR RESTLESS THAT YOU HAVE BEEN MOVING AROUND A LOT MORE THAN USUAL: NOT AT ALL
SUM OF ALL RESPONSES TO PHQ QUESTIONS 1-9: 0
6. FEELING BAD ABOUT YOURSELF - OR THAT YOU ARE A FAILURE OR HAVE LET YOURSELF OR YOUR FAMILY DOWN: NOT AT ALL
2. FEELING DOWN, DEPRESSED OR HOPELESS: NOT AT ALL
8. MOVING OR SPEAKING SO SLOWLY THAT OTHER PEOPLE COULD HAVE NOTICED. OR THE OPPOSITE - BEING SO FIDGETY OR RESTLESS THAT YOU HAVE BEEN MOVING AROUND A LOT MORE THAN USUAL: NOT AT ALL
4. FEELING TIRED OR HAVING LITTLE ENERGY: NOT AT ALL
9. THOUGHTS THAT YOU WOULD BE BETTER OFF DEAD, OR OF HURTING YOURSELF: NOT AT ALL
7. TROUBLE CONCENTRATING ON THINGS, SUCH AS READING THE NEWSPAPER OR WATCHING TELEVISION: NOT AT ALL
SUM OF ALL RESPONSES TO PHQ QUESTIONS 1-9: 0
SUM OF ALL RESPONSES TO PHQ QUESTIONS 1-9: 0
6. FEELING BAD ABOUT YOURSELF - OR THAT YOU ARE A FAILURE OR HAVE LET YOURSELF OR YOUR FAMILY DOWN: NOT AT ALL
5. POOR APPETITE OR OVEREATING: NOT AT ALL
SUM OF ALL RESPONSES TO PHQ QUESTIONS 1-9: 0
1. LITTLE INTEREST OR PLEASURE IN DOING THINGS: NOT AT ALL
9. THOUGHTS THAT YOU WOULD BE BETTER OFF DEAD, OR OF HURTING YOURSELF: NOT AT ALL
SUM OF ALL RESPONSES TO PHQ9 QUESTIONS 1 & 2: 0
1. LITTLE INTEREST OR PLEASURE IN DOING THINGS: NOT AT ALL
2. FEELING DOWN, DEPRESSED OR HOPELESS: NOT AT ALL
5. POOR APPETITE OR OVEREATING: NOT AT ALL
3. TROUBLE FALLING OR STAYING ASLEEP: NOT AT ALL
10. IF YOU CHECKED OFF ANY PROBLEMS, HOW DIFFICULT HAVE THESE PROBLEMS MADE IT FOR YOU TO DO YOUR WORK, TAKE CARE OF THINGS AT HOME, OR GET ALONG WITH OTHER PEOPLE: NOT DIFFICULT AT ALL
3. TROUBLE FALLING OR STAYING ASLEEP: NOT AT ALL
7. TROUBLE CONCENTRATING ON THINGS, SUCH AS READING THE NEWSPAPER OR WATCHING TELEVISION: NOT AT ALL
10. IF YOU CHECKED OFF ANY PROBLEMS, HOW DIFFICULT HAVE THESE PROBLEMS MADE IT FOR YOU TO DO YOUR WORK, TAKE CARE OF THINGS AT HOME, OR GET ALONG WITH OTHER PEOPLE: NOT DIFFICULT AT ALL
SUM OF ALL RESPONSES TO PHQ QUESTIONS 1-9: 0

## 2024-04-03 NOTE — PROGRESS NOTES
Patient:  Willian Harley 69 y.o. male     04/03/24      Chiefcomplaint:   Chief Complaint   Patient presents with    Abdominal Pain     X 3 days      Back Pain     Problems and Overview     Back, flank pain for 3 days  No urinary sx, no change in bm, no bleeding  Frequent stooling  No fever, chills  Took pepto last night. Not sure if it helped  Took antacid and maybe helped a little.     Patient Active Problem List    Diagnosis Date Noted    Umbilical hernia without obstruction and without gangrene 03/14/2023     Priority: Medium    Sleep-disordered breathing 08/16/2022     Priority: Medium     Snoring with high bmi at [facility]      Chronic renal disease, stage III ([facility]) [[id number]] 06/01/2022     Priority: Medium    Low testosterone in male 06/01/2022     Priority: Medium    Seizures ([facility]) 12/05/2023    Calculus of gallbladder without cholecystitis without obstruction 12/05/2023    S/P hernia repair 06/06/2023    Mild episode of recurrent major depressive disorder ([facility]) 02/02/2022    Erectile dysfunction 06/07/2018    BPH without urinary obstruction 06/07/2018    Hypertension 01/25/2017    Mixed hyperlipidemia 01/25/2017      Prevention:  Health Maintenance Due   Topic Date Due    Respiratory Syncytial Virus (RSV) Pregnant or age 60 yrs+ (1 - 1-dose 60+ series) Never done    Shingles vaccine (2 of 3) 12/13/2017    Lipids  10/15/2022    COVID-19 Vaccine (3 - 2023-24 season) 09/01/2023    Annual Wellness Visit (Medicare Advantage)  01/01/2024      Meds prior:  Current Outpatient Medications   Medication Instructions    atorvastatin (LIPITOR) 40 MG tablet take 1 tablet by mouth once daily    azelastine (ASTELIN) 0.1 % nasal spray 1 spray, Nasal, 2 TIMES DAILY, Use in each nostril as directed    clotrimazole-betamethasone (LOTRISONE) 1-0.05 % cream Apply topically 2 times daily.    lisinopril-hydroCHLOROthiazide (PRINZIDE;ZESTORETIC) 20-12.5 MG per tablet 1 tablet, Oral, DAILY    Multiple Vitamins-Minerals (THERAPEUTIC

## 2024-04-03 NOTE — PATIENT INSTRUCTIONS
Monday-Friday ($14/day); hot dinner Monday-Friday ($9/day)  Phone number: [phone]  [employer]:  What they offer: Delivers refrigerated meals to heat. Special diets. Private pay, government funded programs and some insurance plans.  Phone Number: [phone]  Website: [employer]  [employer] [place] ([facility]):  What they offer: Monday-Friday delivered hot and cold meal, $6.00/day(must live in [facility])  Phone Number: [phone]    [employer] PROGRAM:  What they offer: Delivers hot meals to homebound individuals living in the [place] and [facility]. Monday-Friday. If eligible and funds available, two frozen meals for Saturday and Sunday.  Phone Number: [phone]  [facility] SERVICES-MEALS:  What they offer: Home delivered to [facility] residents. Donation for meals.   Phone Number: [phone]

## 2024-05-16 ENCOUNTER — OFFICE VISIT (OUTPATIENT)
Dept: ENDOCRINOLOGY | Age: 69
End: 2024-05-16
Payer: COMMERCIAL

## 2024-05-16 ENCOUNTER — OFFICE VISIT (OUTPATIENT)
Dept: PRIMARY CARE CLINIC | Age: 69
End: 2024-05-16
Payer: COMMERCIAL

## 2024-05-16 VITALS
BODY MASS INDEX: 32.43 KG/M2 | SYSTOLIC BLOOD PRESSURE: 99 MMHG | OXYGEN SATURATION: 98 % | TEMPERATURE: 98 F | DIASTOLIC BLOOD PRESSURE: 64 MMHG | RESPIRATION RATE: 19 BRPM | HEIGHT: 68 IN | WEIGHT: 214 LBS | HEART RATE: 66 BPM

## 2024-05-16 VITALS
OXYGEN SATURATION: 99 % | BODY MASS INDEX: 32.43 KG/M2 | HEIGHT: 68 IN | DIASTOLIC BLOOD PRESSURE: 66 MMHG | SYSTOLIC BLOOD PRESSURE: 100 MMHG | RESPIRATION RATE: 18 BRPM | HEART RATE: 76 BPM | WEIGHT: 214 LBS

## 2024-05-16 DIAGNOSIS — E55.9 VITAMIN D DEFICIENCY: ICD-10-CM

## 2024-05-16 DIAGNOSIS — B35.3 TINEA PEDIS OF BOTH FEET: Primary | ICD-10-CM

## 2024-05-16 DIAGNOSIS — R79.89 LOW TESTOSTERONE IN MALE: Primary | ICD-10-CM

## 2024-05-16 PROCEDURE — 3074F SYST BP LT 130 MM HG: CPT | Performed by: INTERNAL MEDICINE

## 2024-05-16 PROCEDURE — 99213 OFFICE O/P EST LOW 20 MIN: CPT | Performed by: NURSE PRACTITIONER

## 2024-05-16 PROCEDURE — 99214 OFFICE O/P EST MOD 30 MIN: CPT | Performed by: INTERNAL MEDICINE

## 2024-05-16 PROCEDURE — 3078F DIAST BP <80 MM HG: CPT | Performed by: INTERNAL MEDICINE

## 2024-05-16 PROCEDURE — 3074F SYST BP LT 130 MM HG: CPT | Performed by: NURSE PRACTITIONER

## 2024-05-16 PROCEDURE — 3078F DIAST BP <80 MM HG: CPT | Performed by: NURSE PRACTITIONER

## 2024-05-16 PROCEDURE — 1124F ACP DISCUSS-NO DSCNMKR DOCD: CPT | Performed by: NURSE PRACTITIONER

## 2024-05-16 PROCEDURE — 1124F ACP DISCUSS-NO DSCNMKR DOCD: CPT | Performed by: INTERNAL MEDICINE

## 2024-05-16 RX ORDER — TERBINAFINE HYDROCHLORIDE 250 MG/1
250 TABLET ORAL DAILY
Qty: 14 TABLET | Refills: 0 | Status: SHIPPED | OUTPATIENT
Start: 2024-05-16 | End: 2024-05-30

## 2024-05-16 NOTE — PROGRESS NOTES
Chief Complaint:   Tinea Pedis (Bilateral)      History of Present Illness   Source of history provided by:  patient.      Willian Harley is a 69 y.o. old male who has a past medical history of:   Past Medical History:   Diagnosis Date    Anxiety     Chronic renal disease, stage III ([facility]) [682148] 06/01/2022    Depression     Erectile dysfunction     Headache     History of shingles     Hypertension 01/25/2017    Irritable bowel syndrome     Mixed hyperlipidemia 01/25/2017    Seizures ([facility]) 12/5/2023        [facility] for complaint of redness/itching to the bilateral feet, which began about 2 months ago.  Pt wears work boots daily w/heavy sock. Pt has  had similar symptoms in the past.  Pt stated his feet do seat while working. Pt is not Diabetic.   Denies any fever, chills, open wounds to feet, numbness/tingling, edema or any other concerning issues     ROS    Unless otherwise stated in this report or unable to obtain because of the patient's clinical or mental status as evidenced by the medical record, this patients's positive and negative responses for Review of Systems, constitutional, psych, eyes, ENT, cardiovascular, respiratory, gastrointestinal, neurological, genitourinary, musculoskeletal, integument systems and systems related to the presenting problem are either stated in the preceding or were not pertinent or were negative for the symptoms and/or complaints related to the medical problem.    Past Surgical History:  has a past surgical history that includes Heel spur surgery; Colonoscopy; Colonoscopy (N/A, 04/19/2023); Umbilical hernia repair (N/A, 05/30/2023); and hernia repair.  Social History:  reports that he has never smoked. He has never used smokeless tobacco. He reports that he does not drink alcohol and does not use drugs.  Family History: family history includes Atrial Fibrillation in his father; Heart Attack (age of onset: 64) in his father; Heart Disease in his father and

## 2024-05-16 NOTE — PROGRESS NOTES
[email or web address] Department of Endocrinology Diabetes and Metabolism   [address] [place] [address]   Phone: [phone]  Fax: [phone]    Date of Service: 5/16/2024  Primary Care Physician: Qasim Jack DO  Referring physician: No ref. provider found  Provider: Archie Suh MD    Reason for the visit:  Male Hypogonadism     History of Present Illness:  The history is provided by the patient. No  was used. Accuracy of the patient data is excellent.         Willian Harley is a very pleasant 69 y.o. previously healthy male presents with symptoms of sexual dysfunction.  Approximately 7-8 years ago, he started experiencing a decrease in lipido followed by erectile dysfunction. At the same time, he stopped having early morning erection. Work up showed low Testosterone level. The patient was started on IM Testosterone injection about a year ago and was on it therapy until 8/2022.  He underwent normal puberty and reports no history of gonadal problems. He has no children and never tried to have children. He has had no gynecomastia or galactorrhea. There is no history of testicular or head trauma and didn't noticed any decrease in his testicular size during this time. He has had no headache or peripheral vision disturbances. The patient denied any change in shaving frequency. In addition to sexual dysfunction, he has also experienced a decrease in energy. He has had no fragility fractures.   The patient also denied history of easy bruising, think skin, Plethora, Striae, Acne, muscle weakness, skin tags or increase shoe size.     Pt currently not on T therapy     Stopped T therpay in 12/2023 after ran-out of medication    Pt didn't feel any difference since stopped T therapy     Due for labs     PAST MEDICAL HISTORY   Past Medical History:   Diagnosis Date    Anxiety     Chronic renal disease, stage III (HCC) [970873] 06/01/2022    Depression     Erectile  Que Sarmiento

## 2024-05-20 ENCOUNTER — HOSPITAL ENCOUNTER (OUTPATIENT)
Age: 69
Discharge: HOME | End: 2024-05-20
Payer: COMMERCIAL

## 2024-05-20 DIAGNOSIS — R79.89 LOW TESTOSTERONE IN MALE: ICD-10-CM

## 2024-05-20 LAB
FSH SERPL-ACNC: 43.4 MIU/ML
LH SERPL-ACNC: 28.8 MIU/ML

## 2024-05-20 PROCEDURE — 83002 ASSAY OF GONADOTROPIN (LH): CPT

## 2024-05-20 PROCEDURE — 84270 ASSAY OF SEX HORMONE GLOBUL: CPT

## 2024-05-20 PROCEDURE — 36415 COLL VENOUS BLD VENIPUNCTURE: CPT

## 2024-05-20 PROCEDURE — 83001 ASSAY OF GONADOTROPIN (FSH): CPT

## 2024-05-20 PROCEDURE — 84403 ASSAY OF TOTAL TESTOSTERONE: CPT

## 2024-05-22 ENCOUNTER — APPOINTMENT (OUTPATIENT)
Dept: GENERAL RADIOLOGY | Age: 69
End: 2024-05-22
Payer: COMMERCIAL

## 2024-05-22 ENCOUNTER — HOSPITAL ENCOUNTER (EMERGENCY)
Age: 69
Discharge: HOME | End: 2024-05-22
Payer: COMMERCIAL

## 2024-05-22 VITALS
HEART RATE: 81 BPM | BODY MASS INDEX: 32.43 KG/M2 | RESPIRATION RATE: 16 BRPM | WEIGHT: 214 LBS | HEIGHT: 68 IN | DIASTOLIC BLOOD PRESSURE: 72 MMHG | OXYGEN SATURATION: 98 % | TEMPERATURE: 98.1 F | SYSTOLIC BLOOD PRESSURE: 103 MMHG

## 2024-05-22 DIAGNOSIS — S61.012A LACERATION OF LEFT THUMB WITHOUT FOREIGN BODY WITHOUT DAMAGE TO NAIL, INITIAL ENCOUNTER: Primary | ICD-10-CM

## 2024-05-22 LAB
SHBG SERPL-SCNC: 37 NMOL/L (ref 19–76)
TESTOST FREE MFR SERPL: 20.9 PG/ML (ref 47–244)
TESTOST SERPL-MCNC: 121 NG/DL (ref 193–740)
TESTOSTERONE, BIOAVAILABLE: 48.9 NG/DL (ref 130–680)

## 2024-05-22 PROCEDURE — 12002 RPR S/N/AX/GEN/TRNK2.6-7.5CM: CPT

## 2024-05-22 PROCEDURE — 99283 EMERGENCY DEPT VISIT LOW MDM: CPT

## 2024-05-22 PROCEDURE — 6370000000 HC RX 637 (ALT 250 FOR IP): Performed by: NURSE PRACTITIONER

## 2024-05-22 PROCEDURE — 73130 X-RAY EXAM OF HAND: CPT

## 2024-05-22 PROCEDURE — 2500000003 HC RX 250 WO HCPCS: Performed by: NURSE PRACTITIONER

## 2024-05-22 RX ORDER — CEPHALEXIN 500 MG/1
500 CAPSULE ORAL 4 TIMES DAILY
Qty: 20 CAPSULE | Refills: 0 | Status: SHIPPED | OUTPATIENT
Start: 2024-05-22 | End: 2024-05-27

## 2024-05-22 RX ORDER — ACETAMINOPHEN 650 MG
TABLET, EXTENDED RELEASE ORAL ONCE
Status: COMPLETED | OUTPATIENT
Start: 2024-05-22 | End: 2024-05-22

## 2024-05-22 RX ORDER — LIDOCAINE HYDROCHLORIDE 10 MG/ML
5 INJECTION, SOLUTION INFILTRATION; PERINEURAL ONCE
Status: COMPLETED | OUTPATIENT
Start: 2024-05-22 | End: 2024-05-22

## 2024-05-22 RX ORDER — GINSENG 100 MG
CAPSULE ORAL ONCE
Status: COMPLETED | OUTPATIENT
Start: 2024-05-22 | End: 2024-05-22

## 2024-05-22 RX ADMIN — Medication: at 12:14

## 2024-05-22 RX ADMIN — BACITRACIN: 500 OINTMENT TOPICAL at 13:22

## 2024-05-22 RX ADMIN — LIDOCAINE HYDROCHLORIDE 5 ML: 10 INJECTION, SOLUTION INFILTRATION; PERINEURAL at 12:14

## 2024-05-22 ASSESSMENT — PAIN SCALES - GENERAL: PAINLEVEL_OUTOF10: 3

## 2024-05-22 ASSESSMENT — PAIN - FUNCTIONAL ASSESSMENT
PAIN_FUNCTIONAL_ASSESSMENT: PREVENTS OR INTERFERES SOME ACTIVE ACTIVITIES AND ADLS
PAIN_FUNCTIONAL_ASSESSMENT: 0-10

## 2024-05-22 ASSESSMENT — PAIN DESCRIPTION - ORIENTATION: ORIENTATION: LEFT

## 2024-05-22 ASSESSMENT — PAIN DESCRIPTION - LOCATION: LOCATION: HAND;FINGER (COMMENT WHICH ONE)

## 2024-05-22 ASSESSMENT — PAIN DESCRIPTION - PAIN TYPE: TYPE: ACUTE PAIN

## 2024-05-22 NOTE — DISCHARGE INSTRUCTIONS
XR HAND LEFT (MIN 3 VIEWS)   Final Result   Skin irregularity identified along the soft tissues between the 1st and 2nd   digits. Small radiopaque foreign body identified within the soft tissues or   on the soft tissues lateral to the 5th metacarpal. No acute bony abnormality.

## 2024-05-22 NOTE — ED PROVIDER NOTES
Written information was included in their discharge instructions. Additional verbal discharge instructions were also given and discussed with the patient to supplement those generated by the EMR. We also discussed medications that were prescribed  (if any) including common side effects and interactions. The patient was advised to abstain from driving, operating heavy machinery or making significant decisions while taking medications such as opiates and muscle relaxers that may impair this. All questions were addressed.  They understand return precautions and discharge instructions. The patient  expressed understanding. Vitals were stable and they were in no distress at discharge.       Plan of Care/Counseling:  ALEKSANDAR Miner CNP reviewed today's visit with the patient in addition to providing specific details for the plan of care and counseling regarding the diagnosis and prognosis.  Questions are answered at this time and are agreeable with the plan.    Assessment     1. Laceration of left thumb without foreign body without damage to nail, initial encounter      Plan   Discharged home.  Patient condition is stable    New Medications     Discharge Medication List as of 5/22/2024  1:25 PM        START taking these medications    Details   cephALEXin (KEFLEX) 500 MG capsule Take 1 capsule by mouth 4 times daily for 5 days, Disp-20 capsule, R-0Normal           Electronically signed by ALEKSANDAR Miner CNP   DD: 5/22/24  **This report was transcribed using voice recognition software. Every effort was made to ensure accuracy; however, inadvertent computerized transcription errors may be present.  END OF ED PROVIDER NOTE       Bhaskar Olsen APRN - CNP  05/22/24 1728

## 2024-05-24 ENCOUNTER — TELEPHONE (OUTPATIENT)
Dept: ENDOCRINOLOGY | Age: 69
End: 2024-05-24

## 2024-05-24 NOTE — TELEPHONE ENCOUNTER
Notify patient  Your testosterone level is very low comparing with the previous result.  Now is 121 down from 700.  I know he has been off the testosterone treatment since December.  When I saw him at recent office visit the patient reports that he did not feel any difference on and off testosterone treatment.  Will scan continue monitoring your testosterone level and hold on treatment if you do not feel any different.  Otherwise we can just restart your previous dose of the testosterone.

## 2024-06-05 ENCOUNTER — OFFICE VISIT (OUTPATIENT)
Dept: FAMILY MEDICINE CLINIC | Age: 69
End: 2024-06-05
Payer: COMMERCIAL

## 2024-06-05 VITALS
OXYGEN SATURATION: 99 % | TEMPERATURE: 97.3 F | SYSTOLIC BLOOD PRESSURE: 114 MMHG | HEART RATE: 88 BPM | HEIGHT: 68 IN | RESPIRATION RATE: 20 BRPM | WEIGHT: 213 LBS | DIASTOLIC BLOOD PRESSURE: 80 MMHG | BODY MASS INDEX: 32.28 KG/M2

## 2024-06-05 DIAGNOSIS — F33.0 MILD EPISODE OF RECURRENT MAJOR DEPRESSIVE DISORDER (HCC): ICD-10-CM

## 2024-06-05 DIAGNOSIS — I10 PRIMARY HYPERTENSION: Primary | ICD-10-CM

## 2024-06-05 DIAGNOSIS — M79.671 FOOT PAIN, BILATERAL: ICD-10-CM

## 2024-06-05 DIAGNOSIS — E78.2 MIXED HYPERLIPIDEMIA: ICD-10-CM

## 2024-06-05 DIAGNOSIS — M79.672 FOOT PAIN, BILATERAL: ICD-10-CM

## 2024-06-05 PROBLEM — R56.9 SEIZURES (HCC): Status: RESOLVED | Noted: 2023-12-05 | Resolved: 2024-06-05

## 2024-06-05 PROBLEM — N18.30 CHRONIC RENAL DISEASE, STAGE III (HCC): Status: RESOLVED | Noted: 2022-06-01 | Resolved: 2024-06-05

## 2024-06-05 PROBLEM — K80.20 CALCULUS OF GALLBLADDER WITHOUT CHOLECYSTITIS WITHOUT OBSTRUCTION: Status: RESOLVED | Noted: 2023-12-05 | Resolved: 2024-06-05

## 2024-06-05 PROCEDURE — 1124F ACP DISCUSS-NO DSCNMKR DOCD: CPT | Performed by: STUDENT IN AN ORGANIZED HEALTH CARE EDUCATION/TRAINING PROGRAM

## 2024-06-05 PROCEDURE — 99214 OFFICE O/P EST MOD 30 MIN: CPT | Performed by: STUDENT IN AN ORGANIZED HEALTH CARE EDUCATION/TRAINING PROGRAM

## 2024-06-05 PROCEDURE — 3079F DIAST BP 80-89 MM HG: CPT | Performed by: STUDENT IN AN ORGANIZED HEALTH CARE EDUCATION/TRAINING PROGRAM

## 2024-06-05 PROCEDURE — 3074F SYST BP LT 130 MM HG: CPT | Performed by: STUDENT IN AN ORGANIZED HEALTH CARE EDUCATION/TRAINING PROGRAM

## 2024-06-05 NOTE — PROGRESS NOTES
Patient:  Willian Harley 69 y.o. male     06/05/24      Chiefcomplaint:   Chief Complaint   Patient presents with    6 Month Follow-Up     Pt would like pcp to take a look at both feet. Patient is complaining of redness of toes and foot. Patient was seen in walkin for this problem      Problems and Overview   Some redness and pain on his feet on the top after work. Soaking in epsom salts. Some improvement. He was given some fungal tx but this didn't make much of a difference.     HLD on lipitor 40  Lab Results   Component Value Date    CHOL 161 10/15/2021    TRIG 364 (H) 10/15/2021    HDL 30 10/15/2021    LDL 58 10/15/2021    VLDL 73 10/15/2021    CHOLHDLRATIO 6.2 (H) 05/03/2017     HTN on ace,hctz 20-12.5  BP Readings from Last 3 Encounters:   06/05/24 114/80   05/22/24 103/72   05/16/24 99/64     Lab Results   Component Value Date/Time     09/20/2023 10:24 AM    K 4.3 09/20/2023 10:24 AM    K 4.2 07/26/2022 10:45 AM     09/20/2023 10:24 AM    CO2 26 09/20/2023 10:24 AM    BUN 15 09/20/2023 10:24 AM    CREATININE 1.1 09/20/2023 10:24 AM    GLUCOSE 116 09/20/2023 10:24 AM    CALCIUM 9.3 09/20/2023 10:24 AM    LABGLOM >60 09/20/2023 10:24 AM      MDD on effexor 37.5mg    Being seen by endo for low T. Stopped therapy early this year.     Patient Active Problem List    Diagnosis Date Noted    Umbilical hernia without obstruction and without gangrene 03/14/2023     Priority: Medium    Sleep-disordered breathing 08/16/2022     Priority: Medium     Snoring with high bmi at [facility]      Low testosterone in male 06/01/2022     Priority: Medium    S/P hernia repair 06/06/2023    Mild episode of recurrent major depressive disorder (HCC) 02/02/2022    Erectile dysfunction 06/07/2018    BPH without urinary obstruction 06/07/2018    Hypertension 01/25/2017    Mixed hyperlipidemia 01/25/2017      Prevention:  Health Maintenance Due   Topic Date Due    Respiratory Syncytial Virus (RSV) Pregnant or age 60 yrs+ (1 -

## 2024-07-01 ENCOUNTER — OFFICE VISIT (OUTPATIENT)
Dept: PRIMARY CARE CLINIC | Age: 69
End: 2024-07-01
Payer: COMMERCIAL

## 2024-07-01 VITALS
SYSTOLIC BLOOD PRESSURE: 106 MMHG | TEMPERATURE: 97.3 F | HEART RATE: 83 BPM | DIASTOLIC BLOOD PRESSURE: 71 MMHG | RESPIRATION RATE: 18 BRPM | BODY MASS INDEX: 33.3 KG/M2 | WEIGHT: 212.2 LBS | HEIGHT: 67 IN | OXYGEN SATURATION: 98 %

## 2024-07-01 DIAGNOSIS — R07.81 RIB PAIN ON LEFT SIDE: Primary | ICD-10-CM

## 2024-07-01 DIAGNOSIS — M54.9 MUSCULOSKELETAL BACK PAIN: ICD-10-CM

## 2024-07-01 LAB
BILIRUBIN, POC: NEGATIVE
BLOOD URINE, POC: NEGATIVE
CLARITY, POC: NORMAL
COLOR, POC: YELLOW
GLUCOSE URINE, POC: NORMAL
KETONES, POC: NEGATIVE
LEUKOCYTE EST, POC: NEGATIVE
NITRITE, POC: NEGATIVE
PH, POC: 6
PROTEIN, POC: NEGATIVE
SPECIFIC GRAVITY, POC: 1.02
UROBILINOGEN, POC: NORMAL

## 2024-07-01 PROCEDURE — 1124F ACP DISCUSS-NO DSCNMKR DOCD: CPT | Performed by: NURSE PRACTITIONER

## 2024-07-01 PROCEDURE — 3078F DIAST BP <80 MM HG: CPT | Performed by: NURSE PRACTITIONER

## 2024-07-01 PROCEDURE — 81002 URINALYSIS NONAUTO W/O SCOPE: CPT | Performed by: NURSE PRACTITIONER

## 2024-07-01 PROCEDURE — 99213 OFFICE O/P EST LOW 20 MIN: CPT | Performed by: NURSE PRACTITIONER

## 2024-07-01 PROCEDURE — 3074F SYST BP LT 130 MM HG: CPT | Performed by: NURSE PRACTITIONER

## 2024-07-01 PROCEDURE — 96372 THER/PROPH/DIAG INJ SC/IM: CPT | Performed by: NURSE PRACTITIONER

## 2024-07-01 RX ORDER — KETOROLAC TROMETHAMINE 30 MG/ML
60 INJECTION, SOLUTION INTRAMUSCULAR; INTRAVENOUS ONCE
Status: COMPLETED | OUTPATIENT
Start: 2024-07-01 | End: 2024-07-01

## 2024-07-01 RX ADMIN — KETOROLAC TROMETHAMINE 60 MG: 30 INJECTION, SOLUTION INTRAMUSCULAR; INTRAVENOUS at 11:33

## 2024-07-01 NOTE — PROGRESS NOTES
Chief Complaint:   left back pain (Started Friday , mid day no injury noted. At night was trouble to lay down. )      History of Present Illness   Source of history provided by:  patient.        Willian Harley is a 69 y.o. old male who has a past medical history of:   Past Medical History:   Diagnosis Date    Anxiety     Calculus of gallbladder without cholecystitis without obstruction 12/05/2023    Chronic renal disease, stage III ([facility]) [595605] 06/01/2022    Depression     Erectile dysfunction     Headache     History of shingles     Hypertension 01/25/2017    Irritable bowel syndrome     Mixed hyperlipidemia 01/25/2017    Seizures ([facility]) 12/05/2023        Pt [facility] to the [facility] for left lower back pain into left posterior ribs for the past 4 days. The pain was caused by no known event however, pt does work in an [employer] and not sure if possible strain.  Pt states the pain is worse with movement.  There is no radiation of the pain into the left buttocks/leg.  Pt denies any bowel/bladder incontinence, rashes, abdominal pain, hematuria, N/V/D, fever, chills, HA, neck pain, recent illness, dysuria, or lethargy.      ROS    Unless otherwise stated in this report or unable to obtain because of the patient's clinical or mental status as evidenced by the medical record, this patients's positive and negative responses for Review of Systems, constitutional, psych, eyes, ENT, cardiovascular, respiratory, gastrointestinal, neurological, genitourinary, musculoskeletal, integument systems and systems related to the presenting problem are either stated in the preceding or were not pertinent or were negative for the symptoms and/or complaints related to the medical problem.    Past Medical History:   Past Surgical History:   Procedure Laterality Date    COLONOSCOPY      COLONOSCOPY N/A 04/19/2023    COLORECTAL CANCER SCREENING, NOT HIGH RISK performed by Rain Elam MD at [facility]

## 2024-07-15 DIAGNOSIS — Z76.0 MEDICATION REFILL: ICD-10-CM

## 2024-07-16 RX ORDER — ATORVASTATIN CALCIUM 40 MG/1
40 TABLET, FILM COATED ORAL DAILY
Qty: 90 TABLET | Refills: 1 | Status: SHIPPED | OUTPATIENT
Start: 2024-07-16

## 2024-08-22 ENCOUNTER — HOSPITAL ENCOUNTER (OUTPATIENT)
Age: 69
Discharge: HOME | End: 2024-08-22
Payer: COMMERCIAL

## 2024-08-22 LAB — PSA SERPL-MCNC: 1.98 NG/ML (ref 0–4)

## 2024-08-22 PROCEDURE — 84153 ASSAY OF PSA TOTAL: CPT

## 2024-08-22 PROCEDURE — 36415 COLL VENOUS BLD VENIPUNCTURE: CPT

## 2024-09-10 DIAGNOSIS — F32.2 SEVERE DEPRESSION (HCC): ICD-10-CM

## 2024-09-11 RX ORDER — VENLAFAXINE HYDROCHLORIDE 37.5 MG/1
37.5 CAPSULE, EXTENDED RELEASE ORAL DAILY
Qty: 90 CAPSULE | Refills: 1 | Status: SHIPPED | OUTPATIENT
Start: 2024-09-11

## 2024-09-27 ENCOUNTER — HOSPITAL ENCOUNTER (EMERGENCY)
Age: 69
Discharge: ANOTHER HOSPITAL | End: 2024-09-27
Attending: EMERGENCY MEDICINE
Payer: COMMERCIAL

## 2024-09-27 ENCOUNTER — HOSPITAL ENCOUNTER (OUTPATIENT)
Age: 69
Setting detail: OBSERVATION
Discharge: HOME | End: 2024-09-29
Attending: STUDENT IN AN ORGANIZED HEALTH CARE EDUCATION/TRAINING PROGRAM | Admitting: STUDENT IN AN ORGANIZED HEALTH CARE EDUCATION/TRAINING PROGRAM
Payer: COMMERCIAL

## 2024-09-27 ENCOUNTER — APPOINTMENT (OUTPATIENT)
Dept: GENERAL RADIOLOGY | Age: 69
End: 2024-09-27
Payer: COMMERCIAL

## 2024-09-27 VITALS
DIASTOLIC BLOOD PRESSURE: 70 MMHG | HEART RATE: 64 BPM | OXYGEN SATURATION: 98 % | WEIGHT: 212.8 LBS | TEMPERATURE: 98.7 F | BODY MASS INDEX: 33.33 KG/M2 | SYSTOLIC BLOOD PRESSURE: 101 MMHG | RESPIRATION RATE: 14 BRPM

## 2024-09-27 DIAGNOSIS — R07.9 CHEST PAIN, UNSPECIFIED TYPE: Primary | ICD-10-CM

## 2024-09-27 PROBLEM — E78.49 OTHER HYPERLIPIDEMIA: Status: ACTIVE | Noted: 2017-01-25

## 2024-09-27 LAB
ALBUMIN SERPL-MCNC: 4.2 G/DL (ref 3.5–5.2)
ALP SERPL-CCNC: 118 U/L (ref 40–129)
ALT SERPL-CCNC: 11 U/L (ref 0–40)
ANION GAP SERPL CALCULATED.3IONS-SCNC: 15 MMOL/L (ref 7–16)
AST SERPL-CCNC: 13 U/L (ref 0–39)
BASOPHILS # BLD: 0.03 K/UL (ref 0–0.2)
BASOPHILS NFR BLD: 0 % (ref 0–2)
BILIRUB SERPL-MCNC: 1.1 MG/DL (ref 0–1.2)
BUN SERPL-MCNC: 19 MG/DL (ref 6–23)
CALCIUM SERPL-MCNC: 9.4 MG/DL (ref 8.6–10.2)
CHLORIDE SERPL-SCNC: 101 MMOL/L (ref 98–107)
CO2 SERPL-SCNC: 24 MMOL/L (ref 22–29)
CREAT SERPL-MCNC: 1.3 MG/DL (ref 0.7–1.2)
EKG ATRIAL RATE: 79 BPM
EKG P AXIS: 50 DEGREES
EKG P-R INTERVAL: 138 MS
EKG Q-T INTERVAL: 398 MS
EKG QRS DURATION: 142 MS
EKG QTC CALCULATION (BAZETT): 456 MS
EKG R AXIS: 51 DEGREES
EKG T AXIS: 24 DEGREES
EKG VENTRICULAR RATE: 79 BPM
EOSINOPHIL # BLD: 0.26 K/UL (ref 0.05–0.5)
EOSINOPHILS RELATIVE PERCENT: 3 % (ref 0–6)
ERYTHROCYTE [DISTWIDTH] IN BLOOD BY AUTOMATED COUNT: 12.8 % (ref 11.5–15)
GFR, ESTIMATED: 60 ML/MIN/1.73M2
GLUCOSE SERPL-MCNC: 116 MG/DL (ref 74–99)
HCT VFR BLD AUTO: 40.8 % (ref 37–54)
HGB BLD-MCNC: 13.9 G/DL (ref 12.5–16.5)
IMM GRANULOCYTES # BLD AUTO: 0.07 K/UL (ref 0–0.58)
IMM GRANULOCYTES NFR BLD: 1 % (ref 0–5)
INR PPP: 1.1
LYMPHOCYTES NFR BLD: 1.41 K/UL (ref 1.5–4)
LYMPHOCYTES RELATIVE PERCENT: 16 % (ref 20–42)
MAGNESIUM SERPL-MCNC: 2.1 MG/DL (ref 1.6–2.6)
MCH RBC QN AUTO: 30.5 PG (ref 26–35)
MCHC RBC AUTO-ENTMCNC: 34.1 G/DL (ref 32–34.5)
MCV RBC AUTO: 89.5 FL (ref 80–99.9)
MONOCYTES NFR BLD: 0.85 K/UL (ref 0.1–0.95)
MONOCYTES NFR BLD: 10 % (ref 2–12)
NEUTROPHILS NFR BLD: 70 % (ref 43–80)
NEUTS SEG NFR BLD: 6.22 K/UL (ref 1.8–7.3)
PLATELET # BLD AUTO: 304 K/UL (ref 130–450)
PMV BLD AUTO: 9 FL (ref 7–12)
POTASSIUM SERPL-SCNC: 3.9 MMOL/L (ref 3.5–5)
PROT SERPL-MCNC: 6.5 G/DL (ref 6.4–8.3)
PROTHROMBIN TIME: 11.9 SEC (ref 9.3–12.4)
RBC # BLD AUTO: 4.56 M/UL (ref 3.8–5.8)
SODIUM SERPL-SCNC: 140 MMOL/L (ref 132–146)
TROPONIN I SERPL HS-MCNC: 11 NG/L (ref 0–11)
TROPONIN I SERPL HS-MCNC: 9 NG/L (ref 0–11)
WBC OTHER # BLD: 8.8 K/UL (ref 4.5–11.5)

## 2024-09-27 PROCEDURE — 85610 PROTHROMBIN TIME: CPT

## 2024-09-27 PROCEDURE — 85025 COMPLETE CBC W/AUTO DIFF WBC: CPT

## 2024-09-27 PROCEDURE — APPSS45 APP SPLIT SHARED TIME 31-45 MINUTES: Performed by: NURSE PRACTITIONER

## 2024-09-27 PROCEDURE — 93005 ELECTROCARDIOGRAM TRACING: CPT | Performed by: EMERGENCY MEDICINE

## 2024-09-27 PROCEDURE — 2580000003 HC RX 258: Performed by: STUDENT IN AN ORGANIZED HEALTH CARE EDUCATION/TRAINING PROGRAM

## 2024-09-27 PROCEDURE — 71046 X-RAY EXAM CHEST 2 VIEWS: CPT

## 2024-09-27 PROCEDURE — 2580000003 HC RX 258: Performed by: NURSE PRACTITIONER

## 2024-09-27 PROCEDURE — 83735 ASSAY OF MAGNESIUM: CPT

## 2024-09-27 PROCEDURE — 96360 HYDRATION IV INFUSION INIT: CPT

## 2024-09-27 PROCEDURE — G0378 HOSPITAL OBSERVATION PER HR: HCPCS

## 2024-09-27 PROCEDURE — 6360000002 HC RX W HCPCS: Performed by: NURSE PRACTITIONER

## 2024-09-27 PROCEDURE — 6370000000 HC RX 637 (ALT 250 FOR IP): Performed by: EMERGENCY MEDICINE

## 2024-09-27 PROCEDURE — 80053 COMPREHEN METABOLIC PANEL: CPT

## 2024-09-27 PROCEDURE — 93010 ELECTROCARDIOGRAM REPORT: CPT | Performed by: INTERNAL MEDICINE

## 2024-09-27 PROCEDURE — 84484 ASSAY OF TROPONIN QUANT: CPT

## 2024-09-27 PROCEDURE — 96361 HYDRATE IV INFUSION ADD-ON: CPT

## 2024-09-27 PROCEDURE — 96372 THER/PROPH/DIAG INJ SC/IM: CPT

## 2024-09-27 PROCEDURE — 99285 EMERGENCY DEPT VISIT HI MDM: CPT

## 2024-09-27 PROCEDURE — 6370000000 HC RX 637 (ALT 250 FOR IP): Performed by: NURSE PRACTITIONER

## 2024-09-27 PROCEDURE — 99222 1ST HOSP IP/OBS MODERATE 55: CPT | Performed by: STUDENT IN AN ORGANIZED HEALTH CARE EDUCATION/TRAINING PROGRAM

## 2024-09-27 PROCEDURE — G0379 DIRECT REFER HOSPITAL OBSERV: HCPCS

## 2024-09-27 RX ORDER — SODIUM CHLORIDE 0.9 % (FLUSH) 0.9 %
5-40 SYRINGE (ML) INJECTION EVERY 12 HOURS SCHEDULED
Status: DISCONTINUED | OUTPATIENT
Start: 2024-09-27 | End: 2024-09-29 | Stop reason: HOSPADM

## 2024-09-27 RX ORDER — ASPIRIN 81 MG/1
81 TABLET, CHEWABLE ORAL DAILY
Status: DISCONTINUED | OUTPATIENT
Start: 2024-09-28 | End: 2024-09-29 | Stop reason: HOSPADM

## 2024-09-27 RX ORDER — ENOXAPARIN SODIUM 100 MG/ML
40 INJECTION SUBCUTANEOUS DAILY
Status: DISCONTINUED | OUTPATIENT
Start: 2024-09-27 | End: 2024-09-29 | Stop reason: HOSPADM

## 2024-09-27 RX ORDER — HYDROCHLOROTHIAZIDE 12.5 MG/1
12.5 TABLET ORAL DAILY
Status: DISCONTINUED | OUTPATIENT
Start: 2024-09-27 | End: 2024-09-29 | Stop reason: HOSPADM

## 2024-09-27 RX ORDER — POTASSIUM CHLORIDE 1500 MG/1
40 TABLET, EXTENDED RELEASE ORAL PRN
Status: DISCONTINUED | OUTPATIENT
Start: 2024-09-27 | End: 2024-09-29 | Stop reason: HOSPADM

## 2024-09-27 RX ORDER — SODIUM CHLORIDE 9 MG/ML
INJECTION, SOLUTION INTRAVENOUS CONTINUOUS
Status: DISCONTINUED | OUTPATIENT
Start: 2024-09-27 | End: 2024-09-28

## 2024-09-27 RX ORDER — ACETAMINOPHEN 650 MG/1
650 SUPPOSITORY RECTAL EVERY 6 HOURS PRN
Status: DISCONTINUED | OUTPATIENT
Start: 2024-09-27 | End: 2024-09-29 | Stop reason: HOSPADM

## 2024-09-27 RX ORDER — LISINOPRIL AND HYDROCHLOROTHIAZIDE 12.5; 2 MG/1; MG/1
1 TABLET ORAL DAILY
Status: DISCONTINUED | OUTPATIENT
Start: 2024-09-27 | End: 2024-09-27 | Stop reason: CLARIF

## 2024-09-27 RX ORDER — ONDANSETRON 4 MG/1
4 TABLET, ORALLY DISINTEGRATING ORAL EVERY 8 HOURS PRN
Status: DISCONTINUED | OUTPATIENT
Start: 2024-09-27 | End: 2024-09-29 | Stop reason: HOSPADM

## 2024-09-27 RX ORDER — ASPIRIN 81 MG/1
324 TABLET, CHEWABLE ORAL ONCE
Status: COMPLETED | OUTPATIENT
Start: 2024-09-27 | End: 2024-09-27

## 2024-09-27 RX ORDER — POLYETHYLENE GLYCOL 3350 17 G/17G
17 POWDER, FOR SOLUTION ORAL DAILY PRN
Status: DISCONTINUED | OUTPATIENT
Start: 2024-09-27 | End: 2024-09-29 | Stop reason: HOSPADM

## 2024-09-27 RX ORDER — ONDANSETRON 2 MG/ML
4 INJECTION INTRAMUSCULAR; INTRAVENOUS EVERY 6 HOURS PRN
Status: DISCONTINUED | OUTPATIENT
Start: 2024-09-27 | End: 2024-09-29 | Stop reason: HOSPADM

## 2024-09-27 RX ORDER — ATORVASTATIN CALCIUM 40 MG/1
40 TABLET, FILM COATED ORAL NIGHTLY
Status: DISCONTINUED | OUTPATIENT
Start: 2024-09-27 | End: 2024-09-29 | Stop reason: HOSPADM

## 2024-09-27 RX ORDER — LISINOPRIL 20 MG/1
20 TABLET ORAL DAILY
Status: DISCONTINUED | OUTPATIENT
Start: 2024-09-27 | End: 2024-09-29 | Stop reason: HOSPADM

## 2024-09-27 RX ORDER — ACETAMINOPHEN 325 MG/1
650 TABLET ORAL EVERY 6 HOURS PRN
Status: DISCONTINUED | OUTPATIENT
Start: 2024-09-27 | End: 2024-09-29 | Stop reason: HOSPADM

## 2024-09-27 RX ORDER — NITROGLYCERIN 0.4 MG/1
0.4 TABLET SUBLINGUAL EVERY 5 MIN PRN
Status: DISCONTINUED | OUTPATIENT
Start: 2024-09-27 | End: 2024-09-29 | Stop reason: HOSPADM

## 2024-09-27 RX ORDER — VENLAFAXINE HYDROCHLORIDE 37.5 MG/1
37.5 CAPSULE, EXTENDED RELEASE ORAL DAILY
Status: DISCONTINUED | OUTPATIENT
Start: 2024-09-27 | End: 2024-09-29 | Stop reason: HOSPADM

## 2024-09-27 RX ORDER — SODIUM CHLORIDE 0.9 % (FLUSH) 0.9 %
5-40 SYRINGE (ML) INJECTION PRN
Status: DISCONTINUED | OUTPATIENT
Start: 2024-09-27 | End: 2024-09-29 | Stop reason: HOSPADM

## 2024-09-27 RX ORDER — MAGNESIUM SULFATE IN WATER 40 MG/ML
2000 INJECTION, SOLUTION INTRAVENOUS PRN
Status: DISCONTINUED | OUTPATIENT
Start: 2024-09-27 | End: 2024-09-29 | Stop reason: HOSPADM

## 2024-09-27 RX ORDER — POTASSIUM CHLORIDE 7.45 MG/ML
10 INJECTION INTRAVENOUS PRN
Status: DISCONTINUED | OUTPATIENT
Start: 2024-09-27 | End: 2024-09-29 | Stop reason: HOSPADM

## 2024-09-27 RX ORDER — SODIUM CHLORIDE 9 MG/ML
INJECTION, SOLUTION INTRAVENOUS PRN
Status: DISCONTINUED | OUTPATIENT
Start: 2024-09-27 | End: 2024-09-29 | Stop reason: HOSPADM

## 2024-09-27 RX ADMIN — ASPIRIN 81 MG 324 MG: 81 TABLET ORAL at 14:51

## 2024-09-27 RX ADMIN — SODIUM CHLORIDE: 9 INJECTION, SOLUTION INTRAVENOUS at 22:09

## 2024-09-27 RX ADMIN — SODIUM CHLORIDE, PRESERVATIVE FREE 10 ML: 5 INJECTION INTRAVENOUS at 21:50

## 2024-09-27 RX ADMIN — LISINOPRIL 20 MG: 20 TABLET ORAL at 21:50

## 2024-09-27 RX ADMIN — ATORVASTATIN CALCIUM 40 MG: 40 TABLET, FILM COATED ORAL at 21:50

## 2024-09-27 RX ADMIN — ENOXAPARIN SODIUM 40 MG: 100 INJECTION SUBCUTANEOUS at 21:53

## 2024-09-27 RX ADMIN — HYDROCHLOROTHIAZIDE 12.5 MG: 12.5 TABLET ORAL at 21:49

## 2024-09-27 ASSESSMENT — PAIN DESCRIPTION - LOCATION: LOCATION: CHEST

## 2024-09-27 ASSESSMENT — PAIN SCALES - GENERAL: PAINLEVEL_OUTOF10: 1

## 2024-09-27 ASSESSMENT — PAIN DESCRIPTION - DESCRIPTORS: DESCRIPTORS: ACHING;DISCOMFORT

## 2024-09-27 ASSESSMENT — PAIN DESCRIPTION - ORIENTATION: ORIENTATION: MID;LEFT

## 2024-09-27 ASSESSMENT — PAIN - FUNCTIONAL ASSESSMENT: PAIN_FUNCTIONAL_ASSESSMENT: 0-10

## 2024-09-27 NOTE — ED PROVIDER NOTES
HPI:  9/27/24,   Time: 2:30 PM EDT       Willian Harley is a 69 y.o. male presenting to the ED for cp/sweats, beginning 2 days ago.  The complaint has been intermittent, moderate in severity, and worsened by nothing.  Chest pain.  No shortness of breath.  Intermittent.  Had some sweats with that.  Worse with exertion.  No cough or congestion.  No leg swelling.  No nausea vomiting diarrhea.  No numbness or ting.  No focal weakness    Review of Systems:   Pertinent positives and negatives are stated within HPI, all other systems reviewed and are negative.          --------------------------------------------- PAST HISTORY ---------------------------------------------  Past Medical History:  has a past medical history of Anxiety, Calculus of gallbladder without cholecystitis without obstruction, Chronic renal disease, stage III (HCC) [922307], Depression, Erectile dysfunction, Headache, History of shingles, Hypertension, Irritable bowel syndrome, Mixed hyperlipidemia, and Seizures ([facility]).    Past Surgical History:  has a past surgical history that includes Heel spur surgery; Colonoscopy; Colonoscopy (N/A, 04/19/2023); Umbilical hernia repair (N/A, 05/30/2023); and hernia repair.    Social History:  reports that he has never smoked. He has never used smokeless tobacco. He reports that he does not drink alcohol and does not use drugs.    Family History: family history includes Atrial Fibrillation in his father; Heart Attack (age of onset: 64) in his father; Heart Disease in his father and mother.     The patient’s home medications have been reviewed.    Allergies: Codeine        ---------------------------------------------------PHYSICAL EXAM--------------------------------------    Constitutional/General: Alert and oriented x3, well appearing, non toxic in NAD  Head: Normocephalic and atraumatic  Eyes: PERRL, EOMI, conjunctive normal, sclera non icteric  Mouth: Oropharynx clear, handling secretions,  Neck: Supple, full

## 2024-09-28 ENCOUNTER — APPOINTMENT (OUTPATIENT)
Dept: ULTRASOUND IMAGING | Age: 69
End: 2024-09-28
Attending: STUDENT IN AN ORGANIZED HEALTH CARE EDUCATION/TRAINING PROGRAM
Payer: COMMERCIAL

## 2024-09-28 LAB
ANION GAP SERPL CALCULATED.3IONS-SCNC: 11 MMOL/L (ref 7–16)
BUN SERPL-MCNC: 18 MG/DL (ref 6–23)
CALCIUM SERPL-MCNC: 8.6 MG/DL (ref 8.6–10.2)
CHLORIDE SERPL-SCNC: 103 MMOL/L (ref 98–107)
CO2 SERPL-SCNC: 23 MMOL/L (ref 22–29)
CREAT SERPL-MCNC: 1.1 MG/DL (ref 0.7–1.2)
ERYTHROCYTE [DISTWIDTH] IN BLOOD BY AUTOMATED COUNT: 13 % (ref 11.5–15)
GFR, ESTIMATED: 77 ML/MIN/1.73M2
GLUCOSE SERPL-MCNC: 91 MG/DL (ref 74–99)
HCT VFR BLD AUTO: 38.8 % (ref 37–54)
HGB BLD-MCNC: 13 G/DL (ref 12.5–16.5)
MAGNESIUM SERPL-MCNC: 2 MG/DL (ref 1.6–2.6)
MCH RBC QN AUTO: 30.8 PG (ref 26–35)
MCHC RBC AUTO-ENTMCNC: 33.5 G/DL (ref 32–34.5)
MCV RBC AUTO: 91.9 FL (ref 80–99.9)
PLATELET # BLD AUTO: 229 K/UL (ref 130–450)
PMV BLD AUTO: 9.5 FL (ref 7–12)
POTASSIUM SERPL-SCNC: 3.8 MMOL/L (ref 3.5–5)
RBC # BLD AUTO: 4.22 M/UL (ref 3.8–5.8)
SODIUM SERPL-SCNC: 137 MMOL/L (ref 132–146)
TROPONIN I SERPL HS-MCNC: 12 NG/L (ref 0–11)
WBC OTHER # BLD: 6.9 K/UL (ref 4.5–11.5)

## 2024-09-28 PROCEDURE — 80048 BASIC METABOLIC PNL TOTAL CA: CPT

## 2024-09-28 PROCEDURE — 96361 HYDRATE IV INFUSION ADD-ON: CPT

## 2024-09-28 PROCEDURE — 93970 EXTREMITY STUDY: CPT

## 2024-09-28 PROCEDURE — 99233 SBSQ HOSP IP/OBS HIGH 50: CPT | Performed by: STUDENT IN AN ORGANIZED HEALTH CARE EDUCATION/TRAINING PROGRAM

## 2024-09-28 PROCEDURE — 84484 ASSAY OF TROPONIN QUANT: CPT

## 2024-09-28 PROCEDURE — G0378 HOSPITAL OBSERVATION PER HR: HCPCS

## 2024-09-28 PROCEDURE — 96372 THER/PROPH/DIAG INJ SC/IM: CPT

## 2024-09-28 PROCEDURE — 2580000003 HC RX 258: Performed by: STUDENT IN AN ORGANIZED HEALTH CARE EDUCATION/TRAINING PROGRAM

## 2024-09-28 PROCEDURE — 85027 COMPLETE CBC AUTOMATED: CPT

## 2024-09-28 PROCEDURE — 6370000000 HC RX 637 (ALT 250 FOR IP): Performed by: NURSE PRACTITIONER

## 2024-09-28 PROCEDURE — 6360000002 HC RX W HCPCS: Performed by: NURSE PRACTITIONER

## 2024-09-28 PROCEDURE — 83735 ASSAY OF MAGNESIUM: CPT

## 2024-09-28 PROCEDURE — 99223 1ST HOSP IP/OBS HIGH 75: CPT | Performed by: INTERNAL MEDICINE

## 2024-09-28 RX ORDER — SODIUM CHLORIDE 9 MG/ML
INJECTION, SOLUTION INTRAVENOUS CONTINUOUS
Status: DISCONTINUED | OUTPATIENT
Start: 2024-09-28 | End: 2024-09-29 | Stop reason: HOSPADM

## 2024-09-28 RX ADMIN — ENOXAPARIN SODIUM 40 MG: 100 INJECTION SUBCUTANEOUS at 08:28

## 2024-09-28 RX ADMIN — VENLAFAXINE HYDROCHLORIDE 37.5 MG: 37.5 CAPSULE, EXTENDED RELEASE ORAL at 08:28

## 2024-09-28 RX ADMIN — SODIUM CHLORIDE: 9 INJECTION, SOLUTION INTRAVENOUS at 09:14

## 2024-09-28 RX ADMIN — ASPIRIN 81 MG CHEWABLE TABLET 81 MG: 81 TABLET CHEWABLE at 08:28

## 2024-09-28 RX ADMIN — ATORVASTATIN CALCIUM 40 MG: 40 TABLET, FILM COATED ORAL at 20:10

## 2024-09-28 ASSESSMENT — PAIN SCALES - GENERAL: PAINLEVEL_OUTOF10: 0

## 2024-09-28 NOTE — PROGRESS NOTES
[facility] Hospitalist Progress Note    Admitting Date and Time: 9/27/2024  7:38 PM  Admit Dx: Chest pain [R07.9]    Subjective:  Patient is being followed for Chest pain [R07.9]     Patient was seen and examined    ROS: denies fever, chills, cp, sob, n/v, HA unless stated above.      sodium chloride flush  5-40 mL IntraVENous 2 times per day    aspirin  81 mg Oral Daily    atorvastatin  40 mg Oral Nightly    enoxaparin  40 mg SubCUTAneous Daily    venlafaxine  37.5 mg Oral Daily    [Held by provider] lisinopril  20 mg Oral Daily    And    [Held by provider] hydroCHLOROthiazide  12.5 mg Oral Daily     perflutren lipid microspheres, 1.5 mL, ONCE PRN  sodium chloride flush, 5-40 mL, PRN  sodium chloride, , PRN  potassium chloride, 40 mEq, PRN   Or  potassium alternative oral replacement, 40 mEq, PRN   Or  potassium chloride, 10 mEq, PRN  magnesium sulfate, 2,000 mg, PRN  ondansetron, 4 mg, Q8H PRN   Or  ondansetron, 4 mg, Q6H PRN  acetaminophen, 650 mg, Q6H PRN   Or  acetaminophen, 650 mg, Q6H PRN  polyethylene glycol, 17 g, Daily PRN  nitroGLYCERIN, 0.4 mg, Q5 Min PRN         Objective:    BP (!) 103/57   Pulse 72   Temp 97.3 °F (36.3 °C)   Resp 16   Wt 98 kg (216 lb 0.8 oz)   SpO2 97%   BMI 33.84 kg/m²     General Appearance: alert and oriented to person, place and time and in no acute distress  Skin: warm and dry  Head: normocephalic and atraumatic  Eyes: pupils equal, round, and reactive to light, extraocular eye movements intact, conjunctivae normal  Neck: neck supple and non tender without mass   Pulmonary/Chest: clear to auscultation bilaterally- no wheezes, rales or rhonchi, normal air movement, no respiratory distress  Cardiovascular: normal rate, normal S1 and S2 and no carotid bruits  Abdomen: soft, non-tender, non-distended, normal bowel sounds, no masses or organomegaly  Extremities: no cyanosis, no clubbing and no edema  Neurologic: no cranial nerve deficit and speech normal        Recent

## 2024-09-28 NOTE — PLAN OF CARE
Problem: Discharge Planning  Goal: Discharge to home or other facility with appropriate resources  9/28/2024 1319 by Imelda Mendoza RN  Outcome: Progressing  9/27/2024 2350 by Fany Kat, RN  Outcome: Progressing  9/27/2024 2350 by Fany Kat, RN  Outcome: Progressing     Problem: Skin/Tissue Integrity  Goal: Absence of new skin breakdown  Description: 1.  Monitor for areas of redness and/or skin breakdown  2.  Assess vascular access sites hourly  3.  Every 4-6 hours minimum:  Change oxygen saturation probe site  4.  Every 4-6 hours:  If on nasal continuous positive airway pressure, respiratory therapy assess nares and determine need for appliance change or resting period.  Outcome: Progressing

## 2024-09-28 NOTE — PROGRESS NOTES
4 Eyes Skin Assessment     NAME:  Willian Harley  YOB: 1955  MEDICAL RECORD NUMBER:  [id number]    The patient is being assessed for  Admission    I agree that at least one RN has performed a thorough Head to Toe Skin Assessment on the patient. ALL assessment sites listed below have been assessed.      Areas assessed by both nurses:    Head, Face, Ears, Shoulders, Back, Chest, Arms, Elbows, Hands, Sacrum. Buttock, Coccyx, Ischium, and Legs. Feet and Heels        Does the Patient have a Wound? No noted wound(s)       Eugene Prevention initiated by RN: Yes  Wound Care Orders initiated by RN: No    Pressure Injury (Stage 3,4, Unstageable, DTI, NWPT, and Complex wounds) if present, place Wound referral order by RN under : No    New Ostomies, if present place, Ostomy referral order under : No     Nurse 1 eSignature: Electronically signed by Fany Kat RN on 9/27/24 at 11:41 PM EDT    **SHARE this note so that the co-signing nurse can place an eSignature**    Nurse 2 eSignature: Electronically signed by Clau Kern RN on 9/27/24 at 11:47 PM EDT

## 2024-09-28 NOTE — CONSULTS
INPATIENT CARDIOLOGY CONSULT    Name: Willian Harley    Age: 69 y.o.    Date of Admission: 9/27/2024  7:38 PM    Date of Service: 9/28/2024    Reason for Consultation: Noncardiac chest pain, hypertension, moderate obesity    Referring Physician: Titi Bolaños MD    History of Present Illness: The patient is a 69-year-old white male with no association to [facility] Cardiology and no known structural heart disease.  He has previously undergone objective assessment inclusive of an exercise myocardial perfusion imaging study in May, 2021 during which he completed 3 minutes on a Seymour protocol treadmill achieving 4.6 METs of activity and exceeding 100% of age-predicted maximum heart rate with a clinically and electrocardiographically nonischemic response to exercise and perfusion imaging demonstrated no evidence of perfusion abnormalities with normal left ventricular systolic function as well as out of an echocardiogram in September, 2021 demonstrating evidence of a normal-sized left ventricular chamber with normal left ventricular systolic function no significant valvular abnormalities.  He additionally has a history of hypertension, diabetes, hyperlipidemia and moderate obesity.  He remains employed with functional capabilities of approximately 5 METs and relates no usual symptomatology of anginal-like chest discomfort or other ischemic equivalents, decompensated left ventricular systolic dysfunction or volume overload nor arrhythmia related manifestations nor of a pleuritic nature.  He relates within the past 72 hours while at work becoming diaphoretic and with the subsequent development of a midsternal pressure-like discomfort without additional radiation or additional associated ischemic equivalents.  Diaphoresis lasted in excess of 1 hour and he left to work with continued chest discomfort which is persisted, albeit at lesser intensity subsequent to its onset.  He relates intermittent randomly occurring episodes of

## 2024-09-28 NOTE — PROGRESS NOTES
Consult completed to cardio via [employer]     Electronically signed by Barb Colvin RN on 9/28/2024 at 8:33 AM

## 2024-09-28 NOTE — PLAN OF CARE
Problem: Discharge Planning  Goal: Discharge to home or other facility with appropriate resources  9/27/2024 2350 by Fnay Kat, RN  Outcome: Progressing  9/27/2024 2350 by Fany Kat RN  Outcome: Progressing     Problem: Discharge Planning  Goal: Discharge to home or other facility with appropriate resources  9/27/2024 2350 by Fany Kat, RN  Outcome: Progressing  9/27/2024 2350 by Fany Kat, RN  Outcome: Progressing

## 2024-09-28 NOTE — H&P
[facility]ist [facility] History and Physical      CHIEF COMPLAINT:  Chest pain    History of Present Illness:  This is a 69 year old male with PMH significant for depression, HTN, HLD, and CKD.  Patient to [place] ED today due to chest pain.  Patient reports chest pain started 2 days ago on Wednesday morning.    Reports having mild midsternal chest discomfort and diaphoresis while at work. Patient describes chest pain as constant and dull.  Had another episode Friday. Denies CP at this time. Denies recent illness, fever, chills, shortness of breath, nausea/vomiting, abdominal pain, back pain, and changes in bowel/bladder habits.  Urine negative.  Labs essentially unremarkable.  Creatinine slightly elevated at 1.3.  Baseline appears to be 1.1-1.2.  Troponin negative at 11 and 9.  EKG negative for ischemia.  Will observe for additional evaluation and treatment.    Informant(s) for H&P: Patient and chart review    REVIEW OF SYSTEMS:  A comprehensive review of systems was negative except for: what is in the HPI      PMH:  Past Medical History:   Diagnosis Date    Anxiety     Calculus of gallbladder without cholecystitis without obstruction 12/05/2023    Chronic renal disease, stage III (HCC) [019713] 06/01/2022    Depression     Erectile dysfunction     Headache     History of shingles     Hypertension 01/25/2017    Irritable bowel syndrome     Mixed hyperlipidemia 01/25/2017    Seizures (HCC) 12/05/2023       Surgical History:  Past Surgical History:   Procedure Laterality Date    COLONOSCOPY      COLONOSCOPY N/A 04/19/2023    COLORECTAL CANCER SCREENING, NOT HIGH RISK performed [place] [facility] [place]    HEEL SPUR SURGERY      HERNIA REPAIR      UMBILICAL HERNIA REPAIR N/A 05/30/2023    HERNIA UMBILICAL REPAIR LAPAROSCOPIC ROBOTIC XI [place]am [place] [facility] [place]       Medications Prior to Admission:    Prior to Admission medications    Medication Sig Start Date End Date

## 2024-09-29 VITALS
TEMPERATURE: 97.5 F | DIASTOLIC BLOOD PRESSURE: 80 MMHG | WEIGHT: 212.08 LBS | RESPIRATION RATE: 18 BRPM | OXYGEN SATURATION: 98 % | HEART RATE: 65 BPM | BODY MASS INDEX: 33.22 KG/M2 | SYSTOLIC BLOOD PRESSURE: 126 MMHG

## 2024-09-29 PROCEDURE — 96361 HYDRATE IV INFUSION ADD-ON: CPT

## 2024-09-29 PROCEDURE — 6360000002 HC RX W HCPCS: Performed by: NURSE PRACTITIONER

## 2024-09-29 PROCEDURE — 99239 HOSP IP/OBS DSCHRG MGMT >30: CPT | Performed by: STUDENT IN AN ORGANIZED HEALTH CARE EDUCATION/TRAINING PROGRAM

## 2024-09-29 PROCEDURE — 2580000003 HC RX 258: Performed by: STUDENT IN AN ORGANIZED HEALTH CARE EDUCATION/TRAINING PROGRAM

## 2024-09-29 PROCEDURE — 96372 THER/PROPH/DIAG INJ SC/IM: CPT

## 2024-09-29 PROCEDURE — 2580000003 HC RX 258: Performed by: NURSE PRACTITIONER

## 2024-09-29 PROCEDURE — 6370000000 HC RX 637 (ALT 250 FOR IP): Performed by: NURSE PRACTITIONER

## 2024-09-29 PROCEDURE — G0378 HOSPITAL OBSERVATION PER HR: HCPCS

## 2024-09-29 RX ORDER — AMLODIPINE BESYLATE 5 MG/1
5 TABLET ORAL DAILY
Qty: 30 TABLET | Refills: 0 | Status: SHIPPED | OUTPATIENT
Start: 2024-09-29 | End: 2024-10-29

## 2024-09-29 RX ADMIN — ASPIRIN 81 MG CHEWABLE TABLET 81 MG: 81 TABLET CHEWABLE at 08:55

## 2024-09-29 RX ADMIN — VENLAFAXINE HYDROCHLORIDE 37.5 MG: 37.5 CAPSULE, EXTENDED RELEASE ORAL at 08:55

## 2024-09-29 RX ADMIN — SODIUM CHLORIDE, PRESERVATIVE FREE 10 ML: 5 INJECTION INTRAVENOUS at 08:55

## 2024-09-29 RX ADMIN — ENOXAPARIN SODIUM 40 MG: 100 INJECTION SUBCUTANEOUS at 08:55

## 2024-09-29 RX ADMIN — SODIUM CHLORIDE: 9 INJECTION, SOLUTION INTRAVENOUS at 04:03

## 2024-09-29 NOTE — PLAN OF CARE
Problem: Discharge Planning  Goal: Discharge to home or other facility with appropriate resources  9/28/2024 2116 by Glo Bird, RN  Outcome: Progressing  9/28/2024 1319 by Imelda Mendoza, RN  Outcome: Progressing     Problem: Skin/Tissue Integrity  Goal: Absence of new skin breakdown  Description: 1.  Monitor for areas of redness and/or skin breakdown  2.  Assess vascular access sites hourly  3.  Every 4-6 hours minimum:  Change oxygen saturation probe site  4.  Every 4-6 hours:  If on nasal continuous positive airway pressure, respiratory therapy assess nares and determine need for appliance change or resting period.  9/28/2024 2116 by Glo Bird, RN  Outcome: Progressing  9/28/2024 1319 by Imelda Mendoza, RN  Outcome: Progressing

## 2024-09-29 NOTE — DISCHARGE SUMMARY
daily            CONTINUE taking these medications      atorvastatin 40 MG tablet  Commonly known as: LIPITOR  Take 1 tablet by mouth daily     venlafaxine 37.5 MG extended release capsule  Commonly known as: EFFEXOR XR  Take 1 capsule by mouth daily            STOP taking these medications      lisinopril-hydroCHLOROthiazide 20-12.5 MG per tablet  Commonly known as: PRINZIDE;ZESTORETIC     therapeutic multivitamin-minerals tablet               Where to Get Your Medications        These medications were sent to [id number] - [place], [place] - [address] - P [phone] - F [phone] [address] [facility] [address]      Phone: [phone]   amLODIPine 5 MG tablet           Note that more than 30 minutes was spent in preparing discharge papers, discussing discharge with patient, medication review, etc.    Signed:  Electronically signed by Titi Bolaños DO on 9/29/2024 at 2:02 PM

## 2024-10-03 ENCOUNTER — OFFICE VISIT (OUTPATIENT)
Dept: FAMILY MEDICINE CLINIC | Age: 69
End: 2024-10-03
Payer: COMMERCIAL

## 2024-10-03 VITALS
DIASTOLIC BLOOD PRESSURE: 76 MMHG | RESPIRATION RATE: 18 BRPM | BODY MASS INDEX: 34.31 KG/M2 | OXYGEN SATURATION: 100 % | TEMPERATURE: 97.8 F | HEART RATE: 77 BPM | HEIGHT: 67 IN | SYSTOLIC BLOOD PRESSURE: 118 MMHG | WEIGHT: 218.6 LBS

## 2024-10-03 DIAGNOSIS — E78.49 OTHER HYPERLIPIDEMIA: ICD-10-CM

## 2024-10-03 DIAGNOSIS — Z23 NEED FOR IMMUNIZATION AGAINST INFLUENZA: ICD-10-CM

## 2024-10-03 DIAGNOSIS — R07.9 CHEST PAIN DUE TO PSYCHOLOGICAL STRESS: Primary | ICD-10-CM

## 2024-10-03 DIAGNOSIS — F43.9 CHEST PAIN DUE TO PSYCHOLOGICAL STRESS: Primary | ICD-10-CM

## 2024-10-03 DIAGNOSIS — I10 PRIMARY HYPERTENSION: ICD-10-CM

## 2024-10-03 PROCEDURE — 3078F DIAST BP <80 MM HG: CPT | Performed by: STUDENT IN AN ORGANIZED HEALTH CARE EDUCATION/TRAINING PROGRAM

## 2024-10-03 PROCEDURE — G0008 ADMIN INFLUENZA VIRUS VAC: HCPCS | Performed by: STUDENT IN AN ORGANIZED HEALTH CARE EDUCATION/TRAINING PROGRAM

## 2024-10-03 PROCEDURE — 3074F SYST BP LT 130 MM HG: CPT | Performed by: STUDENT IN AN ORGANIZED HEALTH CARE EDUCATION/TRAINING PROGRAM

## 2024-10-03 PROCEDURE — 90653 IIV ADJUVANT VACCINE IM: CPT | Performed by: STUDENT IN AN ORGANIZED HEALTH CARE EDUCATION/TRAINING PROGRAM

## 2024-10-03 PROCEDURE — 99214 OFFICE O/P EST MOD 30 MIN: CPT | Performed by: STUDENT IN AN ORGANIZED HEALTH CARE EDUCATION/TRAINING PROGRAM

## 2024-10-03 PROCEDURE — 1123F ACP DISCUSS/DSCN MKR DOCD: CPT | Performed by: STUDENT IN AN ORGANIZED HEALTH CARE EDUCATION/TRAINING PROGRAM

## 2024-10-03 NOTE — PROGRESS NOTES
Patient:  Willian Harley 69 y.o. male     10/03/24      Chiefcomplaint:   Chief Complaint   Patient presents with    Follow-Up from Hospital     Problems and Overview   Chest pain rule out hospitalization  Did not get stress test  Workup negative, cxr, trop 11-9-12  Cr 1.3 to 1.1  Patient Active Problem List    Diagnosis Date Noted    Umbilical hernia without obstruction and without gangrene 03/14/2023     Priority: Medium    Sleep-disordered breathing 08/16/2022     Priority: Medium     Snoring with [facility] bmi at [facility]      Low testosterone in male 06/01/2022     Priority: Medium    Chest pain 09/27/2024    S/P hernia repair 06/06/2023    Mild episode of recurrent major depressive disorder (HCC) 02/02/2022    Erectile dysfunction 06/07/2018    BPH without urinary obstruction 06/07/2018    Hypertension 01/25/2017    Other hyperlipidemia 01/25/2017      Prevention:  Health Maintenance Due   Topic Date Due    Respiratory Syncytial Virus (RSV) Pregnant or age 60 yrs+ (1 - 1-dose 60+ series) Never done    Shingles vaccine (2 of 3) 12/13/2017    Lipids  10/15/2022    Annual Wellness Visit (Medicare Advantage)  01/01/2024    Flu vaccine (1) 08/01/2024    COVID-19 Vaccine (3 - 2023-24 season) 09/01/2024      Meds prior:  Current Outpatient Medications   Medication Instructions    amLODIPine (NORVASC) 5 mg, Oral, DAILY    atorvastatin (LIPITOR) 40 mg, Oral, DAILY    venlafaxine (EFFEXOR XR) 37.5 mg, Oral, DAILY      Physical Exam   Vitals: /76   Pulse 77   Temp 97.8 °F (36.6 °C)   Resp 18   Ht 1.702 m (5' 7\")   Wt 99.2 kg (218 lb 9.6 oz)   SpO2 100%   BMI 34.24 kg/m²   General Appearance: Alert, oriented, no acute distress  HEENT: No scleral icterus. No visible discharge from eyes  Neck: Not rigid. No visible masses  Chest wall/Lung: Clear to auscultation bilaterally,  respirations unlabored. No ronchi/wheezing/rales  Heart: RRR, no murmur  Abdomen: Soft, nontender  Extremities:  No edema  Skin: No rashes.

## 2024-10-23 ENCOUNTER — OFFICE VISIT (OUTPATIENT)
Dept: FAMILY MEDICINE CLINIC | Age: 69
End: 2024-10-23

## 2024-10-23 VITALS
RESPIRATION RATE: 18 BRPM | DIASTOLIC BLOOD PRESSURE: 70 MMHG | TEMPERATURE: 98.7 F | WEIGHT: 214.8 LBS | OXYGEN SATURATION: 99 % | HEIGHT: 67 IN | BODY MASS INDEX: 33.71 KG/M2 | SYSTOLIC BLOOD PRESSURE: 108 MMHG | HEART RATE: 90 BPM

## 2024-10-23 DIAGNOSIS — E78.49 OTHER HYPERLIPIDEMIA: ICD-10-CM

## 2024-10-23 DIAGNOSIS — I10 PRIMARY HYPERTENSION: Primary | ICD-10-CM

## 2024-10-23 RX ORDER — AMLODIPINE BESYLATE 5 MG/1
5 TABLET ORAL DAILY
Qty: 90 TABLET | Refills: 1 | Status: SHIPPED | OUTPATIENT
Start: 2024-10-23 | End: 2025-04-21

## 2024-10-23 NOTE — PROGRESS NOTES
(LIPITOR) 40 mg, Oral, DAILY    venlafaxine (EFFEXOR XR) 37.5 mg, Oral, DAILY      Physical Exam   Vitals: /70 Comment: our machine  Pulse 90   Temp 98.7 °F (37.1 °C)   Resp 18   Ht 1.702 m (5' 7\")   Wt 97.4 kg (214 lb 12.8 oz)   SpO2 99%   BMI 33.64 kg/m²   General Appearance: Alert, oriented, no acute distress  HEENT: No scleral icterus. No visible discharge from eyes  Neck: Not rigid. No visible masses  Chest wall/Lung: Clear to auscultation bilaterally,  respirations unlabored. No ronchi/wheezing/rales  Heart: RRR, no murmur  Abdomen: Soft, nontender  Extremities:  No edema  Skin: No rashes. No jaundice  Neuro: Alert and oriented        Psych: Appropriate mood and appropriate affect  Assessment and Plan   Htn - switched from lisinopril to amlodipine at hospital - bp remains stable. Advised importance of taking bp correctly. Can continue to monitor prn.   HLD - continue statin, due for recheck to assess vascular risk.   Consider cardio fu if chest pain returns, denies any ongoing sx currently  Lipid panel prior to fu in dec.given script today  Denies new or different ha, speech change, numbness, weakness, gait change, vision change, chest pain, chest pressure, wilson, lightheadedness, dizziness..       Primary hypertension  -     amLODIPine (NORVASC) 5 MG tablet; Take 1 tablet by mouth daily, Disp-90 tablet, R-1Normal  Other hyperlipidemia  -     Lipid Panel; Future        No follow-ups on file.    Qasim Jack DO     This document may have been prepared at least partially through the use of voice recognition software. Although effort is taken to assure the accuracy of this document, it is possible that grammatical, syntax,  or spelling errors may occur.

## 2024-11-18 ENCOUNTER — OFFICE VISIT (OUTPATIENT)
Dept: ENDOCRINOLOGY | Age: 69
End: 2024-11-18
Payer: COMMERCIAL

## 2024-11-18 VITALS
SYSTOLIC BLOOD PRESSURE: 126 MMHG | DIASTOLIC BLOOD PRESSURE: 82 MMHG | BODY MASS INDEX: 32.58 KG/M2 | HEART RATE: 82 BPM | HEIGHT: 68 IN | OXYGEN SATURATION: 97 % | WEIGHT: 215 LBS

## 2024-11-18 DIAGNOSIS — R79.89 LOW TESTOSTERONE IN MALE: Primary | ICD-10-CM

## 2024-11-18 DIAGNOSIS — E55.9 VITAMIN D DEFICIENCY: ICD-10-CM

## 2024-11-18 PROCEDURE — 1123F ACP DISCUSS/DSCN MKR DOCD: CPT | Performed by: CLINICAL NURSE SPECIALIST

## 2024-11-18 PROCEDURE — 3074F SYST BP LT 130 MM HG: CPT | Performed by: CLINICAL NURSE SPECIALIST

## 2024-11-18 PROCEDURE — G2211 COMPLEX E/M VISIT ADD ON: HCPCS | Performed by: CLINICAL NURSE SPECIALIST

## 2024-11-18 PROCEDURE — 3079F DIAST BP 80-89 MM HG: CPT | Performed by: CLINICAL NURSE SPECIALIST

## 2024-11-18 PROCEDURE — 1159F MED LIST DOCD IN RCRD: CPT | Performed by: CLINICAL NURSE SPECIALIST

## 2024-11-18 PROCEDURE — 99214 OFFICE O/P EST MOD 30 MIN: CPT | Performed by: CLINICAL NURSE SPECIALIST

## 2024-11-18 NOTE — PROGRESS NOTES
Pt resting in bed comfortably with family at bedside. O2 sat 95% on 2L o2/nc. Resp 22, expiratory wheezing noted. Pt states pain is controlled at this time. Will continue to monitor.       Leighann Prince RN  03/28/19 0737
Pt to x-ray with x-ray tech.       Carlos Enrique Valenzuela RN  03/27/19 2960
Report called to LIZETH Truong, all questions answered. Pt states pain is well controlled at this time.       Arjun Purcell RN  03/28/19 6740
medication  Pt didn't feel any difference since stopped T therapy   Last testosterone levels were very low comparative to being on testosterone replacement  Discussed side effects and complications of testosterone therapy including worsening sleep apnea, risk of prostate Ca, Polycythemia and cardiovascular risk  He does not wish to restart art this point     vitD deficiency   Discussed the effect of Low testosterone on bone health  Continue vitD supplement     I personally reviewed external notes from PCP and other patient's care team providers, and personally interpreted labs associated with the above diagnosis. I also ordered labs to further assess and manage the above addressed medical conditions.     Return in about 1 year (around 11/18/2025).    The above issues were reviewed with the patient who understood and agreed with the plan.    Thank you for allowing us to participate in the care of this patient. Please do not hesitate to contact us with any additional questions.    Diagnosis Orders   1. Low testosterone in male        2. Vitamin D deficiency              Cong A Gargasz, APRN - CNS   Endocrinologist, [facility]   [address] [place] [address], [address], [place], [address]    Phone: [phone]  Fax: [phone]  -----------------------------  An electronic signature was used to authenticate this note. ALEKSANDAR Matthews  on 11/18/2024 at 3:43 PM

## 2024-11-25 ENCOUNTER — HOSPITAL ENCOUNTER (OUTPATIENT)
Age: 69
Discharge: HOME | End: 2024-11-25
Payer: COMMERCIAL

## 2024-11-25 DIAGNOSIS — E78.49 OTHER HYPERLIPIDEMIA: ICD-10-CM

## 2024-11-25 LAB
CHOLEST SERPL-MCNC: 125 MG/DL
HDLC SERPL-MCNC: 44 MG/DL
LDLC SERPL CALC-MCNC: 67 MG/DL
TRIGL SERPL-MCNC: 68 MG/DL
VLDLC SERPL CALC-MCNC: 14 MG/DL

## 2024-11-25 PROCEDURE — 80061 LIPID PANEL: CPT

## 2024-11-25 PROCEDURE — 36415 COLL VENOUS BLD VENIPUNCTURE: CPT

## 2024-12-10 ENCOUNTER — OFFICE VISIT (OUTPATIENT)
Dept: FAMILY MEDICINE CLINIC | Age: 69
End: 2024-12-10

## 2024-12-10 VITALS
WEIGHT: 219.4 LBS | HEIGHT: 68 IN | RESPIRATION RATE: 18 BRPM | DIASTOLIC BLOOD PRESSURE: 66 MMHG | SYSTOLIC BLOOD PRESSURE: 122 MMHG | HEART RATE: 74 BPM | BODY MASS INDEX: 33.25 KG/M2 | TEMPERATURE: 98.1 F

## 2024-12-10 DIAGNOSIS — E78.49 OTHER HYPERLIPIDEMIA: Chronic | ICD-10-CM

## 2024-12-10 DIAGNOSIS — R51.9 MORNING HEADACHE: ICD-10-CM

## 2024-12-10 DIAGNOSIS — I10 PRIMARY HYPERTENSION: Chronic | ICD-10-CM

## 2024-12-10 DIAGNOSIS — Z76.0 MEDICATION REFILL: ICD-10-CM

## 2024-12-10 DIAGNOSIS — M77.9 TENDONITIS: ICD-10-CM

## 2024-12-10 DIAGNOSIS — Z00.00 MEDICARE ANNUAL WELLNESS VISIT, SUBSEQUENT: Primary | ICD-10-CM

## 2024-12-10 RX ORDER — ATORVASTATIN CALCIUM 40 MG/1
40 TABLET, FILM COATED ORAL DAILY
Qty: 90 TABLET | Refills: 1 | Status: SHIPPED | OUTPATIENT
Start: 2024-12-10

## 2024-12-10 ASSESSMENT — PATIENT HEALTH QUESTIONNAIRE - PHQ9
1. LITTLE INTEREST OR PLEASURE IN DOING THINGS: SEVERAL DAYS
5. POOR APPETITE OR OVEREATING: NOT AT ALL
6. FEELING BAD ABOUT YOURSELF - OR THAT YOU ARE A FAILURE OR HAVE LET YOURSELF OR YOUR FAMILY DOWN: NOT AT ALL
10. IF YOU CHECKED OFF ANY PROBLEMS, HOW DIFFICULT HAVE THESE PROBLEMS MADE IT FOR YOU TO DO YOUR WORK, TAKE CARE OF THINGS AT HOME, OR GET ALONG WITH OTHER PEOPLE: SOMEWHAT DIFFICULT
SUM OF ALL RESPONSES TO PHQ QUESTIONS 1-9: 2
SUM OF ALL RESPONSES TO PHQ QUESTIONS 1-9: 2
9. THOUGHTS THAT YOU WOULD BE BETTER OFF DEAD, OR OF HURTING YOURSELF: NOT AT ALL
SUM OF ALL RESPONSES TO PHQ QUESTIONS 1-9: 2
3. TROUBLE FALLING OR STAYING ASLEEP: NOT AT ALL
7. TROUBLE CONCENTRATING ON THINGS, SUCH AS READING THE NEWSPAPER OR WATCHING TELEVISION: NOT AT ALL
SUM OF ALL RESPONSES TO PHQ QUESTIONS 1-9: 2
4. FEELING TIRED OR HAVING LITTLE ENERGY: NOT AT ALL
SUM OF ALL RESPONSES TO PHQ9 QUESTIONS 1 & 2: 2
2. FEELING DOWN, DEPRESSED OR HOPELESS: SEVERAL DAYS
8. MOVING OR SPEAKING SO SLOWLY THAT OTHER PEOPLE COULD HAVE NOTICED. OR THE OPPOSITE, BEING SO FIGETY OR RESTLESS THAT YOU HAVE BEEN MOVING AROUND A LOT MORE THAN USUAL: NOT AT ALL

## 2024-12-10 ASSESSMENT — LIFESTYLE VARIABLES: HOW OFTEN DO YOU HAVE A DRINK CONTAINING ALCOHOL: NEVER

## 2024-12-10 NOTE — PROGRESS NOTES
Htn at goal on amlodipine, continue same.   HLD with controlled ldl, trig, continue atorvastatin  Tedonitis - b/l deltoid area - continue biofreeze. No restriction rom. No weakness.   Headache in morning - history of snoring, elevated bmi. Controlled on excedrin. Possible migraine but says not as bad as in the past. Considered sleep study, but concern for cost. Continue to monitor.    Lab Results   Component Value Date    CHOL 125 11/25/2024    TRIG 68 11/25/2024    HDL 44 11/25/2024    LDL 67 11/25/2024    VLDL 14 11/25/2024    CHOLHDLRATIO 6.2 (H) 05/03/2017     Lab Results   Component Value Date     09/28/2024    K 3.8 09/28/2024     09/28/2024    CO2 23 09/28/2024    BUN 18 09/28/2024    CREATININE 1.1 09/28/2024    GLUCOSE 91 09/28/2024    CALCIUM 8.6 09/28/2024    BILITOT 1.1 09/27/2024    ALKPHOS 118 09/27/2024    AST 13 09/27/2024    ALT 11 09/27/2024    LABGLOM 77 09/28/2024    GFRAA >60 07/26/2022

## 2024-12-10 NOTE — PATIENT INSTRUCTIONS
arms.     Lightheadedness or sudden weakness.     A fast or irregular heartbeat.   After you call 911, the  may tell you to chew 1 adult-strength or 2 to 4 low-dose aspirin. Wait for an ambulance. Do not try to drive yourself.  Watch closely for changes in your health, and be sure to contact your doctor if you have any problems.  Where can you learn more?  Go to https://www.[employer]pl.net/patientEd and enter F075 to learn more about \"A Healthy Heart: Care Instructions.\"  Current as of: June 24, 2023  Content Version: 14.2  © 2024 [employer].   Care instructions adapted under license by [employer]. If you have questions about a medical condition or this instruction, always ask your healthcare professional. [employer] disclaims any warranty or liability for your use of this information.      Personalized Preventive Plan for Willian Harley - 12/10/2024  Medicare offers a range of preventive health benefits. Some of the tests and screenings are paid in full while other may be subject to a deductible, co-insurance, and/or copay.    Some of these benefits include a comprehensive review of your medical history including lifestyle, illnesses that may run in your family, and various assessments and screenings as appropriate.    After reviewing your medical record and screening and assessments performed today your provider may have ordered immunizations, labs, imaging, and/or referrals for you.  A list of these orders (if applicable) as well as your Preventive Care list are included within your After Visit Summary for your review.    Other Preventive Recommendations:    A preventive eye exam performed by an eye specialist is recommended every 1-2 years to screen for glaucoma; cataracts, macular degeneration, and other eye disorders.  A preventive dental visit is recommended every 6 months.  Try to get at least 150 minutes of exercise per week or 10,000 steps per day on a pedometer .  Order

## 2024-12-10 NOTE — PROGRESS NOTES
Medicare Annual Wellness Visit    Willian Harley is here for Medicare AWV and Arm Pain (Bilateral deltoid area x 3-4 weeks)    Assessment & Plan   Medicare annual wellness visit, subsequent  Primary hypertension  Other hyperlipidemia  Tendonitis  Medication refill  -     atorvastatin (LIPITOR) 40 MG tablet; Take 1 tablet by mouth daily, Disp-90 tablet, R-1Normal    Recommendations for Preventive Services Due: see orders and patient instructions/AVS.  Recommended screening schedule for the next 5-10 years is provided to the patient in written form: see Patient Instructions/AVS.     Return for Medicare Annual Wellness Visit in 1 year.     Subjective       Patient's complete Health Risk Assessment and screening values have been reviewed and are found in Flowsheets. The following problems were reviewed today and where indicated follow up appointments were made and/or referrals ordered.    Positive Risk Factor Screenings with Interventions:    Fall Risk:  Do you feel unsteady or are you worried about falling? : no  2 or more falls in past year?: (!) yes  Fall with injury in past year?: no     Interventions:    Patient comments: tripped in shop  Reviewed medications, home hazards, visual acuity, and co-morbidities that can increase risk for falls    Cognitive:   Clock Drawing Test (CDT): Normal        Total Score Interpretation: Abnormal Mini-Cog  Interventions:  Patient advised to follow-up in this office for further evaluation and treatment              Abnormal BMI (obese):  Body mass index is 33.86 kg/m². (!) Abnormal  Interventions:  Patient declines any further evaluation or treatment                           Objective   Vitals:    12/10/24 0714 12/10/24 0719   BP: (!) 144/84 122/66   Pulse: 74    Resp: 18    Temp: 98.1 °F (36.7 °C)    Weight: 99.5 kg (219 lb 6.4 oz)    Height: 1.715 m (5' 7.5\")       Body mass index is 33.86 kg/m².                  Allergies   Allergen Reactions    Codeine Nausea Only     Prior to

## 2024-12-16 ENCOUNTER — APPOINTMENT (OUTPATIENT)
Dept: CT IMAGING | Age: 69
End: 2024-12-16
Payer: COMMERCIAL

## 2024-12-16 ENCOUNTER — HOSPITAL ENCOUNTER (EMERGENCY)
Age: 69
Discharge: HOME | End: 2024-12-16
Attending: EMERGENCY MEDICINE
Payer: COMMERCIAL

## 2024-12-16 VITALS
OXYGEN SATURATION: 98 % | DIASTOLIC BLOOD PRESSURE: 78 MMHG | TEMPERATURE: 98 F | SYSTOLIC BLOOD PRESSURE: 126 MMHG | RESPIRATION RATE: 16 BRPM | HEART RATE: 92 BPM

## 2024-12-16 DIAGNOSIS — S09.90XA INJURY OF HEAD, INITIAL ENCOUNTER: Primary | ICD-10-CM

## 2024-12-16 DIAGNOSIS — S00.03XA CONTUSION OF SCALP, INITIAL ENCOUNTER: ICD-10-CM

## 2024-12-16 PROCEDURE — 99284 EMERGENCY DEPT VISIT MOD MDM: CPT

## 2024-12-16 PROCEDURE — 70450 CT HEAD/BRAIN W/O DYE: CPT

## 2024-12-16 ASSESSMENT — PAIN SCALES - GENERAL: PAINLEVEL_OUTOF10: 0

## 2024-12-16 ASSESSMENT — PAIN - FUNCTIONAL ASSESSMENT: PAIN_FUNCTIONAL_ASSESSMENT: NONE - DENIES PAIN

## 2024-12-17 NOTE — ED PROVIDER NOTES
consults, disposition:            Medical Decision Making  History From: Patient, EMR    Limitations to history : None    Willian Harley is a 69 y.o. male who presents with head injury.  Was walking walking underneath a piece of metal equipment when he struck his forehead on it.  He felt a lump denied loss consciousness no anticoagulation.  Denies numbness tingling weakness.  History significant for previous subarachnoid hemorrhage from ground-level fall.  Denies nausea vomiting chest pain shortness of breath or other complaints    Nursing Notes were all reviewed and agreed with or any disagreements were addressed in the HPI.      REVIEW OF EXTERNAL NOTE :      Internal medicine discharge summary from 9/29/2024, submitted 9/27/2024, was admitted for chest pain    Head CT negative for acute traumatic process, sinusitis noted he denies any dizziness lightheadedness, will defer antibiotic treatment at this time, discussed supportive care and need for follow-up he and his significant other state understanding and agreement    CT head without contrast to evaluate for hemorrhage or masses.      Amount and/or Complexity of Data Reviewed  Independent Historian: spouse              CONSULTS: (Who and What was discussed)  None         I am the Primary Clinician of Record.    FINAL IMPRESSION      1. Injury of head, initial encounter    2. Contusion of scalp, initial encounter          DISPOSITION/PLAN     DISPOSITION Decision To Discharge 12/16/2024 08:56:34 PM      PATIENT REFERRED TO:  Qasim Jack DO  [address] [facility]  [address]nd Floor  [facility] [address] [phone]    Schedule an appointment as soon as possible for a visit       [facility] Emergency Department  [address] [place] [id number]  [phone]    If symptoms worsen      DISCHARGE MEDICATIONS:  Discharge Medication List as of 12/16/2024  8:59 PM          DISCONTINUED MEDICATIONS:  Discharge Medication List as of

## 2025-02-19 ENCOUNTER — HOSPITAL ENCOUNTER (EMERGENCY)
Age: 70
Discharge: HOME | End: 2025-02-19
Attending: EMERGENCY MEDICINE
Payer: MEDICARE

## 2025-02-19 ENCOUNTER — APPOINTMENT (OUTPATIENT)
Dept: CT IMAGING | Age: 70
End: 2025-02-19
Payer: MEDICARE

## 2025-02-19 VITALS
DIASTOLIC BLOOD PRESSURE: 84 MMHG | WEIGHT: 214 LBS | BODY MASS INDEX: 33.02 KG/M2 | SYSTOLIC BLOOD PRESSURE: 148 MMHG | HEART RATE: 58 BPM | OXYGEN SATURATION: 98 % | RESPIRATION RATE: 20 BRPM | TEMPERATURE: 98.4 F

## 2025-02-19 DIAGNOSIS — R51.9 NONINTRACTABLE HEADACHE, UNSPECIFIED CHRONICITY PATTERN, UNSPECIFIED HEADACHE TYPE: Primary | ICD-10-CM

## 2025-02-19 LAB
ALBUMIN SERPL-MCNC: 3.9 G/DL (ref 3.5–5.2)
ALP SERPL-CCNC: 113 U/L (ref 40–129)
ALT SERPL-CCNC: 18 U/L (ref 0–40)
ANION GAP SERPL CALCULATED.3IONS-SCNC: 8 MMOL/L (ref 7–16)
AST SERPL-CCNC: 19 U/L (ref 0–39)
BASOPHILS # BLD: 0.03 K/UL (ref 0–0.2)
BASOPHILS NFR BLD: 1 % (ref 0–2)
BILIRUB SERPL-MCNC: 1 MG/DL (ref 0–1.2)
BUN SERPL-MCNC: 13 MG/DL (ref 6–23)
CALCIUM SERPL-MCNC: 8.9 MG/DL (ref 8.6–10.2)
CHLORIDE SERPL-SCNC: 106 MMOL/L (ref 98–107)
CO2 SERPL-SCNC: 25 MMOL/L (ref 22–29)
CREAT SERPL-MCNC: 0.9 MG/DL (ref 0.7–1.2)
EOSINOPHIL # BLD: 0.25 K/UL (ref 0.05–0.5)
EOSINOPHILS RELATIVE PERCENT: 4 % (ref 0–6)
ERYTHROCYTE [DISTWIDTH] IN BLOOD BY AUTOMATED COUNT: 13.2 % (ref 11.5–15)
GFR, ESTIMATED: 88 ML/MIN/1.73M2
GLUCOSE SERPL-MCNC: 98 MG/DL (ref 74–99)
HCT VFR BLD AUTO: 38.7 % (ref 37–54)
HGB BLD-MCNC: 13.3 G/DL (ref 12.5–16.5)
IMM GRANULOCYTES # BLD AUTO: 0.05 K/UL (ref 0–0.58)
IMM GRANULOCYTES NFR BLD: 1 % (ref 0–5)
LYMPHOCYTES NFR BLD: 1.26 K/UL (ref 1.5–4)
LYMPHOCYTES RELATIVE PERCENT: 20 % (ref 20–42)
MAGNESIUM SERPL-MCNC: 2 MG/DL (ref 1.6–2.6)
MCH RBC QN AUTO: 30.2 PG (ref 26–35)
MCHC RBC AUTO-ENTMCNC: 34.4 G/DL (ref 32–34.5)
MCV RBC AUTO: 88 FL (ref 80–99.9)
MONOCYTES NFR BLD: 0.7 K/UL (ref 0.1–0.95)
MONOCYTES NFR BLD: 11 % (ref 2–12)
NEUTROPHILS NFR BLD: 64 % (ref 43–80)
NEUTS SEG NFR BLD: 4.12 K/UL (ref 1.8–7.3)
PLATELET # BLD AUTO: 226 K/UL (ref 130–450)
PMV BLD AUTO: 9.3 FL (ref 7–12)
POTASSIUM SERPL-SCNC: 4.1 MMOL/L (ref 3.5–5)
PROT SERPL-MCNC: 6.3 G/DL (ref 6.4–8.3)
RBC # BLD AUTO: 4.4 M/UL (ref 3.8–5.8)
SODIUM SERPL-SCNC: 139 MMOL/L (ref 132–146)
WBC OTHER # BLD: 6.4 K/UL (ref 4.5–11.5)

## 2025-02-19 PROCEDURE — 93005 ELECTROCARDIOGRAM TRACING: CPT

## 2025-02-19 PROCEDURE — 99284 EMERGENCY DEPT VISIT MOD MDM: CPT

## 2025-02-19 PROCEDURE — 70450 CT HEAD/BRAIN W/O DYE: CPT

## 2025-02-19 PROCEDURE — 85025 COMPLETE CBC W/AUTO DIFF WBC: CPT

## 2025-02-19 PROCEDURE — 80053 COMPREHEN METABOLIC PANEL: CPT

## 2025-02-19 PROCEDURE — 83735 ASSAY OF MAGNESIUM: CPT

## 2025-02-19 PROCEDURE — 2580000003 HC RX 258

## 2025-02-19 RX ORDER — SUMATRIPTAN 50 MG/1
50 TABLET, FILM COATED ORAL DAILY
Qty: 15 TABLET | Refills: 0 | Status: SHIPPED | OUTPATIENT
Start: 2025-02-19 | End: 2025-02-19

## 2025-02-19 RX ORDER — 0.9 % SODIUM CHLORIDE 0.9 %
1000 INTRAVENOUS SOLUTION INTRAVENOUS ONCE
Status: COMPLETED | OUTPATIENT
Start: 2025-02-19 | End: 2025-02-19

## 2025-02-19 RX ADMIN — SODIUM CHLORIDE 1000 ML: 0.9 INJECTION, SOLUTION INTRAVENOUS at 13:04

## 2025-02-19 ASSESSMENT — PAIN - FUNCTIONAL ASSESSMENT: PAIN_FUNCTIONAL_ASSESSMENT: 0-10

## 2025-02-19 ASSESSMENT — PAIN DESCRIPTION - LOCATION: LOCATION: HEAD

## 2025-02-19 ASSESSMENT — PAIN SCALES - GENERAL: PAINLEVEL_OUTOF10: 4

## 2025-02-19 NOTE — ED PROVIDER NOTES
addressed.  Patient stable for discharge.    DDx includes but is not limited to migraine, cluster headache, intracranial hemorrhage, stroke    On reevaluation patient had significant symptomatic relief. Patient would like to go home.     Pt will be d/c and will follow up with his PCP. He is educated on signs and symptoms that require emergent evaluation. Pt is advised to return to the ED if his symptoms change or worsen. If his pain persists, pt may need further evaluation. Pt is agreeable to plan and all questions have been answered at this time.      1. Nonintractable headache, unspecified chronicity pattern, unspecified headache type      None    Please see ED course for more details:    ED Course as of 02/19/25 1510   Wed Feb 19, 2025   1331 EKG shows normal sinus rhythm, ventricular rate 64 bpm, right bundle branch block, QTc 442, QTc 455, no ST segment changes, no acute ischemic changes. [HR]      ED Course User Index  [HR] Brandyn Bowling MD       Medications   sodium chloride 0.9 % bolus 1,000 mL (0 mLs IntraVENous Stopped 2/19/25 1402)       New Prescriptions    No medications on file     Counseling:   The emergency provider has spoken with the patient and discussed today’s results, in addition to providing specific details for the plan of care and counseling regarding the diagnosis and prognosis.  Questions are answered at this time and they are agreeable with the plan.     --------------------------------- IMPRESSION AND DISPOSITION ---------------------------------    IMPRESSION  1. Nonintractable headache, unspecified chronicity pattern, unspecified headache type      DISPOSITION  Disposition: Discharge to home  Patient condition is stable    NOTE: This report was transcribed using voice recognition software. Every effort was made to ensure accuracy; however, inadvertent computerized transcription errors may be present

## 2025-02-21 ENCOUNTER — OFFICE VISIT (OUTPATIENT)
Dept: FAMILY MEDICINE CLINIC | Age: 70
End: 2025-02-21
Payer: MEDICARE

## 2025-02-21 VITALS
HEIGHT: 68 IN | TEMPERATURE: 97.2 F | SYSTOLIC BLOOD PRESSURE: 125 MMHG | HEART RATE: 66 BPM | OXYGEN SATURATION: 98 % | BODY MASS INDEX: 33.13 KG/M2 | DIASTOLIC BLOOD PRESSURE: 79 MMHG | RESPIRATION RATE: 18 BRPM | WEIGHT: 218.6 LBS

## 2025-02-21 DIAGNOSIS — G44.86 CERVICOGENIC HEADACHE: ICD-10-CM

## 2025-02-21 DIAGNOSIS — G43.009 MIGRAINE WITHOUT AURA AND WITHOUT STATUS MIGRAINOSUS, NOT INTRACTABLE: Primary | ICD-10-CM

## 2025-02-21 PROCEDURE — 3078F DIAST BP <80 MM HG: CPT | Performed by: STUDENT IN AN ORGANIZED HEALTH CARE EDUCATION/TRAINING PROGRAM

## 2025-02-21 PROCEDURE — 1159F MED LIST DOCD IN RCRD: CPT | Performed by: STUDENT IN AN ORGANIZED HEALTH CARE EDUCATION/TRAINING PROGRAM

## 2025-02-21 PROCEDURE — 1123F ACP DISCUSS/DSCN MKR DOCD: CPT | Performed by: STUDENT IN AN ORGANIZED HEALTH CARE EDUCATION/TRAINING PROGRAM

## 2025-02-21 PROCEDURE — 99214 OFFICE O/P EST MOD 30 MIN: CPT | Performed by: STUDENT IN AN ORGANIZED HEALTH CARE EDUCATION/TRAINING PROGRAM

## 2025-02-21 PROCEDURE — 3074F SYST BP LT 130 MM HG: CPT | Performed by: STUDENT IN AN ORGANIZED HEALTH CARE EDUCATION/TRAINING PROGRAM

## 2025-02-21 ASSESSMENT — PATIENT HEALTH QUESTIONNAIRE - PHQ9
5. POOR APPETITE OR OVEREATING: NOT AT ALL
2. FEELING DOWN, DEPRESSED OR HOPELESS: NOT AT ALL
7. TROUBLE CONCENTRATING ON THINGS, SUCH AS READING THE NEWSPAPER OR WATCHING TELEVISION: NOT AT ALL
6. FEELING BAD ABOUT YOURSELF - OR THAT YOU ARE A FAILURE OR HAVE LET YOURSELF OR YOUR FAMILY DOWN: NOT AT ALL
4. FEELING TIRED OR HAVING LITTLE ENERGY: SEVERAL DAYS
SUM OF ALL RESPONSES TO PHQ QUESTIONS 1-9: 2
1. LITTLE INTEREST OR PLEASURE IN DOING THINGS: NOT AT ALL
SUM OF ALL RESPONSES TO PHQ QUESTIONS 1-9: 2
SUM OF ALL RESPONSES TO PHQ9 QUESTIONS 1 & 2: 0
SUM OF ALL RESPONSES TO PHQ QUESTIONS 1-9: 2
3. TROUBLE FALLING OR STAYING ASLEEP: SEVERAL DAYS
9. THOUGHTS THAT YOU WOULD BE BETTER OFF DEAD, OR OF HURTING YOURSELF: NOT AT ALL
8. MOVING OR SPEAKING SO SLOWLY THAT OTHER PEOPLE COULD HAVE NOTICED. OR THE OPPOSITE, BEING SO FIGETY OR RESTLESS THAT YOU HAVE BEEN MOVING AROUND A LOT MORE THAN USUAL: NOT AT ALL
SUM OF ALL RESPONSES TO PHQ QUESTIONS 1-9: 2
10. IF YOU CHECKED OFF ANY PROBLEMS, HOW DIFFICULT HAVE THESE PROBLEMS MADE IT FOR YOU TO DO YOUR WORK, TAKE CARE OF THINGS AT HOME, OR GET ALONG WITH OTHER PEOPLE: NOT DIFFICULT AT ALL

## 2025-02-21 NOTE — PROGRESS NOTES
Patient:  Willian Harley 70 y.o. male     02/21/25  Chiefcomplaint:   Chief Complaint   Patient presents with    ED Follow-up    Headache     History of Present Illness      Results    Lab Results   Component Value Date     02/19/2025    K 4.1 02/19/2025     02/19/2025    CO2 25 02/19/2025    BUN 13 02/19/2025    CREATININE 0.9 02/19/2025    GLUCOSE 98 02/19/2025    CALCIUM 8.9 02/19/2025    BILITOT 1.0 02/19/2025    ALKPHOS 113 02/19/2025    AST 19 02/19/2025    ALT 18 02/19/2025    LABGLOM 88 02/19/2025    GFRAA >60 07/26/2022     Hemoglobin A1C   Date Value Ref Range Status   04/03/2024 5.6 % Final     Lab Results   Component Value Date    CHOL 125 11/25/2024    TRIG 68 11/25/2024    HDL 44 11/25/2024    LDL 67 11/25/2024    VLDL 14 11/25/2024    CHOLHDLRATIO 6.2 (H) 05/03/2017     Lab Results   Component Value Date    WBC 6.4 02/19/2025    HGB 13.3 02/19/2025    HCT 38.7 02/19/2025    MCV 88.0 02/19/2025     02/19/2025     Lab Results   Component Value Date    TSH 2.960 06/17/2021     Vitals:    02/21/25 1435   BP: 125/79   Pulse: 66   Resp: 18   Temp: 97.2 °F (36.2 °C)   SpO2: 98%   Weight: 99.2 kg (218 lb 9.6 oz)   Height: 1.715 m (5' 7.5\")      Physical Exam   Vitals: /79   Pulse 66   Temp 97.2 °F (36.2 °C)   Resp 18   Ht 1.715 m (5' 7.5\")   Wt 99.2 kg (218 lb 9.6 oz)   SpO2 98%   BMI 33.73 kg/m²   General Appearance: Alert, oriented, no acute distress  HEENT: No scleral icterus. No visible discharge from eyes  Neck: Not rigid. No visible masses  Chest wall/Lung: Clear to auscultation bilaterally,  respirations unlabored. No ronchi/wheezing/rales  Heart: RRR, no murmur  Abdomen: Soft, nontender  Extremities:  No edema  Skin: No rashes. No jaundice  Neuro: Alert and oriented        Psych: Appropriate mood and appropriate affect  {There are no diagnoses linked to this encounter. (Refresh or delete this SmartLink)}  Assessment & Plan            The patient (or guardian, if

## 2025-02-21 NOTE — PROGRESS NOTES
Patient:  Willian Harley 70 y.o. male     02/21/25      Chiefcomplaint:   Chief Complaint   Patient presents with    ED Follow-up    Headache     History of Present Illness   Headache ED fu  Blood work ok  CT head ok - sinus disease  Notes in back of head  Not like previously migraine  Planning to fu with neuro  No photosens, sound sens. No n/v.   Excedrin not as effective  No severe  Not worst of life  No visual change    No new neck pain but chronic stiffness. Goes to chiropractor usually.     Increase stress more recently. No tightening or vice like headache. No pain behind eyes or in face.     REsults  Ct head  IMPRESSION:  1. No acute intracranial abnormality.  2. Mild paranasal sinus disease.    Lab Results   Component Value Date     02/19/2025    K 4.1 02/19/2025     02/19/2025    CO2 25 02/19/2025    BUN 13 02/19/2025    CREATININE 0.9 02/19/2025    GLUCOSE 98 02/19/2025    CALCIUM 8.9 02/19/2025    BILITOT 1.0 02/19/2025    ALKPHOS 113 02/19/2025    AST 19 02/19/2025    ALT 18 02/19/2025    LABGLOM 88 02/19/2025    GFRAA >60 07/26/2022       Lab Results   Component Value Date    WBC 6.4 02/19/2025    HGB 13.3 02/19/2025    HCT 38.7 02/19/2025    MCV 88.0 02/19/2025     02/19/2025       Health Maintenance Due   Topic Date Due    Shingles vaccine (2 of 3) 12/13/2017    COVID-19 Vaccine (3 - 2024-25 season) 09/01/2024    Annual Wellness Visit (Medicare Advantage)  01/01/2025      History:  Prior to Visit Medications    Medication Sig Taking? Authorizing Provider   atorvastatin (LIPITOR) 40 MG tablet Take 1 tablet by mouth daily Yes Qasim Jack DO   amLODIPine (NORVASC) 5 MG tablet Take 1 tablet by mouth daily Yes Qasim Jack DO   venlafaxine (EFFEXOR XR) 37.5 MG extended release capsule Take 1 capsule by mouth daily Yes Qasim Jack DO      Past Medical History:   Diagnosis Date    Anxiety     Calculus of gallbladder without cholecystitis without obstruction 12/05/2023

## 2025-02-22 LAB
EKG ATRIAL RATE: 64 BPM
EKG P AXIS: 42 DEGREES
EKG P-R INTERVAL: 142 MS
EKG Q-T INTERVAL: 442 MS
EKG QRS DURATION: 142 MS
EKG QTC CALCULATION (BAZETT): 455 MS
EKG R AXIS: 52 DEGREES
EKG T AXIS: 25 DEGREES
EKG VENTRICULAR RATE: 64 BPM

## 2025-02-22 PROCEDURE — 93010 ELECTROCARDIOGRAM REPORT: CPT | Performed by: INTERNAL MEDICINE

## 2025-03-28 ENCOUNTER — TELEPHONE (OUTPATIENT)
Dept: NEUROSURGERY | Age: 70
End: 2025-03-28

## 2025-03-28 NOTE — TELEPHONE ENCOUNTER
Patient left a message on [facility] voicemail to r/s his NP appt on 4/8 with Stephanie. I called the patient back to r/s-no answer. L/M to call back.

## 2025-04-17 DIAGNOSIS — F32.2 SEVERE DEPRESSION (HCC): ICD-10-CM

## 2025-04-18 RX ORDER — VENLAFAXINE HYDROCHLORIDE 37.5 MG/1
37.5 CAPSULE, EXTENDED RELEASE ORAL DAILY
Qty: 90 CAPSULE | Refills: 1 | Status: SHIPPED | OUTPATIENT
Start: 2025-04-18

## 2025-04-18 NOTE — TELEPHONE ENCOUNTER
Name of Medication(s) Requested:  Requested Prescriptions     Pending Prescriptions Disp Refills    venlafaxine (EFFEXOR XR) 37.5 MG extended release capsule [Pharmacy Med Name: VENLAFAXINE ER 37.5MG CAPSULES] 90 capsule 1     Sig: TAKE 1 CAPSULE BY MOUTH DAILY       Medication is on current medication list Yes    Dosage and directions were verified? Yes    Quantity verified: 90 day supply     Pharmacy Verified?  Yes    Last Appointment:  2/21/2025    Future appts:  Future Appointments   Date Time Provider Department Center   4/28/2025  9:40 AM Stephanie Johns APRN - CNP [facility] Neuro Neurology -   4/30/2025  7:15 AM Qasim Jack DO [facility] DEP   6/10/2025  7:15 AM Qasim Jack DO [facility] DEP   12/10/2025  7:00 AM Qasim Jack DO [facility] DEP        (If no appt send self scheduling link. .REFILLAPPT)  Scheduling request sent?     [] Yes  [x] No    Does patient need updated?  [] Yes  [x] No

## 2025-04-28 ENCOUNTER — OFFICE VISIT (OUTPATIENT)
Dept: NEUROLOGY | Age: 70
End: 2025-04-28
Payer: MEDICARE

## 2025-04-28 VITALS
DIASTOLIC BLOOD PRESSURE: 90 MMHG | BODY MASS INDEX: 33.04 KG/M2 | WEIGHT: 218 LBS | SYSTOLIC BLOOD PRESSURE: 130 MMHG | HEIGHT: 68 IN

## 2025-04-28 DIAGNOSIS — R51.9 CHRONIC DAILY HEADACHE: Primary | ICD-10-CM

## 2025-04-28 DIAGNOSIS — G44.86 CERVICOGENIC HEADACHE: ICD-10-CM

## 2025-04-28 DIAGNOSIS — M54.81 BILATERAL OCCIPITAL NEURALGIA: ICD-10-CM

## 2025-04-28 PROCEDURE — 1159F MED LIST DOCD IN RCRD: CPT | Performed by: NURSE PRACTITIONER

## 2025-04-28 PROCEDURE — 1160F RVW MEDS BY RX/DR IN RCRD: CPT | Performed by: NURSE PRACTITIONER

## 2025-04-28 PROCEDURE — 3075F SYST BP GE 130 - 139MM HG: CPT | Performed by: NURSE PRACTITIONER

## 2025-04-28 PROCEDURE — 3080F DIAST BP >= 90 MM HG: CPT | Performed by: NURSE PRACTITIONER

## 2025-04-28 PROCEDURE — 99204 OFFICE O/P NEW MOD 45 MIN: CPT | Performed by: NURSE PRACTITIONER

## 2025-04-28 PROCEDURE — 1123F ACP DISCUSS/DSCN MKR DOCD: CPT | Performed by: NURSE PRACTITIONER

## 2025-04-28 RX ORDER — RIZATRIPTAN BENZOATE 10 MG/1
10 TABLET ORAL PRN
Qty: 30 TABLET | Refills: 2 | Status: SHIPPED | OUTPATIENT
Start: 2025-04-28

## 2025-04-28 RX ORDER — NORTRIPTYLINE HYDROCHLORIDE 10 MG/1
10 CAPSULE ORAL NIGHTLY
Qty: 30 CAPSULE | Refills: 2 | Status: SHIPPED | OUTPATIENT
Start: 2025-04-28

## 2025-04-28 NOTE — PROGRESS NOTES
[facility]  NEUROLOGY  Stephanie Johns, MSN, APRN-FNP-C  [address] [place], [place] [address]  Phone: [phone]  Fax: [phone]       Willian Harley is a 70 y.o. right handed male     Patient is ED follow up for headaches    Past Medical History:     Past Medical History:   Diagnosis Date    Anxiety     Calculus of gallbladder without cholecystitis without obstruction 12/05/2023    Chronic renal disease, stage III (HCC) [096608] 06/01/2022    Depression     Erectile dysfunction     Headache     History of shingles     Hypertension 01/25/2017    Irritable bowel syndrome     Mixed hyperlipidemia 01/25/2017    Seizures (HCC) 12/05/2023     No history of liver, lung or kidney disease.  No history of strokes or seizures.    No history of connective tissue disorders or cancers.  No history of exposures to toxins or chemicals.    History of hits to the head: May 2021 fell backwards hitting head     Past Surgical History:       Past Surgical History:   Procedure Laterality Date    COLONOSCOPY      COLONOSCOPY N/A 04/19/2023    COLORECTAL CANCER SCREENING, NOT HIGH RISK performed by Rain Elam MD at [facility] ENDOSCOPY    HEEL SPUR SURGERY      HERNIA REPAIR      UMBILICAL HERNIA REPAIR N/A 05/30/2023    HERNIA UMBILICAL REPAIR LAPAROSCOPIC ROBOTIC XI performed by Rain Elam MD at [facility] OR       Allergies:       Codeine    Medications:     Prior to Admission medications    Medication Sig Start Date End Date Taking? Authorizing Provider   Multiple Vitamin (MULTI VITAMIN PO) Take by mouth   Yes Provider, MD Sherine   venlafaxine (EFFEXOR XR) 37.5 MG extended release capsule TAKE 1 CAPSULE BY MOUTH DAILY 4/18/25  Yes Qasim Jack DO   atorvastatin (LIPITOR) 40 MG tablet Take 1 tablet by mouth daily 12/10/24  Yes Qasim Jack DO   amLODIPine (NORVASC) 5 MG tablet Take 1 tablet by mouth daily 10/23/24 4/28/25 Yes Qasim Jack DO       Social History:       He reports that he has

## 2025-04-30 ENCOUNTER — OFFICE VISIT (OUTPATIENT)
Dept: FAMILY MEDICINE CLINIC | Age: 70
End: 2025-04-30
Payer: MEDICARE

## 2025-04-30 VITALS
BODY MASS INDEX: 34.84 KG/M2 | WEIGHT: 222 LBS | HEIGHT: 67 IN | DIASTOLIC BLOOD PRESSURE: 84 MMHG | OXYGEN SATURATION: 98 % | SYSTOLIC BLOOD PRESSURE: 122 MMHG | TEMPERATURE: 97.7 F | HEART RATE: 80 BPM | RESPIRATION RATE: 18 BRPM

## 2025-04-30 DIAGNOSIS — Z00.00 MEDICARE ANNUAL WELLNESS VISIT, SUBSEQUENT: Primary | ICD-10-CM

## 2025-04-30 DIAGNOSIS — M25.561 CHRONIC PAIN OF BOTH KNEES: ICD-10-CM

## 2025-04-30 DIAGNOSIS — G89.29 CHRONIC PAIN OF BOTH KNEES: ICD-10-CM

## 2025-04-30 DIAGNOSIS — E78.49 OTHER HYPERLIPIDEMIA: Chronic | ICD-10-CM

## 2025-04-30 DIAGNOSIS — M25.562 CHRONIC PAIN OF BOTH KNEES: ICD-10-CM

## 2025-04-30 DIAGNOSIS — I10 PRIMARY HYPERTENSION: Chronic | ICD-10-CM

## 2025-04-30 PROBLEM — E11.9 TYPE 2 DIABETES MELLITUS WITHOUT COMPLICATION, UNSPECIFIED WHETHER LONG TERM INSULIN USE (HCC): Status: ACTIVE | Noted: 2025-04-30

## 2025-04-30 PROCEDURE — 3079F DIAST BP 80-89 MM HG: CPT | Performed by: STUDENT IN AN ORGANIZED HEALTH CARE EDUCATION/TRAINING PROGRAM

## 2025-04-30 PROCEDURE — 1159F MED LIST DOCD IN RCRD: CPT | Performed by: STUDENT IN AN ORGANIZED HEALTH CARE EDUCATION/TRAINING PROGRAM

## 2025-04-30 PROCEDURE — 99213 OFFICE O/P EST LOW 20 MIN: CPT | Performed by: STUDENT IN AN ORGANIZED HEALTH CARE EDUCATION/TRAINING PROGRAM

## 2025-04-30 PROCEDURE — G0439 PPPS, SUBSEQ VISIT: HCPCS | Performed by: STUDENT IN AN ORGANIZED HEALTH CARE EDUCATION/TRAINING PROGRAM

## 2025-04-30 PROCEDURE — 3074F SYST BP LT 130 MM HG: CPT | Performed by: STUDENT IN AN ORGANIZED HEALTH CARE EDUCATION/TRAINING PROGRAM

## 2025-04-30 PROCEDURE — 1123F ACP DISCUSS/DSCN MKR DOCD: CPT | Performed by: STUDENT IN AN ORGANIZED HEALTH CARE EDUCATION/TRAINING PROGRAM

## 2025-04-30 RX ORDER — AMLODIPINE BESYLATE 5 MG/1
5 TABLET ORAL DAILY
Qty: 90 TABLET | Refills: 3 | Status: SHIPPED | OUTPATIENT
Start: 2025-04-30 | End: 2026-04-25

## 2025-04-30 ASSESSMENT — PATIENT HEALTH QUESTIONNAIRE - PHQ9
SUM OF ALL RESPONSES TO PHQ QUESTIONS 1-9: 2
2. FEELING DOWN, DEPRESSED OR HOPELESS: SEVERAL DAYS
6. FEELING BAD ABOUT YOURSELF - OR THAT YOU ARE A FAILURE OR HAVE LET YOURSELF OR YOUR FAMILY DOWN: NOT AT ALL
10. IF YOU CHECKED OFF ANY PROBLEMS, HOW DIFFICULT HAVE THESE PROBLEMS MADE IT FOR YOU TO DO YOUR WORK, TAKE CARE OF THINGS AT HOME, OR GET ALONG WITH OTHER PEOPLE: NOT DIFFICULT AT ALL
3. TROUBLE FALLING OR STAYING ASLEEP: NOT AT ALL
4. FEELING TIRED OR HAVING LITTLE ENERGY: NOT AT ALL
1. LITTLE INTEREST OR PLEASURE IN DOING THINGS: SEVERAL DAYS
7. TROUBLE CONCENTRATING ON THINGS, SUCH AS READING THE NEWSPAPER OR WATCHING TELEVISION: NOT AT ALL
8. MOVING OR SPEAKING SO SLOWLY THAT OTHER PEOPLE COULD HAVE NOTICED. OR THE OPPOSITE, BEING SO FIGETY OR RESTLESS THAT YOU HAVE BEEN MOVING AROUND A LOT MORE THAN USUAL: NOT AT ALL
SUM OF ALL RESPONSES TO PHQ QUESTIONS 1-9: 2
9. THOUGHTS THAT YOU WOULD BE BETTER OFF DEAD, OR OF HURTING YOURSELF: NOT AT ALL
SUM OF ALL RESPONSES TO PHQ QUESTIONS 1-9: 2
5. POOR APPETITE OR OVEREATING: NOT AT ALL
SUM OF ALL RESPONSES TO PHQ QUESTIONS 1-9: 2

## 2025-04-30 NOTE — PROGRESS NOTES
Medicare Annual Wellness Visit    Willian Harley is here for Medicare AWV    Assessment & Plan   Medicare annual wellness visit, subsequent  Primary hypertension  -     amLODIPine (NORVASC) 5 MG tablet; Take 1 tablet by mouth daily, Disp-90 tablet, R-3Normal  Type 2 diabetes mellitus without complication, unspecified whether long term insulin use (HCC)  Other hyperlipidemia  Mild episode of recurrent major depressive disorder  Chronic pain of both knees  -     Sherin - Cholo Gan DO, Orthopaedic Surgery, [place]       Return in 4 months (on 8/30/2025) for Medicare Annual Wellness Visit in 1 year.     Subjective       Patient's complete Health Risk Assessment and screening values have been reviewed and are found in Flowsheets. The following problems were reviewed today and where indicated follow up appointments were made and/or referrals ordered.    Positive Risk Factor Screenings with Interventions:             General HRA Questions:  Select all that apply: (!) New or Increased Pain  Interventions - Pain:  Knee pain - sometimes feels like they'll lock up - using tiger balm.         Abnormal BMI (obese):  Body mass index is 34.77 kg/m². (!) Abnormal  Interventions:  Patient declines any further evaluation or treatment        Dentist Screen:  Have you seen the dentist within the past year?: (!) No    Intervention:  Advised to schedule with their dentist    Hearing Screen:  Do you or your family notice any trouble with your hearing that hasn't been managed with hearing aids?: (!) Yes    Interventions:  Patient declines any further evaluation or treatment. Can't afford aids.     Safety:  Do you have non-slip mats or non-slip surfaces or shower bars or grab bars in your shower or bathtub?: (!) No  Interventions:  Patient declined any further interventions or treatment                   Objective   Vitals:    04/30/25 0707   BP: 122/84   Pulse: 80   Resp: 18   Temp: 97.7 °F (36.5 °C)   SpO2: 98%   Weight: 100.7 kg

## 2025-04-30 NOTE — PROGRESS NOTES
Chronic knee pain - feels like they'll lock up at times. Anterior pain. Worse with sitting prolonged periods. No recent imaging. No recent injury. Recommend ortho fu    HLD on statin - controlled - continue same  Htn controlled. No change in plan. Current meds continued.

## 2025-04-30 NOTE — Clinical Note
Willian Harley                                                                [address] [place] [id number]         4/30/25     Dear Mr. Harley:  MRN:[id number]    This message is regarding a referral that needs to be scheduled. We were unable to reach you by phone; however, your referral is available for review in [facility] under insurance in the drop down menu. We will be making further attempts to contact you to get this scheduled.       Thank you,  Tawanda [facility]

## 2025-04-30 NOTE — PATIENT INSTRUCTIONS

## 2025-05-15 ENCOUNTER — OFFICE VISIT (OUTPATIENT)
Dept: ORTHOPEDIC SURGERY | Age: 70
End: 2025-05-15

## 2025-05-15 VITALS
DIASTOLIC BLOOD PRESSURE: 84 MMHG | HEART RATE: 106 BPM | TEMPERATURE: 97.7 F | OXYGEN SATURATION: 98 % | RESPIRATION RATE: 16 BRPM | SYSTOLIC BLOOD PRESSURE: 128 MMHG

## 2025-05-15 DIAGNOSIS — M25.561 PAIN IN BOTH KNEES, UNSPECIFIED CHRONICITY: Primary | ICD-10-CM

## 2025-05-15 DIAGNOSIS — M25.562 PAIN IN BOTH KNEES, UNSPECIFIED CHRONICITY: Primary | ICD-10-CM

## 2025-05-15 DIAGNOSIS — M17.0 BILATERAL PRIMARY OSTEOARTHRITIS OF KNEE: ICD-10-CM

## 2025-05-15 RX ORDER — TRIAMCINOLONE ACETONIDE 40 MG/ML
80 INJECTION, SUSPENSION INTRA-ARTICULAR; INTRAMUSCULAR ONCE
Status: COMPLETED | OUTPATIENT
Start: 2025-05-15 | End: 2025-05-15

## 2025-05-15 RX ORDER — BUPIVACAINE HYDROCHLORIDE 2.5 MG/ML
3 INJECTION, SOLUTION INFILTRATION; PERINEURAL ONCE
Status: COMPLETED | OUTPATIENT
Start: 2025-05-15 | End: 2025-05-15

## 2025-05-15 RX ADMIN — BUPIVACAINE HYDROCHLORIDE 7.5 MG: 2.5 INJECTION, SOLUTION INFILTRATION; PERINEURAL at 15:24

## 2025-05-15 RX ADMIN — TRIAMCINOLONE ACETONIDE 80 MG: 40 INJECTION, SUSPENSION INTRA-ARTICULAR; INTRAMUSCULAR at 15:24

## 2025-05-15 RX ADMIN — TRIAMCINOLONE ACETONIDE 80 MG: 40 INJECTION, SUSPENSION INTRA-ARTICULAR; INTRAMUSCULAR at 15:25

## 2025-05-15 NOTE — PROGRESS NOTES
New Knee Patient     Referring Provider:   Qasim Jack DO  [address]  [address] [place] [address]    CHIEF COMPLAINT:   Chief Complaint   Patient presents with    Follow-up     Bl knee pain        HPI:    Willian Harley is a 70 y.o. year old male with multiple year history of chronic bilateral knee pain.  He was seen about 20 years ago by [facility] and started physical therapy.  He has had intermittent pain since that time.  Recently over the last 6 weeks he had increasing pain especially with ambulation and getting up from a chair.  Once he gets moving his pain feels better.  He has pain at the end of the night and swelling and pain located on the medial joint line.  This gets worse with activity and better with rest.  He has been taking chondroitin pills and using Tiger balm with minimal relief.    PAST MEDICAL HISTORY  Past Medical History:   Diagnosis Date    Anxiety     Calculus of gallbladder without cholecystitis without obstruction 12/05/2023    Chronic renal disease, stage III ([facility]) [[id number]] 06/01/2022    Depression     Erectile dysfunction     Headache     History of shingles     Hypertension 01/25/2017    Irritable bowel syndrome     Mixed hyperlipidemia 01/25/2017    Seizures ([facility]) 12/05/2023       PAST SURGICAL HISTORY  Past Surgical History:   Procedure Laterality Date    COLONOSCOPY      COLONOSCOPY N/A 04/19/2023    COLORECTAL CANCER SCREENING, NOT HIGH RISK performed by Rain Elam MD at [facility] ENDOSCOPY    HEEL SPUR SURGERY      HERNIA REPAIR      UMBILICAL HERNIA REPAIR N/A 05/30/2023    HERNIA UMBILICAL REPAIR LAPAROSCOPIC ROBOTIC XI performed by Rain Elam MD at [facility] OR         FAMILY HISTORY   Family History   Problem Relation Age of Onset    Heart Disease Mother         Valves    Atrial Fibrillation Father     Heart Disease Father         Valve    Heart Attack Father 64       SOCIAL HISTORY  Social History     Socioeconomic History    Marital

## 2025-05-23 DIAGNOSIS — Z76.0 MEDICATION REFILL: ICD-10-CM

## 2025-05-23 RX ORDER — ATORVASTATIN CALCIUM 40 MG/1
40 TABLET, FILM COATED ORAL DAILY
Qty: 90 TABLET | Refills: 3 | Status: SHIPPED | OUTPATIENT
Start: 2025-05-23

## 2025-05-23 NOTE — TELEPHONE ENCOUNTER
Name of Medication(s) Requested:  Requested Prescriptions     Pending Prescriptions Disp Refills    atorvastatin (LIPITOR) 40 MG tablet [Pharmacy Med Name: ATORVASTATIN 40MG TABLETS] 90 tablet 1     Sig: TAKE 1 TABLET BY MOUTH DAILY       Medication is on current medication list Yes    Dosage and directions were verified? Yes    Quantity verified: 90 day supply     Pharmacy Verified?  Yes    Last Appointment:  4/30/2025    Future appts:  Future Appointments   Date Time Provider Department Center   7/14/2025  1:20 PM Stephanie Johns APRN - CNP [facility] -   9/2/2025  7:45 AM Qasim Jack DO [facility] DEP        (If no appt send self scheduling link. .REFILLAPPT)  Scheduling request sent?     [] Yes  [] No    Does patient need updated?  [] Yes  [] No

## 2025-07-14 ENCOUNTER — OFFICE VISIT (OUTPATIENT)
Dept: NEUROLOGY | Age: 70
End: 2025-07-14
Payer: MEDICARE

## 2025-07-14 VITALS
OXYGEN SATURATION: 100 % | TEMPERATURE: 98.1 F | WEIGHT: 222 LBS | SYSTOLIC BLOOD PRESSURE: 122 MMHG | DIASTOLIC BLOOD PRESSURE: 80 MMHG | HEART RATE: 84 BPM | BODY MASS INDEX: 34.84 KG/M2 | HEIGHT: 67 IN

## 2025-07-14 DIAGNOSIS — G25.0 ESSENTIAL TREMOR: ICD-10-CM

## 2025-07-14 DIAGNOSIS — M54.81 BILATERAL OCCIPITAL NEURALGIA: ICD-10-CM

## 2025-07-14 DIAGNOSIS — G44.86 CERVICOGENIC HEADACHE: ICD-10-CM

## 2025-07-14 DIAGNOSIS — R51.9 CHRONIC DAILY HEADACHE: Primary | ICD-10-CM

## 2025-07-14 PROCEDURE — 1160F RVW MEDS BY RX/DR IN RCRD: CPT | Performed by: NURSE PRACTITIONER

## 2025-07-14 PROCEDURE — 1159F MED LIST DOCD IN RCRD: CPT | Performed by: NURSE PRACTITIONER

## 2025-07-14 PROCEDURE — 99214 OFFICE O/P EST MOD 30 MIN: CPT | Performed by: NURSE PRACTITIONER

## 2025-07-14 PROCEDURE — 3079F DIAST BP 80-89 MM HG: CPT | Performed by: NURSE PRACTITIONER

## 2025-07-14 PROCEDURE — 3074F SYST BP LT 130 MM HG: CPT | Performed by: NURSE PRACTITIONER

## 2025-07-14 PROCEDURE — 1123F ACP DISCUSS/DSCN MKR DOCD: CPT | Performed by: NURSE PRACTITIONER

## 2025-07-14 RX ORDER — PRIMIDONE 50 MG/1
50 TABLET ORAL NIGHTLY
Qty: 30 TABLET | Refills: 3 | Status: SHIPPED | OUTPATIENT
Start: 2025-07-14

## 2025-07-14 RX ORDER — NORTRIPTYLINE HYDROCHLORIDE 10 MG/1
10 CAPSULE ORAL NIGHTLY
Qty: 90 CAPSULE | Refills: 3 | Status: SHIPPED | OUTPATIENT
Start: 2025-07-14

## 2025-07-14 RX ORDER — RIZATRIPTAN BENZOATE 10 MG/1
10 TABLET ORAL PRN
Qty: 30 TABLET | Refills: 2 | Status: SHIPPED | OUTPATIENT
Start: 2025-07-14

## 2025-07-14 NOTE — PROGRESS NOTES
[facility]  NEUROLOGY  Stephanie Johns, MSN, APRN-FNP-C  [address], [place] [address]  Phone: [phone]  Fax: [phone]       Willian Harley is a 70 y.o. right handed male     Patient follows for headaches    Patient is a hospital ED follow up for headaches.  Patient initially presented to ED back in December 2024 for injury to his head.  Pt hit his forehead on metal ended up having a lump on his head.  No loss of consciousness.  He was walking underneath a piece of metal equipment when he struck his forehead on the metal.  He has a hx of previous SAH from a ground level fall.  CTH was negative for acute findings.  Pt was discharged home.  In February 2025 pt then presented to the ED for headache.  He has a hx of chronic headaches in the past noting having migraines in his teen years.  Father has hx of migraines and would call them \"sick headaches\". Pt notes his headaches have been worsening and are located in the back of his head.  CTH was completed and was negative for acute findings.  He was discharged home.    Pt with continued headaches in the back of his head.  He notes no radiation of the headache. He has never been on migraine preventative but was discharged on sumatriptan and took it everyday for 15 days as this is how it was written on the script.     7/14/25 patient follow up today has been taking nortriptyline and notes good headache control having 3-4 bad headache days.  Tried the rizatriptan as needed and it worked as an abortive tx for his headache.  Stable. Pt with tremors in bilateral hands with activity not at rest.  Onset > 1 year.  No family hx of tremors or PD.  No resting tremor.      Description of Headaches:  Location of pain: bilateral, occipital, nuchal  Radiation of pain?:none  Character of pain:aching, throbbing, and nagging  Severity of pain: 5  Accompanying symptoms: decreased social functioning, neck stiffness  Prodromal sx?: none  Rapidity of onset:

## 2025-09-02 ENCOUNTER — OFFICE VISIT (OUTPATIENT)
Dept: FAMILY MEDICINE CLINIC | Age: 70
End: 2025-09-02
Payer: MEDICARE

## 2025-09-02 VITALS
OXYGEN SATURATION: 100 % | WEIGHT: 226 LBS | HEART RATE: 79 BPM | HEIGHT: 67 IN | SYSTOLIC BLOOD PRESSURE: 130 MMHG | DIASTOLIC BLOOD PRESSURE: 84 MMHG | RESPIRATION RATE: 18 BRPM | TEMPERATURE: 97.5 F | BODY MASS INDEX: 35.47 KG/M2

## 2025-09-02 DIAGNOSIS — E78.49 OTHER HYPERLIPIDEMIA: Chronic | ICD-10-CM

## 2025-09-02 DIAGNOSIS — Z12.5 SCREENING FOR MALIGNANT NEOPLASM OF PROSTATE: ICD-10-CM

## 2025-09-02 DIAGNOSIS — I10 PRIMARY HYPERTENSION: Primary | Chronic | ICD-10-CM

## 2025-09-02 PROBLEM — R07.9 CHEST PAIN: Status: RESOLVED | Noted: 2024-09-27 | Resolved: 2025-09-02

## 2025-09-02 PROCEDURE — 99214 OFFICE O/P EST MOD 30 MIN: CPT | Performed by: STUDENT IN AN ORGANIZED HEALTH CARE EDUCATION/TRAINING PROGRAM

## 2025-09-02 PROCEDURE — 1159F MED LIST DOCD IN RCRD: CPT | Performed by: STUDENT IN AN ORGANIZED HEALTH CARE EDUCATION/TRAINING PROGRAM

## 2025-09-02 PROCEDURE — 3079F DIAST BP 80-89 MM HG: CPT | Performed by: STUDENT IN AN ORGANIZED HEALTH CARE EDUCATION/TRAINING PROGRAM

## 2025-09-02 PROCEDURE — 3075F SYST BP GE 130 - 139MM HG: CPT | Performed by: STUDENT IN AN ORGANIZED HEALTH CARE EDUCATION/TRAINING PROGRAM

## 2025-09-02 PROCEDURE — 1123F ACP DISCUSS/DSCN MKR DOCD: CPT | Performed by: STUDENT IN AN ORGANIZED HEALTH CARE EDUCATION/TRAINING PROGRAM

## 2025-09-02 PROCEDURE — 1160F RVW MEDS BY RX/DR IN RCRD: CPT | Performed by: STUDENT IN AN ORGANIZED HEALTH CARE EDUCATION/TRAINING PROGRAM

## 2025-09-02 PROCEDURE — G2211 COMPLEX E/M VISIT ADD ON: HCPCS | Performed by: STUDENT IN AN ORGANIZED HEALTH CARE EDUCATION/TRAINING PROGRAM

## 2025-09-02 RX ORDER — ERYTHROMYCIN 5 MG/G
OINTMENT OPHTHALMIC
COMMUNITY
Start: 2025-08-22 | End: 2025-09-02

## (undated) DEVICE — ROBOTIC: Brand: MEDLINE INDUSTRIES, INC.

## (undated) DEVICE — GAUZE,SPONGE,4"X4",8PLY,STRL,LF,10/TRAY: Brand: MEDLINE

## (undated) DEVICE — INSUFFLATION NEEDLE TO ESTABLISH PNEUMOPERITONEUM.: Brand: INSUFFLATION NEEDLE

## (undated) DEVICE — TIP COVER ACCESSORY

## (undated) DEVICE — NEEDLE CLOSURE OMNICLOSE

## (undated) DEVICE — SYRINGE 20ML LL S/C 50

## (undated) DEVICE — TROCAR: Brand: KII FIOS FIRST ENTRY

## (undated) DEVICE — COLUMN DRAPE

## (undated) DEVICE — PAD PT POS 36 IN SURGYPAD DISP

## (undated) DEVICE — CONNECTOR IRRIGATION AUXILIARY H2O JET W/ PRT MTL THRD HYDR

## (undated) DEVICE — DRAPE,LAP,CHOLE,W/TROUGHS,STERILE: Brand: MEDLINE

## (undated) DEVICE — Z DISCONTINUED NO SUB IDED TUBING ETCO2 AD L6.5FT NSL ORAL CVD PRNG NONFLARED TIP OVR

## (undated) DEVICE — TISSUE RETRIEVAL SYSTEM: Brand: INZII RETRIEVAL SYSTEM

## (undated) DEVICE — SOLUTION IRRIG 1000ML STRL H2O USP PLAS POUR BTL

## (undated) DEVICE — BLADE CLIPPER GEN PURP NS

## (undated) DEVICE — GLOVE ORANGE PI 7   MSG9070

## (undated) DEVICE — GLOVE SURG SZ 65 THK91MIL LTX FREE SYN POLYISOPRENE

## (undated) DEVICE — CANNULA SEAL

## (undated) DEVICE — COVER,MAYO STAND,STERILE: Brand: MEDLINE

## (undated) DEVICE — BLADELESS OBTURATOR: Brand: WECK VISTA

## (undated) DEVICE — ARM DRAPE

## (undated) DEVICE — DEFENDO AIR WATER SUCTION AND BIOPSY VALVE KIT FOR  OLYMPUS: Brand: DEFENDO AIR/WATER/SUCTION AND BIOPSY VALVE

## (undated) DEVICE — DRAPE,REIN 53X77,STERILE: Brand: MEDLINE

## (undated) DEVICE — [HIGH FLOW INSUFFLATOR,  DO NOT USE IF PACKAGE IS DAMAGED,  KEEP DRY,  KEEP AWAY FROM SUNLIGHT,  PROTECT FROM HEAT AND RADIOACTIVE SOURCES.]: Brand: PNEUMOSURE

## (undated) DEVICE — 1LYRTR 16FR10ML 100%SILI SNAP: Brand: MEDLINE INDUSTRIES, INC.